# Patient Record
Sex: FEMALE | Race: WHITE | Employment: OTHER | URBAN - METROPOLITAN AREA
[De-identification: names, ages, dates, MRNs, and addresses within clinical notes are randomized per-mention and may not be internally consistent; named-entity substitution may affect disease eponyms.]

---

## 2017-01-01 ENCOUNTER — CARE COORDINATION (OUTPATIENT)
Dept: CARDIOLOGY | Facility: CLINIC | Age: 82
End: 2017-01-01

## 2017-01-01 ENCOUNTER — ALLIED HEALTH/NURSE VISIT (OUTPATIENT)
Dept: CARDIOLOGY | Facility: CLINIC | Age: 82
End: 2017-01-01
Payer: COMMERCIAL

## 2017-01-01 DIAGNOSIS — I50.9 ACUTE ON CHRONIC CONGESTIVE HEART FAILURE, UNSPECIFIED CONGESTIVE HEART FAILURE TYPE: ICD-10-CM

## 2017-01-01 DIAGNOSIS — E03.9 HYPOTHYROIDISM, UNSPECIFIED TYPE: Primary | ICD-10-CM

## 2017-01-01 DIAGNOSIS — Z95.0 CARDIAC PACEMAKER IN SITU: Primary | ICD-10-CM

## 2017-01-01 PROCEDURE — 93296 REM INTERROG EVL PM/IDS: CPT | Performed by: INTERNAL MEDICINE

## 2017-01-01 PROCEDURE — 93294 REM INTERROG EVL PM/LDLS PM: CPT | Performed by: INTERNAL MEDICINE

## 2017-01-01 RX ORDER — TORSEMIDE 20 MG/1
TABLET ORAL
Qty: 30 TABLET | COMMUNITY
Start: 2017-01-01 | End: 2018-01-01

## 2017-01-01 RX ORDER — TORSEMIDE 20 MG/1
TABLET ORAL
Qty: 30 TABLET | COMMUNITY
Start: 2017-01-01 | End: 2017-01-01

## 2017-01-01 RX ORDER — LEVOTHYROXINE SODIUM 112 UG/1
TABLET ORAL
Qty: 90 TABLET | Refills: 0 | Status: SHIPPED | OUTPATIENT
Start: 2017-01-01 | End: 2018-01-01

## 2017-01-01 RX ORDER — HYDRALAZINE HYDROCHLORIDE 25 MG/1
25 TABLET, FILM COATED ORAL 3 TIMES DAILY
Qty: 270 TABLET | Refills: 3 | Status: ON HOLD | OUTPATIENT
Start: 2017-01-01 | End: 2018-01-01

## 2017-01-09 ENCOUNTER — TELEPHONE (OUTPATIENT)
Dept: INTERNAL MEDICINE | Facility: CLINIC | Age: 82
End: 2017-01-09

## 2017-01-09 DIAGNOSIS — R10.32 LEFT GROIN PAIN: Primary | ICD-10-CM

## 2017-01-09 RX ORDER — TRAMADOL HYDROCHLORIDE 50 MG/1
50 TABLET ORAL EVERY 6 HOURS PRN
Qty: 30 TABLET | Refills: 0 | Status: SHIPPED | OUTPATIENT
Start: 2017-01-09 | End: 2017-02-23

## 2017-01-09 NOTE — TELEPHONE ENCOUNTER
"I cant say what is causing her pain without seeing her.    She does have problems with the arteries into the left leg based on the ultrasound test performed last week, I am not sure if this is playing any role or not.  It could be hip joint arthritis, it could be muscles/soft tissues, not sure until I examine her.   Any kind of \"10/10 pain\" should be evaluated.  If truly \"10/10\" and so bad that you cannot bear any weight, then evaluation in the ER may be indicated.    I would avoid Motrin kind of medications due to kidney problems.   I can send in a prescription for pain medication to start with, but this is only to hopefully provide some temporary symptom relief until she can get in top see us in the next couple of days.   Try Tramadol pain medication 50 mg, 3-4 times per day as needed to help take edge off the pain.  This may cause drowsiness and constipation, stomach upset.  Stop it if you have side effects.  DO not drive with this medication if you have any side effects or drowsiness.   Hand signed RX faxed to the specific pharmacy by myself.   "

## 2017-01-09 NOTE — TELEPHONE ENCOUNTER
"Received call from Mary .[phone]:      Developed spontaneous lt  groin pain about 2-3 weeks ago, HC nurse  thought pulled muscle in groin.  Pain calmed down until last evening, pain severe, rating \"10\" on 1-10 scale. Unable to put weight on leg,  had to lift legs into bed, slept minimal. This AM pain slightly better, using walker.   Lt leg swollen, due to lymphedema    Pt reluctant to to take medication due to her kidney function, she has not tried tylenol.  Asking what she should take?    Do you think should be seen?   Pharm entered if rx for pain med appropriate      "

## 2017-01-10 ENCOUNTER — CARE COORDINATION (OUTPATIENT)
Dept: CASE MANAGEMENT | Facility: CLINIC | Age: 82
End: 2017-01-10

## 2017-01-10 NOTE — PROGRESS NOTES
Clinic Care Coordination Contact  Care Team Conversations    Per [facility] chart review patient is still receiving services from [facility].    Plan:  Secure e-mail sent to home care , Vero Fulton RN, asking for progress update.  Will wait update from Vero.  CARMELLA CC will check on home care status in 2-3 weeks.    ALLYN Brown, RN, PHN  [facility] Care Coordinator  [phone]  January 10, 2017

## 2017-01-11 NOTE — PROGRESS NOTES
Clinic Care Coordination Contact  Care Team Conversations    Update received from Vero Fulton, RN with [facility].  She said that OT lymphedema finished last week due to LIZABETH results indicating moderate to severe PAD and compression is not recommended.  Home care discharge is planned for 1/13/2017 per patient request.  She is ready to start driving.  Home PT update from yesterday noted patient has groin pain and the MD gave RX for Tramadol and she is to schedule follow up with PCP.    Discharge plan may change after home care visit on 1/13/2017.    Plan:  RN CC will check on home care status next week.    ALYLN Brown, RN, PHN  [facility] Care Coordinator  [phone]  January 11, 2017

## 2017-01-12 ENCOUNTER — OFFICE VISIT (OUTPATIENT)
Dept: CARDIOLOGY | Facility: CLINIC | Age: 82
End: 2017-01-12
Payer: COMMERCIAL

## 2017-01-12 VITALS
WEIGHT: 189 LBS | BODY MASS INDEX: 32.27 KG/M2 | DIASTOLIC BLOOD PRESSURE: 64 MMHG | HEART RATE: 68 BPM | HEIGHT: 64 IN | SYSTOLIC BLOOD PRESSURE: 136 MMHG

## 2017-01-12 DIAGNOSIS — I50.22 CHRONIC SYSTOLIC HEART FAILURE (H): ICD-10-CM

## 2017-01-12 DIAGNOSIS — R60.0 LOCALIZED EDEMA: ICD-10-CM

## 2017-01-12 DIAGNOSIS — I42.9 CARDIOMYOPATHY, IDIOPATHIC (H): ICD-10-CM

## 2017-01-12 DIAGNOSIS — I50.22 CHRONIC SYSTOLIC CONGESTIVE HEART FAILURE (H): Primary | ICD-10-CM

## 2017-01-12 LAB
ANION GAP SERPL CALCULATED.3IONS-SCNC: 13.5 MMOL/L (ref 6–17)
BUN SERPL-MCNC: 69 MG/DL (ref 7–30)
CALCIUM SERPL-MCNC: 9.4 MG/DL (ref 8.5–10.5)
CHLORIDE SERPL-SCNC: 105 MMOL/L (ref 98–107)
CO2 SERPL-SCNC: 25 MMOL/L (ref 23–29)
CREAT SERPL-MCNC: 1.95 MG/DL (ref 0.7–1.3)
GFR SERPL CREATININE-BSD FRML MDRD: 24 ML/MIN/1.7M2
GLUCOSE SERPL-MCNC: 91 MG/DL (ref 70–105)
POTASSIUM SERPL-SCNC: 5.5 MMOL/L (ref 3.5–5.1)
SODIUM SERPL-SCNC: 138 MMOL/L (ref 136–145)

## 2017-01-12 PROCEDURE — 80048 BASIC METABOLIC PNL TOTAL CA: CPT | Performed by: PHYSICIAN ASSISTANT

## 2017-01-12 PROCEDURE — 36415 COLL VENOUS BLD VENIPUNCTURE: CPT | Performed by: PHYSICIAN ASSISTANT

## 2017-01-12 PROCEDURE — 99205 OFFICE O/P NEW HI 60 MIN: CPT | Performed by: INTERNAL MEDICINE

## 2017-01-12 RX ORDER — SPIRONOLACTONE 25 MG/1
12.5 TABLET ORAL DAILY
Qty: 90 TABLET | Refills: 3 | COMMUNITY
Start: 2017-01-12 | End: 2017-08-23

## 2017-01-12 NOTE — PROGRESS NOTES
HPI and Plan:   See dictation    Orders Placed This Encounter   Procedures     ICD/Pacemaker/Loop Recorder Procedure       Orders Placed This Encounter   Medications     spironolactone (ALDACTONE) 25 MG tablet     Sig: Take 0.5 tablets (12.5 mg) by mouth daily     Dispense:  90 tablet     Refill:  3       Medications Discontinued During This Encounter   Medication Reason     spironolactone (ALDACTONE) 25 MG tablet Reorder         Encounter Diagnoses   Name Primary?     Chronic systolic congestive heart failure (H) Yes     Cardiomyopathy, idiopathic (H)      Localized edema        CURRENT MEDICATIONS:  Current Outpatient Prescriptions   Medication Sig Dispense Refill     spironolactone (ALDACTONE) 25 MG tablet Take 0.5 tablets (12.5 mg) by mouth daily 90 tablet 3     traMADol (ULTRAM) 50 MG tablet Take 1 tablet (50 mg) by mouth every 6 hours as needed for moderate pain or severe pain maximum 4 tablet(s) per day 30 tablet 0     isosorbide mononitrate (IMDUR) 30 MG 24 hr tablet 1/2 tablet daily AT NIGHT 45 tablet 3     metolazone (ZAROXOLYN) 2.5 MG tablet As directed by cardiology 10 tablet 3     torsemide (DEMADEX) 20 MG tablet Take 2 tablets (40 mg) by mouth 2 times daily 90 tablet 3     hydrALAZINE (APRESOLINE) 25 MG tablet Take 1 tablet (25 mg) by mouth 3 times daily 270 tablet 3     Carvedilol (COREG PO) Take 25 mg by mouth 2 times daily (with meals)       MELATONIN PO Take 3 mg by mouth nightly as needed       fluticasone (FLONASE) 50 MCG/ACT nasal spray Spray 1-2 sprays into both nostrils daily as needed for rhinitis or allergies (TAKE ONCE DAILY DURIGN ALLERGY SEASON) 16 g 11     aspirin 81 MG tablet Take 81 mg by mouth daily       Iron TABS Take 324 mg by mouth daily Patient states she takes OTC iron furrous gluconate 324 mg tablets USP       levothyroxine (SYNTHROID, LEVOTHROID) 112 MCG tablet Take 1 tablet (112 mcg) by mouth daily 90 tablet 3     simvastatin (ZOCOR) 20 MG tablet Take 1 tablet (20 mg) by  mouth At Bedtime 90 tablet 3     calcium citrate (CALCITRATE) 950 MG tablet Take 2 tablets by mouth daily At Noon       VITAMIN D, CHOLECALCIFEROL, PO Take 2,000 Units by mouth daily        Lutein 20 MG CAPS Take 20 mg by mouth daily       prednisoLONE acetate (PRED FORTE) 1 % ophthalmic suspension Place 1 drop into the right eye every evening  1 Bottle      Multiple Vitamins-Minerals (MULTIVITAMIN OR) Take 1 tablet by mouth daily Takes with lunch       Omega-3 Fatty Acids (FISH OIL PO) Take 1,500 mg by mouth daily.       [DISCONTINUED] spironolactone (ALDACTONE) 25 MG tablet Take 1 tablet (25 mg) by mouth daily 90 tablet 3       ALLERGIES     Allergies   Allergen Reactions     Atenolol Anaphylaxis     Hydrochlorothiazide      hyponatremia     Pollen Extract        PAST MEDICAL HISTORY:  Past Medical History   Diagnosis Date     Hypertension      CHF (NYHA class II, ACC/AHA stage C) (H) 2/14/2013     Hypothyroidism 2/14/2013     Cardiomyopathy, idiopathic (H)      cardiomyopathy HTN vs viral; EF as low as 15% in 2009     Aortic stenosis      CAD (coronary artery disease)      CT calcium xwetp=883 May 2013     Glaucoma      Pulmonary hypertension (H)      H/O angiography 9-1-2016     No angiographic evidence of obstructive coronary artery disease.       PAST SURGICAL HISTORY:  Past Surgical History   Procedure Laterality Date     Gyn surgery       Hysterectomy 1995     Orthopedic surgery       knee replacement 1998     Orthopedic surgery       Knee replacement 2008     Orthopedic surgery       knee surgery [place]     Hysterectomy, pap no longer indicated       Cardiac catherization  9/1/16       FAMILY HISTORY:  Family History   Problem Relation Age of Onset     CANCER Mother      Uterius     Breast Cancer Daughter        SOCIAL HISTORY:  Social History     Social History     Marital Status:      Spouse Name: N/A     Number of Children: N/A     Years of Education: N/A     Social History Main Topics     Smoking  status: Never Smoker      Smokeless tobacco: Never Used     Alcohol Use: No     Drug Use: No     Sexual Activity: No     Other Topics Concern     Parent/Sibling W/ Cabg, Mi Or Angioplasty Before 65f 55m? No     Caffeine Concern No     Sleep Concern No     Stress Concern No     Weight Concern No     Special Diet Yes     low sodium     Exercise Yes     therapy     Seat Belt Yes     Social History Narrative       Review of Systems:  Skin:          Eyes:         ENT:         Respiratory:  Negative   uses O2 at night   Cardiovascular:    edema;Positive for;fatigue;exercise intolerance edema in LE improving and in abdomen, but now edema in thighs  Gastroenterology: Negative      Genitourinary:  Negative      Musculoskeletal:  Positive for arthritis    Neurologic:  Negative      Psychiatric:  Negative      Heme/Lymph/Imm:  Positive for allergies;anemia    Endocrine:  Positive for thyroid disorder      [id number]

## 2017-01-12 NOTE — MR AVS SNAPSHOT
After Visit Summary   1/12/2017    Mckayla Oconnell    MRN: [id number]           Patient Information     Date Of Birth          7/2/1927        Visit Information        Provider Department      1/12/2017 4:15 PM Levi Charles MD [facility]        Care Instructions    1.  Hold spironolactone on Friday and Saturday.  2.  On Arthur 01/15/2017 restart at half-dose 12.5 mg daily (currently you are taking 25 mg daily).         Follow-ups after your visit        Your next 10 appointments already scheduled     Jan 13, 2017  3:50 PM   Core Return with Divya Kraft PA-C   [facility] ([facility])    [address] [place] [id number] [phone]              Who to contact     If you have questions or need follow up information about today's clinic visit or your schedule please contact [facility] directly at [phone].  Normal or non-critical lab and imaging results will be communicated to you by [employer], letter or phone within 4 business days after the clinic has received the results. If you do not hear from us within 7 days, please contact the clinic through [email or web address] or phone. If you have a critical or abnormal lab result, we will notify you by phone as soon as possible.  Submit refill requests through [employer] or call your pharmacy and they will forward the refill request to us. Please allow 3 business days for your refill to be completed.          Additional Information About Your Visit        QuadW[employer]hart Information     [employer] gives you secure access to your electronic health record. If you see a primary care provider, you can also send messages to your care team and make appointments. If you have questions, please call your primary care clinic.  If you do not have a primary care provider, please call [phone] and they  "will assist you.        Care EveryWhere ID     This is your Care EveryWhere ID. This could be used by other organizations to access your [place] medical records  [id number]        Your Vitals Were     Pulse Height BMI (Body Mass Index)             68 1.626 m (5' 4\") 32.43 kg/m2          Blood Pressure from Last 3 Encounters:   01/12/17 136/64   12/19/16 132/64   12/02/16 128/50    Weight from Last 3 Encounters:   01/12/17 85.73 kg (189 lb)   12/19/16 85.73 kg (189 lb)   12/02/16 85.049 kg (187 lb 8 oz)              Today, you had the following     No orders found for display       Primary Care Provider Office Phone # Fax #    Ander Shrestha [phone]       [facility] [address] [facility] [address]        Thank you!     Thank you for choosing [facility] [place]  for your care. Our goal is always to provide you with excellent care. Hearing back from our patients is one way we can continue to improve our services. Please take a few minutes to complete the written survey that you may receive in the mail after your visit with us. Thank you!             Your Updated Medication List - Protect others around you: Learn how to safely use, store and throw away your medicines at www.disposemymeds.org.          This list is accurate as of: 1/12/17  4:50 PM.  Always use your most recent med list.                   Brand Name Dispense Instructions for use    aspirin 81 MG tablet      Take 81 mg by mouth daily       calcium citrate 950 MG tablet    CALCITRATE     Take 2 tablets by mouth daily At Noon       COREG PO      Take 25 mg by mouth 2 times daily (with meals)       FISH OIL PO      Take 1,500 mg by mouth daily.       fluticasone 50 MCG/ACT spray    FLONASE    16 g    Spray 1-2 sprays into both nostrils daily as needed for rhinitis or allergies (TAKE ONCE DAILY DURIGN ALLERGY SEASON)       hydrALAZINE 25 MG tablet    APRESOLINE    270 tablet    " Take 1 tablet (25 mg) by mouth 3 times daily       Iron Tabs      Take 324 mg by mouth daily Patient states she takes OTC iron furrous gluconate 324 mg tablets USP       isosorbide mononitrate 30 MG 24 hr tablet    IMDUR    45 tablet    1/2 tablet daily AT NIGHT       levothyroxine 112 MCG tablet    SYNTHROID/LEVOTHROID    90 tablet    Take 1 tablet (112 mcg) by mouth daily       Lutein 20 MG Caps      Take 20 mg by mouth daily       MELATONIN PO      Take 3 mg by mouth nightly as needed       metolazone 2.5 MG tablet    ZAROXOLYN    10 tablet    As directed by cardiology       MULTIVITAMIN PO      Take 1 tablet by mouth daily Takes with lunch       prednisoLONE acetate 1 % ophthalmic susp    PRED FORTE    1 Bottle    Place 1 drop into the right eye every evening       simvastatin 20 MG tablet    ZOCOR    90 tablet    Take 1 tablet (20 mg) by mouth At Bedtime       spironolactone 25 MG tablet    ALDACTONE    90 tablet    Take 1 tablet (25 mg) by mouth daily       torsemide 20 MG tablet    DEMADEX    90 tablet    Take 2 tablets (40 mg) by mouth 2 times daily       traMADol 50 MG tablet    ULTRAM    30 tablet    Take 1 tablet (50 mg) by mouth every 6 hours as needed for moderate pain or severe pain maximum 4 tablet(s) per day       VITAMIN D (CHOLECALCIFEROL) PO      Take 2,000 Units by mouth daily

## 2017-01-12 NOTE — PATIENT INSTRUCTIONS
1.  Hold spironolactone on Friday and Saturday.  2.  On Arthur 01/15/2017 restart at half-dose 12.5 mg daily (currently you are taking 25 mg daily).

## 2017-01-12 NOTE — Clinical Note
"1/12/2017    Ander Shrestha MD  [facility]   [address] [facility] [address]    RE: Mckayla Oconnell       Dear Colleague,    It was my pleasure seeing Ms. Mckayla Oconnell for evaluation of cardiac resynchronization therapy.  She is a delightful 89-year-old woman with severe non-ischemic cardiomyopathy and EF currently of 25%-30%.  She has been referred by Dr. Romeo Echols and MANAS King, for CRT evaluation.  Ms. Oconnell has had issues with congestive heart failure and told me she has been\" battling\" excess fluid for several months.  She was hospitalized in November with CHF and she was diuresed 20 pounds.  She has moderate to severe aortic stenosis and has been evaluated by the TAVR team.  Her aortic stenosis was not deemed to be severe enough to pursue TAVR at this time.  She also has moderate mitral regurgitation and moderate tricuspid regurgitation, chronic kidney disease and dyslipidemia.      She came in today with her daughter, Sandra, and her .  She still cooks her own meals and does some of her house chores.  She is very limited, however.  She told me she has been very tired \"battling the fluid.\"  Thankfully, her weight has been relatively stable over the past 1 to 2 weeks.  She is on torsemide 40 mg b.i.d. and takes an occasional metolazone.  She is on spironolactone 25 mg daily.      She has no chest pain, orthopnea or PND at this time.  She has chronic significant lower extremity edema.  Her lower extremities are typically wrapped.      PHYSICAL EXAMINATION:   VITAL SIGNS:  Blood pressure 136/64, pulse 68 and regular, weight 85.7 kg, height 163 cm.   GENERAL:  She is a pleasant, elderly woman who is alert, oriented and responds appropriately to questions.   HEENT:  Normocephalic, atraumatic.   NECK:  Supple without carotid bruits.   LUNGS:  With mildly decreased breath sounds, slightly diminished at the left base.   CARDIOVASCULAR:  JVP is approximately " 8-10 cm of water with V waves, regular rhythm with a 2/6 systolic ejection murmur at the base.  No apparent gallop.   ABDOMEN:  Soft, nontender, mildly obese.   EXTREMITIES:  With 2+ to 3+ edema bilaterally.      DIAGNOSTIC STUDIES:    A recent 12-lead ECG showed sinus rhythm with a left bundle branch block, QRS duration of 160 msec.      Her echocardiogram earlier in the year showed EF of 30% with severe global hypokinesis, aortic valve that was severely sclerotic with a mean gradient of 26 mmHg and felt to represent severe AS in the setting of low LV function.  2+ MR, 2+ TR, severe biatrial enlargement, normal size right ventricle with global hypokinesis.      No facility-administered encounter medications on file as of 1/12/2017.     Outpatient Encounter Prescriptions as of 1/12/2017   Medication Sig Dispense Refill     spironolactone (ALDACTONE) 25 MG tablet Take 0.5 tablets (12.5 mg) by mouth daily 90 tablet 3     traMADol (ULTRAM) 50 MG tablet Take 1 tablet (50 mg) by mouth every 6 hours as needed for moderate pain or severe pain maximum 4 tablet(s) per day 30 tablet 0     isosorbide mononitrate (IMDUR) 30 MG 24 hr tablet 1/2 tablet daily AT NIGHT 45 tablet 3     metolazone (ZAROXOLYN) 2.5 MG tablet As directed by cardiology 10 tablet 3     torsemide (DEMADEX) 20 MG tablet Take 2 tablets (40 mg) by mouth 2 times daily 90 tablet 3     hydrALAZINE (APRESOLINE) 25 MG tablet Take 1 tablet (25 mg) by mouth 3 times daily 270 tablet 3     Carvedilol (COREG PO) Take 25 mg by mouth 2 times daily (with meals)       MELATONIN PO Take 3 mg by mouth nightly as needed       fluticasone (FLONASE) 50 MCG/ACT nasal spray Spray 1-2 sprays into both nostrils daily as needed for rhinitis or allergies (TAKE ONCE DAILY DURIGN ALLERGY SEASON) 16 g 11     aspirin 81 MG tablet Take 81 mg by mouth daily       Iron TABS Take 324 mg by mouth daily Patient states she takes OTC iron furrous gluconate 324 mg tablets USP       levothyroxine  (SYNTHROID, LEVOTHROID) 112 MCG tablet Take 1 tablet (112 mcg) by mouth daily 90 tablet 3     simvastatin (ZOCOR) 20 MG tablet Take 1 tablet (20 mg) by mouth At Bedtime 90 tablet 3     calcium citrate (CALCITRATE) 950 MG tablet Take 2 tablets by mouth daily At Noon       VITAMIN D, CHOLECALCIFEROL, PO Take 2,000 Units by mouth daily        Lutein 20 MG CAPS Take 20 mg by mouth daily       prednisoLONE acetate (PRED FORTE) 1 % ophthalmic suspension Place 1 drop into the right eye every evening  1 Bottle      Multiple Vitamins-Minerals (MULTIVITAMIN OR) Take 1 tablet by mouth daily Takes with lunch       Omega-3 Fatty Acids (FISH OIL PO) Take 1,500 mg by mouth daily.       [DISCONTINUED] spironolactone (ALDACTONE) 25 MG tablet Take 1 tablet (25 mg) by mouth daily 90 tablet 3     IMPRESSION:   1.  Non-ischemic cardiomyopathy with congestive heart failure, left bundle branch block and moderate to severe aortic stenosis.  Ms. Oconnell is a frail elderly woman with advanced heart failure due to nonischemic cardiomyopathy.  NYHA Class III.  She has a wide left bundle branch block and is a candidate for cardiac resynchronization therapy.      Given her advanced age and comorbidities, the likelihood of CRT making a real difference is about 50%.  There is an approximately 3% risk of serious complication with this procedure, perhaps even higher given her overall condition.  I went over the technical aspects, risks and benefits of the procedure as well as restrictions following the procedure and anticipated recovery time.      We had a good conversation.  She told me that several physicians, including Dr. Echols and Dr. Harrison, have told her that CRT is something she should consider because we really do not have other good options to help her.  She is willing to go ahead with the procedure at this time.  She appears to understand the risks involved.      2.  Hyperkalemia, worsening renal function.  Her potassium today was 5.5  with a creatinine of 1.95, which is increased from 1.73 on 12/30/2016.  I asked her to hold spironolactone for 2 days and restart it at half dose on 01/15/2017.      RECOMMENDATIONS:   A. Hold spironolactone for 2 days and then resume at half dose, 12.5 mg daily, on 01/15/2017.   B.  Proceed with CRT-pacemaker implantation next week.  We will minimize the use of IV contrast, but I did explain to the patient's that a small amount of contrast is necessary for this procedure.      I do appreciate the opportunity to be involved in Ms. Oconnell's care.  Please feel free to contact me with questions or concerns.      I spent a substantial amount of time in the clinic with the patient and her family and most of the time was spent in discussion of CRT.                                   Again, thank you for allowing me to participate in the care of your patient.      Sincerely,    Levi Charles MD     [facility]    cc:   Romeo Echols MD     [facility] [place]    [address], [address]   [place]  [address]        Divya Kraft PA-C    [facility]    [address], [address]   [place]  [address]

## 2017-01-13 NOTE — PROGRESS NOTES
"HISTORY OF PRESENT ILLNESS:    It was my pleasure seeing Ms. Mckayla Oconnell for evaluation of cardiac resynchronization therapy.  She is a delightful 89-year-old woman with severe non-ischemic cardiomyopathy and EF currently of 25%-30%.  She has been referred by Dr. Romeo Echols and MANAS King, for CRT evaluation.  Ms. Oconnell has had issues with congestive heart failure and told me she has been\" battling\" excess fluid for several months.  She was hospitalized in November with CHF and she was diuresed 20 pounds.  She has moderate to severe aortic stenosis and has been evaluated by the TAVR team.  Her aortic stenosis was not deemed to be severe enough to pursue TAVR at this time.  She also has moderate mitral regurgitation and moderate tricuspid regurgitation, chronic kidney disease and dyslipidemia.      She came in today with her daughter, Sandra, and her .  She still cooks her own meals and does some of her house chores.  She is very limited, however.  She told me she has been very tired \"battling the fluid.\"  Thankfully, her weight has been relatively stable over the past 1 to 2 weeks.  She is on torsemide 40 mg b.i.d. and takes an occasional metolazone.  She is on spironolactone 25 mg daily.      She has no chest pain, orthopnea or PND at this time.  She has chronic significant lower extremity edema.  Her lower extremities are typically wrapped.      PHYSICAL EXAMINATION:   VITAL SIGNS:  Blood pressure 136/64, pulse 68 and regular, weight 85.7 kg, height 163 cm.   GENERAL:  She is a pleasant, elderly woman who is alert, oriented and responds appropriately to questions.   HEENT:  Normocephalic, atraumatic.   NECK:  Supple without carotid bruits.   LUNGS:  With mildly decreased breath sounds, slightly diminished at the left base.   CARDIOVASCULAR:  JVP is approximately 8-10 cm of water with V waves, regular rhythm with a 2/6 systolic ejection murmur at the base.  No apparent gallop.   ABDOMEN:  " Soft, nontender, mildly obese.   EXTREMITIES:  With 2+ to 3+ edema bilaterally.      DIAGNOSTIC STUDIES:    A recent 12-lead ECG showed sinus rhythm with a left bundle branch block, QRS duration of 160 msec.      Her echocardiogram earlier in the year showed EF of 30% with severe global hypokinesis, aortic valve that was severely sclerotic with a mean gradient of 26 mmHg and felt to represent severe AS in the setting of low LV function.  2+ MR, 2+ TR, severe biatrial enlargement, normal size right ventricle with global hypokinesis.      IMPRESSION:   1.  Non-ischemic cardiomyopathy with congestive heart failure, left bundle branch block and moderate to severe aortic stenosis.  Ms. Oconnell is a frail elderly woman with advanced heart failure due to nonischemic cardiomyopathy.  NYHA Class III.  She has a wide left bundle branch block and is a candidate for cardiac resynchronization therapy.      Given her advanced age and comorbidities, the likelihood of CRT making a real difference is about 50%.  There is an approximately 3% risk of serious complication with this procedure, perhaps even higher given her overall condition.  I went over the technical aspects, risks and benefits of the procedure as well as restrictions following the procedure and anticipated recovery time.      We had a good conversation.  She told me that several physicians, including Dr. Echols and Dr. Harrison, have told her that CRT is something she should consider because we really do not have other good options to help her.  She is willing to go ahead with the procedure at this time.  She appears to understand the risks involved.      2.  Hyperkalemia, worsening renal function.  Her potassium today was 5.5 with a creatinine of 1.95, which is increased from 1.73 on 12/30/2016.  I asked her to hold spironolactone for 2 days and restart it at half dose on 01/15/2017.      RECOMMENDATIONS:   A. Hold spironolactone for 2 days and then resume at half  dose, 12.5 mg daily, on 01/15/2017.   B.  Proceed with CRT-pacemaker implantation next week.  We will minimize the use of IV contrast, but I did explain to the patient's that a small amount of contrast is necessary for this procedure.      I do appreciate the opportunity to be involved in Ms. Oconnell's care.  Please feel free to contact me with questions or concerns.      I spent a substantial amount of time in the clinic with the patient and her family and most of the time was spent in discussion of CRT.        JUSTIN MUSA MD, [facility]        cc:   Ander Shrestha MD   [facility]   [address]  [address]       Romeo Echols MD     [facility]    [address], [address] [place] [address]  [address]        Divya Kraft PA-C    [facility]    [address], [address] [place] [address]  [address]            D: 2017 17:09   T: 2017 09:59   MT: MARLON      Name:     VICKEY OCONNELL   MRN:      [id number]        Account:      [id number]   :      1927           Service Date: 2017      Document: [id number]

## 2017-01-16 ENCOUNTER — TELEPHONE (OUTPATIENT)
Dept: CARDIOLOGY | Facility: CLINIC | Age: 82
End: 2017-01-16

## 2017-01-16 DIAGNOSIS — I50.22 CHRONIC SYSTOLIC HEART FAILURE (H): Primary | ICD-10-CM

## 2017-01-16 NOTE — TELEPHONE ENCOUNTER
"TELEMANAGEMENT: No Call Variance, pt has not called in weight since 1/12. Reviewed EPIC, pt saw Dr. Charles 1/12, at which time it was recommended she get BiV pacemaker, at that is scheduled for this week on 1/19. Called pt, she states she just needed a little break or vacation from weighing every day and calling it it. She is having the pacemaker surgery on Thursday, and will start weighing again on Friday and calling in. Explained importance of daily weights and calling in to patient, she stated understanding, but is going to continue on her \"weight vacation\" until after her procedure on Thursday. She denies SOB, swelling, or bloating. Will continue to monitor. CARMELLA Gee  "

## 2017-01-17 NOTE — PROGRESS NOTES
Clinic Care Coordination Contact  Care Team Conversations    Update received from CARMELLA Pahm with [facility] that she will be seeing Mckayla for a few more weeks.  She is having a pacemaker implanted on 1/19/2017.    Plan:  RN CC will check on home care status in 2-3 weeks.      ALLYN Brown, RN, PHN  [facility] Care Coordinator  [phone]  January 17, 2017

## 2017-01-18 DIAGNOSIS — I42.8 NON-ISCHEMIC CARDIOMYOPATHY (H): Primary | ICD-10-CM

## 2017-01-18 RX ORDER — SODIUM CHLORIDE 450 MG/100ML
INJECTION, SOLUTION INTRAVENOUS CONTINUOUS
Status: CANCELLED | OUTPATIENT
Start: 2017-01-18

## 2017-01-18 RX ORDER — LIDOCAINE 40 MG/G
CREAM TOPICAL
Status: CANCELLED | OUTPATIENT
Start: 2017-01-18

## 2017-01-18 RX ORDER — CEFAZOLIN SODIUM 2 G/100ML
2 INJECTION, SOLUTION INTRAVENOUS
Status: CANCELLED | OUTPATIENT
Start: 2017-01-18

## 2017-01-19 ENCOUNTER — APPOINTMENT (OUTPATIENT)
Dept: CARDIOLOGY | Facility: CLINIC | Age: 82
End: 2017-01-19
Attending: INTERNAL MEDICINE
Payer: COMMERCIAL

## 2017-01-19 ENCOUNTER — APPOINTMENT (OUTPATIENT)
Dept: GENERAL RADIOLOGY | Facility: CLINIC | Age: 82
End: 2017-01-19
Attending: INTERNAL MEDICINE
Payer: COMMERCIAL

## 2017-01-19 ENCOUNTER — HOSPITAL ENCOUNTER (OUTPATIENT)
Facility: CLINIC | Age: 82
Discharge: HOME | End: 2017-01-19
Attending: INTERNAL MEDICINE | Admitting: INTERNAL MEDICINE
Payer: COMMERCIAL

## 2017-01-19 VITALS
BODY MASS INDEX: 30.42 KG/M2 | HEIGHT: 65 IN | SYSTOLIC BLOOD PRESSURE: 115 MMHG | OXYGEN SATURATION: 96 % | DIASTOLIC BLOOD PRESSURE: 60 MMHG | HEART RATE: 56 BPM | RESPIRATION RATE: 18 BRPM | WEIGHT: 182.6 LBS

## 2017-01-19 DIAGNOSIS — I50.22 CHRONIC SYSTOLIC CONGESTIVE HEART FAILURE (H): ICD-10-CM

## 2017-01-19 LAB
ANION GAP SERPL CALCULATED.3IONS-SCNC: 9 MMOL/L (ref 3–14)
BUN SERPL-MCNC: 84 MG/DL (ref 7–30)
CALCIUM SERPL-MCNC: 8.6 MG/DL (ref 8.5–10.1)
CHLORIDE SERPL-SCNC: 106 MMOL/L (ref 94–109)
CO2 SERPL-SCNC: 24 MMOL/L (ref 20–32)
CREAT SERPL-MCNC: 1.91 MG/DL (ref 0.52–1.04)
ERYTHROCYTE [DISTWIDTH] IN BLOOD BY AUTOMATED COUNT: 15.7 % (ref 10–15)
GFR SERPL CREATININE-BSD FRML MDRD: 25 ML/MIN/1.7M2
GLUCOSE SERPL-MCNC: 82 MG/DL (ref 70–99)
HCT VFR BLD AUTO: 27.5 % (ref 35–47)
HGB BLD-MCNC: 9 G/DL (ref 11.7–15.7)
MCH RBC QN AUTO: 30.5 PG (ref 26.5–33)
MCHC RBC AUTO-ENTMCNC: 32.7 G/DL (ref 31.5–36.5)
MCV RBC AUTO: 93 FL (ref 78–100)
PLATELET # BLD AUTO: 161 10E9/L (ref 150–450)
POTASSIUM SERPL-SCNC: 4.4 MMOL/L (ref 3.4–5.3)
RBC # BLD AUTO: 2.95 10E12/L (ref 3.8–5.2)
SODIUM SERPL-SCNC: 139 MMOL/L (ref 133–144)
WBC # BLD AUTO: 5.3 10E9/L (ref 4–11)

## 2017-01-19 PROCEDURE — 02H63JZ INSERTION OF PACEMAKER LEAD INTO RIGHT ATRIUM, PERCUTANEOUS APPROACH: ICD-10-PCS | Performed by: INTERNAL MEDICINE

## 2017-01-19 PROCEDURE — 40000940 XR CHEST 2 VW

## 2017-01-19 PROCEDURE — 02HK3JZ INSERTION OF PACEMAKER LEAD INTO RIGHT VENTRICLE, PERCUTANEOUS APPROACH: ICD-10-PCS | Performed by: INTERNAL MEDICINE

## 2017-01-19 PROCEDURE — 99153 MOD SED SAME PHYS/QHP EA: CPT

## 2017-01-19 PROCEDURE — 93005 ELECTROCARDIOGRAM TRACING: CPT

## 2017-01-19 PROCEDURE — C1892 INTRO/SHEATH,FIXED,PEEL-AWAY: HCPCS

## 2017-01-19 PROCEDURE — 33208 INSRT HEART PM ATRIAL & VENT: CPT | Performed by: INTERNAL MEDICINE

## 2017-01-19 PROCEDURE — 36415 COLL VENOUS BLD VENIPUNCTURE: CPT | Performed by: INTERNAL MEDICINE

## 2017-01-19 PROCEDURE — 33225 L VENTRIC PACING LEAD ADD-ON: CPT | Performed by: INTERNAL MEDICINE

## 2017-01-19 PROCEDURE — C1898 LEAD, PMKR, OTHER THAN TRANS: HCPCS

## 2017-01-19 PROCEDURE — 25000132 ZZH RX MED GY IP 250 OP 250 PS 637: Performed by: INTERNAL MEDICINE

## 2017-01-19 PROCEDURE — 33208 INSRT HEART PM ATRIAL & VENT: CPT

## 2017-01-19 PROCEDURE — 27210886 ZZH ACCESSORY CR5

## 2017-01-19 PROCEDURE — 99152 MOD SED SAME PHYS/QHP 5/>YRS: CPT

## 2017-01-19 PROCEDURE — 25000125 ZZHC RX 250: Performed by: INTERNAL MEDICINE

## 2017-01-19 PROCEDURE — 80048 BASIC METABOLIC PNL TOTAL CA: CPT | Performed by: INTERNAL MEDICINE

## 2017-01-19 PROCEDURE — 40000852 ZZH STATISTIC HEART CATH LAB OR EP LAB

## 2017-01-19 PROCEDURE — 27210795 ZZH PAD DEFIB QUICK CR4

## 2017-01-19 PROCEDURE — 0JH607Z INSERTION OF CARDIAC RESYNCHRONIZATION PACEMAKER PULSE GENERATOR INTO CHEST SUBCUTANEOUS TISSUE AND FASCIA, OPEN APPROACH: ICD-10-PCS | Performed by: INTERNAL MEDICINE

## 2017-01-19 PROCEDURE — C1900 LEAD, CORONARY VENOUS: HCPCS

## 2017-01-19 PROCEDURE — 99153 MOD SED SAME PHYS/QHP EA: CPT | Performed by: INTERNAL MEDICINE

## 2017-01-19 PROCEDURE — 85027 COMPLETE CBC AUTOMATED: CPT | Performed by: INTERNAL MEDICINE

## 2017-01-19 PROCEDURE — 40000235 ZZH STATISTIC TELEMETRY

## 2017-01-19 PROCEDURE — C1730 CATH, EP, 19 OR FEW ELECT: HCPCS

## 2017-01-19 PROCEDURE — 99152 MOD SED SAME PHYS/QHP 5/>YRS: CPT | Performed by: INTERNAL MEDICINE

## 2017-01-19 PROCEDURE — 27210784 ZZH KIT PACEMAKER CR8

## 2017-01-19 PROCEDURE — 33225 L VENTRIC PACING LEAD ADD-ON: CPT

## 2017-01-19 PROCEDURE — 93010 ELECTROCARDIOGRAM REPORT: CPT | Mod: 59 | Performed by: INTERNAL MEDICINE

## 2017-01-19 PROCEDURE — 02HL3JZ INSERTION OF PACEMAKER LEAD INTO LEFT VENTRICLE, PERCUTANEOUS APPROACH: ICD-10-PCS | Performed by: INTERNAL MEDICINE

## 2017-01-19 PROCEDURE — 40000065 ZZH STATISTIC EKG NON-CHARGEABLE

## 2017-01-19 PROCEDURE — C2621 PMKR, OTHER THAN SING/DUAL: HCPCS

## 2017-01-19 PROCEDURE — 25500064 ZZH RX 255 OP 636: Performed by: INTERNAL MEDICINE

## 2017-01-19 RX ORDER — IBUTILIDE FUMARATE 1 MG/10ML
1 INJECTION, SOLUTION INTRAVENOUS
Status: DISCONTINUED | OUTPATIENT
Start: 2017-01-19 | End: 2017-01-19 | Stop reason: HOSPADM

## 2017-01-19 RX ORDER — PROMETHAZINE HYDROCHLORIDE 25 MG/ML
6.25-25 INJECTION, SOLUTION INTRAMUSCULAR; INTRAVENOUS EVERY 4 HOURS PRN
Status: DISCONTINUED | OUTPATIENT
Start: 2017-01-19 | End: 2017-01-19 | Stop reason: HOSPADM

## 2017-01-19 RX ORDER — SODIUM CHLORIDE 450 MG/100ML
INJECTION, SOLUTION INTRAVENOUS CONTINUOUS
Status: DISCONTINUED | OUTPATIENT
Start: 2017-01-19 | End: 2017-01-19 | Stop reason: HOSPADM

## 2017-01-19 RX ORDER — DOBUTAMINE HYDROCHLORIDE 200 MG/100ML
5-40 INJECTION INTRAVENOUS CONTINUOUS PRN
Status: DISCONTINUED | OUTPATIENT
Start: 2017-01-19 | End: 2017-01-19 | Stop reason: HOSPADM

## 2017-01-19 RX ORDER — LIDOCAINE HYDROCHLORIDE AND EPINEPHRINE 10; 10 MG/ML; UG/ML
10-30 INJECTION, SOLUTION INFILTRATION; PERINEURAL
Status: DISCONTINUED | OUTPATIENT
Start: 2017-01-19 | End: 2017-01-19 | Stop reason: HOSPADM

## 2017-01-19 RX ORDER — LORAZEPAM 2 MG/ML
.5-2 INJECTION INTRAMUSCULAR EVERY 10 MIN PRN
Status: DISCONTINUED | OUTPATIENT
Start: 2017-01-19 | End: 2017-01-19 | Stop reason: HOSPADM

## 2017-01-19 RX ORDER — FENTANYL CITRATE 50 UG/ML
25-50 INJECTION, SOLUTION INTRAMUSCULAR; INTRAVENOUS
Status: DISCONTINUED | OUTPATIENT
Start: 2017-01-19 | End: 2017-01-19 | Stop reason: HOSPADM

## 2017-01-19 RX ORDER — CEFAZOLIN SODIUM 2 G/100ML
2 INJECTION, SOLUTION INTRAVENOUS
Status: COMPLETED | OUTPATIENT
Start: 2017-01-19 | End: 2017-01-19

## 2017-01-19 RX ORDER — ONDANSETRON 2 MG/ML
4 INJECTION INTRAMUSCULAR; INTRAVENOUS EVERY 4 HOURS PRN
Status: DISCONTINUED | OUTPATIENT
Start: 2017-01-19 | End: 2017-01-19 | Stop reason: HOSPADM

## 2017-01-19 RX ORDER — DIPHENHYDRAMINE HYDROCHLORIDE 50 MG/ML
25-50 INJECTION INTRAMUSCULAR; INTRAVENOUS
Status: DISCONTINUED | OUTPATIENT
Start: 2017-01-19 | End: 2017-01-19 | Stop reason: HOSPADM

## 2017-01-19 RX ORDER — BUPIVACAINE HYDROCHLORIDE 2.5 MG/ML
10-30 INJECTION, SOLUTION EPIDURAL; INFILTRATION; INTRACAUDAL
Status: DISCONTINUED | OUTPATIENT
Start: 2017-01-19 | End: 2017-01-19 | Stop reason: HOSPADM

## 2017-01-19 RX ORDER — NALOXONE HYDROCHLORIDE 0.4 MG/ML
0.4 INJECTION, SOLUTION INTRAMUSCULAR; INTRAVENOUS; SUBCUTANEOUS EVERY 5 MIN PRN
Status: DISCONTINUED | OUTPATIENT
Start: 2017-01-19 | End: 2017-01-19 | Stop reason: HOSPADM

## 2017-01-19 RX ORDER — LIDOCAINE 40 MG/G
CREAM TOPICAL
Status: DISCONTINUED | OUTPATIENT
Start: 2017-01-19 | End: 2017-01-19 | Stop reason: HOSPADM

## 2017-01-19 RX ORDER — IBUTILIDE FUMARATE 1 MG/10ML
0.01 INJECTION, SOLUTION INTRAVENOUS
Status: DISCONTINUED | OUTPATIENT
Start: 2017-01-19 | End: 2017-01-19 | Stop reason: HOSPADM

## 2017-01-19 RX ORDER — ADENOSINE 3 MG/ML
6-12 INJECTION, SOLUTION INTRAVENOUS EVERY 5 MIN PRN
Status: DISCONTINUED | OUTPATIENT
Start: 2017-01-19 | End: 2017-01-19 | Stop reason: HOSPADM

## 2017-01-19 RX ORDER — FUROSEMIDE 10 MG/ML
20-100 INJECTION INTRAMUSCULAR; INTRAVENOUS
Status: DISCONTINUED | OUTPATIENT
Start: 2017-01-19 | End: 2017-01-19 | Stop reason: HOSPADM

## 2017-01-19 RX ORDER — ACETAMINOPHEN 325 MG/1
650 TABLET ORAL EVERY 4 HOURS PRN
Status: DISCONTINUED | OUTPATIENT
Start: 2017-01-19 | End: 2017-01-19 | Stop reason: HOSPADM

## 2017-01-19 RX ORDER — LIDOCAINE HYDROCHLORIDE 10 MG/ML
10-30 INJECTION, SOLUTION EPIDURAL; INFILTRATION; INTRACAUDAL; PERINEURAL
Status: COMPLETED | OUTPATIENT
Start: 2017-01-19 | End: 2017-01-19

## 2017-01-19 RX ORDER — PROTAMINE SULFATE 10 MG/ML
1-5 INJECTION, SOLUTION INTRAVENOUS
Status: DISCONTINUED | OUTPATIENT
Start: 2017-01-19 | End: 2017-01-19 | Stop reason: HOSPADM

## 2017-01-19 RX ORDER — NALOXONE HYDROCHLORIDE 0.4 MG/ML
.1-.4 INJECTION, SOLUTION INTRAMUSCULAR; INTRAVENOUS; SUBCUTANEOUS
Status: DISCONTINUED | OUTPATIENT
Start: 2017-01-19 | End: 2017-01-19 | Stop reason: HOSPADM

## 2017-01-19 RX ORDER — FLUMAZENIL 0.1 MG/ML
0.2 INJECTION, SOLUTION INTRAVENOUS
Status: DISCONTINUED | OUTPATIENT
Start: 2017-01-19 | End: 2017-01-19 | Stop reason: HOSPADM

## 2017-01-19 RX ORDER — KETOROLAC TROMETHAMINE 30 MG/ML
15 INJECTION, SOLUTION INTRAMUSCULAR; INTRAVENOUS
Status: DISCONTINUED | OUTPATIENT
Start: 2017-01-19 | End: 2017-01-19 | Stop reason: HOSPADM

## 2017-01-19 RX ORDER — BUPIVACAINE HYDROCHLORIDE 2.5 MG/ML
10-30 INJECTION, SOLUTION EPIDURAL; INFILTRATION; INTRACAUDAL
Status: COMPLETED | OUTPATIENT
Start: 2017-01-19 | End: 2017-01-19

## 2017-01-19 RX ORDER — LIDOCAINE HYDROCHLORIDE 10 MG/ML
10-30 INJECTION, SOLUTION EPIDURAL; INFILTRATION; INTRACAUDAL; PERINEURAL
Status: DISCONTINUED | OUTPATIENT
Start: 2017-01-19 | End: 2017-01-19 | Stop reason: HOSPADM

## 2017-01-19 RX ORDER — HEPARIN SODIUM 1000 [USP'U]/ML
1000-10000 INJECTION, SOLUTION INTRAVENOUS; SUBCUTANEOUS EVERY 5 MIN PRN
Status: DISCONTINUED | OUTPATIENT
Start: 2017-01-19 | End: 2017-01-19 | Stop reason: HOSPADM

## 2017-01-19 RX ORDER — PROTAMINE SULFATE 10 MG/ML
5-40 INJECTION, SOLUTION INTRAVENOUS EVERY 10 MIN PRN
Status: DISCONTINUED | OUTPATIENT
Start: 2017-01-19 | End: 2017-01-19 | Stop reason: HOSPADM

## 2017-01-19 RX ORDER — MORPHINE SULFATE 2 MG/ML
1-2 INJECTION, SOLUTION INTRAMUSCULAR; INTRAVENOUS EVERY 5 MIN PRN
Status: DISCONTINUED | OUTPATIENT
Start: 2017-01-19 | End: 2017-01-19 | Stop reason: HOSPADM

## 2017-01-19 RX ADMIN — MIDAZOLAM HYDROCHLORIDE 1 MG: 1 INJECTION, SOLUTION INTRAMUSCULAR; INTRAVENOUS at 12:44

## 2017-01-19 RX ADMIN — CEFAZOLIN SODIUM 2 G: 2 INJECTION, SOLUTION INTRAVENOUS at 12:30

## 2017-01-19 RX ADMIN — FENTANYL CITRATE 50 MCG: 50 INJECTION, SOLUTION INTRAMUSCULAR; INTRAVENOUS at 12:44

## 2017-01-19 RX ADMIN — ACETAMINOPHEN AND CODEINE PHOSPHATE 2 TABLET: 300; 30 TABLET ORAL at 16:37

## 2017-01-19 RX ADMIN — BUPIVACAINE HYDROCHLORIDE 25 MG: 2.5 INJECTION, SOLUTION EPIDURAL; INFILTRATION; INTRACAUDAL at 12:50

## 2017-01-19 RX ADMIN — ONDANSETRON 4 MG: 2 INJECTION INTRAMUSCULAR; INTRAVENOUS at 12:35

## 2017-01-19 RX ADMIN — Medication 5 ML: at 12:47

## 2017-01-19 RX ADMIN — SODIUM CHLORIDE 25000 UNITS: 900 IRRIGANT IRRIGATION at 13:49

## 2017-01-19 RX ADMIN — Medication 9 ML: at 13:10

## 2017-01-19 RX ADMIN — LIDOCAINE HYDROCHLORIDE 100 MG: 10 INJECTION, SOLUTION EPIDURAL; INFILTRATION; INTRACAUDAL; PERINEURAL at 12:50

## 2017-01-19 NOTE — IP AVS SNAPSHOT
[place] [address]    [place] [address]    Phone:  [phone]                                       After Visit Summary   1/19/2017    Mckayla Oconnell    MRN: [id number]           After Visit Summary Signature Page     I have received my discharge instructions, and my questions have been answered. I have discussed any challenges I see with this plan with the nurse or doctor.    ..........................................................................................................................................  Patient/Patient Representative Signature      ..........................................................................................................................................  Patient Representative Print Name and Relationship to Patient    ..................................................               ................................................  Date                                            Time    ..........................................................................................................................................  Reviewed by Signature/Title    ...................................................              ..............................................  Date                                                            Time

## 2017-01-19 NOTE — DISCHARGE INSTRUCTIONS
Pacemaker Implant Discharge Instructions    [facility] [place]:  [phone] [facility] (7 days a week)    After you go home:      Have an adult stay with you until tomorrow.    You may resume your normal diet.       For 24 hours - due to the sedation you received:    Relax and take it easy.    Do NOT make any important or legal decisions.    Do NOT drive or operate machines at home or at work.    Do NOT drink alcohol.    Care of Chest Incision:      Keep the bandage on at least 3 days. You may remove the dressing on Sunday evening. Change it only if it gets loose or soaked. If you need to change it, use 4x4-inch gauze and a large clear bandage.     If there is a pressure dressing (gauze & tape) - 24 hours after your procedure you may remove ONLY the top dressing. Leave the bottom dressing on.    Leave the strips of tape on. They will fall off on their own, or we will remove them at your first check-up.    Check your wound daily for signs of infection, such as increased redness, severe swelling or draining. Fever may also be a sign of infection. Call us if you see any of these signs.    If there are no signs of infection, you may shower after the bandage comes off in 3 days. If you take a tub bath, keep the wound dry.    No soaking the incision (swimming pool, bathtub, hot tub) for 2 weeks.    You may have mild to medium pain for 3 to 5 days. Take Acetaminophen (Tylenol) or Ibuprofen (Advil) for the pain. If the pain persists or is severe, call us.    Chest:    For at least 2 weeks: Do not raise your elbow above your shoulder. You can begin to use your arm as it feels comfortable to you.    Do not use arm on implant side to lift more than 10 pounds for 2 weeks.    In 6 to 8 weeks: You may begin to golf, play tennis, swim and do similar activities.    No driving for one day & limit to necessary driving for one week. Please talk to your doctor for specific recommendations.    Chest:    If  you start bleeding from the incision site, sit down and press firmly on the site for 10 minutes.     Once bleeding stops, call [facility] as soon as you can.       Call 911 right away if you have heavy bleeding or bleeding that does not stop.      Medicines:      Take your medications, including blood thinners, unless your provider tells you not to.    If you have stopped any other medicines, check with your provider about when to restart them.    If you have pain or shortness of breath, you may take Advil (ibuprofen) or Tylenol (acetaminophen).    Follow Up Appointments:      Follow up with Device Clinic at [facility] of patient preference in 7-10 days.    Follow up with [facility] Nurse Practitioner at [facility] of patient preference in 7-10 days.    Call the clinic if:      You have increased pain or a large or growing hard lump around the site.    The site is red, swollen, hot or tender.    Blood or fluid is draining from the site.    You have chills or a fever greater than 101 F (38 C).    Your leg turns feels numb, cool or changes color.    Increased pain in the chest and/or groin.    New pain in the back or belly that you cannot control with Tylenol.    Shortness of breath or chest pain that is not relieved by Advil or Tylenol.    Recurrent irregular or fast heart rate lasting over 2 hours.    Any questions or concerns.    Heart rhythms:    You may have some premature heartbeats. These feel very strong. They may make you feel that the fast heart rhythm (tachycardia) is going to start again.  Give it time. The premature beats should occur less often.    Telling others about your device:      Before you leave the hospital, you will receive a temporary ID card. A permanent card will be mailed to you about 6 to 8 weeks later. Always carry the ID card with you. It has important details about your device.    You may also get a Medical Alert bracelet or tag that says you have a pacemaker or a  defibrillator (ICD). Go to [email or web address].     Always tell doctors, dentists and other care providers that you have a device implanted in you.    Let us know before you plan any surgeries. Your care team must take special steps to keep you safe during certain procedures. These steps will depend on the type of device you have. Your provider will need to see your ID card. They may need to call us for instructions.    Device Safety:      Please refer to device  s booklet for further information.      [facility]:    [phone] [facility] (7 days a week)

## 2017-01-19 NOTE — PROGRESS NOTES
Report given for shift change to Ksenia DELONG Pt currently resting in room. VSS, left subclavian site dry and intact.

## 2017-01-19 NOTE — PROGRESS NOTES
1645- Dr Charles in and updated. He was shown the pacer site.  1700 Remains in 100% AV paced rhythm.  1730- Up with assist and use of walker. Walked to bathroom. SLow but did well.  Into wheelchair and to Xray.  1800 Pacer interrogated and all is well. Eating dinner.  1830-Melvin called here and OK to DC  1900 Assisted to dress.  Care Suites Discharge Summary  Discharge Criteria:   Discharge Criteria met per MD orders: Yes.   Vital signs stable.     Pt demonstrates ability to ambulate safely: Yes.  (See discharge questionnaire for additional information)  Discharge instructions & education:   Discharge instructions reviewed with patient and  and daughter. Patient verbalizes  understanding.   Additional patient education provided:  Pacer booklet and temporary ID card.  Medications:   Patient will be discharging on new medications- No. Patient verbalizes reason for use, start date, and side effects NA.    Items returned to patient:   Home and hospital acquired medications returned to patient NA   Listed belongings gathered and returned to patient: Yes    Patient discharged to home in car with daughter driving.     Ksenia Jimenes

## 2017-01-19 NOTE — IP AVS SNAPSHOT
MRN:[id number]                      After Visit Summary   1/19/2017    Mckayla Oconnell    MRN: [id number]           Visit Information        Department      1/19/2017  7:46 AM [facility]          Review of your medicines      CONTINUE these medicines which have NOT CHANGED        Dose / Directions    aspirin 81 MG tablet        Dose:  81 mg   Take 81 mg by mouth daily   Refills:  0       calcium citrate 950 MG tablet   Commonly known as:  CALCITRATE        Dose:  2 tablet   Take 2 tablets by mouth daily At Noon   Refills:  0       COREG PO        Dose:  25 mg   Take 25 mg by mouth 2 times daily (with meals)   Refills:  0       FISH OIL PO        Dose:  1500 mg   Take 1,500 mg by mouth daily.   Refills:  0       fluticasone 50 MCG/ACT spray   Commonly known as:  FLONASE   Used for:  Seasonal allergic rhinitis        Dose:  1-2 spray   Spray 1-2 sprays into both nostrils daily as needed for rhinitis or allergies (TAKE ONCE DAILY DURIGN ALLERGY SEASON)   Quantity:  16 g   Refills:  11       hydrALAZINE 25 MG tablet   Commonly known as:  APRESOLINE   Used for:  Acute on chronic congestive heart failure, unspecified congestive heart failure type (H)        Dose:  25 mg   Take 1 tablet (25 mg) by mouth 3 times daily   Quantity:  270 tablet   Refills:  3       Iron Tabs        Dose:  324 mg   Take 324 mg by mouth daily Patient states she takes OTC iron furrous gluconate 324 mg tablets USP   Refills:  0       isosorbide mononitrate 30 MG 24 hr tablet   Commonly known as:  IMDUR   Used for:  Cardiomyopathy, idiopathic (H)        1/2 tablet daily AT NIGHT   Quantity:  45 tablet   Refills:  3       levothyroxine 112 MCG tablet   Commonly known as:  SYNTHROID/LEVOTHROID   Used for:  Hypothyroidism, unspecified hypothyroidism type        Dose:  112 mcg   Take 1 tablet (112 mcg) by mouth daily   Quantity:  90 tablet   Refills:  3       Lutein 20 MG Caps        Dose:  20 mg   Take 20  mg by mouth daily   Refills:  0       MELATONIN PO        Dose:  3 mg   Take 3 mg by mouth nightly as needed   Refills:  0       metolazone 2.5 MG tablet   Commonly known as:  ZAROXOLYN   Used for:  Cardiomyopathy, idiopathic (H)        As directed by cardiology   Quantity:  10 tablet   Refills:  3       MULTIVITAMIN PO        Dose:  1 tablet   Take 1 tablet by mouth daily Takes with lunch   Refills:  0       prednisoLONE acetate 1 % ophthalmic susp   Commonly known as:  PRED FORTE        Dose:  1 drop   Place 1 drop into the right eye every evening   Quantity:  1 Bottle   Refills:  0       simvastatin 20 MG tablet   Commonly known as:  ZOCOR   Used for:  Coronary artery disease involving native coronary artery of native heart without angina pectoris        Dose:  20 mg   Take 1 tablet (20 mg) by mouth At Bedtime   Quantity:  90 tablet   Refills:  3       spironolactone 25 MG tablet   Commonly known as:  ALDACTONE   Used for:  Cardiomyopathy, idiopathic (H), Localized edema        Dose:  12.5 mg   Take 0.5 tablets (12.5 mg) by mouth daily   Quantity:  90 tablet   Refills:  3       torsemide 20 MG tablet   Commonly known as:  DEMADEX   Used for:  Acute on chronic congestive heart failure, unspecified congestive heart failure type (H)        Dose:  40 mg   Take 2 tablets (40 mg) by mouth 2 times daily   Quantity:  90 tablet   Refills:  3       traMADol 50 MG tablet   Commonly known as:  ULTRAM   Used for:  Left groin pain        Dose:  50 mg   Take 1 tablet (50 mg) by mouth every 6 hours as needed for moderate pain or severe pain maximum 4 tablet(s) per day   Quantity:  30 tablet   Refills:  0       VITAMIN D (CHOLECALCIFEROL) PO        Dose:  2000 Units   Take 2,000 Units by mouth daily   Refills:  0                Protect others around you: Learn how to safely use, store and throw away your medicines at www.disposemymeds.org.         Follow-ups after your visit         Care Instructions        After Care  Instructions     Discharge Instructions - Do not use arm on implant side to lift       Do not use arm on implant side to lift in excess of 7-8 pounds for 2 weeks (if newly implanted).            Discharge Instructions - Follow up with Device Check RN        Follow up with Device Check RN in 7-10 days.            Discharge Instructions - Follow up with [facility] Nurse Practitioner          Follow up with MANAS King at [facility] of patient preference in 3 months.            Discharge Instructions - Keep incision dry for 72 hours       Keep incision dry for 72 hours (unless Derma Keys was applied)            Discharge Instructions - No driving for 1 day       No driving for 1 day and limit to necessary driving for 1 week.                  Further instructions from your care team       Pacemaker Implant Discharge Instructions    [facility] at [place]:  [phone] [facility] (7 days a week)    After you go home:      Have an adult stay with you until tomorrow.    You may resume your normal diet.       For 24 hours - due to the sedation you received:    Relax and take it easy.    Do NOT make any important or legal decisions.    Do NOT drive or operate machines at home or at work.    Do NOT drink alcohol.    Care of Chest Incision:      Keep the bandage on at least 3 days. You may remove the dressing on Sunday evening. Change it only if it gets loose or soaked. If you need to change it, use 4x4-inch gauze and a large clear bandage.     If there is a pressure dressing (gauze & tape) - 24 hours after your procedure you may remove ONLY the top dressing. Leave the bottom dressing on.    Leave the strips of tape on. They will fall off on their own, or we will remove them at your first check-up.    Check your wound daily for signs of infection, such as increased redness, severe swelling or draining. Fever may also be a sign of infection. Call us if you see any of these signs.    If there are  no signs of infection, you may shower after the bandage comes off in 3 days. If you take a tub bath, keep the wound dry.    No soaking the incision (swimming pool, bathtub, hot tub) for 2 weeks.    You may have mild to medium pain for 3 to 5 days. Take Acetaminophen (Tylenol) or Ibuprofen (Advil) for the pain. If the pain persists or is severe, call us.    Chest:    For at least 2 weeks: Do not raise your elbow above your shoulder. You can begin to use your arm as it feels comfortable to you.    Do not use arm on implant side to lift more than 10 pounds for 2 weeks.    In 6 to 8 weeks: You may begin to golf, play tennis, swim and do similar activities.    No driving for one day & limit to necessary driving for one week. Please talk to your doctor for specific recommendations.    Chest:    If you start bleeding from the incision site, sit down and press firmly on the site for 10 minutes.     Once bleeding stops, call [facility] as soon as you can.       Call 911 right away if you have heavy bleeding or bleeding that does not stop.      Medicines:      Take your medications, including blood thinners, unless your provider tells you not to.    If you have stopped any other medicines, check with your provider about when to restart them.    If you have pain or shortness of breath, you may take Advil (ibuprofen) or Tylenol (acetaminophen).    Follow Up Appointments:      Follow up with Device Clinic at [facility] of patient preference in 7-10 days.    Follow up with [facility] Nurse Practitioner at [facility] of patient preference in 7-10 days.    Call the clinic if:      You have increased pain or a large or growing hard lump around the site.    The site is red, swollen, hot or tender.    Blood or fluid is draining from the site.    You have chills or a fever greater than 101 F (38 C).    Your leg turns feels numb, cool or changes color.    Increased pain in the chest and/or groin.    New pain in the back  or belly that you cannot control with Tylenol.    Shortness of breath or chest pain that is not relieved by Advil or Tylenol.    Recurrent irregular or fast heart rate lasting over 2 hours.    Any questions or concerns.    Heart rhythms:    You may have some premature heartbeats. These feel very strong. They may make you feel that the fast heart rhythm (tachycardia) is going to start again.  Give it time. The premature beats should occur less often.    Telling others about your device:      Before you leave the hospital, you will receive a temporary ID card. A permanent card will be mailed to you about 6 to 8 weeks later. Always carry the ID card with you. It has important details about your device.    You may also get a Medical Alert bracelet or tag that says you have a pacemaker or a defibrillator (ICD). Go to [email or web address].     Always tell doctors, dentists and other care providers that you have a device implanted in you.    Let us know before you plan any surgeries. Your care team must take special steps to keep you safe during certain procedures. These steps will depend on the type of device you have. Your provider will need to see your ID card. They may need to call us for instructions.    Device Safety:      Please refer to device  s booklet for further information.      [facility] at [place]:    [phone] [facility] (7 days a week)         Additional Information About Your Visit        MyChart Information     [email or web address] gives you secure access to your electronic health record. If you see a primary care provider, you can also send messages to your care team and make appointments. If you have questions, please call your primary care clinic.  If you do not have a primary care provider, please call [phone] and they will assist you.        Care EveryWhere ID     This is your Care EveryWhere ID. This could be used by other organizations to access your [facility]  "records  [id number]        Your Vitals Were     Blood Pressure Pulse Respirations Height Weight BMI (Body Mass Index)    108/52 mmHg 56 18 1.651 m (5' 5\") 82.827 kg (182 lb 9.6 oz) 30.39 kg/m2    Pulse Oximetry                   92%            Primary Care Provider Office Phone # Fax #    Ander Shrestha [phone]      Thank you!     Thank you for choosing [facility] for your care. Our goal is always to provide you with excellent care. Hearing back from our patients is one way we can continue to improve our services. Please take a few minutes to complete the written survey that you may receive in the mail after you visit with us. Thank you!             Medication List: This is a list of all your medications and when to take them. Check marks below indicate your daily home schedule. Keep this list as a reference.      Medications           Morning Afternoon Evening Bedtime As Needed    aspirin 81 MG tablet   Take 81 mg by mouth daily                                calcium citrate 950 MG tablet   Commonly known as:  CALCITRATE   Take 2 tablets by mouth daily At Noon                                COREG PO   Take 25 mg by mouth 2 times daily (with meals)                                FISH OIL PO   Take 1,500 mg by mouth daily.                                fluticasone 50 MCG/ACT spray   Commonly known as:  FLONASE   Spray 1-2 sprays into both nostrils daily as needed for rhinitis or allergies (TAKE ONCE DAILY DURIGN ALLERGY SEASON)                                hydrALAZINE 25 MG tablet   Commonly known as:  APRESOLINE   Take 1 tablet (25 mg) by mouth 3 times daily                                Iron Tabs   Take 324 mg by mouth daily Patient states she takes OTC iron furrous gluconate 324 mg tablets USP                                isosorbide mononitrate 30 MG 24 hr tablet   Commonly known as:  IMDUR   1/2 tablet daily AT NIGHT                                levothyroxine 112 MCG tablet "   Commonly known as:  SYNTHROID/LEVOTHROID   Take 1 tablet (112 mcg) by mouth daily                                Lutein 20 MG Caps   Take 20 mg by mouth daily                                MELATONIN PO   Take 3 mg by mouth nightly as needed                                metolazone 2.5 MG tablet   Commonly known as:  ZAROXOLYN   As directed by cardiology                                MULTIVITAMIN PO   Take 1 tablet by mouth daily Takes with lunch                                prednisoLONE acetate 1 % ophthalmic susp   Commonly known as:  PRED FORTE   Place 1 drop into the right eye every evening                                simvastatin 20 MG tablet   Commonly known as:  ZOCOR   Take 1 tablet (20 mg) by mouth At Bedtime                                spironolactone 25 MG tablet   Commonly known as:  ALDACTONE   Take 0.5 tablets (12.5 mg) by mouth daily                                torsemide 20 MG tablet   Commonly known as:  DEMADEX   Take 2 tablets (40 mg) by mouth 2 times daily                                traMADol 50 MG tablet   Commonly known as:  ULTRAM   Take 1 tablet (50 mg) by mouth every 6 hours as needed for moderate pain or severe pain maximum 4 tablet(s) per day                                VITAMIN D (CHOLECALCIFEROL) PO   Take 2,000 Units by mouth daily

## 2017-01-19 NOTE — PROCEDURES
Dictated.  Successful CRT-P implantation.  Moderately high LV threshold.    DDDR 70/120 ppm.    No apparent complication.  Pressure dressing.    EBL = 30-40 cc.  Contrast 15 cc.      Plan:   - PM interrogation and CXR around 5:30 pm  - will decide later if pt will stay or go home tonight

## 2017-01-21 LAB — INTERPRETATION ECG - MUSE: NORMAL

## 2017-01-23 NOTE — TELEPHONE ENCOUNTER
Pt called back wondering if OK to cancel OV and labs with [facility] this week, since she has OV with Nephrologist Dr. Cueto on 2/14/17. Advised pt keep labs and OV with [facility] this week, as we still need to see her in addition to her Nephrology appt. Pt stated understanding. CARMELLA Gee

## 2017-01-23 NOTE — TELEPHONE ENCOUNTER
"Received call from pt requesting to get her kidney function checked when she is here on 1/26/17 for her PPM check.  Reviewed Epic, pt has been getting BMPs frequently, as Creat has been running high, most recent Creat was 1.91 on 1/19/17.  Per JOI Miller's note on 12/19/16, \"her baseline creatinine is somewhere around 1.4 but has varied anywhere from 1.3 to 1.9 when checked several weeks ago.\"  Also, per Anabel's note on 12/19/16, \"I am going to try to get this patient in with Dr. Echols in the next 1 month.  If she cannot get in with him, we will see her sooner.  I am going to do a BMP in 1 week and will follow her closely with tele-assurance and phone calls before then.\"  Discussed need for f/u with pt and that Dr. Echols does not have any appt openings this week; however, Anabel has appt opening on 1/26/17, when she will be here for her PPM check.  Pt agreed with this plan for appt with Anabel at 1:10pm and BMP prior at 12:50pm, as this is the soonest that pt's daughter will be able to bring her to clinic on 1/26/17.      Of note, pt did call wt into TELEMANAGEMENT: Wt stable and no symptoms reported.     "

## 2017-01-23 NOTE — TELEPHONE ENCOUNTER
TELEMANAGEMENT: No call variance since 1/11.  Reviewed EPIC, pt had pacemaker placed 1//19/17 and was discharged home same day.  Per note below, plan was for pt to start calling in wts again on 1/20/17.  Called pt, no answer, LVM for pt reminding her to call daily wt into TELEMANAGEMENT and left [facility] number for questions/concerns.

## 2017-01-26 ENCOUNTER — ALLIED HEALTH/NURSE VISIT (OUTPATIENT)
Dept: CARDIOLOGY | Facility: CLINIC | Age: 82
End: 2017-01-26
Payer: COMMERCIAL

## 2017-01-26 ENCOUNTER — OFFICE VISIT (OUTPATIENT)
Dept: CARDIOLOGY | Facility: CLINIC | Age: 82
End: 2017-01-26
Payer: COMMERCIAL

## 2017-01-26 VITALS
DIASTOLIC BLOOD PRESSURE: 48 MMHG | SYSTOLIC BLOOD PRESSURE: 122 MMHG | BODY MASS INDEX: 31.07 KG/M2 | WEIGHT: 182 LBS | HEIGHT: 64 IN | HEART RATE: 60 BPM

## 2017-01-26 DIAGNOSIS — I50.22 CHRONIC SYSTOLIC HEART FAILURE (H): ICD-10-CM

## 2017-01-26 DIAGNOSIS — I42.9 CARDIOMYOPATHY, IDIOPATHIC (H): ICD-10-CM

## 2017-01-26 DIAGNOSIS — I10 ESSENTIAL HYPERTENSION, BENIGN: Primary | ICD-10-CM

## 2017-01-26 DIAGNOSIS — Z95.0 CARDIAC PACEMAKER IN SITU: Primary | ICD-10-CM

## 2017-01-26 LAB
ANION GAP SERPL CALCULATED.3IONS-SCNC: 12.6 MMOL/L (ref 6–17)
BUN SERPL-MCNC: 58 MG/DL (ref 7–30)
CALCIUM SERPL-MCNC: 9.3 MG/DL (ref 8.5–10.5)
CHLORIDE SERPL-SCNC: 102 MMOL/L (ref 98–107)
CO2 SERPL-SCNC: 28 MMOL/L (ref 23–29)
CREAT SERPL-MCNC: 1.83 MG/DL (ref 0.7–1.3)
GFR SERPL CREATININE-BSD FRML MDRD: 26 ML/MIN/1.7M2
GLUCOSE SERPL-MCNC: 132 MG/DL (ref 70–105)
POTASSIUM SERPL-SCNC: 4.6 MMOL/L (ref 3.5–5.1)
SODIUM SERPL-SCNC: 138 MMOL/L (ref 136–145)

## 2017-01-26 PROCEDURE — 80048 BASIC METABOLIC PNL TOTAL CA: CPT | Performed by: PHYSICIAN ASSISTANT

## 2017-01-26 PROCEDURE — 36415 COLL VENOUS BLD VENIPUNCTURE: CPT | Performed by: PHYSICIAN ASSISTANT

## 2017-01-26 PROCEDURE — 93281 PM DEVICE PROGR EVAL MULTI: CPT | Performed by: INTERNAL MEDICINE

## 2017-01-26 PROCEDURE — 99214 OFFICE O/P EST MOD 30 MIN: CPT | Mod: 24 | Performed by: PHYSICIAN ASSISTANT

## 2017-01-26 NOTE — PROGRESS NOTES
[employer] CRT-P  7-10 day Post Pacemaker Device Check/Chayo Kraft NP  AP: 98 % BVP: 99 %  Mode: DDDR        Underlying Rhythm: SR/ BBB  Heart Rate: Histogram without much variability in rate. Will consider adjusting RR at 6 week visit if appropriate at that time  Sensing: Stable    Pacing Threshold: Stable (LV capture threshold improved from implant)    Impedance:  WNL  Battery Status: 9.5 years estimated longevity  Incision: CDI; no evidence of infection.  Moderate hematoma with significant bruising under and medial to incision as well as to the axilla. This is resolving and is yellowish in color.  Dr Camacho in to assess. Pt instructed to HOLD ASA until she is seen here in one month.She is to call with any changes noted. Daughter is present and will also be assessing on a regular basis.   Atrial Arrhythmia: NONE  Ventricular Arrhythmia: NONE  Setting Change: NONE    Care Plan: RTC in one month. She is to see Chayo Kraft as well. Orders placed; pt to schedulling.

## 2017-01-26 NOTE — PATIENT INSTRUCTIONS
Thanks for coming into [facility] clinic today.    We discussed: overall, things look great!    Keep weighing yourself.      Medication changes:  Continue current medications.      Results:   Component      Latest Ref Rng 1/26/2017   Sodium      136 - 145 mmol/L 138   Potassium      3.5 - 5.1 mmol/L 4.6   Chloride      98 - 107 mmol/L 102   Carbon Dioxide      23 - 29 mmol/L 28   Anion Gap      6 - 17 mmol/L 12.6   Glucose      70 - 105 mg/dL 132 (H)   Urea Nitrogen      7 - 30 mg/dL 58 (H)   Creatinine      0.70 - 1.30 mg/dL 1.83 (H)   GFR Estimate      >60 mL/min/1.7m2 26 (L)   GFR Estimate If Black      >60 mL/min/1.7m2 31 (L)   Calcium      8.5 - 10.5 mg/dL 9.3     Follow up: with me in 1 month and Dr. Echols in 2 months with an echocardiogram.        Please call my nurse at [phone] with any questions or concerns.    Scheduling phone number: [phone]  Reminder: Please bring in all current medications, over the counter supplements and vitamin bottles to your next appointment.

## 2017-01-26 NOTE — PROGRESS NOTES
[id number]  HPI and Plan:   See dictation    Orders this Visit:  Orders Placed This Encounter   Procedures     Basic metabolic panel     CBC with platelets     Basic metabolic panel     Follow-Up with Cardiologist     Follow-Up with [facility] - TANYA visit     Echocardiogram     No orders of the defined types were placed in this encounter.     There are no discontinued medications.      Encounter Diagnoses   Name Primary?     Chronic systolic heart failure (H)      Essential hypertension, benign Yes     Cardiomyopathy, idiopathic (H)        CURRENT MEDICATIONS:  Current Outpatient Prescriptions   Medication Sig Dispense Refill     spironolactone (ALDACTONE) 25 MG tablet Take 0.5 tablets (12.5 mg) by mouth daily 90 tablet 3     traMADol (ULTRAM) 50 MG tablet Take 1 tablet (50 mg) by mouth every 6 hours as needed for moderate pain or severe pain maximum 4 tablet(s) per day 30 tablet 0     isosorbide mononitrate (IMDUR) 30 MG 24 hr tablet 1/2 tablet daily AT NIGHT 45 tablet 3     torsemide (DEMADEX) 20 MG tablet Take 2 tablets (40 mg) by mouth 2 times daily 90 tablet 3     hydrALAZINE (APRESOLINE) 25 MG tablet Take 1 tablet (25 mg) by mouth 3 times daily 270 tablet 3     Carvedilol (COREG PO) Take 25 mg by mouth 2 times daily (with meals)       MELATONIN PO Take 3 mg by mouth nightly as needed       fluticasone (FLONASE) 50 MCG/ACT nasal spray Spray 1-2 sprays into both nostrils daily as needed for rhinitis or allergies (TAKE ONCE DAILY DURIGN ALLERGY SEASON) 16 g 11     aspirin 81 MG tablet Take 81 mg by mouth daily       Iron TABS Take 324 mg by mouth daily Patient states she takes OTC iron furrous gluconate 324 mg tablets USP       levothyroxine (SYNTHROID, LEVOTHROID) 112 MCG tablet Take 1 tablet (112 mcg) by mouth daily 90 tablet 3     simvastatin (ZOCOR) 20 MG tablet Take 1 tablet (20 mg) by mouth At Bedtime 90 tablet 3     calcium citrate (CALCITRATE) 950 MG tablet Take 2 tablets by mouth daily At Noon       VITAMIN  D, CHOLECALCIFEROL, PO Take 2,000 Units by mouth daily        Lutein 20 MG CAPS Take 20 mg by mouth daily       prednisoLONE acetate (PRED FORTE) 1 % ophthalmic suspension Place 1 drop into the right eye every evening  1 Bottle      Multiple Vitamins-Minerals (MULTIVITAMIN OR) Take 1 tablet by mouth daily Takes with lunch       Omega-3 Fatty Acids (FISH OIL PO) Take 1,500 mg by mouth daily.       metolazone (ZAROXOLYN) 2.5 MG tablet As directed by cardiology 10 tablet 3       ALLERGIES     Allergies   Allergen Reactions     Atenolol Anaphylaxis     Hydrochlorothiazide      hyponatremia     Pollen Extract        PAST MEDICAL HISTORY:  Past Medical History   Diagnosis Date     Hypertension      CHF (NYHA class II, ACC/AHA stage C) (H) 2/14/2013     Hypothyroidism 2/14/2013     Cardiomyopathy, idiopathic (H)      cardiomyopathy HTN vs viral; EF as low as 15% in 2009; CRT-P implanted 1/19/17     Aortic stenosis      CAD (coronary artery disease)      CT calcium dfata=779 May 2013     Glaucoma      Pulmonary hypertension (H)      H/O angiography 9-1-2016     No angiographic evidence of obstructive coronary artery disease.       PAST SURGICAL HISTORY:  Past Surgical History   Procedure Laterality Date     Gyn surgery       Hysterectomy 1995     Orthopedic surgery       knee replacement 1998     Orthopedic surgery       Knee replacement 2008     Orthopedic surgery       knee surgery [place]     Hysterectomy, pap no longer indicated       Cardiac catherization  9/1/16     Implant pacemaker  1/19/17     CRT-P       FAMILY HISTORY:  Family History   Problem Relation Age of Onset     CANCER Mother      Uterius     Breast Cancer Daughter        SOCIAL HISTORY:  Social History     Social History     Marital Status:      Spouse Name: N/A     Number of Children: N/A     Years of Education: N/A     Social History Main Topics     Smoking status: Never Smoker      Smokeless tobacco: Never Used     Alcohol Use: No     Drug Use: No  "    Sexual Activity: No     Other Topics Concern     Parent/Sibling W/ Cabg, Mi Or Angioplasty Before 65f 55m? No     Caffeine Concern No     Sleep Concern No     Stress Concern No     Weight Concern No     Special Diet Yes     low sodium     Exercise Yes     therapy     Seat Belt Yes     Social History Narrative       Review of Systems:  Skin:  Negative     Eyes:  Positive for glasses  ENT:  Negative    Respiratory:  Negative    Cardiovascular:  Negative    Gastroenterology: Negative    Genitourinary:  Negative    Musculoskeletal:  Negative    Neurologic:  Negative    Psychiatric:  Negative    Heme/Lymph/Imm:  Positive for allergies  Endocrine:  Negative      Physical Exam:  Vitals: /48 mmHg  Pulse 60  Ht 1.626 m (5' 4\")  Wt 82.555 kg (182 lb)  BMI 31.22 kg/m2   Please refer to dictation for physical exam    Recent Lab Results:  LIPID RESULTS:  Lab Results   Component Value Date    CHOL 113 03/22/2016    HDL 39* 03/22/2016    LDL 54 03/22/2016    TRIG 98 03/22/2016    CHOLHDLRATIO 3.5 07/06/2015       LIVER ENZYME RESULTS:  Lab Results   Component Value Date    AST 50* 11/14/2016    ALT 89* 11/14/2016       CBC RESULTS:  Lab Results   Component Value Date    WBC 5.3 01/19/2017    RBC 2.95* 01/19/2017    HGB 9.0* 01/19/2017    HCT 27.5* 01/19/2017    MCV 93 01/19/2017    MCH 30.5 01/19/2017    MCHC 32.7 01/19/2017    RDW 15.7* 01/19/2017     01/19/2017       BMP RESULTS:  Lab Results   Component Value Date     01/26/2017    POTASSIUM 4.6 01/26/2017    CHLORIDE 102 01/26/2017    CO2 28 01/26/2017    ANIONGAP 12.6 01/26/2017    * 01/26/2017    BUN 58* 01/26/2017    BUN 31.3 09/06/2013    CR 1.83* 01/26/2017    GFRESTIMATED 26* 01/26/2017    GFRESTBLACK 31* 01/26/2017    SHOBHA 9.3 01/26/2017        A1C RESULTS:  No results found for: A1C    INR RESULTS:  Lab Results   Component Value Date    INR 1.12 09/01/2016           CC  Ander Shrestha MD  [facility]  [address] " [place] [address]

## 2017-01-26 NOTE — LETTER
2017    Ander Shrestha MD  [facility]  [address] [place] [address]    RE: Mckayla Oconnell       Dear Colleague,    I had the pleasure of seeing Mckayla Oconnell in the [facility].    PRIMARY CARDIOLOGIST:  Dr. Echols.      REASON FOR VISIT:  Heart failure followup.      Ms. Oconnell is a delightful 89-year-old woman with past cardiac history significant for the followin.  Nonischemic cardiomyopathy with EF between 10% and 45% historically, most recently about 25%-30%.   2.  Moderate to severe aortic stenosis, moderate tricuspid regurgitation, moderate mitral regurgitation.   3.  CKD with baseline creatinine around 1.5.   4.  Hyperlipidemia.   5.  Recent implant of a biventricular pacemaker on .      I met her in November when she was hospitalized with profound fatigue, weight gain and shortness of breath.  She was diuresed about 20 pounds with Bumex and dobutamine drip, with a discharge weight of 179.  Unfortunately, she rapidly gained about 7 pounds right after admission, and we have maintained her on 40 mg of torsemide b.i.d. as well as metolazone 2.5 mg intermittently.  She then was able to stabilize her weights, although they had not trended down until recently.  She did have her BiV placed last week.  Since that time, her weight has trended down another about 5-7 pounds.  [facility] also has [facility].  There they were doing wraps and compression stockings, but after finding she likely has significant peripheral arterial disease, they asked her to keep both of those off and just elevate her legs and they are doing some massage.      Today she comes in stating she feels okay.  She is happy, her weight is down to 176 at home, where her goal dry weight was somewhere around 172.  She can get her shoes on.  She still has some swelling mostly in her feet and ankles but not so much in her thighs.  She denies  orthopnea or PND.  She denies any symptoms of claudication.  She can get in and out of bed easier and in and out of her car easier.  She also has been doing exercises given to her by her daughter, Sandra, who is a physical therapist.      SOCIAL HISTORY:  She lives in her own home with her .  One of her goals as discussed with Palliative was to stay in her own home and around her family and avoid hospital admissions if possible.  She is a lifelong nonsmoker, no alcohol use, watches her salt closely.  Came in today with her daughter, Sandra, who is a physical therapist.      PHYSICAL EXAMINATION:   GENERAL:  Well-developed, well-nourished elderly woman in no acute distress, pale.   HEENT:  Normocephalic, atraumatic.   HEART:  Regular in rate and rhythm.  There is a harsh 3/6 systolic murmur heard best at the right sternal border.   LUNGS:  Clear bilaterally.   EXTREMITIES:  3+ pitting edema to upper shin.  Thighs are soft today.   SKIN:  Warm and dry.     Outpatient Encounter Prescriptions as of 1/26/2017   Medication Sig Dispense Refill     spironolactone (ALDACTONE) 25 MG tablet Take 0.5 tablets (12.5 mg) by mouth daily 90 tablet 3     traMADol (ULTRAM) 50 MG tablet Take 1 tablet (50 mg) by mouth every 6 hours as needed for moderate pain or severe pain maximum 4 tablet(s) per day 30 tablet 0     isosorbide mononitrate (IMDUR) 30 MG 24 hr tablet 1/2 tablet daily AT NIGHT 45 tablet 3     torsemide (DEMADEX) 20 MG tablet Take 2 tablets (40 mg) by mouth 2 times daily 90 tablet 3     hydrALAZINE (APRESOLINE) 25 MG tablet Take 1 tablet (25 mg) by mouth 3 times daily 270 tablet 3     Carvedilol (COREG PO) Take 25 mg by mouth 2 times daily (with meals)       MELATONIN PO Take 3 mg by mouth nightly as needed       fluticasone (FLONASE) 50 MCG/ACT nasal spray Spray 1-2 sprays into both nostrils daily as needed for rhinitis or allergies (TAKE ONCE DAILY DURIGN ALLERGY SEASON) 16 g 11     aspirin 81 MG tablet Take 81  mg by mouth daily       Iron TABS Take 324 mg by mouth daily Patient states she takes OTC iron furrous gluconate 324 mg tablets USP       levothyroxine (SYNTHROID, LEVOTHROID) 112 MCG tablet Take 1 tablet (112 mcg) by mouth daily 90 tablet 3     simvastatin (ZOCOR) 20 MG tablet Take 1 tablet (20 mg) by mouth At Bedtime 90 tablet 3     calcium citrate (CALCITRATE) 950 MG tablet Take 2 tablets by mouth daily At Noon       VITAMIN D, CHOLECALCIFEROL, PO Take 2,000 Units by mouth daily        Lutein 20 MG CAPS Take 20 mg by mouth daily       prednisoLONE acetate (PRED FORTE) 1 % ophthalmic suspension Place 1 drop into the right eye every evening  1 Bottle      Multiple Vitamins-Minerals (MULTIVITAMIN OR) Take 1 tablet by mouth daily Takes with lunch       Omega-3 Fatty Acids (FISH OIL PO) Take 1,500 mg by mouth daily.       metolazone (ZAROXOLYN) 2.5 MG tablet As directed by cardiology 10 tablet 3     No facility-administered encounter medications on file as of 1/26/2017.       ASSESSMENT AND PLAN:   1.  Chronic systolic heart failure with primarily right-sided heart failure symptoms complicated by MR, TR and moderate to severe aortic stenosis.  Her weight is now finally starting to trend down, with a hopeful home weight of around 172 pounds.  We do know that she had a right heart cath done in 09/2016 where she was just mildly hypervolemic, and her home weight at that point was 177 pounds.  At this point, her kidney function remains stable, her weight is trending down, and her edema seems to be improving.  We are going to continue her on torsemide 40 mg b.i.d.  We will continue to know that we can use metolazone as needed.  Her creatinine is stable at 1.83 with a BUN of 58, and perhaps this will improve as she decreases her volume status and becomes more euvolemic.  I am also hopeful that the BiV is going to make a significant improvement as well as hopefully help with her cardiorenal syndrome.  May also assist in  improving her EF.   2.  Moderate to severe AS, moderate MR and TR.  She has been evaluated for TAVR, and it was felt that her aortic stenosis was not severe.  We will reassess in about 2 months now that she has had the BiV and reconsider at that time.   3.  CKD, baseline creatinine around 1.4-1.5, today 1.83, BUN 58.  We will continue to follow closely.   4.  Peripheral artery disease with markedly abnormal ABIs when tested for lymphedema.  She does not endorse any symptoms of claudication.  She is already appropriately on aspirin and statin.  We will follow.        She will have an echocardiogram in 2 months with followup with Dr. Echols and a BMP at that time.  I will also see her in about 1 month with a BMP at that time.  She will be in contact with the [facility] with daily weights and low-sodium between now and then.      Thank you for allowing me to participate in her care.     Sincerely,    Divya Kraft PA-C     [facility]

## 2017-01-26 NOTE — PROGRESS NOTES
Quick Note:    Reviewed during clinic visit. Please see progress note for plan. Divya Kraft PA-C 1/26/2017 2:22 PM        ______

## 2017-01-26 NOTE — MR AVS SNAPSHOT
After Visit Summary   1/26/2017    Mckayla Oconnell    MRN: [id number]           Patient Information     Date Of Birth          7/2/1927        Visit Information        Provider Department      1/26/2017 1:10 PM More, Divya Mejia PA-C [facility] [place]        Today's Diagnoses     Essential hypertension, benign    -  1     Chronic systolic heart failure (H)         Cardiomyopathy, idiopathic (H)           Care Instructions    Thanks for coming into [facility] today.    We discussed: overall, things look great!    Keep weighing yourself.      Medication changes:  Continue current medications.      Results:   Component      Latest Ref Rng 1/26/2017   Sodium      136 - 145 mmol/L 138   Potassium      3.5 - 5.1 mmol/L 4.6   Chloride      98 - 107 mmol/L 102   Carbon Dioxide      23 - 29 mmol/L 28   Anion Gap      6 - 17 mmol/L 12.6   Glucose      70 - 105 mg/dL 132 (H)   Urea Nitrogen      7 - 30 mg/dL 58 (H)   Creatinine      0.70 - 1.30 mg/dL 1.83 (H)   GFR Estimate      >60 mL/min/1.7m2 26 (L)   GFR Estimate If Black      >60 mL/min/1.7m2 31 (L)   Calcium      8.5 - 10.5 mg/dL 9.3     Follow up: with me in 1 month and Dr. Echols in 2 months with an echocardiogram.        Please call my nurse at [phone] with any questions or concerns.    Scheduling phone number: [phone]  Reminder: Please bring in all current medications, over the counter supplements and vitamin bottles to your next appointment.                    Follow-ups after your visit        Additional Services     Follow-Up with [facility] - T[facility]A visit           Follow-Up with Cardiologist                 Your next 10 appointments already scheduled     Jan 26, 2017  2:00 PM   Pacemaker Check with [facility] DCR2   [facility] [place] ([facility])    [address] [place] [id number] [phone]             "  Future tests that were ordered for you today     Open Future Orders        Priority Expected Expires Ordered    Basic metabolic panel Routine 2/24/2017 1/26/2018 1/26/2017    Follow-Up with [facility] - [facility] visit Routine 2/24/2017 1/26/2018 1/26/2017    Echocardiogram Routine 3/17/2017 1/26/2018 1/26/2017    Follow-Up with Cardiologist Routine 3/17/2017 1/26/2018 1/26/2017    Basic metabolic panel Routine 3/17/2017 1/26/2018 1/26/2017    CBC with platelets Routine 3/17/2017 1/26/2018 1/26/2017            Who to contact     If you have questions or need follow up information about today's clinic visit or your schedule please contact [facility] [place] directly at [phone].  Normal or non-critical lab and imaging results will be communicated to you by [employer], letter or phone within 4 business days after the clinic has received the results. If you do not hear from us within 7 days, please contact the clinic through [employer] or phone. If you have a critical or abnormal lab result, we will notify you by phone as soon as possible.  Submit refill requests through [employer] or call your pharmacy and they will forward the refill request to us. Please allow 3 business days for your refill to be completed.          Additional Information About Your Visit        [employer] Information     [employer] gives you secure access to your electronic health record. If you see a primary care provider, you can also send messages to your care team and make appointments. If you have questions, please call your primary care clinic.  If you do not have a primary care provider, please call [phone] and they will assist you.        Care EveryWhere ID     This is your Care EveryWhere ID. This could be used by other organizations to access your [place] medical records  [id number]        Your Vitals Were     Pulse Height BMI (Body Mass Index)             60 1.626 m (5' 4\") 31.22 kg/m2          Blood Pressure " from Last 3 Encounters:   01/26/17 122/48   01/19/17 115/60   01/12/17 136/64    Weight from Last 3 Encounters:   01/26/17 82.555 kg (182 lb)   01/19/17 82.827 kg (182 lb 9.6 oz)   01/12/17 85.73 kg (189 lb)              We Performed the Following     Follow-Up with [facility]        Primary Care Provider Office Phone # Fax #    Ander Shrestha [phone]       [facility] [address] [facility] [address]        Thank you!     Thank you for choosing [facility] [place]  for your care. Our goal is always to provide you with excellent care. Hearing back from our patients is one way we can continue to improve our services. Please take a few minutes to complete the written survey that you may receive in the mail after your visit with us. Thank you!             Your Updated Medication List - Protect others around you: Learn how to safely use, store and throw away your medicines at www.disposemymeds.org.          This list is accurate as of: 1/26/17  1:55 PM.  Always use your most recent med list.                   Brand Name Dispense Instructions for use    aspirin 81 MG tablet      Take 81 mg by mouth daily       calcium citrate 950 MG tablet    CALCITRATE     Take 2 tablets by mouth daily At Noon       COREG PO      Take 25 mg by mouth 2 times daily (with meals)       FISH OIL PO      Take 1,500 mg by mouth daily.       fluticasone 50 MCG/ACT spray    FLONASE    16 g    Spray 1-2 sprays into both nostrils daily as needed for rhinitis or allergies (TAKE ONCE DAILY DURIGN ALLERGY SEASON)       hydrALAZINE 25 MG tablet    APRESOLINE    270 tablet    Take 1 tablet (25 mg) by mouth 3 times daily       Iron Tabs      Take 324 mg by mouth daily Patient states she takes OTC iron furrous gluconate 324 mg tablets USP       isosorbide mononitrate 30 MG 24 hr tablet    IMDUR    45 tablet    1/2 tablet daily AT NIGHT       levothyroxine 112 MCG tablet     SYNTHROID/LEVOTHROID    90 tablet    Take 1 tablet (112 mcg) by mouth daily       Lutein 20 MG Caps      Take 20 mg by mouth daily       MELATONIN PO      Take 3 mg by mouth nightly as needed       metolazone 2.5 MG tablet    ZAROXOLYN    10 tablet    As directed by cardiology       MULTIVITAMIN PO      Take 1 tablet by mouth daily Takes with lunch       prednisoLONE acetate 1 % ophthalmic susp    PRED FORTE    1 Bottle    Place 1 drop into the right eye every evening       simvastatin 20 MG tablet    ZOCOR    90 tablet    Take 1 tablet (20 mg) by mouth At Bedtime       spironolactone 25 MG tablet    ALDACTONE    90 tablet    Take 0.5 tablets (12.5 mg) by mouth daily       torsemide 20 MG tablet    DEMADEX    90 tablet    Take 2 tablets (40 mg) by mouth 2 times daily       traMADol 50 MG tablet    ULTRAM    30 tablet    Take 1 tablet (50 mg) by mouth every 6 hours as needed for moderate pain or severe pain maximum 4 tablet(s) per day       VITAMIN D (CHOLECALCIFEROL) PO      Take 2,000 Units by mouth daily

## 2017-01-27 NOTE — PROGRESS NOTES
PRIMARY CARDIOLOGIST:  Dr. Echols.      REASON FOR VISIT:  Heart failure followup.      HISTORY OF PRESENT ILLNESS:  Ms. Oconnell is a delightful 89-year-old woman with past cardiac history significant for the followin.  Nonischemic cardiomyopathy with EF between 10% and 45% historically, most recently about 25%-30%.   2.  Moderate to severe aortic stenosis, moderate tricuspid regurgitation, moderate mitral regurgitation.   3.  CKD with baseline creatinine around 1.5.   4.  Hyperlipidemia.   5.  Recent implant of a biventricular pacemaker on .      I met her in November when she was hospitalized with profound fatigue, weight gain and shortness of breath.  She was diuresed about 20 pounds with Bumex and dobutamine drip, with a discharge weight of 179.  Unfortunately, she rapidly gained about 7 pounds right after admission, and we have maintained her on 40 mg of torsemide b.i.d. as well as metolazone 2.5 mg intermittently.  She then was able to stabilize her weights, although they had not trended down until recently.  She did have her BiV placed last week.  Since that time, her weight has trended down another about 5-7 pounds.  She also has been attending [facility].  There they were doing wraps and compression stockings, but after finding she likely has significant peripheral arterial disease, they asked her to keep both of those off and just elevate her legs and they are doing some massage.      Today she comes in stating she feels okay.  She is happy, her weight is down to 176 at home, where her goal dry weight was somewhere around 172.  She can get her shoes on.  She still has some swelling mostly in her feet and ankles but not so much in her thighs.  She denies orthopnea or PND.  She denies any symptoms of claudication.  She can get in and out of bed easier and in and out of her car easier.  She also has been doing exercises given to her by her daughter, Sandra, who is a physical therapist.       SOCIAL HISTORY:  She lives in her own home with her .  One of her goals as discussed with Palliative was to stay in her own home and around her family and avoid hospital admissions if possible.  She is a lifelong nonsmoker, no alcohol use, watches her salt closely.  Came in today with her daughter, Sandra, who is a physical therapist.      PHYSICAL EXAMINATION:   GENERAL:  Well-developed, well-nourished elderly woman in no acute distress, pale.   HEENT:  Normocephalic, atraumatic.   HEART:  Regular in rate and rhythm.  There is a harsh 3/6 systolic murmur heard best at the right sternal border.   LUNGS:  Clear bilaterally.   EXTREMITIES:  3+ pitting edema to upper shin.  Thighs are soft today.   SKIN:  Warm and dry.      ASSESSMENT AND PLAN:   1.  Chronic systolic heart failure with primarily right-sided heart failure symptoms complicated by MR, TR and moderate to severe aortic stenosis.  Her weight is now finally starting to trend down, with a hopeful home weight of around 172 pounds.  We do know that she had a right heart cath done in 09/2016 where she was just mildly hypervolemic, and her home weight at that point was 177 pounds.  At this point, her kidney function remains stable, her weight is trending down, and her edema seems to be improving.  We are going to continue her on torsemide 40 mg b.i.d.  We will continue to know that we can use metolazone as needed.  Her creatinine is stable at 1.83 with a BUN of 58, and perhaps this will improve as she decreases her volume status and becomes more euvolemic.  I am also hopeful that the BiV is going to make a significant improvement as well as hopefully help with her cardiorenal syndrome.  May also assist in improving her EF.   2.  Moderate to severe AS, moderate MR and TR.  She has been evaluated for TAVR, and it was felt that her aortic stenosis was not severe.  We will reassess in about 2 months now that she has had the BiV and reconsider at that  time.   3.  CKD, baseline creatinine around 1.4-1.5, today 1.83, BUN 58.  We will continue to follow closely.   4.  Peripheral artery disease with markedly abnormal ABIs when tested for lymphedema.  She does not endorse any symptoms of claudication.  She is already appropriately on aspirin and statin.  We will follow.        She will have an echocardiogram in 2 months with followup with Dr. Echols and a BMP at that time.  I will also see her in about 1 month with a BMP at that time.  She will be in contact with the [facility] with daily weights and low-sodium between now and then.      Thank you for allowing me to participate in her care.      JOI King PA-C             D: 2017 14:35   T: 2017 02:15   MT: LENI      Name:     VICKEY CHURCH   MRN:      [id number]        Account:      [id number]   :      1927           Service Date: 2017      Document: [id number]

## 2017-02-02 ENCOUNTER — DOCUMENTATION ONLY (OUTPATIENT)
Dept: CASE MANAGEMENT | Facility: CLINIC | Age: 82
End: 2017-02-02

## 2017-02-02 ENCOUNTER — TRANSFERRED RECORDS (OUTPATIENT)
Dept: HEALTH INFORMATION MANAGEMENT | Facility: CLINIC | Age: 82
End: 2017-02-02

## 2017-02-02 NOTE — PROGRESS NOTES
Clinic Care Coordination Contact  Care Team Conversations    Per Epic chart review patient was discharged from [facility] on 1/30/2017. Discharge summary is in Epic.    Per chart review she has appointment with [facility] on 2/23/2017 and with cardiologist on 4/17/2017.      Plan: RN CC will follow up with Mckayla next week.    ALLYN Brown, RN, PHN  A Care Coordinator  [phone]  February 2, 2017

## 2017-02-03 ENCOUNTER — TELEPHONE (OUTPATIENT)
Dept: CARDIOLOGY | Facility: CLINIC | Age: 82
End: 2017-02-03

## 2017-02-03 DIAGNOSIS — I50.22 CHRONIC SYSTOLIC HEART FAILURE (H): Primary | ICD-10-CM

## 2017-02-03 NOTE — TELEPHONE ENCOUNTER
Called pt and reviewed SMore's recommendation to continue current torsemide dose and to repeat BMP next week.  Pt verbalized understanding and agrees with this plan and requests to get BMP done at [facility], as this is more convenient for her.  Discussed with pt that getting lab there is OK and requests that she let us know when this is done in order to watch for results and she agrees with this plan.

## 2017-02-03 NOTE — TELEPHONE ENCOUNTER
Continue current torsemide, repeat bmp next week- I expect her Cr will be stable to improved with the diuresis and biv.  Divya Kraft PA-C 2/3/2017 4:01 PM

## 2017-02-03 NOTE — TELEPHONE ENCOUNTER
"Received VM from pt who states she only has 1# to loose until she is down to the wt she was at last spring when she felt great.  She is wondering if she should continue on current diuretic dosage or decrease it? She also states she is concerned about her kidneys and is wondering when she can have another BMP done.     Per chart review pt saw Anabel JOI on 1/26/17 and per Anabel, \"Her weight is now finally starting to trend down, with a hopeful home weight of around 172 pounds.  We do know that she had a right heart cath done in 09/2016 where she was just mildly hypervolemic, and her home weight at that point was 177 pounds.  At this point, her kidney function remains stable, her weight is trending down, and her edema seems to be improving.  We are going to continue her on torsemide 40 mg b.i.d.  We will continue to know that we can use metolazone as needed.  Her creatinine is stable at 1.83 with a BUN of 58, and perhaps this will improve as she decreases her volume status and becomes more euvolemic.  I am also hopeful that the BiV is going to make a significant improvement as well as hopefully help with her cardiorenal syndrome.  May also assist in improving her EF.\"     Pt is scheduled for BMP and OV with Anabel on 2/23/17. Will route to [facility] for reivew.     TELEMANAGEMENT:      "

## 2017-02-06 NOTE — TELEPHONE ENCOUNTER
TELEMANAGEMENT: Wt 171#, same as yesterday, down another 2# since Friday 2/3 and now below min wt parameters with no symptoms. Pt takes torsemide 40mg BID and spironolactone 12.5mg daily. Called pt, she is feeling great. She is going in to have BMP drawn Thursday this week 2/9. Will continue to monitor until labs drawn Thursday. CARMELLA Gee   pa

## 2017-02-09 ENCOUNTER — TELEPHONE (OUTPATIENT)
Dept: CARDIOLOGY | Facility: CLINIC | Age: 82
End: 2017-02-09

## 2017-02-09 DIAGNOSIS — I50.22 CHRONIC SYSTOLIC HEART FAILURE (H): ICD-10-CM

## 2017-02-09 LAB
ANION GAP SERPL CALCULATED.3IONS-SCNC: 10 MMOL/L (ref 3–14)
BUN SERPL-MCNC: 60 MG/DL (ref 7–30)
CALCIUM SERPL-MCNC: 8.9 MG/DL (ref 8.5–10.1)
CHLORIDE SERPL-SCNC: 100 MMOL/L (ref 94–109)
CO2 SERPL-SCNC: 28 MMOL/L (ref 20–32)
CREAT SERPL-MCNC: 1.6 MG/DL (ref 0.52–1.04)
GFR SERPL CREATININE-BSD FRML MDRD: 30 ML/MIN/1.7M2
GLUCOSE SERPL-MCNC: 102 MG/DL (ref 70–99)
POTASSIUM SERPL-SCNC: 4.2 MMOL/L (ref 3.4–5.3)
SODIUM SERPL-SCNC: 138 MMOL/L (ref 133–144)

## 2017-02-09 PROCEDURE — 80048 BASIC METABOLIC PNL TOTAL CA: CPT | Performed by: INTERNAL MEDICINE

## 2017-02-09 PROCEDURE — 36415 COLL VENOUS BLD VENIPUNCTURE: CPT | Performed by: INTERNAL MEDICINE

## 2017-02-09 NOTE — TELEPHONE ENCOUNTER
TELEMANAGEMENT: Wt 171#, same for the last week, 1# below min wt parameters (current parameters 172-180#), no symptoms reported. Pt takes torsemide 40mg BID and spironolactone 12.5mg daily. Pt scheduled for BMP this afternoon. Will watch for results and review with Irina Gee RN

## 2017-02-09 NOTE — TELEPHONE ENCOUNTER
2/9 BMP results below, routed to [facility] for review with TELEMANAGEMENT note from today.

## 2017-02-10 ENCOUNTER — CARE COORDINATION (OUTPATIENT)
Dept: CASE MANAGEMENT | Facility: CLINIC | Age: 82
End: 2017-02-10

## 2017-02-10 NOTE — PROGRESS NOTES
Clinic Care Coordination Contact  OUTREACH    Referral Information:  Referral Source: Home Care Discharge  Reason for Contact: Home care discharge  Care Conference: No     Universal Utilization:   ED Visits in last year: 0  Hospital visits in last year: 3  Last PCP appointment: 12/02/16  Missed Appointments: 0  Concerns: None  Multiple Providers or Specialists: Yes  Upcoming appointment: 2/23/2017 with CORE and pacemaker check, 4/17/2017 with cardiologist and for ECHO    Clinical Concerns:  Current Medical Concerns: s/p pacemaker placement    Current Behavioral Concerns: Denies concerns for anxiety and depression at this time     Clinical Pathway Name: None (CORE clinic patient)  Clinical Pathway: None    Medication Management:  Method of Taking:  Out of bottles at given times  Patient has understanding of regimen and is adherent:  Yes  Medications Reviewed: Yes  Side Effects Reported: None       Functional Status:  Mobility Status: Independent with device  Equipment Currently Used at Home: cane, straight, grab bar, shower chair, walker, rolling  Transportation: She drives ro her  drives  Mckayla state she is independent with dressing and bathing.  She said she does the cooking and her  does the cleaning.               Psychosocial:  Current living arrangement:: I live in a private home with spouse (2 level town house)  Financial/Insurance: [facility]  , son-in-law, daughter and grandsons are very supportive.     Resources and Interventions:  Current Resources:     [facility]    Tele-Monitoring: through cardiology        Advanced Care Plans/Directives on file:: Yes  Referrals Placed: None at this time     Goals: None at this time       Strengths: Family is supportive    Patient/Caregiver understanding: Mckayla states she is doing well since her discharge from home care.  She denies any questions or concerns at this time.  She said her pacemaker is work well.  She said she recently had it put in.  She  said she is seeing the [facility] clinic and part of their tele-management program.      Frequency of Care Coordination: As needed       Plan:     1) Mckayla will follow up as scheduled with CORE on 2/23/2017 and cardiologist on 4/17/2017    2) RN CC will mail 24 Hour care plan.  No ongoing care coordination needs identified at this time.  Patient has good support from her  and family.  I encouraged Mckayla to call me if she has questions or needs further assistance.      India Sánchez, BS, RN, PHN  [facility] Care Coordinator  [phone]  February 10, 2017

## 2017-02-10 NOTE — LETTER
[facility] - CARE MANAGEMENT DEPT  [address] [address]  Phone: [phone]    02/13/17    Mckayla Oconnell  [address] [facility] [address]        Dear Mckayla,  I am the Clinic Care Coordinator that works with your primary care provider's clinic. I wanted to thank you for spending the time to talk with me on 2/10/2017.  Below is a description of what Clinic Care Coordination is and how I can further assist you.     The Clinic Care Coordinator role is a Registered Nurse and/or  who understands the health care system. The goal of Clinic Care Coordination is to help you manage your health and improve access to the [facility] system in the most efficient manner.  The Registered Nurse can assist you in meeting your health care goals by providing education, coordinating services, and strengthening the communication among your providers. The  can assist you with financial, behavioral, psychosocial, and chemical dependency and counseling/psychiatric resources.    Please feel free to keep this letter and contact information to contact me at [phone] with any further questions or concerns that may arise. We at [facility] are focused on providing you with the highest-quality healthcare experience possible and that all starts with you.       Sincerely,     ALLYN Brown, RN, PHN  FPA Care Coordinator      Enclosed: I have enclosed a copy of a 24 Hour Access Plan. This has helpful phone numbers for you to call when needed. Please keep this in an easy to access place to use as needed.                 Using Your Patient Care Plan: [facility]  When do I use my care plan?    Emergency room visits: The care plan gives the emergency room staff an overview of your health. And it gives instructions from your doctor about your care.    Hospital: If you bring your care plan, it will take less time to give your health  history when you are admitted.    Seeing a specialist:  Specialists can track changes to your medicines or enter a new diagnosis. You can have them fax the changes to your doctor to update your plan.    Regular or chronic care visits: Review your care plan for any errors before regular visits. Add information you feel would be helpful. (For example, all blood draws should be finger pokes if possible or note that your child cannot sit in a room for very long.)    Caregivers: Give the care plan to all caregivers. This might include your in-home health care team and family members.  How do I make changes to my care plan?  You may write on the care plan. Make changes by crossing out or adding information. Bring the revised care plan when you see your doctor, and share it with your Health Care Home team.  To send plan updates to your Health Care Home team, call, fax, mail or drop off the changes. We will update your care plan and send you a new copy. Please tell us if you need more than one copy. It s a good idea to keep a copy in your home, car, wheel-chair bag or wherever you might need one.

## 2017-02-10 NOTE — LETTER
Health Care Home - Access Care Plan    About Me  Patient Name:  Mckayla Oconnell    YOB: 1927  Age:                            89 year old   Manuela MRN:         [id number] Telephone Information:     Home Phone [phone]   Mobile NONE       Address:    [address] [facility] [address] Email address:  [email or web address]      Emergency Contact(s)  Name Relationship Lgl Grd Work Phone Home Phone Mobile Phone   1. DAYSI GREENFIELD* Daughter No [phone]   2. JACOB GREENFIELD Relative No [phone]   3. BREE GREENFIELD Grandchild No  [phone]             Health Maintenance: Routine Health maintenance Reviewed: Up to date    My Access Plan  Medical Emergency 911   Questions or concerns during clinic hours Primary Clinic Line, I will call the clinic directly: Primary Clinic: [facility] [phone]   24 Hour Appointment Line [phone] or  [phone] [place] [phone])  (toll free)   24 Hour Nurse Line [phone] (toll free)   Questions or concerns outside clinic hours 24 Hour Appointment Line, I will call the after-hours on-call line:   [facility] [phone] or [phone] ([phone]) (toll-free)   Preferred Urgent Care Preferred Urgent Care: [facility], [phone]   Preferred Hospital Preferred Hospital: [facility]  [phone]   Preferred Pharmacy [id number] - [place], [place] - [address] [facility], [phone] or 911     My Care Team Members  Patient Care Team       Relationship Specialty Notifications Start End    Ander Shrestha MD PCP - General Internal Medicine  10/23/13     Phone: [phone] Pager: [phone] Fax: [phone]        [facility] [address] [facility] [address]    Sebastián Cueto MD MD Nephrology   7/29/16     Phone: [phone] Fax: [phone]         [facility] [address] [address]    India Sánchez, RN Case Manager   9/15/16     Phone: [phone] Fax: [phone]            My Medical and Care Information  Problem List   Patient Active Problem List   Diagnosis     Hyperlipidemia LDL goal <130     Hypothyroidism, unspecified     Advance care planning     Health Care Home     Cardiomyopathy, idiopathic (H)     Nonrheumatic aortic valve stenosis     Glaucoma     Essential hypertension, benign     Coronary artery disease involving native coronary artery without angina pectoris     Iron deficiency anemia, unspecified     Iron deficiency anemia secondary to inadequate dietary iron intake     Drug-induced malabsorption     Iron and its compounds causing adverse effect in therapeutic use(E934.0)     Anemia in chronic kidney disease(285.21)     Chronic kidney disease, stage III (moderate)     Hyponatremia     Aortic stenosis     Fluid overload     Hypoxia     Peripheral edema     Anemia, unspecified     Malabsorption     Adverse effect of iron     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)     Chronic systolic heart failure (H)      Current Medications and Allergies:  See printed Medication Report

## 2017-02-10 NOTE — TELEPHONE ENCOUNTER
Wt parameters adjusted. Called pt, no answer. LVM with results and to follow up as planned on 2/23. Left call back number for questions. CARMELLA Gee

## 2017-02-10 NOTE — TELEPHONE ENCOUNTER
Kidney function improved from baseline, bun, k stable.  Wts are looking much better.  Please adjust teleassurance parameters to 170-176.  Continue current meds.    Divya Kraft PA-C 2/10/2017 11:07 AM

## 2017-02-13 NOTE — PROGRESS NOTES
Clinic Care Coordination Contact  Care Team Conversations      Plan: Access Care plan mailed to patient.  No ongoing care coordination needs identified at this time.  Patient has good support from her  and family.    ALLYN Brown, RN, PHN  [facility] Care Coordinator  [phone]  February 13, 2017

## 2017-02-23 ENCOUNTER — ALLIED HEALTH/NURSE VISIT (OUTPATIENT)
Dept: CARDIOLOGY | Facility: CLINIC | Age: 82
End: 2017-02-23
Attending: INTERNAL MEDICINE
Payer: COMMERCIAL

## 2017-02-23 ENCOUNTER — OFFICE VISIT (OUTPATIENT)
Dept: CARDIOLOGY | Facility: CLINIC | Age: 82
End: 2017-02-23
Attending: PHYSICIAN ASSISTANT
Payer: COMMERCIAL

## 2017-02-23 VITALS
HEART RATE: 70 BPM | SYSTOLIC BLOOD PRESSURE: 120 MMHG | DIASTOLIC BLOOD PRESSURE: 60 MMHG | OXYGEN SATURATION: 98 % | WEIGHT: 176.7 LBS | BODY MASS INDEX: 30.17 KG/M2 | HEIGHT: 64 IN

## 2017-02-23 DIAGNOSIS — I50.22 CHRONIC SYSTOLIC HEART FAILURE (H): ICD-10-CM

## 2017-02-23 DIAGNOSIS — Z95.0 CARDIAC PACEMAKER IN SITU: ICD-10-CM

## 2017-02-23 LAB
ANION GAP SERPL CALCULATED.3IONS-SCNC: 11.5 MMOL/L (ref 6–17)
BUN SERPL-MCNC: 45 MG/DL (ref 7–30)
CALCIUM SERPL-MCNC: 9.4 MG/DL (ref 8.5–10.5)
CHLORIDE SERPL-SCNC: 98 MMOL/L (ref 98–107)
CO2 SERPL-SCNC: 31 MMOL/L (ref 23–29)
CREAT SERPL-MCNC: 1.93 MG/DL (ref 0.7–1.3)
GFR SERPL CREATININE-BSD FRML MDRD: 24 ML/MIN/1.7M2
GLUCOSE SERPL-MCNC: 137 MG/DL (ref 70–105)
POTASSIUM SERPL-SCNC: 4.5 MMOL/L (ref 3.5–5.1)
SODIUM SERPL-SCNC: 136 MMOL/L (ref 136–145)

## 2017-02-23 PROCEDURE — 93281 PM DEVICE PROGR EVAL MULTI: CPT | Performed by: INTERNAL MEDICINE

## 2017-02-23 PROCEDURE — 80048 BASIC METABOLIC PNL TOTAL CA: CPT | Performed by: PHYSICIAN ASSISTANT

## 2017-02-23 PROCEDURE — 36415 COLL VENOUS BLD VENIPUNCTURE: CPT | Performed by: PHYSICIAN ASSISTANT

## 2017-02-23 PROCEDURE — 99214 OFFICE O/P EST MOD 30 MIN: CPT | Mod: 25 | Performed by: PHYSICIAN ASSISTANT

## 2017-02-23 NOTE — MR AVS SNAPSHOT
After Visit Summary   2/23/2017    Mckayla Oconnell    MRN: [id number]           Patient Information     Date Of Birth          7/2/1927        Visit Information        Provider Department      2/23/2017 2:30 PM BANG RON [facility] HEART [facility]        Today's Diagnoses     Cardiac pacemaker in situ           Follow-ups after your visit        Your next 10 appointments already scheduled     Apr 17, 2017 10:30 AM CDT   Ech Complete with [id number]   [facility] CV Echocardiography (Cardiovascular Imaging at [facility])    [address] [place] [address]  [address]  [facility] [address] [phone]           1.  Please bring or wear a comfortable two-piece outfit. 2.  You may eat, drink and take your normal medicines. 3.  For any questions that cannot be answered, please contact the ordering physician            Apr 17, 2017  1:40 PM CDT   LAB with BANG LAB   [facility] HEART [facility] ([facility])    [address] [place] [address] [address]  [facility] [address] [phone]           Patient must bring picture ID.  Patient should be prepared to give a urine specimen  Please do not eat 10-12 hours before your appointment if you are coming in fasting for labs on lipids, cholesterol, or glucose (sugar).  Pregnant women should follow their Care Team instructions. Water with medications is okay. Do not drink coffee or other fluids.   If you have concerns about taking  your medications, please ask at office or if scheduling via [employer], send a message by clicking on Secure Messaging, Message Your Care Team.            Apr 17, 2017  2:45 PM CDT   Return Visit with Romeo Echols MD   [facility] HEART [facility] ([facility])    [address] [place] [address] [address]  [facility] [address] [phone]            May 18, 2017  3:15 PM CDT   Remote PPM Check with CUENCA TECH1   [facility]YSICIANS  [facility]ART [facility] [place] ([facility])    [address] [facility] [address]  [place] [address] [phone]           This appointment is for a remote check of your pacemaker.  This is not an appointment at the office.              Who to contact     If you have questions or need follow up information about today's clinic visit or your schedule please contact [facility] [place] directly at [phone].  Normal or non-critical lab and imaging results will be communicated to you by [employer], letter or phone within 4 business days after the clinic has received the results. If you do not hear from us within 7 days, please contact the clinic through [employer] or phone. If you have a critical or abnormal lab result, we will notify you by phone as soon as possible.  Submit refill requests through [employer] or call your pharmacy and they will forward the refill request to us. Please allow 3 business days for your refill to be completed.          Additional Information About Your Visit        [employer] Information     [employer] gives you secure access to your electronic health record. If you see a primary care provider, you can also send messages to your care team and make appointments. If you have questions, please call your primary care clinic.  If you do not have a primary care provider, please call [phone] and they will assist you.        Care EveryWhere ID     This is your Care EveryWhere ID. This could be used by other organizations to access your [facility] medical records  [id number]         Blood Pressure from Last 3 Encounters:   01/26/17 122/48   01/19/17 115/60   01/12/17 136/64    Weight from Last 3 Encounters:   02/23/17 80.2 kg (176 lb 11.2 oz)   01/26/17 82.6 kg (182 lb)   01/19/17 82.8 kg (182 lb 9.6 oz)              We Performed the Following     Follow-Up with Device Clinic     PM DEVICE PROGRAMMING EVAL, MULTI LEAD PACER ([id number])        Primary Care Provider Office Phone #  Fax #    Ander Shrestha [phone]       [facility] [address] [facility] [address]        Thank you!     Thank you for choosing [facility] [place]  for your care. Our goal is always to provide you with excellent care. Hearing back from our patients is one way we can continue to improve our services. Please take a few minutes to complete the written survey that you may receive in the mail after your visit with us. Thank you!             Your Updated Medication List - Protect others around you: Learn how to safely use, store and throw away your medicines at www.disposemymeds.org.          This list is accurate as of: 2/23/17  3:12 PM.  Always use your most recent med list.                   Brand Name Dispense Instructions for use    aspirin 81 MG tablet      Take 81 mg by mouth daily       calcium citrate 950 MG tablet    CALCITRATE     Take 2 tablets by mouth daily At Noon       COREG PO      Take 25 mg by mouth 2 times daily (with meals)       FISH OIL PO      Take 1,500 mg by mouth daily.       fluticasone 50 MCG/ACT spray    FLONASE    16 g    Spray 1-2 sprays into both nostrils daily as needed for rhinitis or allergies (TAKE ONCE DAILY DURIGN ALLERGY SEASON)       hydrALAZINE 25 MG tablet    APRESOLINE    270 tablet    Take 1 tablet (25 mg) by mouth 3 times daily       Iron Tabs      Take 324 mg by mouth daily Patient states she takes OTC iron furrous gluconate 324 mg tablets USP       isosorbide mononitrate 30 MG 24 hr tablet    IMDUR    45 tablet    1/2 tablet daily AT NIGHT       levothyroxine 112 MCG tablet    SYNTHROID/LEVOTHROID    90 tablet    Take 1 tablet (112 mcg) by mouth daily       Lutein 20 MG Caps      Take 20 mg by mouth daily       MELATONIN PO      Take 3 mg by mouth nightly as needed       metolazone 2.5 MG tablet    ZAROXOLYN    10 tablet    As directed by cardiology       MULTIVITAMIN PO      Take 1 tablet by mouth  daily Takes with lunch       prednisoLONE acetate 1 % ophthalmic susp    PRED FORTE    1 Bottle    Place 1 drop into the right eye every evening       simvastatin 20 MG tablet    ZOCOR    90 tablet    Take 1 tablet (20 mg) by mouth At Bedtime       spironolactone 25 MG tablet    ALDACTONE    90 tablet    Take 0.5 tablets (12.5 mg) by mouth daily       torsemide 20 MG tablet    DEMADEX    90 tablet    Take 2 tablets (40 mg) by mouth 2 times daily       traMADol 50 MG tablet    ULTRAM    30 tablet    Take 1 tablet (50 mg) by mouth every 6 hours as needed for moderate pain or severe pain maximum 4 tablet(s) per day       VITAMIN D (CHOLECALCIFEROL) PO      Take 2,000 Units by mouth daily

## 2017-02-23 NOTE — PATIENT INSTRUCTIONS
Call CORE nurse for any questions or concerns:    Amparo Dias or Thea: [phone]   If you have concerns after hours, please call [phone], option 2    Consider a driving evaluation at [facility].      1. Medication changes:  Decrease your torsemide/ demadex to 2 tablets (40 mg) in the morning and 1 tablet in the afternoon (20 mg).      2. Weigh yourself daily and call it in.       3. Call CORE nurse if your weight is up more than 2 pounds in one day, or 5 pounds in one week.    4. Call CORE nurse if you feel more short of breath, have more abdominal bloating or leg swelling.    5. Continue low sodium diet (less than 2000mg daily). If you eat less salt, you will retain less fluid.     6. Lab results: These should improve with less waterpill.    Component      Latest Ref Rng & Units 1/26/2017 2/9/2017 2/23/2017   Sodium      136 - 145 mmol/L 138 138 136   Potassium      3.5 - 5.1 mmol/L 4.6 4.2 4.5   Chloride      98 - 107 mmol/L 102 100 98   Carbon Dioxide      23 - 29 mmol/L 28 28 31 (H)   Anion Gap      6 - 17 mmol/L 12.6 10 11.5   Glucose      70 - 105 mg/dL 132 (H) 102 (H) 137 (H)   Urea Nitrogen      7 - 30 mg/dL 58 (H) 60 (H) 45 (H)   Creatinine      0.70 - 1.30 mg/dL 1.83 (H) 1.60 (H) 1.93 (H)   GFR Estimate      >60 mL/min/1.7m2 26 (L) 30 (L) 24 (L)   GFR Estimate If Black      >60 mL/min/1.7m2 31 (L) 37 (L) 30 (L)   Calcium      8.5 - 10.5 mg/dL 9.3 8.9 9.4       **Do NOT take Aleve or Ibuprofen without checking with your doctor first    [facility]: Cardiomyopathy, Optimization, Rehabilitation, Education   The [facility] Clinic is a heart failure specialty clinic within the [facility] where you will work with specialized nurse practioners, physician assistants, doctors and registered nurses. They are dedicated to helping patients with heart failure to carefully adjust medications, receive education, and learn who and when to call if symptoms develop. They  specialize in helping you better understand your condition, slow the progression of your disease, improve the length and quality of your life, help you detect future heart problems before they become life threatening, and avoid hospitalizations.

## 2017-02-23 NOTE — MR AVS SNAPSHOT
After Visit Summary   2/23/2017    Mckayla Oconnell    MRN: [id number]           Patient Information     Date Of Birth          7/2/1927        Visit Information        Provider Department      2/23/2017 3:10 PM More, Divya Mejia PA-C [facility] PHYSIC[facility] AT [place]        Today's Diagnoses     Chronic systolic heart failure (H)          Care Instructions    Call CORE nurse for any questions or concerns:    Amparo Dias or Anne: [phone]   If you have concerns after hours, please call [phone], option [phone]    Consider a driving evaluation at [facility].      1. Medication changes:  Decrease your torsemide/ demadex to 2 tablets (40 mg) in the morning and 1 tablet in the afternoon (20 mg).      2. Weigh yourself daily and call it in.       3. Call CORE nurse if your weight is up more than 2 pounds in one day, or 5 pounds in one week.    4. Call CORE nurse if you feel more short of breath, have more abdominal bloating or leg swelling.    5. Continue low sodium diet (less than 2000mg daily). If you eat less salt, you will retain less fluid.     6. Lab results: These should improve with less waterpill.    Component      Latest Ref Rng & Units 1/26/2017 2/9/2017 2/23/2017   Sodium      136 - 145 mmol/L 138 138 136   Potassium      3.5 - 5.1 mmol/L 4.6 4.2 4.5   Chloride      98 - 107 mmol/L 102 100 98   Carbon Dioxide      23 - 29 mmol/L 28 28 31 (H)   Anion Gap      6 - 17 mmol/L 12.6 10 11.5   Glucose      70 - 105 mg/dL 132 (H) 102 (H) 137 (H)   Urea Nitrogen      7 - 30 mg/dL 58 (H) 60 (H) 45 (H)   Creatinine      0.70 - 1.30 mg/dL 1.83 (H) 1.60 (H) 1.93 (H)   GFR Estimate      >60 mL/min/1.7m2 26 (L) 30 (L) 24 (L)   GFR Estimate If Black      >60 mL/min/1.7m2 31 (L) 37 (L) 30 (L)   Calcium      8.5 - 10.5 mg/dL 9.3 8.9 9.4       **Do NOT take Aleve or Ibuprofen without checking with your doctor first    [facility] Clinic: Cardiomyopathy, Optimization,  Rehabilitation, Education   The [facility]ORE Clinic is a heart failure specialty clinic within the [facility] where you will work with specialized nurse practioners, physician assistants, doctors and registered nurses. They are dedicated to helping patients with heart failure to carefully adjust medications, receive education, and learn who and when to call if symptoms develop. They specialize in helping you better understand your condition, slow the progression of your disease, improve the length and quality of your life, help you detect future heart problems before they become life threatening, and avoid hospitalizations.        Follow-ups after your visit        Additional Services     Follow-Up with CORE Clinic - TANYA visit                 Your next 10 appointments already scheduled     Apr 17, 2017 10:30 AM CDT   Ech Complete with [id number]   [facility] CV Echocardiography (Cardiovascular Imaging at [facility])    [address]  [address]  [facility] [id number] [phone]           1.  Please bring or wear a comfortable two-piece outfit. 2.  You may eat, drink and take your normal medicines. 3.  For any questions that cannot be answered, please contact the ordering physician            Apr 17, 2017  1:40 PM CDT   LAB with [facility]   [facility] HEART AT [place] ([facility])    [address]  [facility] [id number] [phone]           Patient must bring picture ID.  Patient should be prepared to give a urine specimen  Please do not eat 10-12 hours before your appointment if you are coming in fasting for labs on lipids, cholesterol, or glucose (sugar).  Pregnant women should follow their Care Team instructions. Water with medications is okay. Do not drink coffee or other fluids.   If you have concerns about taking  your medications, please ask at office or if scheduling via Cas[employer], send a message by  clicking on Secure Messaging, Message Your Care Team.            Apr 17, 2017  2:45 PM CDT   Return Visit with Romeo Echols MD   [facility] HEART AT [place] ([facility])    [address]  [facility] [id number] [phone]            May 18, 2017  3:15 PM CDT   Remote PPM Check with [facility] TECH1   [facility] ([facility])    [address] [place] [address] [address]  [facility] [id number] [phone]           This appointment is for a remote check of your pacemaker.  This is not an appointment at the office.              Future tests that were ordered for you today     Open Future Orders        Priority Expected Expires Ordered    Basic metabolic panel Routine 4/23/2017 2/23/2018 2/23/2017    Follow-Up with [facility] Clinic - TANYA visit Routine 4/23/2017 2/23/2018 2/23/2017    Hemoglobin Routine 4/23/2017 2/23/2018 2/23/2017            Who to contact     If you have questions or need follow up information about today's clinic visit or your schedule please contact [facility] directly at [phone].  Normal or non-critical lab and imaging results will be communicated to you by [employer], letter or phone within 4 business days after the clinic has received the results. If you do not hear from us within 7 days, please contact the clinic through [employer] or phone. If you have a critical or abnormal lab result, we will notify you by phone as soon as possible.  Submit refill requests through [employer] or call your pharmacy and they will forward the refill request to us. Please allow 3 business days for your refill to be completed.          Additional Information About Your Visit        [employer] Information     [employer] gives you secure access to your electronic health record. If you see a primary care provider, you can also send messages to your care team and make appointments. If you have  "questions, please call your primary care clinic.  If you do not have a primary care provider, please call [phone] and they will assist you.        Care EveryWhere ID     This is your Care EveryWhere ID. This could be used by other organizations to access your [place] medical records  [id number]        Your Vitals Were     Pulse Height Pulse Oximetry BMI (Body Mass Index)          70 1.626 m (5' 4\") 98% 30.33 kg/m2         Blood Pressure from Last 3 Encounters:   02/23/17 120/60   01/26/17 122/48   01/19/17 115/60    Weight from Last 3 Encounters:   02/23/17 80.2 kg (176 lb 11.2 oz)   01/26/17 82.6 kg (182 lb)   01/19/17 82.8 kg (182 lb 9.6 oz)              We Performed the Following     Follow-Up with [facility] Clinic - TANYA visit        Primary Care Provider Office Phone # Fax #    Ander Shrestha [phone]       [facility] [address] [place] [address]        Thank you!     Thank you for choosing [facility] PHYSICIANS HEART AT [place]  for your care. Our goal is always to provide you with excellent care. Hearing back from our patients is one way we can continue to improve our services. Please take a few minutes to complete the written survey that you may receive in the mail after your visit with us. Thank you!             Your Updated Medication List - Protect others around you: Learn how to safely use, store and throw away your medicines at www.disposemymeds.org.          This list is accurate as of: 2/23/17  3:48 PM.  Always use your most recent med list.                   Brand Name Dispense Instructions for use    aspirin 81 MG tablet      Take 81 mg by mouth daily       calcium citrate 950 MG tablet    CALCITRATE     Take 2 tablets by mouth daily At Noon       COREG PO      Take 25 mg by mouth 2 times daily (with meals)       FISH OIL PO      Take 1,500 mg by mouth daily.       fluticasone 50 MCG/ACT spray    FLONASE    16 g    Spray 1-2 sprays into " both nostrils daily as needed for rhinitis or allergies (TAKE ONCE DAILY DURIGN ALLERGY SEASON)       hydrALAZINE 25 MG tablet    APRESOLINE    270 tablet    Take 1 tablet (25 mg) by mouth 3 times daily       Iron Tabs      Take 324 mg by mouth daily Patient states she takes OTC iron furrous gluconate 324 mg tablets USP       isosorbide mononitrate 30 MG 24 hr tablet    IMDUR    45 tablet    1/2 tablet daily AT NIGHT       levothyroxine 112 MCG tablet    SYNTHROID/LEVOTHROID    90 tablet    Take 1 tablet (112 mcg) by mouth daily       Lutein 20 MG Caps      Take 20 mg by mouth daily       MELATONIN PO      Take 3 mg by mouth nightly as needed       metolazone 2.5 MG tablet    ZAROXOLYN    10 tablet    As directed by cardiology       MULTIVITAMIN PO      Take 1 tablet by mouth daily Takes with lunch       prednisoLONE acetate 1 % ophthalmic susp    PRED FORTE    1 Bottle    Place 1 drop into the right eye every evening       simvastatin 20 MG tablet    ZOCOR    90 tablet    Take 1 tablet (20 mg) by mouth At Bedtime       spironolactone 25 MG tablet    ALDACTONE    90 tablet    Take 0.5 tablets (12.5 mg) by mouth daily       torsemide 20 MG tablet    DEMADEX    90 tablet    Take 2 tablets (40 mg) by mouth 2 times daily       VITAMIN D (CHOLECALCIFEROL) PO      Take 2,000 Units by mouth daily

## 2017-02-23 NOTE — PROGRESS NOTES
Reviewed during clinic visit.  Please see progress note for plan.  Divya Kraft PA-C 2/23/2017 3:06 PM

## 2017-02-23 NOTE — PROGRESS NOTES
[employer] Valitude CRT-P 6 Week PPM  Check  AP: 98 % BIV Paced: 98 %  Mode: DDDR 70        Underlying Rhythm: Sinus Ed with BBB, rates 40's  Heart Rate: Blunted at 70 bpm. Ambulates using a wheeled walker and denies any activity intolerance  Sensing: Stable    Pacing Threshold: Stable   Impedance: Stable  Battery Status: 9 yrs  Atrial Arrhythmia: None  Ventricular Arrhythmia: None  Setting Change: None    Care Plan: Pt has no complaints. Incision to left anterior chest well healed without signs of infection. Pt states edema has been steadily improving since CRT-P implant. Reviewed Latitude transmitter with pt and she verbalized understanding. First Latitude scheduled for May. FAUSTINO Hatfield RN.

## 2017-02-23 NOTE — LETTER
2017    Ander Shrestha MD  [facility]   [address]  [facility] [address]    RE: Mckayla Cardenasmaximo       Dear Colleague,    I had the pleasure of seeing Mckayla Oconnell in the [facility].    PRIMARY CARDIOLOGIST:  Dr. Echols.      REASON FOR VISIT:  Heart failure followup.      Ms. Oconnell is a delightful 89-year-old woman with past cardiac history significant for the followin.  Nonischemic cardiomyopathy with EF between 10% and 45% historically, most recently about 25%-30%.   2.  Moderate to severe aortic stenosis, moderate tricuspid regurgitation and moderate mitral regurgitation.   3.  CKD with baseline creatinine around 1.5.   4.  Hyperlipidemia.   5.  Recent implant of a biventricular pacemaker .   6.  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      I had initially met her in November when she was hospitalized with profound fatigue, weight gain and shortness of breath.  She was diuresed about 20 pounds on a dobutamine and Bumex drip, with a discharge weight of 179 pounds.  Unfortunately, she rapidly regained weight and we struggled to stabilize her weights until she underwent her BiV pacemaker placement.  Since that time, her weight has dropped down quite nicely.  Her home weight today is 170 pounds.      She tells me she feels fantastic.  She has no issues at all.  She has no disability.  She can move her legs easily.  She can walk well.  She has no chest pain, shortness of breath, orthopnea or PND.  When she wakes up in the morning, she just has pedal edema and ankle swelling, where previously she had anasarca up into her abdomen.  By the end of the day, she does have edema to her knees, but this is nowhere near as severe as it was before.  She has been doing walking around home.  Her , Jaime, broke his arm so she is now the caregiver there, which is a huge role reversal and she is able to do this without difficulty.  Their  biggest concern today is that Mckayla would like to continue driving, and she is wondering if there are any cardiac restrictions.  She has not driven since the fall due to her not feeling well.  She now feels well and like she would be able to drive just fine.      SOCIAL HISTORY:  She lives in her own home with her , Jaime.  Her daughter, Sandra, comes in with her today, who is a physical therapist.  Her goals have been stated that she wants to feel well and stay in her home and around her family and avoid hospital admissions and TCU or nursing home.  She is a lifelong nonsmoker, no alcohol use, watches her salt closely, has been very involved in her [employer].      PHYSICAL EXAMINATION:   GENERAL:  Well-developed, well-nourished, elderly woman in no acute distress.   HEENT:  Normocephalic, atraumatic.   HEART:  Regular with a 3 to 4/6 systolic murmur heard best at the right sternal border but throughout the precordium.   LUNGS:  Clear, slightly diminished in the left base but otherwise clear to auscultation bilaterally.  No wheezes, rales or rhonchi.   EXTREMITIES:  With 2+ edema to mid shin.  Thighs are soft.  No abdominal edema.   NECK:  Veins are flat at 30 degrees.   SKIN:  Warm and dry.     Outpatient Encounter Prescriptions as of 2/23/2017   Medication Sig Dispense Refill     spironolactone (ALDACTONE) 25 MG tablet Take 0.5 tablets (12.5 mg) by mouth daily 90 tablet 3     isosorbide mononitrate (IMDUR) 30 MG 24 hr tablet 1/2 tablet daily AT NIGHT 45 tablet 3     metolazone (ZAROXOLYN) 2.5 MG tablet As directed by cardiology 10 tablet 3     [DISCONTINUED] torsemide (DEMADEX) 20 MG tablet Take 2 tablets (40 mg) by mouth 2 times daily 90 tablet 3     hydrALAZINE (APRESOLINE) 25 MG tablet Take 1 tablet (25 mg) by mouth 3 times daily 270 tablet 3     Carvedilol (COREG PO) Take 25 mg by mouth 2 times daily (with meals)       MELATONIN PO Take 3 mg by mouth nightly as needed       fluticasone (FLONASE) 50 MCG/ACT  nasal spray Spray 1-2 sprays into both nostrils daily as needed for rhinitis or allergies (TAKE ONCE DAILY DURIGN ALLERGY SEASON) 16 g 11     aspirin 81 MG tablet Take 81 mg by mouth daily       Iron TABS Take 324 mg by mouth daily Patient states she takes OTC iron furrous gluconate 324 mg tablets USP       [DISCONTINUED] levothyroxine (SYNTHROID, LEVOTHROID) 112 MCG tablet Take 1 tablet (112 mcg) by mouth daily 90 tablet 3     [DISCONTINUED] simvastatin (ZOCOR) 20 MG tablet Take 1 tablet (20 mg) by mouth At Bedtime 90 tablet 3     calcium citrate (CALCITRATE) 950 MG tablet Take 2 tablets by mouth daily At Noon       VITAMIN D, CHOLECALCIFEROL, PO Take 2,000 Units by mouth daily        Lutein 20 MG CAPS Take 20 mg by mouth daily       prednisoLONE acetate (PRED FORTE) 1 % ophthalmic suspension Place 1 drop into the right eye every evening  1 Bottle      Multiple Vitamins-Minerals (MULTIVITAMIN OR) Take 1 tablet by mouth daily Takes with lunch       Omega-3 Fatty Acids (FISH OIL PO) Take 1,500 mg by mouth daily.       [DISCONTINUED] traMADol (ULTRAM) 50 MG tablet Take 1 tablet (50 mg) by mouth every 6 hours as needed for moderate pain or severe pain maximum 4 tablet(s) per day 30 tablet 0     No facility-administered encounter medications on file as of 2/23/2017.       ASSESSMENT AND PLAN:   1.  Chronic systolic heart failure.  This primarily has been right-sided, complicated by moderate to severe aortic stenosis, mitral regurgitation and tricuspid regurgitation.  Her home weight is 170 pounds today, which I think is probably euvolemia for her.  Unfortunately, her creatinine is mildly elevated at 1.93 with a BUN of 45.  She has been able to keep fluid off much more efficiently with her BiV pacemaker in place, and I think it is reasonable to consider a lower dose of diuretic at this point.  I am going to decrease her torsemide to 40 mg in the morning and 20 mg in the afternoon, where she previously was on 40 mg b.i.d.   We will continue to watch her weights and symptoms closely on tele-assurance.  If she hits 175 pounds, I think we will need to take some action and increase it again.   2.  Peripheral edema which is likely multifactorial due to venous insufficiency and lymphedema and heart failure.  She previously wore compression stockings or wraps, but she has significant peripheral arterial disease on ultrasound that was requested by the [facility], and they no longer recommend compression stockings due to the severity of her peripheral disease.  We will have her continue to elevate her feet during the day.   3.  CKD with baseline creatinine around 1.5.  Elevated as above.   4.  Valvular disease with moderate to severe AS, moderate MR and TR.  She has been evaluated for TAVR, but she was not felt that her aortic stenosis was severe enough.  She will get a repeat echocardiogram prior to seeing Dr. Echols in April.   5.  BiV and paced with 99% BiV pacing.   6.  Driving concerns, from a cardiac standpoint she does not have restrictions.  Although, we did discuss that I do not evaluate for other issues like reaction time, vision etc.  I asked her to review this with her primary care doctor and consider evaluation at [facility] in their driving center.        This patient is doing exceptionally well, considering everything she has been through.  Medication changes as discussed.  I will see her back in 1 month with a plan to follow her closely in clinic to avoid hospitalizations and long-term deterioration.      Thank you for allowing me to participate in her care.      Sincerely,    Divya Kraft PA-C     [facility]      cc:   Romeo Echols MD, [facility] Heart at [address], [address]    [place]  [address]

## 2017-02-23 NOTE — PROGRESS NOTES
[id number]  HPI and Plan:   See dictation    Orders this Visit:  Orders Placed This Encounter   Procedures     Basic metabolic panel     Hemoglobin     Follow-Up with [facility] - TANYA visit     No orders of the defined types were placed in this encounter.    Medications Discontinued During This Encounter   Medication Reason     traMADol (ULTRAM) 50 MG tablet Stopped by Patient         Encounter Diagnosis   Name Primary?     Chronic systolic heart failure (H)        CURRENT MEDICATIONS:  Current Outpatient Prescriptions   Medication Sig Dispense Refill     spironolactone (ALDACTONE) 25 MG tablet Take 0.5 tablets (12.5 mg) by mouth daily 90 tablet 3     isosorbide mononitrate (IMDUR) 30 MG 24 hr tablet 1/2 tablet daily AT NIGHT 45 tablet 3     metolazone (ZAROXOLYN) 2.5 MG tablet As directed by cardiology 10 tablet 3     torsemide (DEMADEX) 20 MG tablet Take 2 tablets (40 mg) by mouth 2 times daily 90 tablet 3     hydrALAZINE (APRESOLINE) 25 MG tablet Take 1 tablet (25 mg) by mouth 3 times daily 270 tablet 3     Carvedilol (COREG PO) Take 25 mg by mouth 2 times daily (with meals)       MELATONIN PO Take 3 mg by mouth nightly as needed       fluticasone (FLONASE) 50 MCG/ACT nasal spray Spray 1-2 sprays into both nostrils daily as needed for rhinitis or allergies (TAKE ONCE DAILY DURIGN ALLERGY SEASON) 16 g 11     aspirin 81 MG tablet Take 81 mg by mouth daily       Iron TABS Take 324 mg by mouth daily Patient states she takes OTC iron furrous gluconate 324 mg tablets USP       levothyroxine (SYNTHROID, LEVOTHROID) 112 MCG tablet Take 1 tablet (112 mcg) by mouth daily 90 tablet 3     simvastatin (ZOCOR) 20 MG tablet Take 1 tablet (20 mg) by mouth At Bedtime 90 tablet 3     calcium citrate (CALCITRATE) 950 MG tablet Take 2 tablets by mouth daily At Noon       VITAMIN D, CHOLECALCIFEROL, PO Take 2,000 Units by mouth daily        Lutein 20 MG CAPS Take 20 mg by mouth daily       prednisoLONE acetate (PRED FORTE) 1 %  ophthalmic suspension Place 1 drop into the right eye every evening  1 Bottle      Multiple Vitamins-Minerals (MULTIVITAMIN OR) Take 1 tablet by mouth daily Takes with lunch       Omega-3 Fatty Acids (FISH OIL PO) Take 1,500 mg by mouth daily.         ALLERGIES     Allergies   Allergen Reactions     Atenolol Anaphylaxis     Hydrochlorothiazide      hyponatremia     Pollen Extract        PAST MEDICAL HISTORY:  Past Medical History   Diagnosis Date     Aortic stenosis      CAD (coronary artery disease)      CT calcium qcgvk=277 May 2013     Cardiomyopathy, idiopathic (H)      cardiomyopathy HTN vs viral; EF as low as 15% in 2009; CRT-P implanted 1/19/17     CHF (NYHA class II, ACC/AHA stage C) (H) 2/14/2013     Glaucoma      H/O angiography 9-1-2016     No angiographic evidence of obstructive coronary artery disease.     Hypertension      Hypothyroidism 2/14/2013     Pulmonary hypertension (H)        PAST SURGICAL HISTORY:  Past Surgical History   Procedure Laterality Date     Gyn surgery       Hysterectomy 1995     Orthopedic surgery       knee replacement 1998     Orthopedic surgery       Knee replacement 2008     Orthopedic surgery       knee surgery [place]     Hysterectomy, pap no longer indicated       Cardiac catherization  9/1/16     Implant pacemaker  1/19/17     CRT-P       FAMILY HISTORY:  Family History   Problem Relation Age of Onset     CANCER Mother      Uterius     Breast Cancer Daughter        SOCIAL HISTORY:  Social History     Social History     Marital status:      Spouse name: N/A     Number of children: N/A     Years of education: N/A     Social History Main Topics     Smoking status: Never Smoker     Smokeless tobacco: Never Used     Alcohol use No     Drug use: No     Sexual activity: No     Other Topics Concern     Parent/Sibling W/ Cabg, Mi Or Angioplasty Before 65f 55m? No     Caffeine Concern No     Sleep Concern No     Stress Concern No     Weight Concern No     Special Diet Yes      "low sodium     Exercise Yes     therapy     Seat Belt Yes     Social History Narrative       Review of Systems:  Skin:  Negative     Eyes:  Positive for glasses  ENT:  Negative    Respiratory:  Negative for shortness of breath;cough;wheezing  Cardiovascular:  Negative for;palpitations;chest pain;lightheadedness;dizziness Positive for;edema;fatigue  Gastroenterology: Negative    Genitourinary:  Negative    Musculoskeletal:  Negative    Neurologic:  Negative    Psychiatric:  Negative    Heme/Lymph/Imm:  Negative    Endocrine:  Positive for thyroid disorder    Physical Exam:  Vitals: /60 (BP Location: Left arm, Patient Position: Chair, Cuff Size: Adult Regular)  Pulse 70  Ht 1.626 m (5' 4\")  Wt 80.2 kg (176 lb 11.2 oz)  SpO2 98%  BMI 30.33 kg/m2   Please refer to dictation for physical exam    Recent Lab Results:  LIPID RESULTS:  Lab Results   Component Value Date    CHOL 113 03/22/2016    HDL 39 (L) 03/22/2016    LDL 54 03/22/2016    TRIG 98 03/22/2016    CHOLHDLRATIO 3.5 07/06/2015       LIVER ENZYME RESULTS:  Lab Results   Component Value Date    AST 50 (H) 11/14/2016    ALT 89 (H) 11/14/2016       CBC RESULTS:  Lab Results   Component Value Date    WBC 5.3 01/19/2017    RBC 2.95 (L) 01/19/2017    HGB 9.0 (L) 01/19/2017    HCT 27.5 (L) 01/19/2017    MCV 93 01/19/2017    MCH 30.5 01/19/2017    MCHC 32.7 01/19/2017    RDW 15.7 (H) 01/19/2017     01/19/2017       BMP RESULTS:  Lab Results   Component Value Date     02/23/2017    POTASSIUM 4.5 02/23/2017    CHLORIDE 98 02/23/2017    CO2 31 (H) 02/23/2017    ANIONGAP 11.5 02/23/2017     (H) 02/23/2017    BUN 45 (H) 02/23/2017    BUN 31.3 09/06/2013    CR 1.93 (H) 02/23/2017    GFRESTIMATED 24 (L) 02/23/2017    GFRESTBLACK 30 (L) 02/23/2017    SHOBHA 9.4 02/23/2017        A1C RESULTS:  No results found for: A1C    INR RESULTS:  Lab Results   Component Value Date    INR 1.12 09/01/2016           CC  Divya Kraft PA-C  [facility]  [address]  [place] [id number]

## 2017-02-24 DIAGNOSIS — I25.10 CORONARY ARTERY DISEASE INVOLVING NATIVE CORONARY ARTERY OF NATIVE HEART WITHOUT ANGINA PECTORIS: ICD-10-CM

## 2017-02-24 RX ORDER — SIMVASTATIN 20 MG
20 TABLET ORAL AT BEDTIME
Qty: 90 TABLET | Refills: 0 | Status: SHIPPED | OUTPATIENT
Start: 2017-02-24 | End: 2017-07-16

## 2017-02-24 NOTE — PROGRESS NOTES
PRIMARY CARDIOLOGIST:  Dr. Echols.      REASON FOR VISIT:  Heart failure followup.      HISTORY OF PRESENT ILLNESS:  Ms. Oconnell is a delightful 89-year-old woman with past cardiac history significant for the followin.  Nonischemic cardiomyopathy with EF between 10% and 45% historically, most recently about 25%-30%.   2.  Moderate to severe aortic stenosis, moderate tricuspid regurgitation and moderate mitral regurgitation.   3.  CKD with baseline creatinine around 1.5.   4.  Hyperlipidemia.   5.  Recent implant of a biventricular pacemaker .   6.  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      I had initially met her in November when she was hospitalized with profound fatigue, weight gain and shortness of breath.  She was diuresed about 20 pounds on a dobutamine and Bumex drip, with a discharge weight of 179 pounds.  Unfortunately, she rapidly regained weight and we struggled to stabilize her weights until she underwent her BiV pacemaker placement.  Since that time, her weight has dropped down quite nicely.  Her home weight today is 170 pounds.      She tells me she feels fantastic.  She has no issues at all.  She has no disability.  She can move her legs easily.  She can walk well.  She has no chest pain, shortness of breath, orthopnea or PND.  When she wakes up in the morning, she just has pedal edema and ankle swelling, where previously she had anasarca up into her abdomen.  By the end of the day, she does have edema to her knees, but this is nowhere near as severe as it was before.  She has been doing walking around home.  Her , Jaime, broke his arm so she is now the caregiver there, which is a huge role reversal and she is able to do this without difficulty.  Their biggest concern today is that Mckayla would like to continue driving, and she is wondering if there are any cardiac restrictions.  She has not driven since the fall due to her not feeling well.  She now feels well and like she would be  able to drive just fine.      SOCIAL HISTORY:  She lives in her own home with her , Jaime.  Her daughter, Sandra, comes in with her today, who is a physical therapist.  Her goals have been stated that she wants to feel well and stay in her home and around her family and avoid hospital admissions and TCU or nursing home.  She is a lifelong nonsmoker, no alcohol use, watches her salt closely, has been very involved in her [employer].      PHYSICAL EXAMINATION:   GENERAL:  Well-developed, well-nourished, elderly woman in no acute distress.   HEENT:  Normocephalic, atraumatic.   HEART:  Regular with a 3 to 4/6 systolic murmur heard best at the right sternal border but throughout the precordium.   LUNGS:  Clear, slightly diminished in the left base but otherwise clear to auscultation bilaterally.  No wheezes, rales or rhonchi.   EXTREMITIES:  With 2+ edema to mid shin.  Thighs are soft.  No abdominal edema.   NECK:  Veins are flat at 30 degrees.   SKIN:  Warm and dry.      ASSESSMENT AND PLAN:   1.  Chronic systolic heart failure.  This primarily has been right-sided, complicated by moderate to severe aortic stenosis, mitral regurgitation and tricuspid regurgitation.  Her home weight is 170 pounds today, which I think is probably euvolemia for her.  Unfortunately, her creatinine is mildly elevated at 1.93 with a BUN of 45.  She has been able to keep fluid off much more efficiently with her BiV pacemaker in place, and I think it is reasonable to consider a lower dose of diuretic at this point.  I am going to decrease her torsemide to 40 mg in the morning and 20 mg in the afternoon, where she previously was on 40 mg b.i.d.  We will continue to watch her weights and symptoms closely on tele-assurance.  If she hits 175 pounds, I think we will need to take some action and increase it again.   2.  Peripheral edema which is likely multifactorial due to venous insufficiency and lymphedema and heart failure.  She previously  wore compression stockings or wraps, but she has significant peripheral arterial disease on ultrasound that was requested by the [facility], and they no longer recommend compression stockings due to the severity of her peripheral disease.  We will have her continue to elevate her feet during the day.   3.  CKD with baseline creatinine around 1.5.  Elevated as above.   4.  Valvular disease with moderate to severe AS, moderate MR and TR.  She has been evaluated for TAVR, but she was not felt that her aortic stenosis was severe enough.  She will get a repeat echocardiogram prior to seeing Dr. Echols in April.   5.  BiV and paced with 99% BiV pacing.   6.  Driving concerns, from a cardiac standpoint she does not have restrictions.  Although, we did discuss that I do not evaluate for other issues like reaction time, vision etc.  I asked her to review this with her primary care doctor and consider evaluation at [facility] in their driving center.        This patient is doing exceptionally well, considering everything she has been through.  Medication changes as discussed.  I will see her back in 1 month with a plan to follow her closely in clinic to avoid hospitalizations and long-term deterioration.      Thank you for allowing me to participate in her care.      Divya Burkett PA-C       cc:   Ander Shrestha MD    [facility]    [address]  [address]       Romeo Echols MD, [facility] at [address], [address]    [place]  [address]         DIVYA BURKETT PA-C             D: 2017 16:03   T: 2017 04:40   MT: LENI      Name:     VICKEY CHURCH   MRN:      [id number]        Account:      [id number]   :      1927           Service Date: 2017      Document: [id number]

## 2017-02-24 NOTE — TELEPHONE ENCOUNTER
simvastatin     Last Written Prescription Date: 03/22/16  Last Fill Quantity: 90, # refills: 3  Last Office Visit with [facility], [facility] or [facility] prescribing provider: 02/23/17  Next 5 appointments (look out 90 days)     Apr 17, 2017  2:45 PM CDT   Return Visit with Romeo Echols MD   [facility] ([facility])    [address] [place] [id number] [phone]                   Lab Results   Component Value Date    CHOL 113 03/22/2016     Lab Results   Component Value Date    HDL 39 03/22/2016     Lab Results   Component Value Date    LDL 54 03/22/2016     Lab Results   Component Value Date    TRIG 98 03/22/2016     Lab Results   Component Value Date    CHOLHDLRATIO 3.5 07/06/2015

## 2017-02-27 ENCOUNTER — TELEPHONE (OUTPATIENT)
Dept: CARDIOLOGY | Facility: CLINIC | Age: 82
End: 2017-02-27

## 2017-02-27 NOTE — TELEPHONE ENCOUNTER
TELEMANAGEMENT: Wt 169#, same as yesterday, 1# below min wt parameters with no symptoms reported. Pt takes torsemide 40mg in am and 20mg in pm, which was just decreased from 40mg BID on 2/23 per [facility]. Pt was seen in clinic on 2/23, pt euvolemic at home wt 170#. Creatinine and BUN were elevated, so torsemide decreased. Next OV 3/17 with [facility] and BMP. Called pt to review, she is feeling well. She verified she did decrease torsemide as directed. Messaged [facility] to review. CARMELLA Gee

## 2017-02-27 NOTE — TELEPHONE ENCOUNTER
Continue current meds.  Adjust parameter to 167-174 please.  Labs and f/u 3/17 ok.  Divya Kraft PA-C 2/27/2017 12:48 PM

## 2017-03-17 ENCOUNTER — OFFICE VISIT (OUTPATIENT)
Dept: CARDIOLOGY | Facility: CLINIC | Age: 82
End: 2017-03-17
Attending: PHYSICIAN ASSISTANT
Payer: COMMERCIAL

## 2017-03-17 VITALS
OXYGEN SATURATION: 95 % | BODY MASS INDEX: 29.94 KG/M2 | HEART RATE: 70 BPM | HEIGHT: 64 IN | WEIGHT: 175.4 LBS | DIASTOLIC BLOOD PRESSURE: 65 MMHG | SYSTOLIC BLOOD PRESSURE: 123 MMHG

## 2017-03-17 DIAGNOSIS — I50.22 CHRONIC SYSTOLIC HEART FAILURE (H): ICD-10-CM

## 2017-03-17 DIAGNOSIS — I42.9 CARDIOMYOPATHY, IDIOPATHIC (H): Primary | ICD-10-CM

## 2017-03-17 LAB
ANION GAP SERPL CALCULATED.3IONS-SCNC: 13.3 MMOL/L (ref 6–17)
BUN SERPL-MCNC: 58 MG/DL (ref 7–30)
CALCIUM SERPL-MCNC: 9.1 MG/DL (ref 8.5–10.5)
CHLORIDE SERPL-SCNC: 100 MMOL/L (ref 98–107)
CO2 SERPL-SCNC: 29 MMOL/L (ref 23–29)
CREAT SERPL-MCNC: 1.76 MG/DL (ref 0.7–1.3)
ERYTHROCYTE [DISTWIDTH] IN BLOOD BY AUTOMATED COUNT: 14.8 % (ref 10–15)
GFR SERPL CREATININE-BSD FRML MDRD: 27 ML/MIN/1.7M2
GLUCOSE SERPL-MCNC: 74 MG/DL (ref 70–105)
HCT VFR BLD AUTO: 27.4 % (ref 35–47)
HGB BLD-MCNC: 8.7 G/DL (ref 11.7–15.7)
MCH RBC QN AUTO: 30.3 PG (ref 26.5–33)
MCHC RBC AUTO-ENTMCNC: 31.8 G/DL (ref 31.5–36.5)
MCV RBC AUTO: 96 FL (ref 78–100)
PLATELET # BLD AUTO: 171 10E9/L (ref 150–450)
POTASSIUM SERPL-SCNC: 5.3 MMOL/L (ref 3.5–5.1)
RBC # BLD AUTO: 2.87 10E12/L (ref 3.8–5.2)
SODIUM SERPL-SCNC: 137 MMOL/L (ref 136–145)
WBC # BLD AUTO: 4.7 10E9/L (ref 4–11)

## 2017-03-17 PROCEDURE — 85027 COMPLETE CBC AUTOMATED: CPT | Performed by: PHYSICIAN ASSISTANT

## 2017-03-17 PROCEDURE — 99214 OFFICE O/P EST MOD 30 MIN: CPT | Performed by: PHYSICIAN ASSISTANT

## 2017-03-17 PROCEDURE — 36415 COLL VENOUS BLD VENIPUNCTURE: CPT | Performed by: PHYSICIAN ASSISTANT

## 2017-03-17 PROCEDURE — 80048 BASIC METABOLIC PNL TOTAL CA: CPT | Performed by: PHYSICIAN ASSISTANT

## 2017-03-17 RX ORDER — TORSEMIDE 20 MG/1
TABLET ORAL
COMMUNITY
End: 2017-03-17

## 2017-03-17 RX ORDER — TORSEMIDE 20 MG/1
20 TABLET ORAL 2 TIMES DAILY
Qty: 180 TABLET | Refills: 3 | Status: SHIPPED | OUTPATIENT
Start: 2017-03-17 | End: 2017-07-17

## 2017-03-17 NOTE — LETTER
3/17/2017    Ander Shrestha MD  [facility]   [address]  [facility] [address]    RE: Mckayla Cardenasmaximo       Dear Colleague,    I had the pleasure of seeing Mckayla Oconnell in the [facility].    PRIMARY CARDIOLOGIST:  Dr. Echols.      REASON FOR VISIT:  Heart failure followup.      Ms. Oconnell is a delightful 89-year-old woman with past cardiac history significant for the followin.  Nonischemic cardiomyopathy with EF historically between 10% and 45%, and most recently about 25%-30%.   2.  Moderate to severe aortic stenosis, moderate tricuspid regurg and moderate mitral regurg.   3.  CKD with baseline creatinine around 1.5.   4.  Hyperlipidemia.   5.  Recent implant of biventricular pacemaker 2017.   6.  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   7.  Peripheral arterial disease that is asymptomatic.   8.  Lymphedema.      I had initially met her in November when she was hospitalized with profound fatigue, weight gain and shortness of breath.  She diuresed 20 pounds on dobutamine and Bumex drip was discharged at a weight of 179 pounds.  Unfortunately, she rapidly regained weight and we struggled to stabilize her until she underwent her biventricular pacemaker, and since then she has really dropped her weight quite nicely.  Most recently her weight has been down to 168 pounds.  This is even after we decreased her torsemide from 40 mg b.i.d. to 40 mg in the morning and 20 mg in the afternoon.      Today, she comes in again today feeling fantastic.  She denies chest pain, shortness of breath, orthopnea or PND.  She is back to doing everything normal at home, which makes her just thrilled.  Her legs are without edema essentially in the mornings when she wakes up and then just worsen throughout the day.  She has had very minimal weeping that happens later in the day.  She has not been wearing compression stockings, as the [facility]ema Clinic asked  for an assessment of her peripheral arterial disease and this was severe enough that they did not feel compression was appropriate.  Since then, we have just had her elevate her legs, and with managing her heart failure she has done well.      SOCIAL HISTORY:  She lives in her own home with her , Jaime.  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  Her goals have been to stay home and around her family and avoid admissions to TCU or nursing home.  She is a lifelong nonsmoker, no alcohol use, watches their salt closely, is very involved in her [employer].      PHYSICAL EXAMINATION:   GENERAL:  Well-developed, well-nourished elderly woman in no acute distress.   HEENT:  Normocephalic, atraumatic.   HEART:  Regular with a harsh 4/6 systolic murmur heard throughout the precordium, worse at the right sternal border.   LUNGS:  Clear.  Good airflow deep bilateral lobes.   EXTREMITIES:  2+ pitting edema to lower shin, which actually for her is greatly improved, and neck veins are flat at 90 degrees.  She is unable to climb onto exam table.   SKIN:  Warm and dry.     Outpatient Encounter Prescriptions as of 3/17/2017   Medication Sig Dispense Refill     torsemide (DEMADEX) 20 MG tablet Take 1 tablet (20 mg) by mouth 2 times daily 180 tablet 3     simvastatin (ZOCOR) 20 MG tablet Take 1 tablet (20 mg) by mouth At Bedtime 90 tablet 0     spironolactone (ALDACTONE) 25 MG tablet Take 0.5 tablets (12.5 mg) by mouth daily 90 tablet 3     isosorbide mononitrate (IMDUR) 30 MG 24 hr tablet 1/2 tablet daily AT NIGHT 45 tablet 3     metolazone (ZAROXOLYN) 2.5 MG tablet As directed by cardiology 10 tablet 3     hydrALAZINE (APRESOLINE) 25 MG tablet Take 1 tablet (25 mg) by mouth 3 times daily 270 tablet 3     Carvedilol (COREG PO) Take 25 mg by mouth 2 times daily (with meals)       MELATONIN PO Take 3 mg by mouth nightly as needed       fluticasone (FLONASE) 50  MCG/ACT nasal spray Spray 1-2 sprays into both nostrils daily as needed for rhinitis or allergies (TAKE ONCE DAILY DURIGN ALLERGY SEASON) 16 g 11     aspirin 81 MG tablet Take 81 mg by mouth daily       Iron TABS Take 324 mg by mouth daily Patient states she takes OTC iron furrous gluconate 324 mg tablets USP       levothyroxine (SYNTHROID, LEVOTHROID) 112 MCG tablet Take 1 tablet (112 mcg) by mouth daily 90 tablet 3     calcium citrate (CALCITRATE) 950 MG tablet Take 2 tablets by mouth daily At Noon       VITAMIN D, CHOLECALCIFEROL, PO Take 2,000 Units by mouth daily        Lutein 20 MG CAPS Take 20 mg by mouth daily       prednisoLONE acetate (PRED FORTE) 1 % ophthalmic suspension Place 1 drop into the right eye every evening  1 Bottle      Multiple Vitamins-Minerals (MULTIVITAMIN OR) Take 1 tablet by mouth daily Takes with lunch       Omega-3 Fatty Acids (FISH OIL PO) Take 1,500 mg by mouth daily.       [DISCONTINUED] torsemide (DEMADEX) 20 MG tablet Take 2 tablets or 40mg in the morning and 1 tablet or 20mg in the afternoon       [DISCONTINUED] torsemide (DEMADEX) 20 MG tablet Take 2 tablets (40 mg) by mouth 2 times daily 90 tablet 3     No facility-administered encounter medications on file as of 3/17/2017.       ASSESSMENT AND PLAN:   1.  Chronic systolic heart failure, actually class II, stage C symptoms at this point and euvolemic.  Her current weight at home is 168 pounds, and this has trended down by 2 pounds since we decreased her torsemide.  I have to give credit to her biventricular pacemaker, as that is what has really turned her around.  She does have CKD, and at this point we do want to be as cautious and minimize her diuretic use as much as possible.  We are going to do a trial of keeping her on 20 mg of torsemide just b.i.d.  I think the maximum weight I would tolerate is 172 pounds before increasing it back up.  I am also going to change her weight parameters on tele-assurance to be 167-172  pounds.  We will continue her spironolactone at 12.5 mg daily, her Imdur 15 mg at night, her hydralazine 25 mg t.i.d. and her carvedilol 25 mg b.i.d.  She is not on an ACE or ARB due to renal failure.   2.  Moderate to severe aortic stenosis, moderate TR, moderate MR.  She has been followed by the [facility], and her AS has not been felt severe enough to warrant TAVR.  She does have an echocardiogram and is seeing Dr. Echols in 1 month.  Can be reassessed at that time.   3.  BiV pacemaker in place with BiV pacing.   4.  Lymphedema, as discussed.  She also has documented severe peripheral arterial disease on ABIs, and Lymphedema Clinic as recommend not using compression.  At this point she is doing well and will continue as we are.   5.  CKD, baseline creatinine 1.5.  Diuretic adjustments as below.   6.  Hypertension, well controlled.   7.  Anemia.  With her hemoglobin trending down from 10.6, to 9.0, to 8.7, which is not dangerous, although certainly going in the wrong direction.  I will get in touch with Dr. Brennan, our hematologist and what she recommends, as the patient in the past has gotten IV iron as well as what sounds like a EPO shots, although I am not precisely sure.      She and I had a nice discussion about her goals of care, which is keeping her out of the hospital.  We both agree that this needs means frequent clinic followup, and I am planning on seeing her monthly in [facility] to try to keep her out of trouble.  This would include monthly hemoglobin and BMPs as well.      Thank you for allowing me to participate in this delightful patient's care.       Sincerely,    Divya Kraft PA-C     [facility]    cc:   Romeo Echols MD, [facility] at [address], [address]    [place]  [address]

## 2017-03-17 NOTE — MR AVS SNAPSHOT
After Visit Summary   3/17/2017    Mckayla Oconnell    MRN: [id number]           Patient Information     Date Of Birth          7/2/1927        Visit Information        Provider Department      3/17/2017 3:10 PM More, Divya Mejia PA-C [facility] AT [place]        Today's Diagnoses     Cardiomyopathy, idiopathic (H)    -  1    Chronic systolic heart failure (H)          Care Instructions      Call CORE nurse for any questions or concerns:    Amparo Dias, ana Sidhu: [phone]   If you have concerns after hours, please call [phone], option [phone]    I'll talk to Dr. Brennan about your hemoglobin to see what we need to do to keep it where it should be.     1. Medication changes: decrease torsemide to 20 mg twice a day.    Continue other medications.     2. Weigh yourself daily and write it down.     3. Call CORE nurse if your weight is up more than 2 pounds in one day, or 5 pounds in one week.    4. Call CORE nurse if you feel more short of breath, have more abdominal bloating or leg swelling.    5. Continue low sodium diet (less than 2000mg daily). If you eat less salt, you will retain less fluid.     6. Lab results:  Kidney numbers look better today.  Watch your potassium intake.    Component      Latest Ref Rng & Units 2/9/2017 2/23/2017 3/17/2017   Sodium      136 - 145 mmol/L 138 136 137   Potassium      3.5 - 5.1 mmol/L 4.2 4.5 5.3 (H)   Chloride      98 - 107 mmol/L 100 98 100   Carbon Dioxide      23 - 29 mmol/L 28 31 (H) 29   Anion Gap      6 - 17 mmol/L 10 11.5 13.3   Glucose      70 - 105 mg/dL 102 (H) 137 (H) 74   Urea Nitrogen      7 - 30 mg/dL 60 (H) 45 (H) 58 (H)   Creatinine      0.70 - 1.30 mg/dL 1.60 (H) 1.93 (H) 1.76 (H)   GFR Estimate      >60 mL/min/1.7m2 30 (L) 24 (L) 27 (L)   GFR Estimate If Black      >60 mL/min/1.7m2 37 (L) 30 (L) 33 (L)   Calcium      8.5 - 10.5 mg/dL 8.9 9.4 9.1       **Do NOT take Aleve or Ibuprofen without  checking with your doctor first    [facility]: Cardiomyopathy, Optimization, Rehabilitation, Education   The [facility] is a heart failure specialty clinic within the [facility] where you will work with specialized nurse practioners, physician assistants, doctors and registered nurses. They are dedicated to helping patients with heart failure to carefully adjust medications, receive education, and learn who and when to call if symptoms develop. They specialize in helping you better understand your condition, slow the progression of your disease, improve the length and quality of your life, help you detect future heart problems before they become life threatening, and avoid hospitalizations.              Follow-ups after your visit        Additional Services     Follow-Up with [facility] - TANYA visit                 Your next 10 appointments already scheduled     Apr 17, 2017 10:30 AM CDT   Ech Complete with [id number]   [facility] CV Echocardiography (Cardiovascular Imaging at [facility])    [address]  [address]  [facility] [id number] [phone]           1.  Please bring or wear a comfortable two-piece outfit. 2.  You may eat, drink and take your normal medicines. 3.  For any questions that cannot be answered, please contact the ordering physician            Apr 17, 2017  1:40 PM CDT   LAB with [facility]   [facility] HEART AT [place] ([facility])    [address]  [facility] [id number] [phone]           Patient must bring picture ID.  Patient should be prepared to give a urine specimen  Please do not eat 10-12 hours before your appointment if you are coming in fasting for labs on lipids, cholesterol, or glucose (sugar).  Pregnant women should follow their Care Team instructions. Water with medications is okay. Do not drink coffee or other fluids.   If you have concerns about taking  your  medications, please ask at office or if scheduling via Tunezy, send a message by clicking on Secure Messaging, Message Your Care Team.            Apr 17, 2017  2:45 PM CDT   Return Visit with Romeo Echols MD   [facility] HEART AT [place] ([facility])    [address] [place] [address] [address]  [place] [address]   [phone]            May 18, 2017  3:15 PM CDT   Remote PPM Check with [facility] TECH1   [facility] ([facility])    [address] [place] [address] [address]  [facility] [address]   [phone]           This appointment is for a remote check of your pacemaker.  This is not an appointment at the office.              Future tests that were ordered for you today     Open Future Orders        Priority Expected Expires Ordered    Basic metabolic panel Routine 5/17/2017 3/17/2018 3/17/2017    Follow-Up with [facility] - TANYA visit Routine 5/17/2017 3/17/2018 3/17/2017            Who to contact     If you have questions or need follow up information about today's clinic visit or your schedule please contact [facility] directly at [phone].  Normal or non-critical lab and imaging results will be communicated to you by ForceManagerhart, letter or phone within 4 business days after the clinic has received the results. If you do not hear from us within 7 days, please contact the clinic through ForceManagerhart or phone. If you have a critical or abnormal lab result, we will notify you by phone as soon as possible.  Submit refill requests through Tunezy or call your pharmacy and they will forward the refill request to us. Please allow 3 business days for your refill to be completed.          Additional Information About Your Visit        Tunezy Information     Tunezy gives you secure access to your electronic health record. If you see a primary care provider, you can also send messages to your care team and make  "appointments. If you have questions, please call your primary care clinic.  If you do not have a primary care provider, please call [phone] and they will assist you.        Care EveryWhere ID     This is your Care EveryWhere ID. This could be used by other organizations to access your [place] medical records  [id number]        Your Vitals Were     Pulse Height Pulse Oximetry BMI (Body Mass Index)          70 1.626 m (5' 4\") 95% 30.11 kg/m2         Blood Pressure from Last 3 Encounters:   03/17/17 123/65   02/23/17 120/60   01/26/17 122/48    Weight from Last 3 Encounters:   03/17/17 79.6 kg (175 lb 6.4 oz)   02/23/17 80.2 kg (176 lb 11.2 oz)   01/26/17 82.6 kg (182 lb)              We Performed the Following     Follow-Up with [facility] - TANYA visit          Today's Medication Changes          These changes are accurate as of: 3/17/17  3:41 PM.  If you have any questions, ask your nurse or doctor.               These medicines have changed or have updated prescriptions.        Dose/Directions    torsemide 20 MG tablet   Commonly known as:  DEMADEX   This may have changed:    - how much to take  - how to take this  - when to take this  - additional instructions  - Another medication with the same name was removed. Continue taking this medication, and follow the directions you see here.   Used for:  Chronic systolic heart failure (H)   Changed by:  Divya Kraft PA-C        Dose:  20 mg   Take 1 tablet (20 mg) by mouth 2 times daily   Quantity:  180 tablet   Refills:  3            Where to get your medicines      These medications were sent to [facility] - [address]  [address] [facility] [address]     Phone:  [phone]     torsemide 20 MG tablet                Primary Care Provider Office Phone # Fax #    Ander Shrestha [phone]       [facility] [address] [facility] [address]        Thank you!  "    Thank you for choosing [facility] PHYSICIANS HEART AT [place]  for your care. Our goal is always to provide you with excellent care. Hearing back from our patients is one way we can continue to improve our services. Please take a few minutes to complete the written survey that you may receive in the mail after your visit with us. Thank you!             Your Updated Medication List - Protect others around you: Learn how to safely use, store and throw away your medicines at www.disposemymeds.org.          This list is accurate as of: 3/17/17  3:41 PM.  Always use your most recent med list.                   Brand Name Dispense Instructions for use    aspirin 81 MG tablet      Take 81 mg by mouth daily       calcium citrate 950 MG tablet    CALCITRATE     Take 2 tablets by mouth daily At Noon       COREG PO      Take 25 mg by mouth 2 times daily (with meals)       FISH OIL PO      Take 1,500 mg by mouth daily.       fluticasone 50 MCG/ACT spray    FLONASE    16 g    Spray 1-2 sprays into both nostrils daily as needed for rhinitis or allergies (TAKE ONCE DAILY DURIGN ALLERGY SEASON)       hydrALAZINE 25 MG tablet    APRESOLINE    270 tablet    Take 1 tablet (25 mg) by mouth 3 times daily       Iron Tabs      Take 324 mg by mouth daily Patient states she takes OTC iron furrous gluconate 324 mg tablets USP       isosorbide mononitrate 30 MG 24 hr tablet    IMDUR    45 tablet    1/2 tablet daily AT NIGHT       levothyroxine 112 MCG tablet    SYNTHROID/LEVOTHROID    90 tablet    Take 1 tablet (112 mcg) by mouth daily       Lutein 20 MG Caps      Take 20 mg by mouth daily       MELATONIN PO      Take 3 mg by mouth nightly as needed       metolazone 2.5 MG tablet    ZAROXOLYN    10 tablet    As directed by cardiology       MULTIVITAMIN PO      Take 1 tablet by mouth daily Takes with lunch       prednisoLONE acetate 1 % ophthalmic susp    PRED FORTE    1 Bottle    Place 1 drop into the right eye every evening        simvastatin 20 MG tablet    ZOCOR    90 tablet    Take 1 tablet (20 mg) by mouth At Bedtime       spironolactone 25 MG tablet    ALDACTONE    90 tablet    Take 0.5 tablets (12.5 mg) by mouth daily       torsemide 20 MG tablet    DEMADEX    180 tablet    Take 1 tablet (20 mg) by mouth 2 times daily       VITAMIN D (CHOLECALCIFEROL) PO      Take 2,000 Units by mouth daily

## 2017-03-17 NOTE — PROGRESS NOTES
[id number]  HPI and Plan:   See dictation    Orders this Visit:  Orders Placed This Encounter   Procedures     Basic metabolic panel     Follow-Up with [facility] - TANYA visit     Orders Placed This Encounter   Medications     DISCONTD: torsemide (DEMADEX) 20 MG tablet     Sig: Take 2 tablets or 40mg in the morning and 1 tablet or 20mg in the afternoon     torsemide (DEMADEX) 20 MG tablet     Sig: Take 1 tablet (20 mg) by mouth 2 times daily     Dispense:  180 tablet     Refill:  3     Medications Discontinued During This Encounter   Medication Reason     torsemide (DEMADEX) 20 MG tablet Dose adjustment     torsemide (DEMADEX) 20 MG tablet Reorder         Encounter Diagnoses   Name Primary?     Chronic systolic heart failure (H)      Cardiomyopathy, idiopathic (H) Yes       CURRENT MEDICATIONS:  Current Outpatient Prescriptions   Medication Sig Dispense Refill     torsemide (DEMADEX) 20 MG tablet Take 1 tablet (20 mg) by mouth 2 times daily 180 tablet 3     simvastatin (ZOCOR) 20 MG tablet Take 1 tablet (20 mg) by mouth At Bedtime 90 tablet 0     spironolactone (ALDACTONE) 25 MG tablet Take 0.5 tablets (12.5 mg) by mouth daily 90 tablet 3     isosorbide mononitrate (IMDUR) 30 MG 24 hr tablet 1/2 tablet daily AT NIGHT 45 tablet 3     metolazone (ZAROXOLYN) 2.5 MG tablet As directed by cardiology 10 tablet 3     hydrALAZINE (APRESOLINE) 25 MG tablet Take 1 tablet (25 mg) by mouth 3 times daily 270 tablet 3     Carvedilol (COREG PO) Take 25 mg by mouth 2 times daily (with meals)       MELATONIN PO Take 3 mg by mouth nightly as needed       fluticasone (FLONASE) 50 MCG/ACT nasal spray Spray 1-2 sprays into both nostrils daily as needed for rhinitis or allergies (TAKE ONCE DAILY DURIGN ALLERGY SEASON) 16 g 11     aspirin 81 MG tablet Take 81 mg by mouth daily       Iron TABS Take 324 mg by mouth daily Patient states she takes OTC iron furrous gluconate 324 mg tablets USP       levothyroxine (SYNTHROID, LEVOTHROID) 112 MCG  tablet Take 1 tablet (112 mcg) by mouth daily 90 tablet 3     calcium citrate (CALCITRATE) 950 MG tablet Take 2 tablets by mouth daily At Noon       VITAMIN D, CHOLECALCIFEROL, PO Take 2,000 Units by mouth daily        Lutein 20 MG CAPS Take 20 mg by mouth daily       prednisoLONE acetate (PRED FORTE) 1 % ophthalmic suspension Place 1 drop into the right eye every evening  1 Bottle      Multiple Vitamins-Minerals (MULTIVITAMIN OR) Take 1 tablet by mouth daily Takes with lunch       Omega-3 Fatty Acids (FISH OIL PO) Take 1,500 mg by mouth daily.       [DISCONTINUED] torsemide (DEMADEX) 20 MG tablet Take 2 tablets or 40mg in the morning and 1 tablet or 20mg in the afternoon       [DISCONTINUED] torsemide (DEMADEX) 20 MG tablet Take 2 tablets (40 mg) by mouth 2 times daily 90 tablet 3       ALLERGIES     Allergies   Allergen Reactions     Atenolol Anaphylaxis     Hydrochlorothiazide      hyponatremia     Pollen Extract        PAST MEDICAL HISTORY:  Past Medical History   Diagnosis Date     Aortic stenosis      CAD (coronary artery disease)      CT calcium quduq=860 May 2013     Cardiomyopathy, idiopathic (H)      cardiomyopathy HTN vs viral; EF as low as 15% in 2009; CRT-P implanted 1/19/17     CHF (NYHA class II, ACC/AHA stage C) (H) 2/14/2013     Glaucoma      H/O angiography 9-1-2016     No angiographic evidence of obstructive coronary artery disease.     Hypertension      Hypothyroidism 2/14/2013     Pulmonary hypertension (H)        PAST SURGICAL HISTORY:  Past Surgical History   Procedure Laterality Date     Gyn surgery       Hysterectomy 1995     Orthopedic surgery       knee replacement 1998     Orthopedic surgery       Knee replacement 2008     Orthopedic surgery       knee surgery [place]     Hysterectomy, pap no longer indicated       Cardiac catherization  9/1/16     Implant pacemaker  1/19/17     CRT-P       FAMILY HISTORY:  Family History   Problem Relation Age of Onset     CANCER Mother      Uterius      "Breast Cancer Daughter        SOCIAL HISTORY:  Social History     Social History     Marital status:      Spouse name: N/A     Number of children: N/A     Years of education: N/A     Social History Main Topics     Smoking status: Never Smoker     Smokeless tobacco: Never Used     Alcohol use No     Drug use: No     Sexual activity: No     Other Topics Concern     Parent/Sibling W/ Cabg, Mi Or Angioplasty Before 65f 55m? No     Caffeine Concern No     Sleep Concern No     Stress Concern No     Weight Concern No     Special Diet Yes     low sodium     Exercise Yes     therapy     Seat Belt Yes     Social History Narrative       Review of Systems:  Skin:  Negative     Eyes:  Positive for glasses  ENT:  Negative    Respiratory:  Negative    Cardiovascular:    Positive for;edema  Gastroenterology: Negative    Genitourinary:  Negative    Musculoskeletal:  Positive for arthritis  Neurologic:  Negative    Psychiatric:  Negative    Heme/Lymph/Imm:  Negative    Endocrine:  Positive for thyroid disorder    Physical Exam:  Vitals: /65  Pulse 70  Ht 1.626 m (5' 4\")  Wt 79.6 kg (175 lb 6.4 oz)  SpO2 95%  BMI 30.11 kg/m2   Please refer to dictation for physical exam    Recent Lab Results:  LIPID RESULTS:  Lab Results   Component Value Date    CHOL 113 03/22/2016    HDL 39 (L) 03/22/2016    LDL 54 03/22/2016    TRIG 98 03/22/2016    CHOLHDLRATIO 3.5 07/06/2015       LIVER ENZYME RESULTS:  Lab Results   Component Value Date    AST 50 (H) 11/14/2016    ALT 89 (H) 11/14/2016       CBC RESULTS:  Lab Results   Component Value Date    WBC 4.7 03/17/2017    RBC 2.87 (L) 03/17/2017    HGB 8.7 (L) 03/17/2017    HCT 27.4 (L) 03/17/2017    MCV 96 03/17/2017    MCH 30.3 03/17/2017    MCHC 31.8 03/17/2017    RDW 14.8 03/17/2017     03/17/2017       BMP RESULTS:  Lab Results   Component Value Date     03/17/2017    POTASSIUM 5.3 (H) 03/17/2017    CHLORIDE 100 03/17/2017    CO2 29 03/17/2017    ANIONGAP 13.3 03/17/2017 "    GLC 74 03/17/2017    BUN 58 (H) 03/17/2017    BUN 31.3 09/06/2013    CR 1.76 (H) 03/17/2017    GFRESTIMATED 27 (L) 03/17/2017    GFRESTBLACK 33 (L) 03/17/2017    SHOBHA 9.1 03/17/2017        A1C RESULTS:  No results found for: A1C    INR RESULTS:  Lab Results   Component Value Date    INR 1.12 09/01/2016           CC  Divya Kraft PALUIS  [facility] AT [place] [address]  [place] [address]

## 2017-03-17 NOTE — PATIENT INSTRUCTIONS
Call CORE nurse for any questions or concerns:    Amparo Dias, or Thea: [phone]   If you have concerns after hours, please call [phone], option 2    I'll talk to Dr. Brennan about your hemoglobin to see what we need to do to keep it where it should be.     1. Medication changes: decrease torsemide to 20 mg twice a day.    Continue other medications.     2. Weigh yourself daily and write it down.     3. Call CORE nurse if your weight is up more than 2 pounds in one day, or 5 pounds in one week.    4. Call CORE nurse if you feel more short of breath, have more abdominal bloating or leg swelling.    5. Continue low sodium diet (less than 2000mg daily). If you eat less salt, you will retain less fluid.     6. Lab results:  Kidney numbers look better today.  Watch your potassium intake.    Component      Latest Ref Rng & Units 2/9/2017 2/23/2017 3/17/2017   Sodium      136 - 145 mmol/L 138 136 137   Potassium      3.5 - 5.1 mmol/L 4.2 4.5 5.3 (H)   Chloride      98 - 107 mmol/L 100 98 100   Carbon Dioxide      23 - 29 mmol/L 28 31 (H) 29   Anion Gap      6 - 17 mmol/L 10 11.5 13.3   Glucose      70 - 105 mg/dL 102 (H) 137 (H) 74   Urea Nitrogen      7 - 30 mg/dL 60 (H) 45 (H) 58 (H)   Creatinine      0.70 - 1.30 mg/dL 1.60 (H) 1.93 (H) 1.76 (H)   GFR Estimate      >60 mL/min/1.7m2 30 (L) 24 (L) 27 (L)   GFR Estimate If Black      >60 mL/min/1.7m2 37 (L) 30 (L) 33 (L)   Calcium      8.5 - 10.5 mg/dL 8.9 9.4 9.1       **Do NOT take Aleve or Ibuprofen without checking with your doctor first    [facility]: Cardiomyopathy, Optimization, Rehabilitation, Education   The [facility] is a heart failure specialty clinic within the [facility] where you will work with specialized nurse practioners, physician assistants, doctors and registered nurses. They are dedicated to helping patients with heart failure to carefully adjust medications, receive education, and learn who and when  to call if symptoms develop. They specialize in helping you better understand your condition, slow the progression of your disease, improve the length and quality of your life, help you detect future heart problems before they become life threatening, and avoid hospitalizations.

## 2017-03-17 NOTE — PROGRESS NOTES
Reviewed during clinic visit.  Please see progress note for plan.  Divya Kraft PA-C 3/17/2017 3:44 PM

## 2017-03-18 NOTE — PROGRESS NOTES
PRIMARY CARDIOLOGIST:  Dr. Echols.      REASON FOR VISIT:  Heart failure followup.      HISTORY OF PRESENT ILLNESS:  Ms. Oconnell is a delightful 89-year-old woman with past cardiac history significant for the followin.  Nonischemic cardiomyopathy with EF historically between 10% and 45%, and most recently about 25%-30%.   2.  Moderate to severe aortic stenosis, moderate tricuspid regurg and moderate mitral regurg.   3.  CKD with baseline creatinine around 1.5.   4.  Hyperlipidemia.   5.  Recent implant of biventricular pacemaker 2017.   6.  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   7.  Peripheral arterial disease that is asymptomatic.   8.  Lymphedema.      I had initially met her in November when she was hospitalized with profound fatigue, weight gain and shortness of breath.  She diuresed 20 pounds on dobutamine and Bumex drip was discharged at a weight of 179 pounds.  Unfortunately, she rapidly regained weight and we struggled to stabilize her until she underwent her biventricular pacemaker, and since then she has really dropped her weight quite nicely.  Most recently her weight has been down to 168 pounds.  This is even after we decreased her torsemide from 40 mg b.i.d. to 40 mg in the morning and 20 mg in the afternoon.      Today, she comes in again today feeling fantastic.  She denies chest pain, shortness of breath, orthopnea or PND.  She is back to doing everything normal at home, which makes her just thrilled.  Her legs are without edema essentially in the mornings when she wakes up and then just worsen throughout the day.  She has had very minimal weeping that happens later in the day.  She has not been wearing compression stockings, as the Lymphedema Clinic asked for an assessment of her peripheral arterial disease and this was severe enough that they did not feel compression was appropriate.  Since then, we have just had her elevate her legs, and with managing her heart failure she has done  well.      SOCIAL HISTORY:  She lives in her own home with her , Jaime.  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  Her goals have been to stay home and around her family and avoid admissions to TCU or nursing home.  She is a lifelong nonsmoker, no alcohol use, watches their salt closely, is very involved in her [employer].      PHYSICAL EXAMINATION:   GENERAL:  Well-developed, well-nourished elderly woman in no acute distress.   HEENT:  Normocephalic, atraumatic.   HEART:  Regular with a harsh 4/6 systolic murmur heard throughout the precordium, worse at the right sternal border.   LUNGS:  Clear.  Good airflow deep bilateral lobes.   EXTREMITIES:  2+ pitting edema to lower shin, which actually for her is greatly improved, and neck veins are flat at 90 degrees.  She is unable to climb onto exam table.   SKIN:  Warm and dry.      ASSESSMENT AND PLAN:   1.  Chronic systolic heart failure, actually class II, stage C symptoms at this point and euvolemic.  Her current weight at home is 168 pounds, and this has trended down by 2 pounds since we decreased her torsemide.  I have to give credit to her biventricular pacemaker, as that is what has really turned her around.  She does have CKD, and at this point we do want to be as cautious and minimize her diuretic use as much as possible.  We are going to do a trial of keeping her on 20 mg of torsemide just b.i.d.  I think the maximum weight I would tolerate is 172 pounds before increasing it back up.  I am also going to change her weight parameters on tele-assurance to be 167-172 pounds.  We will continue her spironolactone at 12.5 mg daily, her Imdur 15 mg at night, her hydralazine 25 mg t.i.d. and her carvedilol 25 mg b.i.d.  She is not on an ACE or ARB due to renal failure.   2.  Moderate to severe aortic stenosis, moderate TR, moderate MR.  She has been followed by the [facility], and her AS has not  been felt severe enough to warrant TAVR.  She does have an echocardiogram and is seeing Dr. Echols in 1 month.  Can be reassessed at that time.   3.  BiV pacemaker in place with BiV pacing.   4.  Lymphedema, as discussed.  She also has documented severe peripheral arterial disease on ABIs, and Lymphedema Clinic as recommend not using compression.  At this point she is doing well and will continue as we are.   5.  CKD, baseline creatinine 1.5.  Diuretic adjustments as below.   6.  Hypertension, well controlled.   7.  Anemia.  With her hemoglobin trending down from 10.6, to 9.0, to 8.7, which is not dangerous, although certainly going in the wrong direction.  I will get in touch with Dr. Brennan, our hematologist and what she recommends, as the patient in the past has gotten IV iron as well as what sounds like a EPO shots, although I am not precisely sure.      She and I had a nice discussion about her goals of care, which is keeping her out of the hospital.  We both agree that this needs means frequent clinic followup, and I am planning on seeing her monthly in [facility] to try to keep her out of trouble.  This would include monthly hemoglobin and BMPs as well.      Thank you for allowing me to participate in this delightful patient's care.       cc:   Ander Shrestha MD    [facility]    [address]  [address]       Romeo Echols MD, [facility] Heart at [address], [address]    [place]  [address]         MELISSA BURKETT PA-C             D: 2017 15:52   T: 2017 03:21   MT: LENI      Name:     VICKEY CHURCH   MRN:      [id number]        Account:      [id number]   :      1927           Service Date: 2017      Document: [id number]

## 2017-03-24 ENCOUNTER — TRANSFERRED RECORDS (OUTPATIENT)
Dept: HEALTH INFORMATION MANAGEMENT | Facility: CLINIC | Age: 82
End: 2017-03-24

## 2017-03-31 ENCOUNTER — PRE VISIT (OUTPATIENT)
Dept: CARDIOLOGY | Facility: CLINIC | Age: 82
End: 2017-03-31

## 2017-04-01 DIAGNOSIS — E03.9 HYPOTHYROIDISM, UNSPECIFIED: Primary | ICD-10-CM

## 2017-04-03 RX ORDER — LEVOTHYROXINE SODIUM 112 UG/1
TABLET ORAL
Qty: 90 TABLET | Refills: 1 | Status: SHIPPED | OUTPATIENT
Start: 2017-04-03 | End: 2017-09-14

## 2017-04-03 NOTE — TELEPHONE ENCOUNTER
TSH was abnormal when she was acutely ill, but FT4 was normal 1.16, most liekly due to sick euthyroid state.   Continue same dose  Prescription(s) sent electronically to specified pharmacy.

## 2017-04-03 NOTE — TELEPHONE ENCOUNTER
levothyroxine     Last Written Prescription Date: 03/22/16  Last Quantity: 90, # refills: 3  Last Office Visit with [facility], [facility] or [facility] prescribing provider: 12/02/16   Next 5 appointments (look out 90 days)     Apr 17, 2017  2:45 PM CDT   Return Visit with Romeo Echols MD   [facility] ([facility])    [address] [place] [id number] [phone]                   TSH   Date Value Ref Range Status   11/28/2016 14.36 (H) 0.40 - 4.00 mU/L Final

## 2017-04-06 ENCOUNTER — CARE COORDINATION (OUTPATIENT)
Dept: CARDIOLOGY | Facility: CLINIC | Age: 82
End: 2017-04-06

## 2017-04-06 ENCOUNTER — TRANSFERRED RECORDS (OUTPATIENT)
Dept: HEALTH INFORMATION MANAGEMENT | Facility: CLINIC | Age: 82
End: 2017-04-06

## 2017-04-06 DIAGNOSIS — I50.9 CHF (CONGESTIVE HEART FAILURE) (H): Primary | ICD-10-CM

## 2017-04-06 NOTE — PROGRESS NOTES
TELEMANAGEMENT: Wt 166#, down 2# from yesterday, now 1# below min wt parameters with no symptoms reported. Pt takes torsemide 20mg BID and spironolactone 12.5mg daily. Last OV with [facility] wt was 168# at home, torsemide was decreased to 20mg BID from 40mg in am and 20mg in pm. Wt very stable since that time. Next f/u with Dr. Echols on 4/17 with Echo and BMP, CBC prior. Called pt, no answer and did not go to , just rang and rang. Will try back later. CARMELLA Gee

## 2017-04-07 NOTE — PROGRESS NOTES
Spoke with pt, she is feeling well. Wt today is 166#, same as yesterday with no symptoms. She verified she did decrease torsemide to 20mg BID as directed at last OV. Pt states she has started exercising and with that has lost a few lbs. Will message Anabel for review of parameters. CARMELLA Gee

## 2017-04-17 ENCOUNTER — OFFICE VISIT (OUTPATIENT)
Dept: CARDIOLOGY | Facility: CLINIC | Age: 82
End: 2017-04-17
Attending: PHYSICIAN ASSISTANT
Payer: COMMERCIAL

## 2017-04-17 ENCOUNTER — HOSPITAL ENCOUNTER (OUTPATIENT)
Dept: CARDIOLOGY | Facility: CLINIC | Age: 82
Discharge: HOME | End: 2017-04-17
Attending: PHYSICIAN ASSISTANT | Admitting: PHYSICIAN ASSISTANT
Payer: COMMERCIAL

## 2017-04-17 VITALS
HEART RATE: 72 BPM | BODY MASS INDEX: 30.39 KG/M2 | DIASTOLIC BLOOD PRESSURE: 60 MMHG | WEIGHT: 178 LBS | SYSTOLIC BLOOD PRESSURE: 124 MMHG | HEIGHT: 64 IN

## 2017-04-17 DIAGNOSIS — I50.22 CHRONIC SYSTOLIC HEART FAILURE (H): ICD-10-CM

## 2017-04-17 DIAGNOSIS — I35.0 NONRHEUMATIC AORTIC VALVE STENOSIS: ICD-10-CM

## 2017-04-17 DIAGNOSIS — I10 ESSENTIAL HYPERTENSION, BENIGN: ICD-10-CM

## 2017-04-17 DIAGNOSIS — I50.9 CHF (CONGESTIVE HEART FAILURE) (H): ICD-10-CM

## 2017-04-17 DIAGNOSIS — I42.9 CARDIOMYOPATHY, IDIOPATHIC (H): Primary | ICD-10-CM

## 2017-04-17 DIAGNOSIS — I25.10 CORONARY ARTERY DISEASE INVOLVING NATIVE CORONARY ARTERY OF NATIVE HEART WITHOUT ANGINA PECTORIS: ICD-10-CM

## 2017-04-17 DIAGNOSIS — E78.00 PURE HYPERCHOLESTEROLEMIA: ICD-10-CM

## 2017-04-17 DIAGNOSIS — R60.0 PERIPHERAL EDEMA: Chronic | ICD-10-CM

## 2017-04-17 LAB
ANION GAP SERPL CALCULATED.3IONS-SCNC: 16.1 MMOL/L (ref 6–17)
BUN SERPL-MCNC: 69 MG/DL (ref 7–30)
CALCIUM SERPL-MCNC: 9.1 MG/DL (ref 8.5–10.5)
CHLORIDE SERPL-SCNC: 103 MMOL/L (ref 98–107)
CO2 SERPL-SCNC: 25 MMOL/L (ref 23–29)
CREAT SERPL-MCNC: 1.8 MG/DL (ref 0.7–1.3)
ERYTHROCYTE [DISTWIDTH] IN BLOOD BY AUTOMATED COUNT: 15.3 % (ref 10–15)
GFR SERPL CREATININE-BSD FRML MDRD: 26 ML/MIN/1.7M2
GLUCOSE SERPL-MCNC: 133 MG/DL (ref 70–105)
HCT VFR BLD AUTO: 28.7 % (ref 35–47)
HGB BLD-MCNC: 9.2 G/DL (ref 11.7–15.7)
MCH RBC QN AUTO: 30.9 PG (ref 26.5–33)
MCHC RBC AUTO-ENTMCNC: 32.1 G/DL (ref 31.5–36.5)
MCV RBC AUTO: 96 FL (ref 78–100)
PLATELET # BLD AUTO: 147 10E9/L (ref 150–450)
POTASSIUM SERPL-SCNC: 5.1 MMOL/L (ref 3.5–5.1)
RBC # BLD AUTO: 2.98 10E12/L (ref 3.8–5.2)
SODIUM SERPL-SCNC: 139 MMOL/L (ref 136–145)
WBC # BLD AUTO: 4.8 10E9/L (ref 4–11)

## 2017-04-17 PROCEDURE — 93306 TTE W/DOPPLER COMPLETE: CPT

## 2017-04-17 PROCEDURE — 80048 BASIC METABOLIC PNL TOTAL CA: CPT | Performed by: PHYSICIAN ASSISTANT

## 2017-04-17 PROCEDURE — 36415 COLL VENOUS BLD VENIPUNCTURE: CPT | Performed by: PHYSICIAN ASSISTANT

## 2017-04-17 PROCEDURE — 85027 COMPLETE CBC AUTOMATED: CPT | Performed by: PHYSICIAN ASSISTANT

## 2017-04-17 PROCEDURE — 93306 TTE W/DOPPLER COMPLETE: CPT | Mod: 26 | Performed by: INTERNAL MEDICINE

## 2017-04-17 PROCEDURE — 99214 OFFICE O/P EST MOD 30 MIN: CPT | Performed by: INTERNAL MEDICINE

## 2017-04-17 NOTE — MR AVS SNAPSHOT
After Visit Summary   4/17/2017    Mckayla Oconnell    MRN: [id number]           Patient Information     Date Of Birth          7/2/1927        Visit Information        Provider Department      4/17/2017 2:45 PM Romeo Echols MD [facility]        Today's Diagnoses     Cardiomyopathy, idiopathic (H)    -  1    Chronic systolic heart failure (H)        Essential hypertension, benign        Pure hypercholesterolemia        Nonrheumatic aortic valve stenosis        Coronary artery disease involving native coronary artery of native heart without angina pectoris        Peripheral edema           Follow-ups after your visit        Additional Services     Follow-Up with Cardiac Advanced Practice Provider           Follow-Up with Cardiologist                 Your next 10 appointments already scheduled     May 15, 2017 12:30 PM CDT   LAB with [facility]   [facility] ([facility])    [address] [place] [id number] [phone]           Patient must bring picture ID.  Patient should be prepared to give a urine specimen  Please do not eat 10-12 hours before your appointment if you are coming in fasting for labs on lipids, cholesterol, or glucose (sugar).  Pregnant women should follow their Care Team instructions. Water with medications is okay. Do not drink coffee or other fluids.   If you have concerns about taking  your medications, please ask at office or if scheduling via [facility], send a message by clicking on Secure Messaging, Message Your Care Team.            May 15, 2017  1:10 PM CDT   Core Return with Divya Kraft PA-C   [facility] ([facility])    [address] [place], [place] [id number]  [facility] [id number] [phone]            May 18, 2017  3:15 PM CDT   Remote PPM Check with [facility] TECH1   [facility]YS[facility]ANS  HEART AT [place] ([facility])    [address]  [place] [id number] [phone]           This appointment is for a remote check of your pacemaker.  This is not an appointment at the office.              Future tests that were ordered for you today     Open Future Orders        Priority Expected Expires Ordered    Basic metabolic panel Routine 10/14/2017 4/17/2018 4/17/2017    Echocardiogram Routine 10/14/2017 4/17/2018 4/17/2017    Follow-Up with Cardiologist Routine 10/14/2017 4/17/2018 4/17/2017    Basic metabolic panel Routine 6/20/2017 4/17/2018 4/17/2017    Follow-Up with Cardiac Advanced Practice Provider Routine 6/20/2017 4/17/2018 4/17/2017            Who to contact     If you have questions or need follow up information about today's clinic visit or your schedule please contact [facility] PHYSICIANS HEART AT [place] directly at [phone].  Normal or non-critical lab and imaging results will be communicated to you by [facility], letter or phone within 4 business days after the clinic has received the results. If you do not hear from us within 7 days, please contact the clinic through [facility] or phone. If you have a critical or abnormal lab result, we will notify you by phone as soon as possible.  Submit refill requests through [facility] or call your pharmacy and they will forward the refill request to us. Please allow 3 business days for your refill to be completed.          Additional Information About Your Visit        [facility] Information     [facility] gives you secure access to your electronic health record. If you see a primary care provider, you can also send messages to your care team and make appointments. If you have questions, please call your primary care clinic.  If you do not have a primary care provider, please call [phone] and they will assist you.        Care EveryWhere ID     This is your Care EveryWhere ID. This could be used by other  "organizations to access your [place] medical records  [id number]        Your Vitals Were     Pulse Height BMI (Body Mass Index)             72 1.626 m (5' 4\") 30.55 kg/m2          Blood Pressure from Last 3 Encounters:   04/17/17 124/60   03/17/17 123/65   02/23/17 120/60    Weight from Last 3 Encounters:   04/17/17 80.7 kg (178 lb)   03/17/17 79.6 kg (175 lb 6.4 oz)   02/23/17 80.2 kg (176 lb 11.2 oz)              We Performed the Following     Follow-Up with Cardiologist        Primary Care Provider Office Phone # Fax #    Ander Shrestha [phone]       [facility] [address] [facility] [address]        Thank you!     Thank you for choosing [facility]YS[facility] HEART AT [place]  for your care. Our goal is always to provide you with excellent care. Hearing back from our patients is one way we can continue to improve our services. Please take a few minutes to complete the written survey that you may receive in the mail after your visit with us. Thank you!             Your Updated Medication List - Protect others around you: Learn how to safely use, store and throw away your medicines at www.disposemymeds.org.          This list is accurate as of: 4/17/17  3:45 PM.  Always use your most recent med list.                   Brand Name Dispense Instructions for use    aspirin 81 MG tablet      Take 81 mg by mouth daily       calcium citrate 950 MG tablet    CALCITRATE     Take 2 tablets by mouth daily At Noon       COREG PO      Take 25 mg by mouth 2 times daily (with meals)       FISH OIL PO      Take 1,500 mg by mouth daily.       fluticasone 50 MCG/ACT spray    FLONASE    16 g    Spray 1-2 sprays into both nostrils daily as needed for rhinitis or allergies (TAKE ONCE DAILY DURIGN ALLERGY SEASON)       hydrALAZINE 25 MG tablet    APRESOLINE    270 tablet    Take 1 tablet (25 mg) by mouth 3 times daily       Iron Tabs      Take 324 mg by mouth daily Patient " states she takes OTC iron furrous gluconate 324 mg tablets USP       isosorbide mononitrate 30 MG 24 hr tablet    IMDUR    45 tablet    1/2 tablet daily AT NIGHT       levothyroxine 112 MCG tablet    SYNTHROID/LEVOTHROID    90 tablet    TAKE 1 TABLET DAILY       Lutein 20 MG Caps      Take 20 mg by mouth daily       MELATONIN PO      Take 3 mg by mouth nightly as needed       MULTIVITAMIN PO      Take 1 tablet by mouth daily Takes with lunch       prednisoLONE acetate 1 % ophthalmic susp    PRED FORTE    1 Bottle    Place 1 drop into the right eye every evening       simvastatin 20 MG tablet    ZOCOR    90 tablet    Take 1 tablet (20 mg) by mouth At Bedtime       spironolactone 25 MG tablet    ALDACTONE    90 tablet    Take 0.5 tablets (12.5 mg) by mouth daily       torsemide 20 MG tablet    DEMADEX    180 tablet    Take 1 tablet (20 mg) by mouth 2 times daily       VITAMIN D (CHOLECALCIFEROL) PO      Take 2,000 Units by mouth daily

## 2017-04-17 NOTE — LETTER
4/17/2017    Ander Shrestha MD  [facility]   [address] [facility] [address]    RE: Mckayla Knowlesteri       Dear Colleague,    I had the pleasure of seeing Mckayla Oconnell in the [facility].    Ms. 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  A CT scan in 2013 confirmed the anterior course and a benign position, idiopathic cardiomyopathy with ejection fraction as low as 15% now running in the 30%-35% range.  She has hypertriglyceridemia, HDL deficiency, hypothyroidism and anemia for which he follows with Dr. Brennan for which she received iron infusions.  She has had problems with hyponatremia thought to be secondary to hydrochlorothiazide and another episode secondary to trazodone.      She has been evaluated at the TAVR Clinic and was in the process of headed towards TAVR procedure when a dobutamine stress echo appeared to demonstrate that her aortic valve was still only in the moderate range.  She has significant peripheral edema.  She has been seen by Nephrology for her renal insufficiency.  Ultimately, a biventricular pacemaker was placed which dramatically improved her symptoms and control of her biventricular heart failure.  She follows closely in our [facility] and has managed to thrive with this plan.      Mckayla returns to clinic stating she continues to do quite well.  She states she can do all things she wants to do in life.  She does have peripheral edema but she states when she gets up in the morning her ankles look normal, but it does accumulate as the day goes on.  She watches her sodium.  She also asked what she needs to watch for her potassium.  She has no chest, arm, neck, jaw or shoulder discomfort, no  dyspnea on exertion, orthopnea or PND.  She had no lightheadedness, dizziness, syncope or near-syncope.  She notes no problems or side effects with her current medical regimen.  She asks many good questions about her echocardiogram results and lab work.     Outpatient Encounter Prescriptions as of 4/17/2017   Medication Sig Dispense Refill     levothyroxine (SYNTHROID/LEVOTHROID) 112 MCG tablet TAKE 1 TABLET DAILY 90 tablet 1     torsemide (DEMADEX) 20 MG tablet Take 1 tablet (20 mg) by mouth 2 times daily 180 tablet 3     simvastatin (ZOCOR) 20 MG tablet Take 1 tablet (20 mg) by mouth At Bedtime 90 tablet 0     spironolactone (ALDACTONE) 25 MG tablet Take 0.5 tablets (12.5 mg) by mouth daily 90 tablet 3     isosorbide mononitrate (IMDUR) 30 MG 24 hr tablet 1/2 tablet daily AT NIGHT 45 tablet 3     hydrALAZINE (APRESOLINE) 25 MG tablet Take 1 tablet (25 mg) by mouth 3 times daily 270 tablet 3     Carvedilol (COREG PO) Take 25 mg by mouth 2 times daily (with meals)       MELATONIN PO Take 3 mg by mouth nightly as needed       fluticasone (FLONASE) 50 MCG/ACT nasal spray Spray 1-2 sprays into both nostrils daily as needed for rhinitis or allergies (TAKE ONCE DAILY DURIGN ALLERGY SEASON) 16 g 11     aspirin 81 MG tablet Take 81 mg by mouth daily       Iron TABS Take 324 mg by mouth daily Patient states she takes OTC iron furrous gluconate 324 mg tablets USP       calcium citrate (CALCITRATE) 950 MG tablet Take 2 tablets by mouth daily At Noon       VITAMIN D, CHOLECALCIFEROL, PO Take 2,000 Units by mouth daily        Lutein 20 MG CAPS Take 20 mg by mouth daily       prednisoLONE acetate (PRED FORTE) 1 % ophthalmic suspension Place 1 drop into the right eye every evening  1 Bottle      Multiple Vitamins-Minerals (MULTIVITAMIN OR) Take 1 tablet by mouth daily Takes with lunch       Omega-3 Fatty Acids (FISH OIL PO) Take 1,500 mg by mouth daily.       [DISCONTINUED] metolazone (ZAROXOLYN) 2.5 MG tablet As directed by  cardiology 10 tablet 3     No facility-administered encounter medications on file as of 4/17/2017.       ASSESSMENT AND PLAN:  Mckayla appears to be doing well from a cardiac standpoint without clinical evidence of ischemia.      Heart failure appears to be very well compensated.  She does have peripheral edema.  We did review the importance of a low-salt diet and elevating her legs.      Potassium is okay at 5.1.  We talked about watching out for watermelon, tomatoes and other high potassium fruits.  Creatinine is 1.8.  At this time, she is on spironolactone 12.5 and we will continue this, but will have to watch this closely.  We may have to stop that at some point in time.      A followup echocardiogram again relates that her aortic valve is severely stenotic with a mean gradient of 25.  At this time I suspect if we repeated the dobutamine we again would get a more moderate valve area.      Ejection fraction is 30%-35%, which is where she is hovering.      Pulmonary pressures are higher now, estimated to be 52 mmHg plus right atrial pressure.  This is higher than she has been.  We will have to keep an eye on this.      We talked about the sodium restriction and how much fluid she should drink.  I think she can drink to thirst.  She is not having problems with hyponatremia.      Fasting lipid profile is outstanding.  Total cholesterol is 113, HDL 39, LDL 54, triglycerides are 98.      I will have her follow up in the [facility] in 2 months.  I will see her back in 6 months, at which time I will repeat her echocardiogram.  If she should have problems, I would be glad to see her sooner.  If her aortic valve progresses or pulmonary hypertension worsens, we may have to consider a TAVR procedure but at this time she appears to be doing quite well.      Thank you for allowing me to participate in her care.     Sincerely,    Romeo Echols MD     [facility]

## 2017-04-17 NOTE — PROGRESS NOTES
HPI and Plan:   See dictation    Orders Placed This Encounter   Procedures     Basic metabolic panel     Basic metabolic panel     Follow-Up with Cardiac Advanced Practice Provider     Follow-Up with Cardiologist     Echocardiogram       No orders of the defined types were placed in this encounter.      Medications Discontinued During This Encounter   Medication Reason     metolazone (ZAROXOLYN) 2.5 MG tablet Discontinued by another Health Care Provider         Encounter Diagnoses   Name Primary?     Chronic systolic heart failure (H)      Cardiomyopathy, idiopathic (H) Yes     Essential hypertension, benign      Pure hypercholesterolemia      Nonrheumatic aortic valve stenosis      Coronary artery disease involving native coronary artery of native heart without angina pectoris      Peripheral edema        CURRENT MEDICATIONS:  Current Outpatient Prescriptions   Medication Sig Dispense Refill     levothyroxine (SYNTHROID/LEVOTHROID) 112 MCG tablet TAKE 1 TABLET DAILY 90 tablet 1     torsemide (DEMADEX) 20 MG tablet Take 1 tablet (20 mg) by mouth 2 times daily 180 tablet 3     simvastatin (ZOCOR) 20 MG tablet Take 1 tablet (20 mg) by mouth At Bedtime 90 tablet 0     spironolactone (ALDACTONE) 25 MG tablet Take 0.5 tablets (12.5 mg) by mouth daily 90 tablet 3     isosorbide mononitrate (IMDUR) 30 MG 24 hr tablet 1/2 tablet daily AT NIGHT 45 tablet 3     hydrALAZINE (APRESOLINE) 25 MG tablet Take 1 tablet (25 mg) by mouth 3 times daily 270 tablet 3     Carvedilol (COREG PO) Take 25 mg by mouth 2 times daily (with meals)       MELATONIN PO Take 3 mg by mouth nightly as needed       fluticasone (FLONASE) 50 MCG/ACT nasal spray Spray 1-2 sprays into both nostrils daily as needed for rhinitis or allergies (TAKE ONCE DAILY DURIGN ALLERGY SEASON) 16 g 11     aspirin 81 MG tablet Take 81 mg by mouth daily       Iron TABS Take 324 mg by mouth daily Patient states she takes OTC iron furrous gluconate 324 mg tablets USP        calcium citrate (CALCITRATE) 950 MG tablet Take 2 tablets by mouth daily At Noon       VITAMIN D, CHOLECALCIFEROL, PO Take 2,000 Units by mouth daily        Lutein 20 MG CAPS Take 20 mg by mouth daily       prednisoLONE acetate (PRED FORTE) 1 % ophthalmic suspension Place 1 drop into the right eye every evening  1 Bottle      Multiple Vitamins-Minerals (MULTIVITAMIN OR) Take 1 tablet by mouth daily Takes with lunch       Omega-3 Fatty Acids (FISH OIL PO) Take 1,500 mg by mouth daily.         ALLERGIES     Allergies   Allergen Reactions     Atenolol Anaphylaxis     Hydrochlorothiazide      hyponatremia     Pollen Extract        PAST MEDICAL HISTORY:  Past Medical History:   Diagnosis Date     Aortic stenosis      CAD (coronary artery disease)     CT calcium cxukv=574 May 2013     Cardiomyopathy, idiopathic (H)     cardiomyopathy HTN vs viral; EF as low as 15% in 2009; CRT-P implanted 1/19/17     CHF (NYHA class II, ACC/AHA stage C) (H) 2/14/2013     Glaucoma      H/O angiography 9-1-2016    No angiographic evidence of obstructive coronary artery disease.     Hypertension      Hypothyroidism 2/14/2013     Pulmonary hypertension (H)        PAST SURGICAL HISTORY:  Past Surgical History:   Procedure Laterality Date     CARDIAC CATHERIZATION  9/1/16     GYN SURGERY      Hysterectomy 1995     HYSTERECTOMY, PAP NO LONGER INDICATED       IMPLANT PACEMAKER  1/19/17    CRT-P     ORTHOPEDIC SURGERY      knee replacement 1998     ORTHOPEDIC SURGERY      Knee replacement 2008     ORTHOPEDIC SURGERY      knee surgery [place]       FAMILY HISTORY:  Family History   Problem Relation Age of Onset     CANCER Mother      Uterius     Breast Cancer Daughter        SOCIAL HISTORY:  Social History     Social History     Marital status:      Spouse name: N/A     Number of children: N/A     Years of education: N/A     Social History Main Topics     Smoking status: Never Smoker     Smokeless tobacco: Never Used     Alcohol use No     Drug  "use: No     Sexual activity: No     Other Topics Concern     Parent/Sibling W/ Cabg, Mi Or Angioplasty Before 65f 55m? No     Caffeine Concern No     Sleep Concern No     Stress Concern No     Weight Concern No     Special Diet Yes     low sodium     Exercise Yes     therapy     Seat Belt Yes     Social History Narrative       Review of Systems:  Skin:  Negative       Eyes:  Positive for glasses    ENT:  Negative      Respiratory:  Positive for sleep apnea wears oxygen   Cardiovascular:  Negative      Gastroenterology: Negative      Genitourinary:  not assessed      Musculoskeletal:  Positive for arthritis    Neurologic:  Positive for numbness or tingling of hands    Psychiatric:  Negative      Heme/Lymph/Imm:  Positive for allergies pollen  Endocrine:  Positive for thyroid disorder      Physical Exam:  Vitals: /60  Pulse 72  Ht 1.626 m (5' 4\")  Wt 80.7 kg (178 lb)  BMI 30.55 kg/m2    Constitutional:  cooperative, alert and oriented, well developed, well nourished, in no acute distress frail      Skin:  warm and dry to the touch, no apparent skin lesions or masses noted        Head:  normocephalic, no masses or lesions        Eyes:  pupils equal and round;conjunctivae and lids unremarkable;sclera white;no xanthalasma;no nystagmus   Ptosis of right eye    ENT:  no pallor or cyanosis, dentition good        Neck:  carotid pulses are full and equal bilaterally;no carotid bruit        Chest:  normal breath sounds, clear to auscultation, normal A-P diameter, normal symmetry, normal respiratory excursion, no use of accessory muscles          Cardiac: regular rhythm       systolic murmur;grade 3;radiation to the carotid     late peaking    Abdomen:           Vascular:                                          Extremities and Back:  no spinal abnormalities noted;normal muscle strength and tone   bilateral LE edema;2+          Neurological:  affect appropriate, oriented to time, person and place;no gross motor " deficits              CC  Divya Kraft PA-C  [facility] [place]  [address]  [place] [address]

## 2017-04-18 NOTE — PROGRESS NOTES
HISTORY OF PRESENT ILLNESS:  Ms. 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  A CT scan in 2013 confirmed the anterior course and a benign position, idiopathic cardiomyopathy with ejection fraction as low as 15% now running in the 30%-35% range.  She has hypertriglyceridemia, HDL deficiency, hypothyroidism and anemia for which he follows with Dr. Brennan for which she received iron infusions.  She has had problems with hyponatremia thought to be secondary to hydrochlorothiazide and another episode secondary to trazodone.      She has been evaluated at the [facility] and was in the process of headed towards TAVR procedure when a dobutamine stress echo appeared to demonstrate that her aortic valve was still only in the moderate range.  She has significant peripheral edema.  She has been seen by Nephrology for her renal insufficiency.  Ultimately, a biventricular pacemaker was placed which dramatically improved her symptoms and control of her biventricular heart failure.  She follows closely in our [facility] and has managed to thrive with this plan.      Mckayla returns to clinic stating she continues to do quite well.  She states she can do all things she wants to do in life.  She does have peripheral edema but she states when she gets up in the morning her ankles look normal, but it does accumulate as the day goes on.  She watches her sodium.  She also asked what she needs to watch for her potassium.  She has no chest, arm, neck, jaw or shoulder discomfort, no dyspnea on exertion, orthopnea or PND.  She had no lightheadedness, dizziness, syncope or near-syncope.  She notes no problems or side effects with her current medical regimen.  She asks many good questions about her echocardiogram results  and lab work.      ASSESSMENT AND PLAN:  Mckayla appears to be doing well from a cardiac standpoint without clinical evidence of ischemia.      Heart failure appears to be very well compensated.  She does have peripheral edema.  We did review the importance of a low-salt diet and elevating her legs.      Potassium is okay at 5.1.  We talked about watching out for watermelon, tomatoes and other high potassium fruits.  Creatinine is 1.8.  At this time, she is on spironolactone 12.5 and we will continue this, but will have to watch this closely.  We may have to stop that at some point in time.      A followup echocardiogram again relates that her aortic valve is severely stenotic with a mean gradient of 25.  At this time I suspect if we repeated the dobutamine we again would get a more moderate valve area.      Ejection fraction is 30%-35%, which is where she is hovering.      Pulmonary pressures are higher now, estimated to be 52 mmHg plus right atrial pressure.  This is higher than she has been.  We will have to keep an eye on this.      We talked about the sodium restriction and how much fluid she should drink.  I think she can drink to thirst.  She is not having problems with hyponatremia.      Fasting lipid profile is outstanding.  Total cholesterol is 113, HDL 39, LDL 54, triglycerides are 98.      I will have her follow up in the [facility] in 2 months.  I will see her back in 6 months, at which time I will repeat her echocardiogram.  If she should have problems, I would be glad to see her sooner.  If her aortic valve progresses or pulmonary hypertension worsens, we may have to consider a TAVR procedure but at this time she appears to be doing quite well.      Thank you for allowing me to participate in her care.      MD GUCCI Guan MD, [facility]             D: 04/17/2017 15:45   T: 04/18/2017 08:18   MT: JETT      Name:     MCKAYLA CHURCH   MRN:      [id number]         Account:      [id number]   :      1927           Service Date: 2017      Document: [id number]

## 2017-04-21 ENCOUNTER — OFFICE VISIT (OUTPATIENT)
Dept: INTERNAL MEDICINE | Facility: CLINIC | Age: 82
End: 2017-04-21
Payer: COMMERCIAL

## 2017-04-21 ENCOUNTER — CARE COORDINATION (OUTPATIENT)
Dept: CARDIOLOGY | Facility: CLINIC | Age: 82
End: 2017-04-21

## 2017-04-21 VITALS
OXYGEN SATURATION: 96 % | HEART RATE: 71 BPM | HEIGHT: 64 IN | TEMPERATURE: 98.3 F | DIASTOLIC BLOOD PRESSURE: 80 MMHG | SYSTOLIC BLOOD PRESSURE: 122 MMHG

## 2017-04-21 DIAGNOSIS — I50.9 CHF (CONGESTIVE HEART FAILURE) (H): Primary | ICD-10-CM

## 2017-04-21 DIAGNOSIS — H61.23 BILATERAL IMPACTED CERUMEN: Primary | ICD-10-CM

## 2017-04-21 PROCEDURE — 99213 OFFICE O/P EST LOW 20 MIN: CPT | Performed by: PHYSICIAN ASSISTANT

## 2017-04-21 NOTE — PROGRESS NOTES
Called pt, no answer. LVM letting her know we will start with check mag level at next OV. Explained if level normal, no reason for supplement. Left call back phone number for questions. Order placed for mag level to be done with labs on 5/15. CARMELLA Gee

## 2017-04-21 NOTE — MR AVS SNAPSHOT
After Visit Summary   4/21/2017    Mckayla Oconnell    MRN: [id number]           Patient Information     Date Of Birth          7/2/1927        Visit Information        Provider Department      4/21/2017 11:00 AM Lilian Chen PA-C [facility]        Today's Diagnoses     Bilateral impacted cerumen    -  1       Follow-ups after your visit        Your next 10 appointments already scheduled     May 15, 2017 12:30 PM CDT   LAB with [facility]   [facility]    [address] [place] [address] [address]  [facility] [address]   [phone]           Patient must bring picture ID.  Patient should be prepared to give a urine specimen  Please do not eat 10-12 hours before your appointment if you are coming in fasting for labs on lipids, cholesterol, or glucose (sugar).  Pregnant women should follow their Care Team instructions. Water with medications is okay. Do not drink coffee or other fluids.   If you have concerns about taking  your medications, please ask at office or if scheduling via exoro system, send a message by clicking on Secure Messaging, Message Your Care Team.            May 15, 2017  1:10 PM CDT   Core Return with Divya Kraft PA-C   [facility]    [address] [place], [place] [id number]  [facility] [id number] [phone]            May 18, 2017  3:15 PM CDT   Remote PPM Check with [facility] TECH1   [facility]    [address] [place], [place] [id number]  [facility] [address] [phone]           This appointment is for a remote check of your pacemaker.  This is not an appointment at the office.              Future tests that were ordered for you today     Open Future Orders        Priority Expected Expires Ordered    Magnesium Routine 5/5/2017 4/21/2019 4/21/2017            Who to contact     If  "you have questions or need follow up information about today's clinic visit or your schedule please contact [facility] directly at [phone].  Normal or non-critical lab and imaging results will be communicated to you by MyChart, letter or phone within 4 business days after the clinic has received the results. If you do not hear from us within 7 days, please contact the clinic through [employer] or phone. If you have a critical or abnormal lab result, we will notify you by phone as soon as possible.  Submit refill requests through [employer] or call your pharmacy and they will forward the refill request to us. Please allow 3 business days for your refill to be completed.          Additional Information About Your Visit        [employer] Information     [employer] gives you secure access to your electronic health record. If you see a primary care provider, you can also send messages to your care team and make appointments. If you have questions, please call your primary care clinic.  If you do not have a primary care provider, please call [phone] and they will assist you.        Care EveryWhere ID     This is your Care EveryWhere ID. This could be used by other organizations to access your [facility] [place] medical records  [id number]        Your Vitals Were     Pulse Temperature Height Pulse Oximetry          71 98.3  F (36.8  C) (Oral) 5' 4\" (1.626 m) 96%         Blood Pressure from Last 3 Encounters:   04/21/17 122/80   04/17/17 124/60   03/17/17 123/65    Weight from Last 3 Encounters:   04/17/17 178 lb (80.7 kg)   03/17/17 175 lb 6.4 oz (79.6 kg)   02/23/17 176 lb 11.2 oz (80.2 kg)              Today, you had the following     No orders found for display       Primary Care Provider Office Phone # Fax #    Ander Shrestha [phone]       [facility] [address] [facility] [address]        Thank you!     Thank you for choosing [facility] " [facility]  for your care. Our goal is always to provide you with excellent care. Hearing back from our patients is one way we can continue to improve our services. Please take a few minutes to complete the written survey that you may receive in the mail after your visit with us. Thank you!             Your Updated Medication List - Protect others around you: Learn how to safely use, store and throw away your medicines at www.disposemymeds.org.          This list is accurate as of: 4/21/17 11:35 AM.  Always use your most recent med list.                   Brand Name Dispense Instructions for use    aspirin 81 MG tablet      Take 81 mg by mouth daily       calcium citrate 950 MG tablet    CALCITRATE     Take 2 tablets by mouth daily At Noon       COREG PO      Take 25 mg by mouth 2 times daily (with meals)       FISH OIL PO      Take 1,500 mg by mouth daily.       fluticasone 50 MCG/ACT spray    FLONASE    16 g    Spray 1-2 sprays into both nostrils daily as needed for rhinitis or allergies (TAKE ONCE DAILY DURIGN ALLERGY SEASON)       hydrALAZINE 25 MG tablet    APRESOLINE    270 tablet    Take 1 tablet (25 mg) by mouth 3 times daily       Iron Tabs      Take 324 mg by mouth daily Patient states she takes OTC iron furrous gluconate 324 mg tablets USP       isosorbide mononitrate 30 MG 24 hr tablet    IMDUR    45 tablet    1/2 tablet daily AT NIGHT       levothyroxine 112 MCG tablet    SYNTHROID/LEVOTHROID    90 tablet    TAKE 1 TABLET DAILY       Lutein 20 MG Caps      Take 20 mg by mouth daily       MELATONIN PO      Take 3 mg by mouth nightly as needed       MULTIVITAMIN PO      Take 1 tablet by mouth daily Takes with lunch       prednisoLONE acetate 1 % ophthalmic susp    PRED FORTE    1 Bottle    Place 1 drop into the right eye every evening       simvastatin 20 MG tablet    ZOCOR    90 tablet    Take 1 tablet (20 mg) by mouth At Bedtime       spironolactone 25 MG tablet    ALDACTONE    90 tablet    Take 0.5 tablets  (12.5 mg) by mouth daily       torsemide 20 MG tablet    DEMADEX    180 tablet    Take 1 tablet (20 mg) by mouth 2 times daily       VITAMIN D (CHOLECALCIFEROL) PO      Take 2,000 Units by mouth daily

## 2017-04-21 NOTE — NURSING NOTE
"Chief Complaint   Patient presents with     Plugged Ears     R ear        Initial /80 (BP Location: Left arm, Patient Position: Chair, Cuff Size: Adult Regular)  Pulse 71  Temp 98.3  F (36.8  C) (Oral)  Ht 5' 4\" (1.626 m)  SpO2 96% Estimated body mass index is 30.55 kg/(m^2) as calculated from the following:    Height as of 4/17/17: 5' 4\" (1.626 m).    Weight as of 4/17/17: 178 lb (80.7 kg).  Medication Reconciliation: complete    "

## 2017-04-21 NOTE — PROGRESS NOTES
Telemanagment     Alert for: Wt gain, wt outside of parameters.  Current diuretic: Torsemide 20mg BID, Spironolactone 12.5mg daily  Heart Failure sx: No symptoms reported on survey.  Plan: No PRN plan.     Pt just saw Dr. Echols on Monday 4/17, at that time pt doing well and HF well compensated. Wt that day 169# at home.   Pt called and LVM. She is feeling well, denies SOB, swelling, or bloating. Pt cannot figure out why she gained wt. She did not eat anything different yesterday, has been watching sodium intake. She was more active yesterday. Next OV 5/15 with Anabel and GRAY. Messaged Anabel for review. CARMELLA Gee

## 2017-04-21 NOTE — PROGRESS NOTES
"  SUBJECTIVE:                                                    Mckayla Oconnell is a 89 year old female who presents to clinic today for the following health issues:      Pt states her R ear as been plugged and needs to be cleaned.   Patient with hx of cerumen impaction in the past.   Notes that hearing is less that it should be.  No pain or drainage from the ear.  No recent URI symptoms, congestion or runny nose.     -------------------------------------    Problem list and histories reviewed & adjusted, as indicated.  Additional history: as documented    Labs reviewed in EPIC    Reviewed and updated as needed this visit by clinical staff  Tobacco  Allergies       Reviewed and updated as needed this visit by Provider         ROS:  Constitutional, HEENT, cardiovascular, pulmonary, systems are negative, except as otherwise noted.    OBJECTIVE:                                                    /80 (BP Location: Left arm, Patient Position: Chair, Cuff Size: Adult Regular)  Pulse 71  Temp 98.3  F (36.8  C) (Oral)  Ht 5' 4\" (1.626 m)  SpO2 96%  There is no height or weight on file to calculate BMI.  GENERAL: healthy, alert and no distress  HENT: both ears: occluded with wax and nose and mouth without ulcers or lesions  RESP: lungs clear to auscultation - no rales, rhonchi or wheezes  CV: regular rates and rhythm and normal S1 S2, no S3 or S4    Diagnostic Test Results:  none      ASSESSMENT/PLAN:                                                            1. Bilateral impacted cerumen  Ear lavage in clinic with good results.  No pain or irritation after.  Recheck prn with PCP on routine chronic issues.           Lilian Chen PA-C  [facility]    "

## 2017-04-21 NOTE — PROGRESS NOTES
Called pt, reviewed Anabel's recommendations for no changes today given feeling well. Reviewed we will check in with her Monday, and if wt still up, be will give extra torsemide. Pt stated understanding. Reminder sent to CORE board to check in with pt on Monday. Pt also states she was reading an article today about Magnesium. Pt wonders if she should start taking a Magnesium supplement and wants Anabel's input. She also states if we do think she should take Magnesium, what dose. Will review with Anabel. CARMELLA Gee

## 2017-04-21 NOTE — PROGRESS NOTES
We can check a mag level with next labs, no mag supplement for now.  If it's in the normal range no reason to take supplements.

## 2017-04-21 NOTE — PROGRESS NOTES
Given she feels well and is one day, we'll follow for now.  If wt is still up on Monday above parameters will do 30 mg of torsemide bid until back in range.  Divya Kraft PA-C 4/21/2017 10:08 AM

## 2017-05-15 ENCOUNTER — OFFICE VISIT (OUTPATIENT)
Dept: CARDIOLOGY | Facility: CLINIC | Age: 82
End: 2017-05-15
Payer: COMMERCIAL

## 2017-05-15 VITALS
BODY MASS INDEX: 28.67 KG/M2 | SYSTOLIC BLOOD PRESSURE: 108 MMHG | HEIGHT: 65 IN | HEART RATE: 62 BPM | OXYGEN SATURATION: 94 % | WEIGHT: 172.1 LBS | RESPIRATION RATE: 16 BRPM | DIASTOLIC BLOOD PRESSURE: 60 MMHG

## 2017-05-15 DIAGNOSIS — I42.9 CARDIOMYOPATHY, IDIOPATHIC (H): Primary | ICD-10-CM

## 2017-05-15 DIAGNOSIS — I50.9 CHF (CONGESTIVE HEART FAILURE) (H): ICD-10-CM

## 2017-05-15 DIAGNOSIS — I50.22 CHRONIC SYSTOLIC HEART FAILURE (H): ICD-10-CM

## 2017-05-15 LAB
ANION GAP SERPL CALCULATED.3IONS-SCNC: 13.6 MMOL/L (ref 6–17)
BUN SERPL-MCNC: 66 MG/DL (ref 7–30)
CALCIUM SERPL-MCNC: 9.9 MG/DL (ref 8.5–10.5)
CHLORIDE SERPL-SCNC: 104 MMOL/L (ref 98–107)
CO2 SERPL-SCNC: 28 MMOL/L (ref 23–29)
CREAT SERPL-MCNC: 1.98 MG/DL (ref 0.7–1.3)
GFR SERPL CREATININE-BSD FRML MDRD: 24 ML/MIN/1.7M2
GLUCOSE SERPL-MCNC: 105 MG/DL (ref 70–105)
MAGNESIUM SERPL-MCNC: 2.6 MG/DL (ref 1.6–2.3)
POTASSIUM SERPL-SCNC: 4.6 MMOL/L (ref 3.5–5.1)
SODIUM SERPL-SCNC: 141 MMOL/L (ref 136–145)

## 2017-05-15 PROCEDURE — 36415 COLL VENOUS BLD VENIPUNCTURE: CPT | Performed by: PHYSICIAN ASSISTANT

## 2017-05-15 PROCEDURE — 83735 ASSAY OF MAGNESIUM: CPT | Performed by: PHYSICIAN ASSISTANT

## 2017-05-15 PROCEDURE — 80048 BASIC METABOLIC PNL TOTAL CA: CPT | Performed by: PHYSICIAN ASSISTANT

## 2017-05-15 PROCEDURE — 99214 OFFICE O/P EST MOD 30 MIN: CPT | Performed by: PHYSICIAN ASSISTANT

## 2017-05-15 NOTE — MR AVS SNAPSHOT
After Visit Summary   5/15/2017    Mckayla Oconnell    MRN: [id number]           Patient Information     Date Of Birth          7/2/1927        Visit Information        Provider Department      5/15/2017 1:50 PM More, Divya Mejia PA-C [facility] AT [place]        Today's Diagnoses     Cardiomyopathy, idiopathic (H)    -  1    Chronic systolic heart failure (H)          Care Instructions    Call CORE Nurse for any questions or concerns:   Amparo Dias or Anne: [phone]   If you have concerns after hours, please call [phone], option 2    1. Medication changes: Continue current medications.      2. Weigh yourself daily and write it down.     3. Call CORE nurse if your weight is up more than 2 pounds in one day, or 5 pounds in one week.    4. Call CORE nurse if you feel more short of breath, have more abdominal bloating or leg swelling.    5. Continue low sodium diet (less than 2000mg daily). If you eat less salt, you will retain less fluid.     6. Set up a time to do labs.      **Do NOT take Aleve or Ibuprofen without checking with your doctor first        [facility]: Cardiomyopathy, Optimization, Rehabilitation, Education   The [facility] is a heart failure specialty clinic within the [facility] where you will work with specialized nurse practioners, physician assistants, doctors and registered nurses. They are dedicated to helping patients with heart failure to carefully adjust medications, receive education, and learn who and when to call if symptoms develop. They specialize in helping you better understand your condition, slow the progression of your disease, improve the length and quality of your life, help you detect future heart problems before they become life threatening, and avoid hospitalizations.              Follow-ups after your visit        Additional Services     Follow-Up with CORE Clinic -  TANYA visit                 Your next 10 appointments already scheduled     May 18, 2017  3:15 PM CDT   Remote PPM Check with CUENCA TECH1   [facility]YS[facility] AT [place] ([facility])    [address] [place] [address]  [place] [address] [phone]           This appointment is for a remote check of your pacemaker.  This is not an appointment at the office.              Future tests that were ordered for you today     Open Future Orders        Priority Expected Expires Ordered    Basic metabolic panel Routine 7/15/2017 5/15/2018 5/15/2017    Follow-Up with CORE Clinic - TANYA visit Routine 7/15/2017 5/15/2018 5/15/2017    Hemoglobin Routine 7/15/2017 5/15/2018 5/15/2017            Who to contact     If you have questions or need follow up information about today's clinic visit or your schedule please contact [facility] AT [place] directly at [phone].  Normal or non-critical lab and imaging results will be communicated to you by [employer], letter or phone within 4 business days after the clinic has received the results. If you do not hear from us within 7 days, please contact the clinic through [employer] or phone. If you have a critical or abnormal lab result, we will notify you by phone as soon as possible.  Submit refill requests through [employer] or call your pharmacy and they will forward the refill request to us. Please allow 3 business days for your refill to be completed.          Additional Information About Your Visit        [employer] Information     [employer] gives you secure access to your electronic health record. If you see a primary care provider, you can also send messages to your care team and make appointments. If you have questions, please call your primary care clinic.  If you do not have a primary care provider, please call [phone] and they will assist you.        Care EveryWhere ID     This is your Care EveryWhere ID. This could be used  "by other organizations to access your [place] medical records  [id number]        Your Vitals Were     Pulse Respirations Height Pulse Oximetry BMI (Body Mass Index)       62 16 1.651 m (5' 5\") 94% 28.64 kg/m2        Blood Pressure from Last 3 Encounters:   05/15/17 108/60   04/21/17 122/80   04/17/17 124/60    Weight from Last 3 Encounters:   05/15/17 78.1 kg (172 lb 1.6 oz)   04/17/17 80.7 kg (178 lb)   03/17/17 79.6 kg (175 lb 6.4 oz)              We Performed the Following     Follow-Up with Cardiac Advanced Practice Provider     Follow-Up with CORE Clinic - TANYA visit        Primary Care Provider Office Phone # Fax #    Ander Shrestha [phone]       [facility] [address] [facility] [address]        Thank you!     Thank you for choosing [facility] AT [place]  for your care. Our goal is always to provide you with excellent care. Hearing back from our patients is one way we can continue to improve our services. Please take a few minutes to complete the written survey that you may receive in the mail after your visit with us. Thank you!             Your Updated Medication List - Protect others around you: Learn how to safely use, store and throw away your medicines at www.disposemymeds.org.          This list is accurate as of: 5/15/17  2:14 PM.  Always use your most recent med list.                   Brand Name Dispense Instructions for use    aspirin 81 MG tablet      Take 81 mg by mouth daily       calcium citrate 950 MG tablet    CALCITRATE     Take 2 tablets by mouth daily At Noon       COREG PO      Take 25 mg by mouth 2 times daily (with meals)       FISH OIL PO      Take 1,500 mg by mouth daily.       fluticasone 50 MCG/ACT spray    FLONASE    16 g    Spray 1-2 sprays into both nostrils daily as needed for rhinitis or allergies (TAKE ONCE DAILY DURIGN ALLERGY SEASON)       hydrALAZINE 25 MG tablet    APRESOLINE    270 tablet    " Take 1 tablet (25 mg) by mouth 3 times daily       Iron Tabs      Take 324 mg by mouth daily Patient states she takes OTC iron furrous gluconate 324 mg tablets USP       isosorbide mononitrate 30 MG 24 hr tablet    IMDUR    45 tablet    1/2 tablet daily AT NIGHT       levothyroxine 112 MCG tablet    SYNTHROID/LEVOTHROID    90 tablet    TAKE 1 TABLET DAILY       Lutein 20 MG Caps      Take 20 mg by mouth daily       MELATONIN PO      Take 3 mg by mouth nightly as needed       MULTIVITAMIN PO      Take 1 tablet by mouth daily Takes with lunch       prednisoLONE acetate 1 % ophthalmic susp    PRED FORTE    1 Bottle    Place 1 drop into the right eye every evening       simvastatin 20 MG tablet    ZOCOR    90 tablet    Take 1 tablet (20 mg) by mouth At Bedtime       spironolactone 25 MG tablet    ALDACTONE    90 tablet    Take 0.5 tablets (12.5 mg) by mouth daily       torsemide 20 MG tablet    DEMADEX    180 tablet    Take 1 tablet (20 mg) by mouth 2 times daily       VITAMIN D (CHOLECALCIFEROL) PO      Take 2,000 Units by mouth daily

## 2017-05-15 NOTE — PROGRESS NOTES
[id number]  HPI and Plan:   See dictation    Orders this Visit:  Orders Placed This Encounter   Procedures     Basic metabolic panel     Hemoglobin     Follow-Up with [facility] Clinic - TANYA visit     No orders of the defined types were placed in this encounter.    There are no discontinued medications.      Encounter Diagnoses   Name Primary?     Chronic systolic heart failure (H)      Cardiomyopathy, idiopathic (H) Yes       CURRENT MEDICATIONS:  Current Outpatient Prescriptions   Medication Sig Dispense Refill     levothyroxine (SYNTHROID/LEVOTHROID) 112 MCG tablet TAKE 1 TABLET DAILY 90 tablet 1     torsemide (DEMADEX) 20 MG tablet Take 1 tablet (20 mg) by mouth 2 times daily 180 tablet 3     simvastatin (ZOCOR) 20 MG tablet Take 1 tablet (20 mg) by mouth At Bedtime 90 tablet 0     spironolactone (ALDACTONE) 25 MG tablet Take 0.5 tablets (12.5 mg) by mouth daily 90 tablet 3     isosorbide mononitrate (IMDUR) 30 MG 24 hr tablet 1/2 tablet daily AT NIGHT 45 tablet 3     hydrALAZINE (APRESOLINE) 25 MG tablet Take 1 tablet (25 mg) by mouth 3 times daily 270 tablet 3     Carvedilol (COREG PO) Take 25 mg by mouth 2 times daily (with meals)       MELATONIN PO Take 3 mg by mouth nightly as needed       fluticasone (FLONASE) 50 MCG/ACT nasal spray Spray 1-2 sprays into both nostrils daily as needed for rhinitis or allergies (TAKE ONCE DAILY DURIGN ALLERGY SEASON) 16 g 11     aspirin 81 MG tablet Take 81 mg by mouth daily       Iron TABS Take 324 mg by mouth daily Patient states she takes OTC iron furrous gluconate 324 mg tablets USP       calcium citrate (CALCITRATE) 950 MG tablet Take 2 tablets by mouth daily At Noon       VITAMIN D, CHOLECALCIFEROL, PO Take 2,000 Units by mouth daily        Lutein 20 MG CAPS Take 20 mg by mouth daily       prednisoLONE acetate (PRED FORTE) 1 % ophthalmic suspension Place 1 drop into the right eye every evening  1 Bottle      Multiple Vitamins-Minerals (MULTIVITAMIN OR) Take 1 tablet by mouth  daily Takes with lunch       Omega-3 Fatty Acids (FISH OIL PO) Take 1,500 mg by mouth daily.         ALLERGIES     Allergies   Allergen Reactions     Atenolol Anaphylaxis     Hydrochlorothiazide      hyponatremia     Pollen Extract        PAST MEDICAL HISTORY:  Past Medical History:   Diagnosis Date     Aortic stenosis      CAD (coronary artery disease)     CT calcium ptivg=355 May 2013     Cardiomyopathy, idiopathic (H)     cardiomyopathy HTN vs viral; EF as low as 15% in 2009; CRT-P implanted 1/19/17     CHF (NYHA class II, ACC/AHA stage C) (H) 2/14/2013     Glaucoma      H/O angiography 9-1-2016    No angiographic evidence of obstructive coronary artery disease.     Hypertension      Hypothyroidism 2/14/2013     Pulmonary hypertension (H)        PAST SURGICAL HISTORY:  Past Surgical History:   Procedure Laterality Date     CARDIAC CATHERIZATION  9/1/16     GYN SURGERY      Hysterectomy 1995     HYSTERECTOMY, PAP NO LONGER INDICATED       IMPLANT PACEMAKER  1/19/17    CRT-P     ORTHOPEDIC SURGERY      knee replacement 1998     ORTHOPEDIC SURGERY      Knee replacement 2008     ORTHOPEDIC SURGERY      knee surgery [place]       FAMILY HISTORY:  Family History   Problem Relation Age of Onset     CANCER Mother      Uterius     Breast Cancer Daughter        SOCIAL HISTORY:  Social History     Social History     Marital status:      Spouse name: N/A     Number of children: N/A     Years of education: N/A     Social History Main Topics     Smoking status: Never Smoker     Smokeless tobacco: Never Used     Alcohol use No     Drug use: No     Sexual activity: No     Other Topics Concern     Parent/Sibling W/ Cabg, Mi Or Angioplasty Before 65f 55m? No     Caffeine Concern No     Sleep Concern No     Stress Concern No     Weight Concern No     Special Diet Yes     low sodium     Exercise Yes     therapy     Seat Belt Yes     Social History Narrative       Review of Systems:  Skin:  Positive for bruising   Eyes:  Positive  "for glasses  ENT:  Positive for hoarseness  Respiratory:  Negative for shortness of breath;dyspnea on exertion  Cardiovascular:  Negative for Positive for;edema;fatigue;lightheadedness;dizziness  Gastroenterology: Positive for nausea  Genitourinary:  Positive for nocturia  Musculoskeletal:  Positive for arthritis  Neurologic:  Positive for numbness or tingling of hands  Psychiatric:  Negative    Heme/Lymph/Imm:  Positive for allergies  Endocrine:  Positive for thyroid disorder    Physical Exam:  Vitals: /60  Pulse 62  Resp 16  Ht 1.651 m (5' 5\")  Wt 78.1 kg (172 lb 1.6 oz)  SpO2 94%  BMI 28.64 kg/m2   Please refer to dictation for physical exam    Recent Lab Results:  LIPID RESULTS:  Lab Results   Component Value Date    CHOL 113 03/22/2016    HDL 39 (L) 03/22/2016    LDL 54 03/22/2016    TRIG 98 03/22/2016    CHOLHDLRATIO 3.5 07/06/2015       LIVER ENZYME RESULTS:  Lab Results   Component Value Date    AST 50 (H) 11/14/2016    ALT 89 (H) 11/14/2016       CBC RESULTS:  Lab Results   Component Value Date    WBC 4.8 04/17/2017    RBC 2.98 (L) 04/17/2017    HGB 9.2 (L) 04/17/2017    HCT 28.7 (L) 04/17/2017    MCV 96 04/17/2017    MCH 30.9 04/17/2017    MCHC 32.1 04/17/2017    RDW 15.3 (H) 04/17/2017     (L) 04/17/2017       BMP RESULTS:  Lab Results   Component Value Date     04/17/2017    POTASSIUM 5.1 04/17/2017    CHLORIDE 103 04/17/2017    CO2 25 04/17/2017    ANIONGAP 16.1 04/17/2017     (H) 04/17/2017    BUN 69 (H) 04/17/2017    CR 1.80 (H) 04/17/2017    GFRESTIMATED 26 (L) 04/17/2017    GFRESTBLACK 32 (L) 04/17/2017    SHOBHA 9.1 04/17/2017        A1C RESULTS:  No results found for: A1C    INR RESULTS:  Lab Results   Component Value Date    INR 1.12 09/01/2016           MIKY Kraft PA-C   PHYS HEART AT [place]  [address]  [place] [address]      "

## 2017-05-15 NOTE — LETTER
"5/15/2017    Ander Shrestha MD  [address] [facility] [address]    RE: Mckayla Isabelcaridad       Dear Colleague,    I had the pleasure of seeing Mckayla Oconnell in the [facility].    PRIMARY CARDIOLOGIST:  Dr. Echols.      REASON FOR VISIT:  Heart failure followup.      Ms. Oconnell is a delightful 89-year-old woman with past cardiac history significant for the followin.  Nonischemic cardiomyopathy with EF historically between 10% and 45%, most recently documented around 25%-30%.   2.  Moderate to severe aortic stenosis, moderate tricuspid regurgitation and moderate mitral regurgitation.   3.  CKD with baseline creatinine around 1.5.   4.  Hyperlipidemia.   5.  Biventricular pacemaker implant in 2017.   6.  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   7.  Peripheral arterial disease without symptoms of claudication.   8.  Lymphedema.      I had initially met her in 2016 when she was hospitalized with profound fatigue, weight gain and shortness of breath.  She diuresed about 20 pounds on dobutamine and Bumex to a weight of 179.  This weight was unfortunately quickly regained, and we struggled to stabilize her until she underwent a biventricular pacemaker.  She has responded to this phenomenally well, and her weight has trended down to about 168 at home.  This is even with a decrease in her torsemide.      She comes in today stating she continues to feel well.  She denies chest pain, shortness of breath, orthopnea or PND.  Her weight is 168 pounds at home today.  Her legs are without edema in the morning when she wakes up and then they worsen throughout the day, but they are still nowhere near as severely swollen as they were before.  She overall is watching her diet, but yesterday on Mother's Day, she said she ate \"everything bad.\"  She did not, though, have breen, sausage or ham.  She did have lots of cinnamon rolls and quiche at [facility].      SOCIAL " HISTORY:  She lives in her own home, usually with her , Jaime.  Unfortunately, Jaime had a fall and broke his arm, and he is in [facility] right now.  She has children, 1 of her daughters, Sandra, is a physical therapist.  Their goals have been to stay at home and avoid admissions or TCU.  Her  is now going to see Dr. Estrada.  She is a lifelong nonsmoker, no alcohol use.  She is very involved in their [employer] locally and at their cabin.      PHYSICAL EXAMINATION:   GENERAL:  Well-developed, well-nourished elderly woman in no acute distress.   HEENT:  Normocephalic, atraumatic, anicteric.   HEART:  Regular with a harsh 4/6 systolic murmur heard throughout the precordium, best at the right sternal border with radiation to bilateral carotids.   LUNGS:  Clear.  Good airflow deep bilateral lobes.   EXTREMITIES:  1+ pitting edema to upper shin.  Legs also have notable varicose veins.   SKIN:  Warm and dry but pale.     Outpatient Encounter Prescriptions as of 5/15/2017   Medication Sig Dispense Refill     levothyroxine (SYNTHROID/LEVOTHROID) 112 MCG tablet TAKE 1 TABLET DAILY 90 tablet 1     [DISCONTINUED] torsemide (DEMADEX) 20 MG tablet Take 1 tablet (20 mg) by mouth 2 times daily 180 tablet 3     [DISCONTINUED] simvastatin (ZOCOR) 20 MG tablet Take 1 tablet (20 mg) by mouth At Bedtime 90 tablet 0     spironolactone (ALDACTONE) 25 MG tablet Take 0.5 tablets (12.5 mg) by mouth daily 90 tablet 3     [DISCONTINUED] isosorbide mononitrate (IMDUR) 30 MG 24 hr tablet 1/2 tablet daily AT NIGHT 45 tablet 3     hydrALAZINE (APRESOLINE) 25 MG tablet Take 1 tablet (25 mg) by mouth 3 times daily 270 tablet 3     MELATONIN PO Take 3 mg by mouth nightly as needed       [DISCONTINUED] Carvedilol (COREG PO) Take 25 mg by mouth 2 times daily (with meals)       fluticasone (FLONASE) 50 MCG/ACT nasal spray Spray 1-2 sprays into both nostrils daily as needed for rhinitis or allergies (TAKE ONCE DAILY DURIGN ALLERGY SEASON)  16 g 11     aspirin 81 MG tablet Take 81 mg by mouth daily       Iron TABS Take 324 mg by mouth daily Patient states she takes OTC iron furrous gluconate 324 mg tablets USP       calcium citrate (CALCITRATE) 950 MG tablet Take 2 tablets by mouth daily At Noon       VITAMIN D, CHOLECALCIFEROL, PO Take 2,000 Units by mouth daily        Lutein 20 MG CAPS Take 20 mg by mouth daily       prednisoLONE acetate (PRED FORTE) 1 % ophthalmic suspension Place 1 drop into the right eye every evening  1 Bottle      Omega-3 Fatty Acids (FISH OIL PO) Take 1,500 mg by mouth daily.       [DISCONTINUED] Multiple Vitamins-Minerals (MULTIVITAMIN OR) Take 1 tablet by mouth daily Takes with lunch       No facility-administered encounter medications on file as of 5/15/2017.       ASSESSMENT AND PLAN:   1.  Chronic systolic heart failure with class II symptoms and euvolemic with a home dry weight of about 168 pounds.  She is monitored on tele-assurance and done quite well.  She is appropriately on torsemide 20 mg b.i.d., spironolactone 12.5 mg daily, Imdur 15 mg at night, hydralazine 25 mg t.i.d. and carvedilol 25 mg b.i.d.  She has not been on high-dose ACE or ARB due to renal insufficiency.  She has done remarkably well on this regimen, considering how much she struggled before she had her BiV.  We will continue her medications as currently taking.  I did warn her to not cheat on her food and to watch her sodium going forward.   2.  Moderate aortic stenosis but in a low-flow state, her mean gradient was 25 mmHg on last echocardiogram.  She did have a dobutamine echo done in 09/2016 at similar gradients, and her mean gradient did not change significantly, nor did her peak velocity which peaked at 3.5, falling short of the 4 meter per second velocity for severe AS.  She in addition has MR and TR.  These will be followed with serial echocardiogram again in about 4 months.   3.  BiV pacemaker with last device check showing 98% BiV paced with  no changes.   4.  Lymphedema.  She has documented severe peripheral arterial disease, and Lymphedema Clinic does not recommend these to compression.  Will follow.   5.  Hypertension, excellent control.   6.  Hemoglobin.  Will repeat hemoglobin at labs.      She is due for labs, and this should be a hemoglobin and BMP.  We will see her back in 2 months, sooner with concerns.  Thank you for allowing me to participate in her care.     Sincerely,    Divya Kraft PA-C     [facility]

## 2017-05-15 NOTE — PATIENT INSTRUCTIONS
Call CORE Nurse for any questions or concerns:   Amparo Dias, or Thea: [phone]   If you have concerns after hours, please call [phone], option 2    1. Medication changes: Continue current medications.      2. Weigh yourself daily and write it down.     3. Call CORE nurse if your weight is up more than 2 pounds in one day, or 5 pounds in one week.    4. Call CORE nurse if you feel more short of breath, have more abdominal bloating or leg swelling.    5. Continue low sodium diet (less than 2000mg daily). If you eat less salt, you will retain less fluid.     6. Set up a time to do labs.      **Do NOT take Aleve or Ibuprofen without checking with your doctor first        [facility]: Cardiomyopathy, Optimization, Rehabilitation, Education   The [facility] is a heart failure specialty clinic within the [facility] where you will work with specialized nurse practioners, physician assistants, doctors and registered nurses. They are dedicated to helping patients with heart failure to carefully adjust medications, receive education, and learn who and when to call if symptoms develop. They specialize in helping you better understand your condition, slow the progression of your disease, improve the length and quality of your life, help you detect future heart problems before they become life threatening, and avoid hospitalizations.

## 2017-05-16 NOTE — PROGRESS NOTES
"PRIMARY CARDIOLOGIST:  Dr. Echols.      REASON FOR VISIT:  Heart failure followup.      HISTORY OF PRESENT ILLNESS:  Ms. Oconnell is a delightful 89-year-old woman with past cardiac history significant for the followin.  Nonischemic cardiomyopathy with EF historically between 10% and 45%, most recently documented around 25%-30%.   2.  Moderate to severe aortic stenosis, moderate tricuspid regurgitation and moderate mitral regurgitation.   3.  CKD with baseline creatinine around 1.5.   4.  Hyperlipidemia.   5.  Biventricular pacemaker implant in 2017.   6.  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   7.  Peripheral arterial disease without symptoms of claudication.   8.  Lymphedema.      I had initially met her in 2016 when she was hospitalized with profound fatigue, weight gain and shortness of breath.  She diuresed about 20 pounds on dobutamine and Bumex to a weight of 179.  This weight was unfortunately quickly regained, and we struggled to stabilize her until she underwent a biventricular pacemaker.  She has responded to this phenomenally well, and her weight has trended down to about 168 at home.  This is even with a decrease in her torsemide.      She comes in today stating she continues to feel well.  She denies chest pain, shortness of breath, orthopnea or PND.  Her weight is 168 pounds at home today.  Her legs are without edema in the morning when she wakes up and then they worsen throughout the day, but they are still nowhere near as severely swollen as they were before.  She overall is watching her diet, but yesterday on Mother's Day, she said she ate \"everything bad.\"  She did not, though, have breen, sausage or ham.  She did have lots of cinnamon rolls and quiche at [facility].      SOCIAL HISTORY:  She lives in her own home, usually with her , Jaime.  Unfortunately, Jaime had a fall and broke his arm, and [facility].  She has children, 1 of her daughters, Sandra, is a " physical therapist.  Their goals have been to stay at home and avoid admissions or TCU.  Her  is now going to see Dr. Estrada.  She is a lifelong nonsmoker, no alcohol use.  She is very involved in their [employer] locally and at their cabin.      PHYSICAL EXAMINATION:   GENERAL:  Well-developed, well-nourished elderly woman in no acute distress.   HEENT:  Normocephalic, atraumatic, anicteric.   HEART:  Regular with a harsh 4/6 systolic murmur heard throughout the precordium, best at the right sternal border with radiation to bilateral carotids.   LUNGS:  Clear.  Good airflow deep bilateral lobes.   EXTREMITIES:  1+ pitting edema to upper shin.  Legs also have notable varicose veins.   SKIN:  Warm and dry but pale.      ASSESSMENT AND PLAN:   1.  Chronic systolic heart failure with class II symptoms and euvolemic with a home dry weight of about 168 pounds.  She is monitored on tele-assurance and done quite well.  She is appropriately on torsemide 20 mg b.i.d., spironolactone 12.5 mg daily, Imdur 15 mg at night, hydralazine 25 mg t.i.d. and carvedilol 25 mg b.i.d.  She has not been on high-dose ACE or ARB due to renal insufficiency.  She has done remarkably well on this regimen, considering how much she struggled before she had her BiV.  We will continue her medications as currently taking.  I did warn her to not cheat on her food and to watch her sodium going forward.   2.  Moderate aortic stenosis but in a low-flow state, her mean gradient was 25 mmHg on last echocardiogram.  She did have a dobutamine echo done in 09/2016 at similar gradients, and her mean gradient did not change significantly, nor did her peak velocity which peaked at 3.5, falling short of the 4 meter per second velocity for severe AS.  She in addition has MR and TR.  These will be followed with serial echocardiogram again in about 4 months.   3.  BiV pacemaker with last device check showing 98% BiV paced with no changes.   4.  Lymphedema.  She  has documented severe peripheral arterial disease, and [facility] does not recommend these to compression.  Will follow.   5.  Hypertension, excellent control.   6.  Hemoglobin.  Will repeat hemoglobin at labs.      She is due for labs, and this should be a hemoglobin and BMP.  We will see her back in 2 months, sooner with concerns.  Thank you for allowing me to participate in her care.         MELISSA BURKETT PA-C             D: 05/15/2017 14:24   T: 2017 01:49   MT: LENI      Name:     VICKEY CHURCH   MRN:      [id number]        Account:      [id number]   :      1927           Service Date: 05/15/2017      Document: [id number]

## 2017-05-17 ENCOUNTER — CARE COORDINATION (OUTPATIENT)
Dept: CARDIOLOGY | Facility: CLINIC | Age: 82
End: 2017-05-17

## 2017-05-17 NOTE — PROGRESS NOTES
Called and spoke with pt.  Reviewed that labs done 5/15 show high magnesium level.  Pt confirms she does not take a magnesium supplement.  Reviewed with pt to hold multivitamin, per Anabel's recommendations.  Pt verbalized understanding and agrees with this plan.  CARMELLA Miller 4:47 PM 5/17/2017

## 2017-05-18 ENCOUNTER — ALLIED HEALTH/NURSE VISIT (OUTPATIENT)
Dept: CARDIOLOGY | Facility: CLINIC | Age: 82
End: 2017-05-18
Payer: COMMERCIAL

## 2017-05-18 DIAGNOSIS — Z95.0 CARDIAC PACEMAKER IN SITU: Primary | ICD-10-CM

## 2017-05-18 PROCEDURE — 93294 REM INTERROG EVL PM/LDLS PM: CPT | Performed by: INTERNAL MEDICINE

## 2017-05-18 PROCEDURE — 93296 REM INTERROG EVL PM/IDS: CPT | Performed by: INTERNAL MEDICINE

## 2017-05-18 NOTE — PROGRESS NOTES
[employer] Valitude CRT-P Remote PPM Device Check  AP: 99 % BIVP: 98 %  Mode: DDDR        Presenting Rhythm: AP/BIVP  Heart Rate: Adequate rates per histogram  Sensing: Stable    Pacing Threshold: Stable    Impedance: Stable  Battery Status: 10 years  Atrial Arrhythmia: 1 brief mode switch episode for PAT lasting 7 seconds, ventricular rates controlled.    Ventricular Arrhythmia: None     Care Plan: F/u PPM Latitude q 3 months. Sent letter with results. HUANG GordilloT

## 2017-05-18 NOTE — MR AVS SNAPSHOT
After Visit Summary   5/18/2017    Mckayla Oconnell    MRN: [id number]           Patient Information     Date Of Birth          7/2/1927        Visit Information        Provider Department      5/18/2017 3:15 PM [facility] TECH1 [facility]        Today's Diagnoses     Cardiac pacemaker in situ    -  1       Follow-ups after your visit        Your next 10 appointments already scheduled     May 18, 2017  3:15 PM CDT   Remote PPM Check with [facility] TECH1   [facility] ([facility])    [address] [place] [id number] [phone]           This appointment is for a remote check of your pacemaker.  This is not an appointment at the office.              Who to contact     If you have questions or need follow up information about today's clinic visit or your schedule please contact [facility] directly at [phone].  Normal or non-critical lab and imaging results will be communicated to you by BioSEThart, letter or phone within 4 business days after the clinic has received the results. If you do not hear from us within 7 days, please contact the clinic through BioSEThart or phone. If you have a critical or abnormal lab result, we will notify you by phone as soon as possible.  Submit refill requests through YesVideo or call your pharmacy and they will forward the refill request to us. Please allow 3 business days for your refill to be completed.          Additional Information About Your Visit        BioSEThart Information     YesVideo gives you secure access to your electronic health record. If you see a primary care provider, you can also send messages to your care team and make appointments. If you have questions, please call your primary care clinic.  If you do not have a primary care provider, please call [phone] and they will assist you.        Care EveryWhere  ID     This is your Care EveryWhere ID. This could be used by other organizations to access your [place] medical records  [id number]         Blood Pressure from Last 3 Encounters:   05/15/17 108/60   04/21/17 122/80   04/17/17 124/60    Weight from Last 3 Encounters:   05/15/17 78.1 kg (172 lb 1.6 oz)   04/17/17 80.7 kg (178 lb)   03/17/17 79.6 kg (175 lb 6.4 oz)              We Performed the Following     INTERROGATION DEVICE EVAL REMOTE, PACER/ICD (49834)     PM DEVICE INTERROGATE REMOTE (23147)        Primary Care Provider Office Phone # Fax #    Ander Shrestha [phone]       [facility] [address] [facility] [address]        Thank you!     Thank you for choosing [facility] [place]  for your care. Our goal is always to provide you with excellent care. Hearing back from our patients is one way we can continue to improve our services. Please take a few minutes to complete the written survey that you may receive in the mail after your visit with us. Thank you!             Your Updated Medication List - Protect others around you: Learn how to safely use, store and throw away your medicines at www.disposemymeds.org.          This list is accurate as of: 5/18/17 10:20 AM.  Always use your most recent med list.                   Brand Name Dispense Instructions for use    aspirin 81 MG tablet      Take 81 mg by mouth daily       calcium citrate 950 MG tablet    CALCITRATE     Take 2 tablets by mouth daily At Noon       COREG PO      Take 25 mg by mouth 2 times daily (with meals)       FISH OIL PO      Take 1,500 mg by mouth daily.       fluticasone 50 MCG/ACT spray    FLONASE    16 g    Spray 1-2 sprays into both nostrils daily as needed for rhinitis or allergies (TAKE ONCE DAILY DURIGN ALLERGY SEASON)       hydrALAZINE 25 MG tablet    APRESOLINE    270 tablet    Take 1 tablet (25 mg) by mouth 3 times daily       Iron Tabs      Take 324 mg  by mouth daily Patient states she takes OTC iron furrous gluconate 324 mg tablets USP       isosorbide mononitrate 30 MG 24 hr tablet    IMDUR    45 tablet    1/2 tablet daily AT NIGHT       levothyroxine 112 MCG tablet    SYNTHROID/LEVOTHROID    90 tablet    TAKE 1 TABLET DAILY       Lutein 20 MG Caps      Take 20 mg by mouth daily       MELATONIN PO      Take 3 mg by mouth nightly as needed       MULTIVITAMIN PO      Take 1 tablet by mouth daily Takes with lunch       prednisoLONE acetate 1 % ophthalmic susp    PRED FORTE    1 Bottle    Place 1 drop into the right eye every evening       simvastatin 20 MG tablet    ZOCOR    90 tablet    Take 1 tablet (20 mg) by mouth At Bedtime       spironolactone 25 MG tablet    ALDACTONE    90 tablet    Take 0.5 tablets (12.5 mg) by mouth daily       torsemide 20 MG tablet    DEMADEX    180 tablet    Take 1 tablet (20 mg) by mouth 2 times daily       VITAMIN D (CHOLECALCIFEROL) PO      Take 2,000 Units by mouth daily

## 2017-05-30 ENCOUNTER — CARE COORDINATION (OUTPATIENT)
Dept: CASE MANAGEMENT | Facility: CLINIC | Age: 82
End: 2017-05-30

## 2017-05-30 NOTE — PROGRESS NOTES
"Clinic Care Coordination Contact  OUTREACH    Received a note from triage about patient's experience with managing her spouse's care.  \"... called regarding a visit pt had with GREGORIO PERKINS has apparently given the patient \"inadequate information\" about pt's broken arm  Mckayla would not give more information than that  Please call patient at [phone]- patient is waiting on a phone call.\"    Spoke with patient about her concerns regarding the visit with GREGORIO. Pt reported she was going to \"hire a  to have her records cleared about the visit with GREGORIO.\" Offered reassurance to patient. Pt has had  many poor experiences with health care and many providers have expressed concerns for her ability to care for the pt. Validated feelings. 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 Pt sounded a bit more calm ending the conversation. Denied needing assistance with anything.     Hiral Kline, [facility] Care Coordinator  [phone]    "

## 2017-06-05 ENCOUNTER — CARE COORDINATION (OUTPATIENT)
Dept: CARDIOLOGY | Facility: CLINIC | Age: 82
End: 2017-06-05

## 2017-06-05 NOTE — PROGRESS NOTES
Telemanagement: No call variance for last 3 days. Called pt. LVM requesting she begin calling into telemonitoring number or call [facility] RN w/ concerns.

## 2017-06-06 NOTE — PROGRESS NOTES
Telemanagement: No call variance for last 4 days. Called pt. LVM requesting she begin calling into telemonitoring number or call CORE RN w/ concerns.

## 2017-06-08 NOTE — PROGRESS NOTES
Telemanagement: No call variance since 6/2. Called pt, LVM requesting call back or call into survey.

## 2017-06-08 NOTE — PROGRESS NOTES
Received VM from shruthi Flores. Pt is out of town and she was just at pt's home checking messages. Pt is doing well and in [place] through 6/11 visiting family. TELEMANAGEMENT placed on hold. Pt will start calling again when back in town. CARMELLA Gee

## 2017-06-15 ENCOUNTER — TRANSFERRED RECORDS (OUTPATIENT)
Dept: HEALTH INFORMATION MANAGEMENT | Facility: CLINIC | Age: 82
End: 2017-06-15

## 2017-06-16 ENCOUNTER — CARE COORDINATION (OUTPATIENT)
Dept: CARDIOLOGY | Facility: CLINIC | Age: 82
End: 2017-06-16

## 2017-06-16 NOTE — PROGRESS NOTES
FILIPE from pt. Last OV with Anabel, pt states she thought she was supposed to have labs every month. Pt states her  has an OV on Monday 6/19 to see Dr. Estrada, so she is wondering if she can have it drawn when she is here with him. Reviewed chart, pt last seen 5/15 by Anabel, Creatinine was up slightly from baseline, plan was to watch and recheck at next OV which is ordered for mid July. Reviewed with Anabel, OK for BMP Monday since Creatinine up on last check. Called pt back, reviewed OK for BMP on Monday 6/19 per Anabel. Pt stated understanding. CARMELLA Gee

## 2017-06-19 DIAGNOSIS — I25.10 CORONARY ARTERY DISEASE INVOLVING NATIVE CORONARY ARTERY OF NATIVE HEART WITHOUT ANGINA PECTORIS: Primary | Chronic | ICD-10-CM

## 2017-06-19 DIAGNOSIS — I50.22 CHRONIC SYSTOLIC HEART FAILURE (H): ICD-10-CM

## 2017-06-19 DIAGNOSIS — I10 ESSENTIAL HYPERTENSION, BENIGN: ICD-10-CM

## 2017-06-19 DIAGNOSIS — I42.9 CARDIOMYOPATHY, IDIOPATHIC (H): ICD-10-CM

## 2017-06-19 LAB
ANION GAP SERPL CALCULATED.3IONS-SCNC: 13.4 MMOL/L (ref 6–17)
BUN SERPL-MCNC: 57 MG/DL (ref 7–30)
CALCIUM SERPL-MCNC: 9.4 MG/DL (ref 8.5–10.5)
CHLORIDE SERPL-SCNC: 102 MMOL/L (ref 98–107)
CO2 SERPL-SCNC: 24 MMOL/L (ref 23–29)
CREAT SERPL-MCNC: 1.81 MG/DL (ref 0.7–1.3)
GFR SERPL CREATININE-BSD FRML MDRD: 26 ML/MIN/1.7M2
GLUCOSE SERPL-MCNC: 106 MG/DL (ref 70–105)
HGB BLD-MCNC: 9.3 G/DL (ref 11.7–15.7)
POTASSIUM SERPL-SCNC: 4.4 MMOL/L (ref 3.5–5.1)
SODIUM SERPL-SCNC: 135 MMOL/L (ref 136–145)

## 2017-06-19 PROCEDURE — 80048 BASIC METABOLIC PNL TOTAL CA: CPT | Performed by: INTERNAL MEDICINE

## 2017-06-19 PROCEDURE — 85018 HEMOGLOBIN: CPT | Performed by: PHYSICIAN ASSISTANT

## 2017-06-19 PROCEDURE — 36415 COLL VENOUS BLD VENIPUNCTURE: CPT | Performed by: INTERNAL MEDICINE

## 2017-06-19 RX ORDER — CARVEDILOL 25 MG/1
25 TABLET ORAL 2 TIMES DAILY WITH MEALS
Qty: 60 TABLET | Refills: 2 | Status: SHIPPED | OUTPATIENT
Start: 2017-06-19 | End: 2017-07-16

## 2017-06-19 NOTE — TELEPHONE ENCOUNTER
Reason for call:  Medication   If this is a refill request, has the caller requested the refill from the pharmacy already? Yes  Will the patient be using a [facility]? No  Name of the pharmacy and phone number for the current request: [facility]    Name of the medication requested: Carvedilol    Other request: Patient only has one pill left, and needs these asap, as she is really worried about running out, no need to call patient as she will not be home, she said just sent the meds to the pharmacy, and she will check with them    Phone number to reach patient:  Home number on file [phone] (home)    Best Time:  anytime    Can we leave a detailed message on this number?  YES

## 2017-06-19 NOTE — TELEPHONE ENCOUNTER
Prescription(s) sent electronically to specified pharmacy.     She bene taking Carvedilol without reported side effects

## 2017-07-03 ENCOUNTER — CARE COORDINATION (OUTPATIENT)
Dept: CARDIOLOGY | Facility: CLINIC | Age: 82
End: 2017-07-03

## 2017-07-03 DIAGNOSIS — I50.9 CHF (CONGESTIVE HEART FAILURE) (H): Primary | ICD-10-CM

## 2017-07-03 NOTE — PROGRESS NOTES
TELEMANAGEMENT: No call variance X 3 days.  Called and spoke with pt who reports she was out of town for granddaughter's wedding over the weekend so no opportunity to weigh herself.  Pt reports she is feeling well, denies questions concerns and states she will plan to start weighing daily and calling in again tomorrow.  Reviewed that clinic is closed July 4th, and provided on-call cardiology number if questions/concerns arise.  Pt verbalized understanding and agrees with this plan. CARMELLA Miller 12:05 PM 7/3/2017

## 2017-07-16 DIAGNOSIS — I42.9 CARDIOMYOPATHY, IDIOPATHIC (H): ICD-10-CM

## 2017-07-16 DIAGNOSIS — I25.10 CORONARY ARTERY DISEASE INVOLVING NATIVE CORONARY ARTERY OF NATIVE HEART WITHOUT ANGINA PECTORIS: ICD-10-CM

## 2017-07-16 DIAGNOSIS — I10 ESSENTIAL HYPERTENSION, BENIGN: ICD-10-CM

## 2017-07-16 DIAGNOSIS — I50.22 CHRONIC SYSTOLIC HEART FAILURE (H): ICD-10-CM

## 2017-07-16 NOTE — TELEPHONE ENCOUNTER
simvastatin (ZOCOR) 20 MG tablet     Last Written Prescription Date: 2/24/17  Last Fill Quantity: 90, # refills: 0  Last Office Visit with [facility], [facility] or [facility] prescribing provider: 4/21/17       Lab Results   Component Value Date    CHOL 113 03/22/2016     Lab Results   Component Value Date    HDL 39 03/22/2016     Lab Results   Component Value Date    LDL 54 03/22/2016     Lab Results   Component Value Date    TRIG 98 03/22/2016     Lab Results   Component Value Date    CHOLHDLRATIO 3.5 07/06/2015         carvedilol (COREG) 25 MG tablet      Last Written Prescription Date: 6/19/17  Last Fill Quantity: 60, # refills: 2    Last Office Visit with [facility], [facility] or [facility] prescribing provider:  4/21/17   Future Office Visit:        BP Readings from Last 3 Encounters:   05/15/17 108/60   04/21/17 122/80   04/17/17 124/60

## 2017-07-17 ENCOUNTER — OFFICE VISIT (OUTPATIENT)
Dept: CARDIOLOGY | Facility: CLINIC | Age: 82
End: 2017-07-17
Attending: PHYSICIAN ASSISTANT
Payer: COMMERCIAL

## 2017-07-17 VITALS
SYSTOLIC BLOOD PRESSURE: 108 MMHG | BODY MASS INDEX: 28.44 KG/M2 | WEIGHT: 170.7 LBS | DIASTOLIC BLOOD PRESSURE: 52 MMHG | HEIGHT: 65 IN | HEART RATE: 64 BPM

## 2017-07-17 DIAGNOSIS — I50.9 CHF (CONGESTIVE HEART FAILURE) (H): ICD-10-CM

## 2017-07-17 DIAGNOSIS — I50.22 CHRONIC SYSTOLIC HEART FAILURE (H): ICD-10-CM

## 2017-07-17 LAB
ANION GAP SERPL CALCULATED.3IONS-SCNC: 13.9 MMOL/L (ref 6–17)
BUN SERPL-MCNC: 66 MG/DL (ref 7–30)
CALCIUM SERPL-MCNC: 9.1 MG/DL (ref 8.5–10.5)
CHLORIDE SERPL-SCNC: 101 MMOL/L (ref 98–107)
CO2 SERPL-SCNC: 26 MMOL/L (ref 23–29)
CREAT SERPL-MCNC: 1.9 MG/DL (ref 0.7–1.3)
GFR SERPL CREATININE-BSD FRML MDRD: 25 ML/MIN/1.7M2
GLUCOSE SERPL-MCNC: 127 MG/DL (ref 70–105)
MAGNESIUM SERPL-MCNC: 2.3 MG/DL (ref 1.6–2.3)
POTASSIUM SERPL-SCNC: 3.9 MMOL/L (ref 3.5–5.1)
SODIUM SERPL-SCNC: 137 MMOL/L (ref 136–145)

## 2017-07-17 PROCEDURE — 36415 COLL VENOUS BLD VENIPUNCTURE: CPT | Performed by: PHYSICIAN ASSISTANT

## 2017-07-17 PROCEDURE — 83735 ASSAY OF MAGNESIUM: CPT | Performed by: PHYSICIAN ASSISTANT

## 2017-07-17 PROCEDURE — 99214 OFFICE O/P EST MOD 30 MIN: CPT | Performed by: PHYSICIAN ASSISTANT

## 2017-07-17 PROCEDURE — 80048 BASIC METABOLIC PNL TOTAL CA: CPT | Performed by: PHYSICIAN ASSISTANT

## 2017-07-17 RX ORDER — TORSEMIDE 20 MG/1
TABLET ORAL
Qty: 135 TABLET | Refills: 3
Start: 2017-07-17 | End: 2017-08-28

## 2017-07-17 NOTE — PATIENT INSTRUCTIONS
Call [facility] nurse for any questions or concerns:  [phone]   *If you have concerns after hours, please call [phone], option 2 to speak with on-call Cardiologist.    1. Medication changes from today:  Decrease torsemide.  We'll do 20 mg in the morning- 1 full pill.  Take 10 mg in the afternoon- 1/2 tablet.    Continue other medications.       2. Weigh yourself daily and write it down.     3. Call [facility]ORE nurse if your weight is up more than 2 pounds in one day or 5 pounds in one week.     4. Call CORE nurse if you feel more short of breath, have more abdominal bloating, or leg swelling.     5. Continue low sodium diet (less than 2000 mg daily). If you eat less salt, you will retain less fluid.     6. Alcohol can weaken your heart further. You should avoid alcohol or limit its use to special times, such as a holiday or birthday.      7. Do NOT take Aleve or ibuprofen without talking to your doctor first.      8. Lab Results:   Component      Latest Ref Rng & Units 5/15/2017 6/19/2017 7/17/2017   Sodium      136 - 145 mmol/L 141 135 (L) 137   Potassium      3.5 - 5.1 mmol/L 4.6 4.4 3.9   Chloride      98 - 107 mmol/L 104 102 101   Carbon Dioxide      23 - 29 mmol/L 28 24 26   Anion Gap      6 - 17 mmol/L 13.6 13.4 13.9   Glucose      70 - 105 mg/dL 105 106 (H) 127 (H)   Urea Nitrogen      7 - 30 mg/dL 66 (H) 57 (H) 66 (H)   Creatinine      0.70 - 1.30 mg/dL 1.98 (H) 1.81 (H) 1.90 (H)   GFR Estimate      >60 mL/min/1.7m2 24 (L) 26 (L) 25 (L)   GFR Estimate If Black      >60 mL/min/1.7m2 29 (L) 32 (L) 30 (L)   Calcium      8.5 - 10.5 mg/dL 9.9 9.4 9.1        [facility]: Cardiomyopathy, Optimization, Rehabilitation, Education  The [facility] is a heart failure specialty clinic within the [facility] where you will work with specialized nurse practitioners, physician assistants, doctors, and registered nurses. They are dedicated to helping patients with heart failure to carefully adjust medications,  receive education, and learn who and when to call if symptoms develop. They specialize in helping you better understand your condition, slow the progression of your disease, improve the length and quality of your life, help you detect future heart problems before they become life threatening, and avoid hospitalizations.

## 2017-07-17 NOTE — PROGRESS NOTES
Reviewed during clinic visit.  Please see progress note for plan.  Divya Kraft PA-C 7/17/2017 10:06 AM

## 2017-07-17 NOTE — PROGRESS NOTES
PRIMARY CARDIOLOGIST:  Dr. Echols.      REASON FOR VISIT:  Heart failure followup.      HISTORY OF PRESENT ILLNESS:  Ms. Oconnell is a delightful 89-year-old woman with past cardiac history significant for the followin.  Nonischemic cardiomyopathy with EF historically between 10% and 45%, most recently documented around 30-35%.   2.  Moderate to severe aortic stenosis, moderate tricuspid regurgitation and moderate mitral regurgitation.   3.  CKD with baseline creatinine around 1.5.   4.  Hyperlipidemia.   5.  Biventricular pacemaker implant in 2017.   6.  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   7.  Peripheral arterial disease without symptoms of claudication.   8.  Lymphedema.   9.  Severely elevated pulmonary pressures likely secondary to valvular disease     I had initially met her in 2016 when she was hospitalized with profound fatigue, weight gain and shortness of breath.  She diuresed about 20 pounds on dobutamine and Bumex to a weight of 179.  This weight was unfortunately quickly regained, and we struggled to stabilize her until she underwent a biventricular pacemaker.  She has responded to this phenomenally well, and her weight has trended down to about 166 at home.       She comes in today stating she continues to feel well.  She denies chest pain, shortness of breath, orthopnea or PND.  Her weight is 166 pounds at home today.  Her legs are without edema in the morning when she wakes up and then they worsen throughout the day but only to minimal amount. She states that this is the best she's felt in quite some time. She has good energy she's getting a lot of things done at home that she couldn't to before. She feels like she continues to improve.    She watches her diet closely she thinks she is eating about 600 mg of salt and probably drinking about 40 ounces of fluid a day. She is working on a renal diet. She is very motivated to protect her kidneys.         SOCIAL HISTORY:  She lives in her own  "home, usually with her , Jaime.  Unfortunately, Jaime had a fall and broke his arm,was in TCU now back home. She has children, 1 of her daughters, Sandra, is a physical therapist.  Their goals have been to stay at home and avoid admissions or TCU.  Her  is now going to see Dr. Estrada.  She is a lifelong nonsmoker, no alcohol use.  She is very involved in their Zoroastrianism locally and at their cabin.      PHYSICAL EXAMINATION:  /52  Pulse 64  Ht 1.651 m (5' 5\")  Wt 77.4 kg (170 lb 11.2 oz)  BMI 28.41 kg/m2    GENERAL:  Well-developed, well-nourished elderly woman in no acute distress.   HEENT:  Normocephalic, atraumatic, anicteric.   HEART:  Regular with a harsh 4/6 systolic murmur heard throughout the precordium, best at the right sternal border with radiation to bilateral carotids.   LUNGS:  Clear.  Good airflow deep bilateral lobes.   EXTREMITIES:  1+ pitting edema to lower shin, woody edema noted as well.  Legs also have notable varicose veins.   SKIN:  Warm and dry but pale.      ASSESSMENT AND PLAN:   1.  Chronic systolic heart failure with class II symptoms and euvolemic with a home dry weight of about 16 pounds.  She is monitored on tele-assurance and done quite well.  She is appropriately on torsemide 20 mg b.i.d., spironolactone 12.5 mg daily, Imdur 15 mg at night, hydralazine 25 mg t.i.d. and carvedilol 25 mg b.i.d.  She has not been on high-dose ACE or ARB due to renal insufficiency.  At this point her creatinine has been running in the high ones. As she's been euvolemic and doing well I think it's worth a trial of cutting back her diuretics bit further. We'll decrease her torsemide from 20 mg b.i.d. to 20 mg in the morning and 10 mg in the afternoon. She'll continue with total assurance if she goes above 169 pounds all have her increase to 20 mg b.i.d. for 3 days or until she is back in range. I'm hopeful she'll be able to manage at this and perhaps even down the road field cut back her " torsemide further.   2.  Moderate aortic stenosis but in a low-flow state, her mean gradient was 25 mmHg on last echocardiogram.  She did have a dobutamine echo done in 2016 at similar gradients, and her mean gradient did not change significantly, nor did her peak velocity which peaked at 3.5, falling short of the 4 meter per second velocity for severe AS.  She in addition has MR and TR.  These will be followed with serial echocardiogram again in October   3.  BiV pacemaker with last device check showing 98% BiV paced with no changes.   4.  Lymphedema.  She has documented severe peripheral arterial disease, and [facility] does not recommend these to compression.  Will follow.   5.  Hypertension, excellent control.   6. Chronic anemia, stable on last check- will recheck at next visti.        Thank you for allowing me to participate in this patient's care. I'll see her back in one month with a BMP and hemoglobin at that visit. She'll then see Dr. Echols in October with an echocardiogram prior to that visit.     Divya Kraft PA-C  10:10 AM 2017   [facility]  Pager: [phone]    This note was dictated using a voice recognition software, unintended errors may occur.      Orders this Visit:  Orders Placed This Encounter   Procedures     Basic metabolic panel     Hemoglobin     [facility] with [facility] - [facility] visit     Orders Placed This Encounter   Medications     torsemide (DEMADEX) 20 MG tablet     Si tablet in the am and 1/2 tablet in the afternoon     Dispense:  135 tablet     Refill:  3     DO not fill, dose adjustments.     Medications Discontinued During This Encounter   Medication Reason     Multiple Vitamins-Minerals (MULTIVITAMIN OR) Stopped by Patient     torsemide (DEMADEX) 20 MG tablet Reorder         Encounter Diagnosis   Name Primary?     Chronic systolic heart failure (H)        CURRENT MEDICATIONS:  Current Outpatient Prescriptions   Medication Sig Dispense Refill     torsemide  (DEMADEX) 20 MG tablet 1 tablet in the am and 1/2 tablet in the afternoon 135 tablet 3     carvedilol (COREG) 25 MG tablet Take 1 tablet (25 mg) by mouth 2 times daily (with meals) 60 tablet 2     levothyroxine (SYNTHROID/LEVOTHROID) 112 MCG tablet TAKE 1 TABLET DAILY 90 tablet 1     simvastatin (ZOCOR) 20 MG tablet Take 1 tablet (20 mg) by mouth At Bedtime 90 tablet 0     spironolactone (ALDACTONE) 25 MG tablet Take 0.5 tablets (12.5 mg) by mouth daily 90 tablet 3     isosorbide mononitrate (IMDUR) 30 MG 24 hr tablet 1/2 tablet daily AT NIGHT 45 tablet 3     hydrALAZINE (APRESOLINE) 25 MG tablet Take 1 tablet (25 mg) by mouth 3 times daily 270 tablet 3     MELATONIN PO Take 3 mg by mouth nightly as needed       fluticasone (FLONASE) 50 MCG/ACT nasal spray Spray 1-2 sprays into both nostrils daily as needed for rhinitis or allergies (TAKE ONCE DAILY DURIGN ALLERGY SEASON) 16 g 11     aspirin 81 MG tablet Take 81 mg by mouth daily       Iron TABS Take 324 mg by mouth daily Patient states she takes OTC iron furrous gluconate 324 mg tablets USP       calcium citrate (CALCITRATE) 950 MG tablet Take 2 tablets by mouth daily At Noon       VITAMIN D, CHOLECALCIFEROL, PO Take 2,000 Units by mouth daily        Lutein 20 MG CAPS Take 20 mg by mouth daily       prednisoLONE acetate (PRED FORTE) 1 % ophthalmic suspension Place 1 drop into the right eye every evening  1 Bottle      Omega-3 Fatty Acids (FISH OIL PO) Take 1,500 mg by mouth daily.       [DISCONTINUED] torsemide (DEMADEX) 20 MG tablet Take 1 tablet (20 mg) by mouth 2 times daily 180 tablet 3       ALLERGIES     Allergies   Allergen Reactions     Atenolol Anaphylaxis     Hydrochlorothiazide      hyponatremia     Pollen Extract        PAST MEDICAL HISTORY:  Past Medical History:   Diagnosis Date     Aortic stenosis      CAD (coronary artery disease)     CT calcium buulq=656 May 2013     Cardiomyopathy, idiopathic (H)     cardiomyopathy HTN vs viral; EF as low as 15%  "in 2009; CRT-P implanted 1/19/17     CHF (NYHA class II, ACC/AHA stage C) (H) 2/14/2013     Glaucoma      H/O angiography 9-1-2016    No angiographic evidence of obstructive coronary artery disease.     Hypertension      Hypothyroidism 2/14/2013     Pulmonary hypertension (H)        PAST SURGICAL HISTORY:  Past Surgical History:   Procedure Laterality Date     CARDIAC CATHERIZATION  9/1/16     GYN SURGERY      Hysterectomy 1995     HYSTERECTOMY, PAP NO LONGER INDICATED       IMPLANT PACEMAKER  1/19/17    CRT-P     ORTHOPEDIC SURGERY      knee replacement 1998     ORTHOPEDIC SURGERY      Knee replacement 2008     ORTHOPEDIC SURGERY      knee surgery [place]       FAMILY HISTORY:  Family History   Problem Relation Age of Onset     CANCER Mother      Uterius     Breast Cancer Daughter        SOCIAL HISTORY:  Social History     Social History     Marital status:      Spouse name: N/A     Number of children: N/A     Years of education: N/A     Social History Main Topics     Smoking status: Never Smoker     Smokeless tobacco: Never Used     Alcohol use No     Drug use: No     Sexual activity: No     Other Topics Concern     Parent/Sibling W/ Cabg, Mi Or Angioplasty Before 65f 55m? No     Caffeine Concern No     Sleep Concern No     Stress Concern No     Weight Concern No     Special Diet Yes     low sodium     Exercise Yes     therapy     Seat Belt Yes     Social History Narrative       Review of Systems:  Skin:  Positive for bruising   Eyes:  Positive for glasses  ENT:  Positive for hoarseness  Respiratory:  Negative    Cardiovascular:    Positive for;edema  Gastroenterology: Negative    Genitourinary:  Positive for    Musculoskeletal:  Positive for arthritis  Neurologic:    numbness or tingling of hands  Psychiatric:  Negative    Heme/Lymph/Imm:  Positive for allergies  Endocrine:  Positive for thyroid disorder    Physical Exam:  Vitals: /52  Pulse 64  Ht 1.651 m (5' 5\")  Wt 77.4 kg (170 lb 11.2 oz)  BMI " 28.41 kg/m2   Please refer to dictation above for physical exam    Recent Lab Results:  LIPID RESULTS:  Lab Results   Component Value Date    CHOL 113 03/22/2016    HDL 39 (L) 03/22/2016    LDL 54 03/22/2016    TRIG 98 03/22/2016    CHOLHDLRATIO 3.5 07/06/2015       LIVER ENZYME RESULTS:  Lab Results   Component Value Date    AST 50 (H) 11/14/2016    ALT 89 (H) 11/14/2016       CBC RESULTS:  Lab Results   Component Value Date    WBC 4.8 04/17/2017    RBC 2.98 (L) 04/17/2017    HGB 9.3 (L) 06/19/2017    HCT 28.7 (L) 04/17/2017    MCV 96 04/17/2017    MCH 30.9 04/17/2017    MCHC 32.1 04/17/2017    RDW 15.3 (H) 04/17/2017     (L) 04/17/2017       BMP RESULTS:  Lab Results   Component Value Date     07/17/2017    POTASSIUM 3.9 07/17/2017    CHLORIDE 101 07/17/2017    CO2 26 07/17/2017    ANIONGAP 13.9 07/17/2017     (H) 07/17/2017    BUN 66 (H) 07/17/2017    CR 1.90 (H) 07/17/2017    GFRESTIMATED 25 (L) 07/17/2017    GFRESTBLACK 30 (L) 07/17/2017    SHOBHA 9.1 07/17/2017        A1C RESULTS:  No results found for: A1C    INR RESULTS:  Lab Results   Component Value Date    INR 1.12 09/01/2016           CC  Divya Kraft PA-C  [facility] [place]  [address]  [place] [address]

## 2017-07-17 NOTE — LETTER
2017    Ander Shrestha MD  [facility]   [address]  [facility] [address]    RE: Mckayla Oconnell       Dear Colleague,    I had the pleasure of seeing Mckayla Oconnell in the [facility].    PRIMARY CARDIOLOGIST:  Dr. Echols.      REASON FOR VISIT:  Heart failure followup.      HISTORY OF PRESENT ILLNESS:  Ms. Oconnell is a delightful 89-year-old woman with past cardiac history significant for the followin.  Nonischemic cardiomyopathy with EF historically between 10% and 45%, most recently documented around 30-35%.   2.  Moderate to severe aortic stenosis, moderate tricuspid regurgitation and moderate mitral regurgitation.   3.  CKD with baseline creatinine around 1.5.   4.  Hyperlipidemia.   5.  Biventricular pacemaker implant in 2017.   6.  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   7.  Peripheral arterial disease without symptoms of claudication.   8.  Lymphedema.   9.  Severely elevated pulmonary pressures likely secondary to valvular disease     I had initially met her in 2016 when she was hospitalized with profound fatigue, weight gain and shortness of breath.  She diuresed about 20 pounds on dobutamine and Bumex to a weight of 179.  This weight was unfortunately quickly regained, and we struggled to stabilize her until she underwent a biventricular pacemaker.  She has responded to this phenomenally well, and her weight has trended down to about 166 at home.       She comes in today stating she continues to feel well.  She denies chest pain, shortness of breath, orthopnea or PND.  Her weight is 166 pounds at home today.  Her legs are without edema in the morning when she wakes up and then they worsen throughout the day but only to minimal amount. She states that this is the best she's felt in quite some time. She has good energy she's getting a lot of things done at home that she couldn't to before. She feels like she continues to  "improve.    She watches her diet closely she thinks she is eating about 600 mg of salt and probably drinking about 40 ounces of fluid a day. She is working on a renal diet. She is very motivated to protect her kidneys.         SOCIAL HISTORY:  She lives in her own home, usually with her , Jaime.  Unfortunately, Jaime had a fall and broke his arm,was in TCU now back home. She has children, 1 of her daughters, Sandra, is a physical therapist.  Their goals have been to stay at home and avoid admissions or TCU.  Her  is now going to see Dr. Estrada.  She is a lifelong nonsmoker, no alcohol use.  She is very involved in their Jainism locally and at their cabin.      PHYSICAL EXAMINATION:  /52  Pulse 64  Ht 1.651 m (5' 5\")  Wt 77.4 kg (170 lb 11.2 oz)  BMI 28.41 kg/m2    GENERAL:  Well-developed, well-nourished elderly woman in no acute distress.   HEENT:  Normocephalic, atraumatic, anicteric.   HEART:  Regular with a harsh 4/6 systolic murmur heard throughout the precordium, best at the right sternal border with radiation to bilateral carotids.   LUNGS:  Clear.  Good airflow deep bilateral lobes.   EXTREMITIES:  1+ pitting edema to lower shin, woody edema noted as well.  Legs also have notable varicose veins.   SKIN:  Warm and dry but pale.      ASSESSMENT AND PLAN:   1.  Chronic systolic heart failure with class II symptoms and euvolemic with a home dry weight of about 16 pounds.  She is monitored on tele-assurance and done quite well.  She is appropriately on torsemide 20 mg b.i.d., spironolactone 12.5 mg daily, Imdur 15 mg at night, hydralazine 25 mg t.i.d. and carvedilol 25 mg b.i.d.  She has not been on high-dose ACE or ARB due to renal insufficiency.  At this point her creatinine has been running in the high ones. As she's been euvolemic and doing well I think it's worth a trial of cutting back her diuretics bit further. We'll decrease her torsemide from 20 mg b.i.d. to 20 mg in the morning and " 10 mg in the afternoon. She'll continue with total assurance if she goes above 169 pounds all have her increase to 20 mg b.i.d. for 3 days or until she is back in range. I'm hopeful she'll be able to manage at this and perhaps even down the road field cut back her torsemide further.   2.  Moderate aortic stenosis but in a low-flow state, her mean gradient was 25 mmHg on last echocardiogram.  She did have a dobutamine echo done in 2016 at similar gradients, and her mean gradient did not change significantly, nor did her peak velocity which peaked at 3.5, falling short of the 4 meter per second velocity for severe AS.  She in addition has MR and TR.  These will be followed with serial echocardiogram again in October   3.  BiV pacemaker with last device check showing 98% BiV paced with no changes.   4.  Lymphedema.  She has documented severe peripheral arterial disease, and [facility] does not recommend these to compression.  Will follow.   5.  Hypertension, excellent control.   6. Chronic anemia, stable on last check- will recheck at next visti.        Thank you for allowing me to participate in this patient's care. I'll see her back in one month with a BMP and hemoglobin at that visit. She'll then see Dr. Echols in October with an echocardiogram prior to that visit.     Divya Kraft PA-C  10:10 AM 2017   [facility]  Pager: [phone]    This note was dictated using a voice recognition software, unintended errors may occur.      Orders this Visit:  Orders Placed This Encounter   Procedures     Basic metabolic panel     Hemoglobin     [facility] with [facility] - [facility] visit     Orders Placed This Encounter   Medications     torsemide (DEMADEX) 20 MG tablet     Si tablet in the am and 1/2 tablet in the afternoon     Dispense:  135 tablet     Refill:  3     DO not fill, dose adjustments.     Medications Discontinued During This Encounter   Medication Reason     Multiple Vitamins-Minerals (MULTIVITAMIN  OR) Stopped by Patient     torsemide (DEMADEX) 20 MG tablet Reorder         Encounter Diagnosis   Name Primary?     Chronic systolic heart failure (H)        CURRENT MEDICATIONS:  Current Outpatient Prescriptions   Medication Sig Dispense Refill     torsemide (DEMADEX) 20 MG tablet 1 tablet in the am and 1/2 tablet in the afternoon 135 tablet 3     carvedilol (COREG) 25 MG tablet Take 1 tablet (25 mg) by mouth 2 times daily (with meals) 60 tablet 2     levothyroxine (SYNTHROID/LEVOTHROID) 112 MCG tablet TAKE 1 TABLET DAILY 90 tablet 1     simvastatin (ZOCOR) 20 MG tablet Take 1 tablet (20 mg) by mouth At Bedtime 90 tablet 0     spironolactone (ALDACTONE) 25 MG tablet Take 0.5 tablets (12.5 mg) by mouth daily 90 tablet 3     isosorbide mononitrate (IMDUR) 30 MG 24 hr tablet 1/2 tablet daily AT NIGHT 45 tablet 3     hydrALAZINE (APRESOLINE) 25 MG tablet Take 1 tablet (25 mg) by mouth 3 times daily 270 tablet 3     MELATONIN PO Take 3 mg by mouth nightly as needed       fluticasone (FLONASE) 50 MCG/ACT nasal spray Spray 1-2 sprays into both nostrils daily as needed for rhinitis or allergies (TAKE ONCE DAILY DURIGN ALLERGY SEASON) 16 g 11     aspirin 81 MG tablet Take 81 mg by mouth daily       Iron TABS Take 324 mg by mouth daily Patient states she takes OTC iron furrous gluconate 324 mg tablets USP       calcium citrate (CALCITRATE) 950 MG tablet Take 2 tablets by mouth daily At Noon       VITAMIN D, CHOLECALCIFEROL, PO Take 2,000 Units by mouth daily        Lutein 20 MG CAPS Take 20 mg by mouth daily       prednisoLONE acetate (PRED FORTE) 1 % ophthalmic suspension Place 1 drop into the right eye every evening  1 Bottle      Omega-3 Fatty Acids (FISH OIL PO) Take 1,500 mg by mouth daily.       [DISCONTINUED] torsemide (DEMADEX) 20 MG tablet Take 1 tablet (20 mg) by mouth 2 times daily 180 tablet 3       ALLERGIES     Allergies   Allergen Reactions     Atenolol Anaphylaxis     Hydrochlorothiazide      hyponatremia      Pollen Extract        PAST MEDICAL HISTORY:  Past Medical History:   Diagnosis Date     Aortic stenosis      CAD (coronary artery disease)     CT calcium zpdls=149 May 2013     Cardiomyopathy, idiopathic (H)     cardiomyopathy HTN vs viral; EF as low as 15% in 2009; CRT-P implanted 1/19/17     CHF (NYHA class II, ACC/AHA stage C) (H) 2/14/2013     Glaucoma      H/O angiography 9-1-2016    No angiographic evidence of obstructive coronary artery disease.     Hypertension      Hypothyroidism 2/14/2013     Pulmonary hypertension (H)        PAST SURGICAL HISTORY:  Past Surgical History:   Procedure Laterality Date     CARDIAC CATHERIZATION  9/1/16     GYN SURGERY      Hysterectomy 1995     HYSTERECTOMY, PAP NO LONGER INDICATED       IMPLANT PACEMAKER  1/19/17    CRT-P     ORTHOPEDIC SURGERY      knee replacement 1998     ORTHOPEDIC SURGERY      Knee replacement 2008     ORTHOPEDIC SURGERY      knee surgery [place]       FAMILY HISTORY:  Family History   Problem Relation Age of Onset     CANCER Mother      Uterius     Breast Cancer Daughter        SOCIAL HISTORY:  Social History     Social History     Marital status:      Spouse name: N/A     Number of children: N/A     Years of education: N/A     Social History Main Topics     Smoking status: Never Smoker     Smokeless tobacco: Never Used     Alcohol use No     Drug use: No     Sexual activity: No     Other Topics Concern     Parent/Sibling W/ Cabg, Mi Or Angioplasty Before 65f 55m? No     Caffeine Concern No     Sleep Concern No     Stress Concern No     Weight Concern No     Special Diet Yes     low sodium     Exercise Yes     therapy     Seat Belt Yes     Social History Narrative       Review of Systems:  Skin:  Positive for bruising   Eyes:  Positive for glasses  ENT:  Positive for hoarseness  Respiratory:  Negative    Cardiovascular:    Positive for;edema  Gastroenterology: Negative    Genitourinary:  Positive for    Musculoskeletal:  Positive for  "arthritis  Neurologic:    numbness or tingling of hands  Psychiatric:  Negative    Heme/Lymph/Imm:  Positive for allergies  Endocrine:  Positive for thyroid disorder    Physical Exam:  Vitals: /52  Pulse 64  Ht 1.651 m (5' 5\")  Wt 77.4 kg (170 lb 11.2 oz)  BMI 28.41 kg/m2   Please refer to dictation above for physical exam    Recent Lab Results:  LIPID RESULTS:  Lab Results   Component Value Date    CHOL 113 03/22/2016    HDL 39 (L) 03/22/2016    LDL 54 03/22/2016    TRIG 98 03/22/2016    CHOLHDLRATIO 3.5 07/06/2015       LIVER ENZYME RESULTS:  Lab Results   Component Value Date    AST 50 (H) 11/14/2016    ALT 89 (H) 11/14/2016       CBC RESULTS:  Lab Results   Component Value Date    WBC 4.8 04/17/2017    RBC 2.98 (L) 04/17/2017    HGB 9.3 (L) 06/19/2017    HCT 28.7 (L) 04/17/2017    MCV 96 04/17/2017    MCH 30.9 04/17/2017    MCHC 32.1 04/17/2017    RDW 15.3 (H) 04/17/2017     (L) 04/17/2017       BMP RESULTS:  Lab Results   Component Value Date     07/17/2017    POTASSIUM 3.9 07/17/2017    CHLORIDE 101 07/17/2017    CO2 26 07/17/2017    ANIONGAP 13.9 07/17/2017     (H) 07/17/2017    BUN 66 (H) 07/17/2017    CR 1.90 (H) 07/17/2017    GFRESTIMATED 25 (L) 07/17/2017    GFRESTBLACK 30 (L) 07/17/2017    SHOBHA 9.1 07/17/2017        A1C RESULTS:  No results found for: A1C    INR RESULTS:  Lab Results   Component Value Date    INR 1.12 09/01/2016     Thank you for allowing me to participate in the care of your patient.    Sincerely,     Divya Kraft PAPascaleC     [facility] [place]    "

## 2017-07-17 NOTE — MR AVS SNAPSHOT
After Visit Summary   7/17/2017    Mckayla Oconnell    MRN: [id number]           Patient Information     Date Of Birth          7/2/1927        Visit Information        Provider Department      7/17/2017 9:30 AM Abena, Divya Mejia PA-C [facility] [place]        Today's Diagnoses     Chronic systolic heart failure (H)          Care Instructions      Call CORE nurse for any questions or concerns:  [phone]   *If you have concerns after hours, please call [phone], option 2 to speak with on-call Cardiologist.    1. Medication changes from today:  Decrease torsemide.  We'll do 20 mg in the morning- 1 full pill.  Take 10 mg in the afternoon- 1/2 tablet.    Continue other medications.       2. Weigh yourself daily and write it down.     3. Call CORE nurse if your weight is up more than 2 pounds in one day or 5 pounds in one week.     4. Call CORE nurse if you feel more short of breath, have more abdominal bloating, or leg swelling.     5. Continue low sodium diet (less than 2000 mg daily). If you eat less salt, you will retain less fluid.     6. Alcohol can weaken your heart further. You should avoid alcohol or limit its use to special times, such as a holiday or birthday.      7. Do NOT take Aleve or ibuprofen without talking to your doctor first.      8. Lab Results:   Component      Latest Ref Rng & Units 5/15/2017 6/19/2017 7/17/2017   Sodium      136 - 145 mmol/L 141 135 (L) 137   Potassium      3.5 - 5.1 mmol/L 4.6 4.4 3.9   Chloride      98 - 107 mmol/L 104 102 101   Carbon Dioxide      23 - 29 mmol/L 28 24 26   Anion Gap      6 - 17 mmol/L 13.6 13.4 13.9   Glucose      70 - 105 mg/dL 105 106 (H) 127 (H)   Urea Nitrogen      7 - 30 mg/dL 66 (H) 57 (H) 66 (H)   Creatinine      0.70 - 1.30 mg/dL 1.98 (H) 1.81 (H) 1.90 (H)   GFR Estimate      >60 mL/min/1.7m2 24 (L) 26 (L) 25 (L)   GFR Estimate If Black      >60 mL/min/1.7m2 29 (L) 32 (L) 30 (L)    Calcium      8.5 - 10.5 mg/dL 9.9 9.4 9.1        [facility]: Cardiomyopathy, Optimization, Rehabilitation, Education  The [facility] Clinic is a heart failure specialty clinic within the [facility] where you will work with specialized nurse practitioners, physician assistants, doctors, and registered nurses. They are dedicated to helping patients with heart failure to carefully adjust medications, receive education, and learn who and when to call if symptoms develop. They specialize in helping you better understand your condition, slow the progression of your disease, improve the length and quality of your life, help you detect future heart problems before they become life threatening, and avoid hospitalizations.          Follow-ups after your visit        Additional Services     Follow-Up with [facility] - [facility]ANYA visit                 Future tests that were ordered for you today     Open Future Orders        Priority Expected Expires Ordered    Basic metabolic panel Routine 8/21/2017 7/17/2018 7/17/2017    Follow-Up with [facility] Clinic - [facility] visit Routine 8/17/2017 7/17/2018 7/17/2017            Who to contact     If you have questions or need follow up information about today's clinic visit or your schedule please contact [facility] [place] directly at [phone].  Normal or non-critical lab and imaging results will be communicated to you by MyChart, letter or phone within 4 business days after the clinic has received the results. If you do not hear from us within 7 days, please contact the clinic through MyChart or phone. If you have a critical or abnormal lab result, we will notify you by phone as soon as possible.  Submit refill requests through [employer] or call your pharmacy and they will forward the refill request to us. Please allow 3 business days for your refill to be completed.          Additional Information About Your Visit        MyChart Information     CAPPTUREt  "gives you secure access to your electronic health record. If you see a primary care provider, you can also send messages to your care team and make appointments. If you have questions, please call your primary care clinic.  If you do not have a primary care provider, please call [phone] and they will assist you.        Care EveryWhere ID     This is your Care EveryWhere ID. This could be used by other organizations to access your [place] medical records  [id number]        Your Vitals Were     Pulse Height BMI (Body Mass Index)             64 1.651 m (5' 5\") 28.41 kg/m2          Blood Pressure from Last 3 Encounters:   07/17/17 108/52   05/15/17 108/60   04/21/17 122/80    Weight from Last 3 Encounters:   07/17/17 77.4 kg (170 lb 11.2 oz)   05/15/17 78.1 kg (172 lb 1.6 oz)   04/17/17 80.7 kg (178 lb)              We Performed the Following     Follow-Up with [facility] Clinic - [facility]ANYA visit          Today's Medication Changes          These changes are accurate as of: 7/17/17 10:01 AM.  If you have any questions, ask your nurse or doctor.               These medicines have changed or have updated prescriptions.        Dose/Directions    torsemide 20 MG tablet   Commonly known as:  DEMADEX   This may have changed:    - how much to take  - how to take this  - when to take this  - additional instructions   Used for:  Chronic systolic heart failure (H)   Changed by:  Divya Kraft PA-C        1 tablet in the am and 1/2 tablet in the afternoon   Quantity:  135 tablet   Refills:  3            Where to get your medicines      Some of these will need a paper prescription and others can be bought over the counter.  Ask your nurse if you have questions.     You don't need a prescription for these medications     torsemide 20 MG tablet                Primary Care Provider Office Phone # Fax #    Ander Shrestha [phone]       [facility] [address] [facility] [address]      "   Equal Access to Services     [facility] : Hadii aad ku hadstephypatrick Ayleenchicho, waambreenda luqadaha, qaybta kimberlijeremiasbrooklyn pressley. So [facility] [phone].    ATENCIÓN: Si habla santos, tiene a villalba disposición servicios gratuitos de asistencia lingüística. Felicityame al [phone].    We comply with applicable federal civil rights laws and [place] laws. We do not discriminate on the basis of race, color, national origin, age, disability sex, sexual orientation or gender identity.            Thank you!     Thank you for choosing [facility] AT [place]  for your care. Our goal is always to provide you with excellent care. Hearing back from our patients is one way we can continue to improve our services. Please take a few minutes to complete the written survey that you may receive in the mail after your visit with us. Thank you!             Your Updated Medication List - Protect others around you: Learn how to safely use, store and throw away your medicines at www.disposemymeds.org.          This list is accurate as of: 7/17/17 10:01 AM.  Always use your most recent med list.                   Brand Name Dispense Instructions for use Diagnosis    aspirin 81 MG tablet      Take 81 mg by mouth daily        calcium citrate 950 MG tablet    CALCITRATE     Take 2 tablets by mouth daily At Noon        carvedilol 25 MG tablet    COREG    60 tablet    Take 1 tablet (25 mg) by mouth 2 times daily (with meals)    Cardiomyopathy, idiopathic (H), Chronic systolic heart failure (H), Essential hypertension, benign, Coronary artery disease involving native coronary artery of native heart without angina pectoris       FISH OIL PO      Take 1,500 mg by mouth daily.        fluticasone 50 MCG/ACT spray    FLONASE    16 g    Spray 1-2 sprays into both nostrils daily as needed for rhinitis or allergies (TAKE ONCE DAILY DURIGN ALLERGY SEASON)    Seasonal allergic rhinitis        hydrALAZINE 25 MG tablet    APRESOLINE    270 tablet    Take 1 tablet (25 mg) by mouth 3 times daily    Acute on chronic congestive heart failure, unspecified congestive heart failure type (H)       Iron Tabs      Take 324 mg by mouth daily Patient states she takes OTC iron furrous gluconate 324 mg tablets USP        isosorbide mononitrate 30 MG 24 hr tablet    IMDUR    45 tablet    1/2 tablet daily AT NIGHT    Cardiomyopathy, idiopathic (H)       levothyroxine 112 MCG tablet    SYNTHROID/LEVOTHROID    90 tablet    TAKE 1 TABLET DAILY    Hypothyroidism, unspecified       Lutein 20 MG Caps      Take 20 mg by mouth daily        MELATONIN PO      Take 3 mg by mouth nightly as needed        prednisoLONE acetate 1 % ophthalmic susp    PRED FORTE    1 Bottle    Place 1 drop into the right eye every evening        simvastatin 20 MG tablet    ZOCOR    90 tablet    Take 1 tablet (20 mg) by mouth At Bedtime    Coronary artery disease involving native coronary artery of native heart without angina pectoris       spironolactone 25 MG tablet    ALDACTONE    90 tablet    Take 0.5 tablets (12.5 mg) by mouth daily    Cardiomyopathy, idiopathic (H), Localized edema       torsemide 20 MG tablet    DEMADEX    135 tablet    1 tablet in the am and 1/2 tablet in the afternoon    Chronic systolic heart failure (H)       VITAMIN D (CHOLECALCIFEROL) PO      Take 2,000 Units by mouth daily

## 2017-07-18 RX ORDER — CARVEDILOL 25 MG/1
TABLET ORAL
Qty: 180 TABLET | Refills: 2 | Status: SHIPPED | OUTPATIENT
Start: 2017-07-18 | End: 2018-01-01

## 2017-07-18 RX ORDER — SIMVASTATIN 20 MG
20 TABLET ORAL AT BEDTIME
Qty: 90 TABLET | Refills: 0 | Status: SHIPPED | OUTPATIENT
Start: 2017-07-18 | End: 2017-08-28

## 2017-07-18 NOTE — TELEPHONE ENCOUNTER
Routing refill request to provider for review/approval because:  Labs not current:  Last lipids done 3/2016

## 2017-07-20 DIAGNOSIS — I42.9 CARDIOMYOPATHY, IDIOPATHIC (H): ICD-10-CM

## 2017-07-20 RX ORDER — ISOSORBIDE MONONITRATE 30 MG/1
TABLET, EXTENDED RELEASE ORAL
Qty: 45 TABLET | Refills: 3 | Status: SHIPPED | OUTPATIENT
Start: 2017-07-20 | End: 2018-01-01

## 2017-07-31 ENCOUNTER — CARE COORDINATION (OUTPATIENT)
Dept: CARE COORDINATION | Facility: CLINIC | Age: 82
End: 2017-07-31

## 2017-07-31 NOTE — PROGRESS NOTES
Clinic Care Coordination Contact  OUTREACH    Referral Information:  Referral Source: Self-patient/Caregiver  Reason for Contact: Patient was told that she should call to get phone number for [facility].   Care Conference: No     Universal Utilization:   ED Visits in last year: 0  Hospital visits in last year: 3  Last PCP appointment: 04/21/17  Missed Appointments: 0  Concerns: None  Multiple Providers or Specialists: cardiology and nephrology    Clinical Concerns:  Current Medical Concerns: none identified.    Current Behavioral Concerns: none identified.    Education Provided to patient: How [facility] works.   Clinical Pathway Name: None      Medication Management: not addressed.    Functional Status:  Mobility Status: Independent w/Device  Equipment Currently Used at Home: cane, straight, grab bar, shower chair, walker, rolling  Transportation: not addressed.      Psychosocial:  Current living arrangement::A private home with spouse  Financial/Insurance: not addressed.     Resources and Interventions:  Current Resources: [facility], Tele-Monitoring;   PAS Number:  (na)  Senior Linkage Line Referral Placed:  (na)  Advanced Care Plans/Directives on file:: Yes  Referrals Placed: ([facility])     Barriers: none identified.  Strengths: Patient can call and set up services.  Patient/Caregiver understanding: She will call [facility] to set it up.  Frequency of Care Coordination:  (na)  Upcoming appointment:  (na)     Plan: No further contact by this CC.

## 2017-08-23 DIAGNOSIS — R60.0 LOCALIZED EDEMA: ICD-10-CM

## 2017-08-23 DIAGNOSIS — I42.9 CARDIOMYOPATHY, IDIOPATHIC (H): ICD-10-CM

## 2017-08-23 RX ORDER — SPIRONOLACTONE 25 MG/1
12.5 TABLET ORAL DAILY
Qty: 90 TABLET | Refills: 3 | Status: SHIPPED | OUTPATIENT
Start: 2017-08-23 | End: 2017-08-28

## 2017-08-24 ENCOUNTER — ALLIED HEALTH/NURSE VISIT (OUTPATIENT)
Dept: CARDIOLOGY | Facility: CLINIC | Age: 82
End: 2017-08-24
Payer: COMMERCIAL

## 2017-08-24 DIAGNOSIS — Z95.0 CARDIAC PACEMAKER IN SITU: Primary | ICD-10-CM

## 2017-08-24 PROCEDURE — 93296 REM INTERROG EVL PM/IDS: CPT | Performed by: INTERNAL MEDICINE

## 2017-08-24 PROCEDURE — 93294 REM INTERROG EVL PM/LDLS PM: CPT | Performed by: INTERNAL MEDICINE

## 2017-08-24 NOTE — PROGRESS NOTES
[employer] Valitude CRT-P Remote PPM Device Check  AP: 89 % BIVP: 99 %  Mode: DDDR        Presenting Rhythm: AS/BIVP  Heart Rate: Rates mostly in the 70's per histogram  Sensing: Stable    Pacing Threshold: Stable    Impedance: Stable  Battery Status: 10 years  Atrial Arrhythmia: None  Ventricular Arrhythmia: None     Care Plan: F/u PPM Latitude q 3 months. Gave patient results over the phone. DuyCVT

## 2017-08-24 NOTE — MR AVS SNAPSHOT
After Visit Summary   8/24/2017    Mckayla Oconnell    MRN: [id number]           Patient Information     Date Of Birth          7/2/1927        Visit Information        Provider Department      8/24/2017 2:45 PM CUEN[facility] TECH1 [facility]        Today's Diagnoses     Cardiac pacemaker in situ    -  1       Follow-ups after your visit        Your next 10 appointments already scheduled     Aug 24, 2017  2:45 PM CDT   Remote PPM Check with CUENCA TECH1   [facility] ([facility])    [address] [place], [place] [id number]  [place] [address] [phone]           This appointment is for a remote check of your pacemaker.  This is not an appointment at the office.            Aug 28, 2017  9:20 AM CDT   LAB with [facility] LAB   [facility] ([facility])    [address] [place], [place] [id number]  [facility] [id number] [phone]           Patient must bring picture ID. Patient should be prepared to give a urine specimen  Please do not eat 10-12 hours before your appointment if you are coming in fasting for labs on lipids, cholesterol, or glucose (sugar). Pregnant women should follow their Care Team instructions. Water with medications is okay. Do not drink coffee or other fluids. If you have concerns about taking  your medications, please ask at office or if scheduling via alooma, send a message by clicking on Secure Messaging, Message Your Care Team.            Aug 28, 2017 10:10 AM CDT   Core Return with Divya Kraft PA-C   [facility] ([facility])    [address] [place] [address] [address]  [place] [address] [phone]            Oct 23, 2017  9:30 AM CDT   Ech Complete with [id number]   [facility] CV Echocardiography (Cardiovascular Imaging at [facility])    [address] [place] [address]  [address]  [place]  [address]   [phone]           1. Please bring or wear a comfortable two-piece outfit. 2. You may eat, drink and take your normal medicines. 3. For any questions that cannot be answered, please contact the ordering physician            Oct 23, 2017 10:30 AM CDT   LAB with [facility]EN[facility] LAB   [facility] ([facility])    [address] [place] [address] [phone]           Patient must bring picture ID. Patient should be prepared to give a urine specimen  Please do not eat 10-12 hours before your appointment if you are coming in fasting for labs on lipids, cholesterol, or glucose (sugar). Pregnant women should follow their Care Team instructions. Water with medications is okay. Do not drink coffee or other fluids. If you have concerns about taking  your medications, please ask at office or if scheduling via dentaZOOM, send a message by clicking on Secure Messaging, Message Your Care Team.            Oct 23, 2017 12:45 PM CDT   Return Visit with Romeo Echols MD   [facility] ([facility])    [address] [place] [address] [phone]              Who to contact     If you have questions or need follow up information about today's clinic visit or your schedule please contact [facility] directly at [phone].  Normal or non-critical lab and imaging results will be communicated to you by MyChart, letter or phone within 4 business days after the clinic has received the results. If you do not hear from us within 7 days, please contact the clinic through [employer] or phone. If you have a critical or abnormal lab result, we will notify you by phone as soon as possible.  Submit refill requests through [employer] or call your pharmacy and they will forward the refill request to us. Please allow 3 business days for your refill to be completed.           Additional Information About Your Visit        MyChart Information     [employer] gives you secure access to your electronic health record. If you see a primary care provider, you can also send messages to your care team and make appointments. If you have questions, please call your primary care clinic.  If you do not have a primary care provider, please call [phone] and they will assist you.        Care EveryWhere ID     This is your Care EveryWhere ID. This could be used by other organizations to access your [place] medical records  [id number]         Blood Pressure from Last 3 Encounters:   07/17/17 108/52   05/15/17 108/60   04/21/17 122/80    Weight from Last 3 Encounters:   07/17/17 77.4 kg (170 lb 11.2 oz)   05/15/17 78.1 kg (172 lb 1.6 oz)   04/17/17 80.7 kg (178 lb)              We Performed the Following     INTERROGATION DEVICE EVAL REMOTE, PACER/ICD (91680)     PM DEVICE INTERROGATE REMOTE (69119)        Primary Care Provider Office Phone # Fax #    Ander Shrestha [phone]       [address] [place] [address]        Equal Access to Services     SHELBY SHEPARD : Hadii aad ku hadasho Soomaali, waaxda luqadaha, qaybta kaalmada adeegyada, waxay patrick haysebn peewee sorensen. So [facility] [phone].    ATENCIÓN: Si habla español, tiene a villalba disposición servicios gratuitos de asistencia lingüística. [facility] [phone].    We comply with applicable federal civil rights laws and [place] laws. We do not discriminate on the basis of race, color, national origin, age, disability sex, sexual orientation or gender identity.            Thank you!     Thank you for choosing [facility] AT [place]  for your care. Our goal is always to provide you with excellent care. Hearing back from our patients is one way we can continue to improve our services. Please take a few minutes to complete the written survey that you may receive in the mail  after your visit with us. Thank you!             Your Updated Medication List - Protect others around you: Learn how to safely use, store and throw away your medicines at www.disposemymeds.org.          This list is accurate as of: 8/24/17  9:50 AM.  Always use your most recent med list.                   Brand Name Dispense Instructions for use Diagnosis    aspirin 81 MG tablet      Take 81 mg by mouth daily        calcium citrate 950 MG tablet    CALCITRATE     Take 2 tablets by mouth daily At Noon        carvedilol 25 MG tablet    COREG    180 tablet    TAKE 1 TABLET TWICE A DAY WITH MEALS    Cardiomyopathy, idiopathic (H), Chronic systolic heart failure (H), Essential hypertension, benign, Coronary artery disease involving native coronary artery of native heart without angina pectoris       FISH OIL PO      Take 1,500 mg by mouth daily.        fluticasone 50 MCG/ACT spray    FLONASE    16 g    Spray 1-2 sprays into both nostrils daily as needed for rhinitis or allergies (TAKE ONCE DAILY DURIGN ALLERGY SEASON)    Seasonal allergic rhinitis       hydrALAZINE 25 MG tablet    APRESOLINE    270 tablet    Take 1 tablet (25 mg) by mouth 3 times daily    Acute on chronic congestive heart failure, unspecified congestive heart failure type (H)       Iron Tabs      Take 324 mg by mouth daily Patient states she takes OTC iron furrous gluconate 324 mg tablets USP        isosorbide mononitrate 30 MG 24 hr tablet    IMDUR    45 tablet    1/2 tablet daily AT NIGHT    Cardiomyopathy, idiopathic (H)       levothyroxine 112 MCG tablet    SYNTHROID/LEVOTHROID    90 tablet    TAKE 1 TABLET DAILY    Hypothyroidism, unspecified       Lutein 20 MG Caps      Take 20 mg by mouth daily        MELATONIN PO      Take 3 mg by mouth nightly as needed        prednisoLONE acetate 1 % ophthalmic susp    PRED FORTE    1 Bottle    Place 1 drop into the right eye every evening        simvastatin 20 MG tablet    ZOCOR    90 tablet    Take 1 tablet  (20 mg) by mouth At Bedtime    Coronary artery disease involving native coronary artery of native heart without angina pectoris       spironolactone 25 MG tablet    ALDACTONE    90 tablet    Take 0.5 tablets (12.5 mg) by mouth daily    Cardiomyopathy, idiopathic (H), Localized edema       torsemide 20 MG tablet    DEMADEX    135 tablet    1 tablet in the am and 1/2 tablet in the afternoon    Chronic systolic heart failure (H)       VITAMIN D (CHOLECALCIFEROL) PO      Take 2,000 Units by mouth daily

## 2017-08-28 ENCOUNTER — OFFICE VISIT (OUTPATIENT)
Dept: CARDIOLOGY | Facility: CLINIC | Age: 82
End: 2017-08-28
Attending: PHYSICIAN ASSISTANT
Payer: COMMERCIAL

## 2017-08-28 VITALS
DIASTOLIC BLOOD PRESSURE: 60 MMHG | SYSTOLIC BLOOD PRESSURE: 108 MMHG | BODY MASS INDEX: 28.76 KG/M2 | HEART RATE: 70 BPM | OXYGEN SATURATION: 98 % | WEIGHT: 172.6 LBS | HEIGHT: 65 IN

## 2017-08-28 DIAGNOSIS — I25.10 CORONARY ARTERY DISEASE INVOLVING NATIVE CORONARY ARTERY OF NATIVE HEART WITHOUT ANGINA PECTORIS: ICD-10-CM

## 2017-08-28 DIAGNOSIS — I42.9 CARDIOMYOPATHY, IDIOPATHIC (H): ICD-10-CM

## 2017-08-28 DIAGNOSIS — I50.22 CHRONIC SYSTOLIC HEART FAILURE (H): ICD-10-CM

## 2017-08-28 DIAGNOSIS — R60.0 LOCALIZED EDEMA: ICD-10-CM

## 2017-08-28 LAB
ANION GAP SERPL CALCULATED.3IONS-SCNC: 11.3 MMOL/L (ref 6–17)
BUN SERPL-MCNC: 54 MG/DL (ref 7–30)
CALCIUM SERPL-MCNC: 9.2 MG/DL (ref 8.5–10.5)
CHLORIDE SERPL-SCNC: 104 MMOL/L (ref 98–107)
CO2 SERPL-SCNC: 27 MMOL/L (ref 23–29)
CREAT SERPL-MCNC: 1.82 MG/DL (ref 0.7–1.3)
GFR SERPL CREATININE-BSD FRML MDRD: 26 ML/MIN/1.7M2
GLUCOSE SERPL-MCNC: 109 MG/DL (ref 70–105)
HGB BLD-MCNC: 9.3 G/DL (ref 11.7–15.7)
POTASSIUM SERPL-SCNC: 4.3 MMOL/L (ref 3.5–5.1)
SODIUM SERPL-SCNC: 138 MMOL/L (ref 136–145)

## 2017-08-28 PROCEDURE — 80048 BASIC METABOLIC PNL TOTAL CA: CPT | Performed by: PHYSICIAN ASSISTANT

## 2017-08-28 PROCEDURE — 99214 OFFICE O/P EST MOD 30 MIN: CPT | Performed by: PHYSICIAN ASSISTANT

## 2017-08-28 PROCEDURE — 36415 COLL VENOUS BLD VENIPUNCTURE: CPT | Performed by: PHYSICIAN ASSISTANT

## 2017-08-28 PROCEDURE — 85018 HEMOGLOBIN: CPT | Performed by: PHYSICIAN ASSISTANT

## 2017-08-28 RX ORDER — SPIRONOLACTONE 25 MG/1
12.5 TABLET ORAL DAILY
Qty: 45 TABLET | Refills: 3 | Status: SHIPPED | OUTPATIENT
Start: 2017-08-28 | End: 2018-01-01

## 2017-08-28 RX ORDER — SIMVASTATIN 20 MG
20 TABLET ORAL AT BEDTIME
Qty: 90 TABLET | Refills: 3 | Status: SHIPPED | OUTPATIENT
Start: 2017-08-28 | End: 2018-01-01

## 2017-08-28 RX ORDER — TORSEMIDE 20 MG/1
TABLET ORAL
COMMUNITY
End: 2017-01-01

## 2017-08-28 NOTE — PROGRESS NOTES
PRIMARY CARDIOLOGIST:  Dr. Echols.      HISTORY OF PRESENT ILLNESS:  Ms. Oconnell is a delightful 89-year-old woman with past cardiac history significant for the followin.  Nonischemic cardiomyopathy with EF between 10% and 45%, most recently documented at 30%-35%.   2.  Aortic stenosis concerning for severe low-flow, low-gradient AS, moderate tricuspid regurgitation and moderate mitral regurgitation.   3.  Chronic kidney disease, baseline creatinine typically about 1.5, although has been running at about 1.8 or 1.9 more recently.   4.  Hyperlipidemia.   5.  Biventricular pacemaker in 2017.   6.  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   7.  Peripheral arterial disease with symptoms of claudication.   8.  Lymphedema.   9.  Elevated pulmonary pressures, RVSP of 52+ right atrial pressure.      I had met her in 2016 when she was hospitalized with profound fatigue, weight gain and shortness of breath.  She diuresed about 20 pounds on a dobutamine and Bumex drip to a weight of 179.  She struggled fairly significantly with a quick regain of weight when she got home until she got her biventricular pacemaker placed.  She then respond to this phenomenally well with her weight trending down to 166 to 167 and has been stable since then.  At her last visit, we had her on torsemide 20 mg b.i.d. for diuretic.      She tells me today that she on her own decided to see how much she could decrease her torsemide.  She was doing 20 b.i.d. and now after much trial and error has settled on taking torsemide 10 mg for 2 days in a row, on the third day taking 20 mg, then doing 10 mg for 2 days, again the third day taking 20.  With this, her edema has been stable and her weight has stayed between 166 and 167 pounds at home.  She feels well overall.  She is most concerned about preserving her kidney function and she was hopeful with these changes her creatinine would improve significantly.  She has also been aggressively eating a  renal diet, limiting her protein to less than 45 mg a day, avoiding phosphorus and keeping close track of those things as well.  She thinks she drinks about 35-40 ounces of fluid a day and is eating about 600-700 mg of salt.      SOCIAL HISTORY:  She lives in her own home with her , Jaime.  They have children.  Their daughter, Sandra, is a physical therapist.  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      PHYSICAL EXAMINATION:   VITAL SIGNS:  Blood pressure today is 108/60 with a pulse of 70, weight is 172 on our clinic scale, 167 at home.  BMI is 28.   HEART:  Regular with a harsh 4/6 systolic murmur heard best at the right sternal border, but throughout the precordium.   LUNGS:  Clear with good airflow deep bilateral lobes.  She has 1-2+ edema mixed with lymphedema to her lower shin.  This is good for her.   SKIN:  Warm and dry.   NECK:  She does not have JVP at 90 degrees, unable to climb onto exam table.      ASSESSMENT AND PLAN:   1.  Chronic systolic heart failure with class II symptoms currently with most of her improvement attributable to her biventricular pacemaker.  She is maintained on carvedilol 25 mg b.i.d., Imdur 15 mg each day at bedtime and hydralazine 25 mg t.i.d.  We have not had her on higher dose of Imdur primarily due to blood pressures.  She has done well on this mix, including her current low dose of torsemide at 10 mg 2 days in a row followed by 20 mg on the third day.  She is also maintained on spironolactone 12.5 mg daily.  With her RV dysfunction, the spironolactone is helpful.  I would like to continue this in spite of her creatinine being elevated at 1.8.  We will follow her closely on Tel-Assurance.  Really from a heart failure standpoint, she is doing excellent.   2.  Aortic stenosis, moderate tricuspid regurgitation and mitral regurgitation as well as elevated pulmonary pressures.  I suspect her elevated pulmonary  pressures are due to her valvular disease, although we need a right heart cath to confirm.  She had dobutamine echo done in 2016 that did not meet criteria for severe AS, although on a plain transthoracic echo in April there is again concern for low-flow, low-gradient severe AS.  At this point, she is fairly asymptomatic.  We will repeat her echocardiogram in October and she will see Dr. Echols, at that time can reassess.   3.  Biventricular pacemaker in place.  Last check shows 99% BiV pacing.   4.  Lymphedema and known severely abnormal ABIs.  Given her abnormal ABIs, Lymphedema Clinic did not recommend compression stockings.  She is doing well without.   5.  Hypertension, well controlled.   6.  Chronic anemia, stable at 9.3.      She is overall really doing outstanding considering all her comorbidities.  We will get her echocardiogram and repeat labs in early October with Dr. Echols.  She certainly would be a possible TAVR candidate if we need to go down that road, but at this point fortunately she does not have a lot of symptoms.      Thank you for allowing me to participate in her care.      cc:   Romeo Echols MD, [facility] at [address]  [address]         MELISSA BURKETT PA-C             D: 2017 10:56   T: 2017 12:28   MT: KENYETTA      Name:     VICKEY CHURCH   MRN:      [id number]        Account:      [id number]   :      1927           Service Date: 2017      Document: [id number]

## 2017-08-28 NOTE — MR AVS SNAPSHOT
After Visit Summary   8/28/2017    Mckayla Oconnell    MRN: [id number]           Patient Information     Date Of Birth          7/2/1927        Visit Information        Provider Department      8/28/2017 10:10 AM More, Divya Mejia PA-C [facility] AT [place]        Today's Diagnoses     Chronic systolic heart failure (H)        Cardiomyopathy, idiopathic (H)        Localized edema        Coronary artery disease involving native coronary artery of native heart without angina pectoris          Care Instructions      Call [facility] nurse for any questions or concerns:  [phone]   *If you have concerns after hours, please call [phone], option 2 to speak with on call Cardiologist.    Please try drinking about 60 ounces of fluids a day.      1. Medication changes from today:  Ok to stop taking fish oil.  Continue taking your torsemide as you have been.       2. Weigh yourself daily and write it down.     3. Call CORE nurse if your weight is up more than 2 pounds in one day or 5 pounds in one week.     4. Call CORE nurse if you feel more short of breath, have more abdominal bloating, or leg swelling.     5. Continue low sodium diet (less than 2000 mg daily). If you eat less salt, you will retain less fluid.     6. Alcohol can weaken your heart further. You should avoid alcohol or limit its use to special times, such as a holiday or birthday.      7. Do NOT take Aleve or ibuprofen without talking to your doctor first.      8. Lab Results: Labs are slightly better than last time.    Component      Latest Ref Rng & Units 6/19/2017 7/17/2017 8/28/2017   Sodium      136 - 145 mmol/L 135 (L) 137 138   Potassium      3.5 - 5.1 mmol/L 4.4 3.9 4.3   Chloride      98 - 107 mmol/L 102 101 104   Carbon Dioxide      23 - 29 mmol/L 24 26 27   Anion Gap      6 - 17 mmol/L 13.4 13.9 11.3   Glucose      70 - 105 mg/dL 106 (H) 127 (H) 109 (H)   Urea Nitrogen      7 - 30 mg/dL 57  (H) 66 (H) 54 (H)   Creatinine      0.70 - 1.30 mg/dL 1.81 (H) 1.90 (H) 1.82 (H)   GFR Estimate      >60 mL/min/1.7m2 26 (L) 25 (L) 26 (L)   GFR Estimate If Black      >60 mL/min/1.7m2 32 (L) 30 (L) 32 (L)   Calcium      8.5 - 10.5 mg/dL 9.4 9.1 9.2   Hemoglobin      11.7 - 15.7 g/dL 9.3 (L)  9.3 (L)        [facility]: Cardiomyopathy, Optimization, Rehabilitation, Education  The [facility] is a heart failure specialty clinic within the [facility] where you will work with your cardiologist, nurse practitioners, physician assistants and registered nurses who specialize in heart failure care. They are dedicated to helping patients with heart failure to carefully adjust medications, receive education, and learn who and when to call if symptoms develop. They specialize in helping you better understand your condition, slow the progression of your disease, improve the length and quality of your life, help you detect future heart problems before they become life threatening, and avoid hospitalizations.                  Follow-ups after your visit        Your next 10 appointments already scheduled     Oct 23, 2017  9:30 AM CDT   Ech Complete with [id number]   [facility] CV Echocardiography (Cardiovascular Imaging at [facility])    [address] [place] [id number] [phone]           1. Please bring or wear a comfortable two-piece outfit. 2. You may eat, drink and take your normal medicines. 3. For any questions that cannot be answered, please contact the ordering physician            Oct 23, 2017 10:30 AM CDT   LAB with [facility]   [facility] AT [place] ([facility])    [address] [place] [address] [address]  [facility] [id number] [phone]           Patient must bring picture ID. Patient should be prepared to give a urine specimen  Please do not eat 10-12 hours before your appointment if you are coming in fasting for labs on  lipids, cholesterol, or glucose (sugar). Pregnant women should follow their Care Team instructions. Water with medications is okay. Do not drink coffee or other fluids. If you have concerns about taking  your medications, please ask at office or if scheduling via 3Scan, send a message by clicking on Secure Messaging, Message Your Care Team.            Oct 23, 2017 12:45 PM CDT   Return Visit with Romeo Echols MD   [facility] AT [place] ([facility])    [address] [place] [address] [address]  [facility] [id number] [phone]            Nov 30, 2017  2:30 PM CST   Remote PPM Check with CU[facility]CA TECH1   [facility] ([facility])    [address] [place] [address] [address]  [facility] [id number] [phone]           This appointment is for a remote check of your pacemaker.  This is not an appointment at the office.              Who to contact     If you have questions or need follow up information about today's clinic visit or your schedule please contact [facility] directly at [phone].  Normal or non-critical lab and imaging results will be communicated to you by Gamervisionhart, letter or phone within 4 business days after the clinic has received the results. If you do not hear from us within 7 days, please contact the clinic through Tadpolest or phone. If you have a critical or abnormal lab result, we will notify you by phone as soon as possible.  Submit refill requests through 3Scan or call your pharmacy and they will forward the refill request to us. Please allow 3 business days for your refill to be completed.          Additional Information About Your Visit        3Scan Information     3Scan gives you secure access to your electronic health record. If you see a primary care provider, you can also send messages to your care team and make appointments. If you have questions, please call your  "primary care clinic.  If you do not have a primary care provider, please call [phone] and they will assist you.        Care EveryWhere ID     This is your Care EveryWhere ID. This could be used by other organizations to access your [facility] medical records  [id number]        Your Vitals Were     Pulse Height Pulse Oximetry BMI (Body Mass Index)          70 1.651 m (5' 5\") 98% 28.72 kg/m2         Blood Pressure from Last 3 Encounters:   08/28/17 108/60   07/17/17 108/52   05/15/17 108/60    Weight from Last 3 Encounters:   08/28/17 78.3 kg (172 lb 9.6 oz)   07/17/17 77.4 kg (170 lb 11.2 oz)   05/15/17 78.1 kg (172 lb 1.6 oz)              We Performed the Following     Follow-Up with [facility] - TANYA visit          Today's Medication Changes          These changes are accurate as of: 8/28/17 10:37 AM.  If you have any questions, ask your nurse or doctor.               These medicines have changed or have updated prescriptions.        Dose/Directions    torsemide 20 MG tablet   Commonly known as:  DEMADEX   This may have changed:  Another medication with the same name was removed. Continue taking this medication, and follow the directions you see here.   Changed by:  Divya Kraft PA-C        10mg or 1/2 tablet daily at bedtime for 2 days, then on 3rd night, takes 1 tablet or 20mg x 1 day, and so on   Refills:  0         Stop taking these medicines if you haven't already. Please contact your care team if you have questions.     FISH OIL PO   Stopped by:  Divya Kraft PA-C                Where to get your medicines      These medications were [facility] to [id number] - [place], [place] [facility] [address] [address]  [address] [facility] [address]     Phone:  [phone]     simvastatin 20 MG tablet    spironolactone 25 MG tablet                Primary Care Provider Office Phone # Fax #    Ander Shrestha [phone]       [address] [facility] " [address]        Equal Access to Services     [facility] : Hadii aad ku hadstephypatrick Dexterali, waambreenda luqgeorgiha, qaybta kajeremiasbrooklyn pressley. So [facility] [phone].    ATENCIÓN: Si habla español, tiene a villalba disposición servicios gratuitos de asistencia lingüística. Felicityame al [phone].    We comply with applicable federal civil rights laws and [place] laws. We do not discriminate on the basis of race, color, national origin, age, disability sex, sexual orientation or gender identity.            Thank you!     Thank you for choosing [facility] AT [place]  for your care. Our goal is always to provide you with excellent care. Hearing back from our patients is one way we can continue to improve our services. Please take a few minutes to complete the written survey that you may receive in the mail after your visit with us. Thank you!             Your Updated Medication List - Protect others around you: Learn how to safely use, store and throw away your medicines at www.disposemymeds.org.          This list is accurate as of: 8/28/17 10:37 AM.  Always use your most recent med list.                   Brand Name Dispense Instructions for use Diagnosis    aspirin 81 MG tablet      Take 81 mg by mouth daily        calcium citrate 950 MG tablet    CALCITRATE     Take 2 tablets by mouth daily At Noon        carvedilol 25 MG tablet    COREG    180 tablet    TAKE 1 TABLET TWICE A DAY WITH MEALS    Cardiomyopathy, idiopathic (H), Chronic systolic heart failure (H), Essential hypertension, benign, Coronary artery disease involving native coronary artery of native heart without angina pectoris       fluticasone 50 MCG/ACT spray    FLONASE    16 g    Spray 1-2 sprays into both nostrils daily as needed for rhinitis or allergies (TAKE ONCE DAILY DURIGN ALLERGY SEASON)    Seasonal allergic rhinitis       hydrALAZINE 25 MG tablet    APRESOLINE    270 tablet    Take 1  tablet (25 mg) by mouth 3 times daily    Acute on chronic congestive heart failure, unspecified congestive heart failure type (H)       Iron Tabs      Take 324 mg by mouth daily Patient states she takes OTC iron furrous gluconate 324 mg tablets USP        isosorbide mononitrate 30 MG 24 hr tablet    IMDUR    45 tablet    1/2 tablet daily AT NIGHT    Cardiomyopathy, idiopathic (H)       levothyroxine 112 MCG tablet    SYNTHROID/LEVOTHROID    90 tablet    TAKE 1 TABLET DAILY    Hypothyroidism, unspecified       Lutein 20 MG Caps      Take 20 mg by mouth daily        MELATONIN PO      Take 3 mg by mouth nightly as needed        prednisoLONE acetate 1 % ophthalmic susp    PRED FORTE    1 Bottle    Place 1 drop into the right eye every evening        simvastatin 20 MG tablet    ZOCOR    90 tablet    Take 1 tablet (20 mg) by mouth At Bedtime    Coronary artery disease involving native coronary artery of native heart without angina pectoris       spironolactone 25 MG tablet    ALDACTONE    45 tablet    Take 0.5 tablets (12.5 mg) by mouth daily    Cardiomyopathy, idiopathic (H), Localized edema       torsemide 20 MG tablet    DEMADEX     10mg or 1/2 tablet daily at bedtime for 2 days, then on 3rd night, takes 1 tablet or 20mg x 1 day, and so on        VITAMIN D (CHOLECALCIFEROL) PO      Take 2,000 Units by mouth daily

## 2017-08-28 NOTE — LETTER
2017    Ander Shrestha MD  [address] [facility] [address]      RE: Mckayla Cardenasmaximo       Dear Colleague,    I had the pleasure of seeing Mckayla Oconnell in the [facility].    PRIMARY CARDIOLOGIST:  Dr. Echols.      Ms. Oconnell is a delightful 89-year-old woman with past cardiac history significant for the followin.  Nonischemic cardiomyopathy with EF between 10% and 45%, most recently documented at 30%-35%.   2.  Aortic stenosis concerning for severe low-flow, low-gradient AS, moderate tricuspid regurgitation and moderate mitral regurgitation.   3.  Chronic kidney disease, baseline creatinine typically about 1.5, although has been running at about 1.8 or 1.9 more recently.   4.  Hyperlipidemia.   5.  Biventricular pacemaker in 2017.   6.  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   7.  Peripheral arterial disease with symptoms of claudication.   8.  Lymphedema.   9.  Elevated pulmonary pressures, RVSP of 52+ right atrial pressure.      I had met her in 2016 when she was hospitalized with profound fatigue, weight gain and shortness of breath.  She diuresed about 20 pounds on a dobutamine and Bumex drip to a weight of 179.  She struggled fairly significantly with a quick regain of weight when she got home until she got her biventricular pacemaker placed.  She then respond to this phenomenally well with her weight trending down to 166 to 167 and has been stable since then.  At her last visit, we had her on torsemide 20 mg b.i.d. for diuretic.      She tells me today that she on her own decided to see how much she could decrease her torsemide.  She was doing 20 b.i.d. and now after much trial and error has settled on taking torsemide 10 mg for 2 days in a row, on the third day taking 20 mg, then doing 10 mg for 2 days, again the third day taking 20.  With this, her edema has been stable and her weight has stayed between 166 and 167 pounds at home.   She feels well overall.  She is most concerned about preserving her kidney function and she was hopeful with these changes her creatinine would improve significantly.  She has also been aggressively eating a renal diet, limiting her protein to less than 45 mg a day, avoiding phosphorus and keeping close track of those things as well.  She thinks she drinks about 35-40 ounces of fluid a day and is eating about 600-700 mg of salt.      SOCIAL HISTORY:  She lives in her own home with her , Jaime.  They have children.  Their daughter, Sandra, is a physical therapist.  She is a lifelong nonsmoker, no alcohol use, very involved in her [employer] locally and at her cabin.      PHYSICAL EXAMINATION:   VITAL SIGNS:  Blood pressure today is 108/60 with a pulse of 70, weight is 172 on our clinic scale, 167 at home.  BMI is 28.   HEART:  Regular with a harsh 4/6 systolic murmur heard best at the right sternal border, but throughout the precordium.   LUNGS:  Clear with good airflow deep bilateral lobes.  She has 1-2+ edema mixed with lymphedema to her lower shin.  This is good for her.   SKIN:  Warm and dry.   NECK:  She does not have JVP at 90 degrees, unable to climb onto exam table.     Outpatient Encounter Prescriptions as of 8/28/2017   Medication Sig Dispense Refill     torsemide (DEMADEX) 20 MG tablet 10mg or 1/2 tablet daily at bedtime for 2 days, then on 3rd night, takes 1 tablet or 20mg x 1 day, and so on       spironolactone (ALDACTONE) 25 MG tablet Take 0.5 tablets (12.5 mg) by mouth daily 45 tablet 3     simvastatin (ZOCOR) 20 MG tablet Take 1 tablet (20 mg) by mouth At Bedtime 90 tablet 3     isosorbide mononitrate (IMDUR) 30 MG 24 hr tablet 1/2 tablet daily AT NIGHT 45 tablet 3     carvedilol (COREG) 25 MG tablet TAKE 1 TABLET TWICE A DAY WITH MEALS 180 tablet 2     levothyroxine (SYNTHROID/LEVOTHROID) 112 MCG tablet TAKE 1 TABLET DAILY 90 tablet 1     hydrALAZINE (APRESOLINE) 25 MG tablet Take 1 tablet (25 mg)  by mouth 3 times daily 270 tablet 3     MELATONIN PO Take 3 mg by mouth nightly as needed       fluticasone (FLONASE) 50 MCG/ACT nasal spray Spray 1-2 sprays into both nostrils daily as needed for rhinitis or allergies (TAKE ONCE DAILY DURIGN ALLERGY SEASON) 16 g 11     aspirin 81 MG tablet Take 81 mg by mouth daily       Iron TABS Take 324 mg by mouth daily Patient states she takes OTC iron furrous gluconate 324 mg tablets USP       calcium citrate (CALCITRATE) 950 MG tablet Take 2 tablets by mouth daily At Noon       VITAMIN D, CHOLECALCIFEROL, PO Take 2,000 Units by mouth daily        Lutein 20 MG CAPS Take 20 mg by mouth daily       prednisoLONE acetate (PRED FORTE) 1 % ophthalmic suspension Place 1 drop into the right eye every evening  1 Bottle      [DISCONTINUED] spironolactone (ALDACTONE) 25 MG tablet Take 0.5 tablets (12.5 mg) by mouth daily 90 tablet 3     [DISCONTINUED] simvastatin (ZOCOR) 20 MG tablet Take 1 tablet (20 mg) by mouth At Bedtime 90 tablet 0     [DISCONTINUED] torsemide (DEMADEX) 20 MG tablet 1 tablet in the am and 1/2 tablet in the afternoon 135 tablet 3     [DISCONTINUED] Omega-3 Fatty Acids (FISH OIL PO) Take 1,500 mg by mouth daily.       No facility-administered encounter medications on file as of 8/28/2017.       ASSESSMENT AND PLAN:   1.  Chronic systolic heart failure with class II symptoms currently with most of her improvement attributable to her biventricular pacemaker.  She is maintained on carvedilol 25 mg b.i.d., Imdur 15 mg each day at bedtime and hydralazine 25 mg t.i.d.  We have not had her on higher dose of Imdur primarily due to blood pressures.  She has done well on this mix, including her current low dose of torsemide at 10 mg 2 days in a row followed by 20 mg on the third day.  She is also maintained on spironolactone 12.5 mg daily.  With her RV dysfunction, the spironolactone is helpful.  I would like to continue this in spite of her creatinine being elevated at 1.8.   We will follow her closely on [employer].  Really from a heart failure standpoint, she is doing excellent.   2.  Aortic stenosis, moderate tricuspid regurgitation and mitral regurgitation as well as elevated pulmonary pressures.  I suspect her elevated pulmonary pressures are due to her valvular disease, although we need a right heart cath to confirm.  She had dobutamine echo done in 09/2016 that did not meet criteria for severe AS, although on a plain transthoracic echo in April there is again concern for low-flow, low-gradient severe AS.  At this point, she is fairly asymptomatic.  We will repeat her echocardiogram in October and she will see Dr. Echols, at that time can reassess.   3.  Biventricular pacemaker in place.  Last check shows 99% BiV pacing.   4.  Lymphedema and known severely abnormal ABIs.  Given her abnormal ABIs, Lymphedema Clinic did not recommend compression stockings.  She is doing well without.   5.  Hypertension, well controlled.   6.  Chronic anemia, stable at 9.3.      She is overall really doing outstanding considering all her comorbidities.  We will get her echocardiogram and repeat labs in early October with Dr. Echols.  She certainly would be a possible TAVR candidate if we need to go down that road, but at this point fortunately she does not have a lot of symptoms.      Thank you for allowing me to participate in her care.      Sincerely,    Divya Kraft PA-C     [facility]    cc:   Romeo Echols MD, [facility] at [address]    [address] [place] [address]  [id number]

## 2017-08-28 NOTE — PATIENT INSTRUCTIONS
Call [facility] nurse for any questions or concerns:  [phone]   *If you have concerns after hours, please call [phone], option 2 to speak with on call Cardiologist.    Please try drinking about 60 ounces of fluids a day.      1. Medication changes from today:  Ok to stop taking fish oil.  Continue taking your torsemide as you have been.       2. Weigh yourself daily and write it down.     3. Call [facility] nurse if your weight is up more than 2 pounds in one day or 5 pounds in one week.     4. Call [facility] nurse if you feel more short of breath, have more abdominal bloating, or leg swelling.     5. Continue low sodium diet (less than 2000 mg daily). If you eat less salt, you will retain less fluid.     6. Alcohol can weaken your heart further. You should avoid alcohol or limit its use to special times, such as a holiday or birthday.      7. Do NOT take Aleve or ibuprofen without talking to your doctor first.      8. Lab Results: Labs are slightly better than last time.    Component      Latest Ref Rng & Units 6/19/2017 7/17/2017 8/28/2017   Sodium      136 - 145 mmol/L 135 (L) 137 138   Potassium      3.5 - 5.1 mmol/L 4.4 3.9 4.3   Chloride      98 - 107 mmol/L 102 101 104   Carbon Dioxide      23 - 29 mmol/L 24 26 27   Anion Gap      6 - 17 mmol/L 13.4 13.9 11.3   Glucose      70 - 105 mg/dL 106 (H) 127 (H) 109 (H)   Urea Nitrogen      7 - 30 mg/dL 57 (H) 66 (H) 54 (H)   Creatinine      0.70 - 1.30 mg/dL 1.81 (H) 1.90 (H) 1.82 (H)   GFR Estimate      >60 mL/min/1.7m2 26 (L) 25 (L) 26 (L)   GFR Estimate If Black      >60 mL/min/1.7m2 32 (L) 30 (L) 32 (L)   Calcium      8.5 - 10.5 mg/dL 9.4 9.1 9.2   Hemoglobin      11.7 - 15.7 g/dL 9.3 (L)  9.3 (L)        [facility]: Cardiomyopathy, Optimization, Rehabilitation, Education  The [facility] is a heart failure specialty clinic within the [facility] where you will work with your cardiologist, nurse practitioners, physician assistants and registered nurses who  specialize in heart failure care. They are dedicated to helping patients with heart failure to carefully adjust medications, receive education, and learn who and when to call if symptoms develop. They specialize in helping you better understand your condition, slow the progression of your disease, improve the length and quality of your life, help you detect future heart problems before they become life threatening, and avoid hospitalizations.

## 2017-08-28 NOTE — PROGRESS NOTES
Reviewed during clinic visit.  Please see progress note for plan.  Divya Kraft PA-C 8/28/2017 10:39 AM

## 2017-08-28 NOTE — PROGRESS NOTES
[id number]  HPI and Plan:   See dictation    Orders this Visit:  No orders of the defined types were placed in this encounter.    Orders Placed This Encounter   Medications     torsemide (DEMADEX) 20 MG tablet     Sig: 10mg or 1/2 tablet daily at bedtime for 2 days, then on 3rd night, takes 1 tablet or 20mg x 1 day, and so on     spironolactone (ALDACTONE) 25 MG tablet     Sig: Take 0.5 tablets (12.5 mg) by mouth daily     Dispense:  45 tablet     Refill:  3     simvastatin (ZOCOR) 20 MG tablet     Sig: Take 1 tablet (20 mg) by mouth At Bedtime     Dispense:  90 tablet     Refill:  3     Medications Discontinued During This Encounter   Medication Reason     torsemide (DEMADEX) 20 MG tablet      Omega-3 Fatty Acids (FISH OIL PO)      spironolactone (ALDACTONE) 25 MG tablet Reorder     simvastatin (ZOCOR) 20 MG tablet Reorder         Encounter Diagnoses   Name Primary?     Chronic systolic heart failure (H)      Cardiomyopathy, idiopathic (H)      Localized edema      Coronary artery disease involving native coronary artery of native heart without angina pectoris        CURRENT MEDICATIONS:  Current Outpatient Prescriptions   Medication Sig Dispense Refill     torsemide (DEMADEX) 20 MG tablet 10mg or 1/2 tablet daily at bedtime for 2 days, then on 3rd night, takes 1 tablet or 20mg x 1 day, and so on       spironolactone (ALDACTONE) 25 MG tablet Take 0.5 tablets (12.5 mg) by mouth daily 45 tablet 3     simvastatin (ZOCOR) 20 MG tablet Take 1 tablet (20 mg) by mouth At Bedtime 90 tablet 3     isosorbide mononitrate (IMDUR) 30 MG 24 hr tablet 1/2 tablet daily AT NIGHT 45 tablet 3     carvedilol (COREG) 25 MG tablet TAKE 1 TABLET TWICE A DAY WITH MEALS 180 tablet 2     levothyroxine (SYNTHROID/LEVOTHROID) 112 MCG tablet TAKE 1 TABLET DAILY 90 tablet 1     hydrALAZINE (APRESOLINE) 25 MG tablet Take 1 tablet (25 mg) by mouth 3 times daily 270 tablet 3     MELATONIN PO Take 3 mg by mouth nightly as needed       fluticasone  (FLONASE) 50 MCG/ACT nasal spray Spray 1-2 sprays into both nostrils daily as needed for rhinitis or allergies (TAKE ONCE DAILY DURIGN ALLERGY SEASON) 16 g 11     aspirin 81 MG tablet Take 81 mg by mouth daily       Iron TABS Take 324 mg by mouth daily Patient states she takes OTC iron furrous gluconate 324 mg tablets USP       calcium citrate (CALCITRATE) 950 MG tablet Take 2 tablets by mouth daily At Noon       VITAMIN D, CHOLECALCIFEROL, PO Take 2,000 Units by mouth daily        Lutein 20 MG CAPS Take 20 mg by mouth daily       prednisoLONE acetate (PRED FORTE) 1 % ophthalmic suspension Place 1 drop into the right eye every evening  1 Bottle      [DISCONTINUED] spironolactone (ALDACTONE) 25 MG tablet Take 0.5 tablets (12.5 mg) by mouth daily 90 tablet 3     [DISCONTINUED] simvastatin (ZOCOR) 20 MG tablet Take 1 tablet (20 mg) by mouth At Bedtime 90 tablet 0     [DISCONTINUED] torsemide (DEMADEX) 20 MG tablet 1 tablet in the am and 1/2 tablet in the afternoon 135 tablet 3       ALLERGIES     Allergies   Allergen Reactions     Atenolol Anaphylaxis     Hydrochlorothiazide      hyponatremia     Pollen Extract        PAST MEDICAL HISTORY:  Past Medical History:   Diagnosis Date     Aortic stenosis      CAD (coronary artery disease)     CT calcium jjovl=118 May 2013     Cardiomyopathy, idiopathic (H)     cardiomyopathy HTN vs viral; EF as low as 15% in 2009; CRT-P implanted 1/19/17     CHF (NYHA class II, ACC/AHA stage C) (H) 2/14/2013     Glaucoma      H/O angiography 9-1-2016    No angiographic evidence of obstructive coronary artery disease.     Hypertension      Hypothyroidism 2/14/2013     Pulmonary hypertension (H)        PAST SURGICAL HISTORY:  Past Surgical History:   Procedure Laterality Date     CARDIAC CATHERIZATION  9/1/16     GYN SURGERY      Hysterectomy 1995     HYSTERECTOMY, PAP NO LONGER INDICATED       IMPLANT PACEMAKER  1/19/17    CRT-P     ORTHOPEDIC SURGERY      knee replacement 1998     ORTHOPEDIC  "SURGERY      Knee replacement 2008     ORTHOPEDIC SURGERY      knee surgery [place]       FAMILY HISTORY:  Family History   Problem Relation Age of Onset     CANCER Mother      Uterius     Breast Cancer Daughter        SOCIAL HISTORY:  Social History     Social History     Marital status:      Spouse name: N/A     Number of children: N/A     Years of education: N/A     Social History Main Topics     Smoking status: Never Smoker     Smokeless tobacco: Never Used     Alcohol use No     Drug use: No     Sexual activity: No     Other Topics Concern     Parent/Sibling W/ Cabg, Mi Or Angioplasty Before 65f 55m? No     Caffeine Concern No     Sleep Concern No     Stress Concern No     Weight Concern No     Special Diet Yes     low sodium     Exercise Yes     therapy     Seat Belt Yes     Social History Narrative       Review of Systems:  Skin:  Positive for bruising   Eyes:  Positive for glasses  ENT:  Positive for hoarseness  Respiratory:  Negative    Cardiovascular:    edema;Positive for  Gastroenterology: Negative    Genitourinary:  Positive for nocturia  Musculoskeletal:  Positive for arthritis  Neurologic:  Positive for numbness or tingling of hands  Psychiatric:  Negative    Heme/Lymph/Imm:  Positive for allergies  Endocrine:  Positive for thyroid disorder    Physical Exam:  Vitals: /60  Pulse 70  Ht 1.651 m (5' 5\")  Wt 78.3 kg (172 lb 9.6 oz)  SpO2 98%  BMI 28.72 kg/m2   Please refer to dictation for physical exam    Recent Lab Results:  LIPID RESULTS:  Lab Results   Component Value Date    CHOL 113 03/22/2016    HDL 39 (L) 03/22/2016    LDL 54 03/22/2016    TRIG 98 03/22/2016    CHOLHDLRATIO 3.5 07/06/2015       LIVER ENZYME RESULTS:  Lab Results   Component Value Date    AST 50 (H) 11/14/2016    ALT 89 (H) 11/14/2016       CBC RESULTS:  Lab Results   Component Value Date    WBC 4.8 04/17/2017    RBC 2.98 (L) 04/17/2017    HGB 9.3 (L) 08/28/2017    HCT 28.7 (L) 04/17/2017    MCV 96 04/17/2017    MCH " 30.9 04/17/2017    MCHC 32.1 04/17/2017    RDW 15.3 (H) 04/17/2017     (L) 04/17/2017       BMP RESULTS:  Lab Results   Component Value Date     08/28/2017    POTASSIUM 4.3 08/28/2017    CHLORIDE 104 08/28/2017    CO2 27 08/28/2017    ANIONGAP 11.3 08/28/2017     (H) 08/28/2017    BUN 54 (H) 08/28/2017    CR 1.82 (H) 08/28/2017    GFRESTIMATED 26 (L) 08/28/2017    GFRESTBLACK 32 (L) 08/28/2017    SHOBHA 9.2 08/28/2017        A1C RESULTS:  No results found for: A1C    INR RESULTS:  Lab Results   Component Value Date    INR 1.12 09/01/2016           MIKY Kraft PA-C  [id number]  [place] [id number]

## 2017-09-14 DIAGNOSIS — E03.9 HYPOTHYROIDISM, UNSPECIFIED: ICD-10-CM

## 2017-09-14 RX ORDER — LEVOTHYROXINE SODIUM 112 UG/1
TABLET ORAL
Qty: 90 TABLET | Refills: 0 | Status: SHIPPED | OUTPATIENT
Start: 2017-09-14 | End: 2017-01-01

## 2017-09-14 NOTE — TELEPHONE ENCOUNTER
levothyoxine     Last Written Prescription Date: 4/3/17  Last Quantity: 90, # refills: 1  Last Office Visit with [facility], [facility] or [facility] prescribing provider:4/21/17   Next 5 appointments (look out 90 days)     Oct 23, 2017 12:45 PM CDT   Return Visit with Romeo Echols MD   [facility] ([facility])    [address] [place] [id number] [phone]                   TSH   Date Value Ref Range Status   11/28/2016 14.36 (H) 0.40 - 4.00 mU/L Final

## 2017-09-14 NOTE — TELEPHONE ENCOUNTER
Prescription(s) sent electronically to specified pharmacy.    Elevated TSH in Nov 2016 was probably sick euthyroid from acute illness.     Repeat labs at next opportunity

## 2017-09-15 ENCOUNTER — CARE COORDINATION (OUTPATIENT)
Dept: CARDIOLOGY | Facility: CLINIC | Age: 82
End: 2017-09-15

## 2017-09-15 NOTE — PROGRESS NOTES
Called pt to review Anabel's recommendations.  No answer and no option to leave VM.  Will try again later today.  CARMELLA Miller 12:23 PM 9/15/2017

## 2017-09-15 NOTE — PROGRESS NOTES
Called and spoke with pt and reviewed that Anabel states her Creat has been fairly stable, so OK for pt to wait until OV with Dr. Echols in October for BMP.  Pt verbalized understanding and agrees with this plan.  CARMELLA Miller 3:20 PM 9/15/2017

## 2017-09-15 NOTE — PROGRESS NOTES
Her cr has been fairly stable, I thinks waiting 2 months between will be fine.  IF she feels very strongly about it we can do a bmp in between now and visit with Dr. Echols.   Divya Kraft PA-C 9/15/2017 11:33 AM

## 2017-09-15 NOTE — PROGRESS NOTES
Received VM from pt who requests to know if she can have a BMP done this month.  She states she has been getting a BMP monthly but is not scheduled for OV with Dr. Echols and BMP until 10/23/17.  Routed to [facility] for review.  CARMELLA Miller 8:45 AM 9/15/2017

## 2017-09-25 ENCOUNTER — OFFICE VISIT (OUTPATIENT)
Dept: INTERNAL MEDICINE | Facility: CLINIC | Age: 82
End: 2017-09-25
Payer: COMMERCIAL

## 2017-09-25 VITALS
DIASTOLIC BLOOD PRESSURE: 62 MMHG | OXYGEN SATURATION: 95 % | HEIGHT: 65 IN | HEART RATE: 70 BPM | WEIGHT: 171.3 LBS | TEMPERATURE: 98 F | BODY MASS INDEX: 28.54 KG/M2 | SYSTOLIC BLOOD PRESSURE: 130 MMHG

## 2017-09-25 DIAGNOSIS — H61.23 BILATERAL IMPACTED CERUMEN: Primary | ICD-10-CM

## 2017-09-25 PROCEDURE — 99213 OFFICE O/P EST LOW 20 MIN: CPT | Performed by: PHYSICIAN ASSISTANT

## 2017-09-25 NOTE — PROGRESS NOTES
"  SUBJECTIVE:   Mckayla Oconnell is a 89 year old female who presents to clinic today for the following health issues:      Pt is here today to get her ears cleaned out   Issues with ear wax build up in the past. She does wear hearing aids too.  No ear pain or drainage noted. Notes hearing is less that it should be.   No recent URI or fevers.       -------------------------------------    Problem list and histories reviewed & adjusted, as indicated.  Additional history: as documented    Labs reviewed in EPIC    Reviewed and updated as needed this visit by clinical staff  Tobacco  Allergies       Reviewed and updated as needed this visit by Provider  Allergies  Meds         ROS:  Constitutional, HEENT, cardiovascular, pulmonary, gi and gu systems are negative, except as otherwise noted.      OBJECTIVE:   /62 (BP Location: Left arm, Patient Position: Chair, Cuff Size: Adult Regular)  Pulse 70  Temp 98  F (36.7  C) (Oral)  Ht 5' 5\" (1.651 m)  Wt 171 lb 4.8 oz (77.7 kg)  SpO2 95%  BMI 28.51 kg/m2  Body mass index is 28.51 kg/(m^2).  GENERAL: healthy, alert and no distress  HENT: normal cephalic/atraumatic and both ears: occluded with wax after lavage clear  RESP: lungs clear to auscultation - no rales, rhonchi or wheezes  CV: regular rates and rhythm  SKIN: no suspicious lesions or rashes    Diagnostic Test Results:  none     ASSESSMENT/PLAN:             1. Bilateral impacted cerumen  Ear lavage with good results, done by nursing staff      Reviewed techniques to help with ear wax buildup       Lilian Chen PA-C  [facility]    "

## 2017-09-25 NOTE — MR AVS SNAPSHOT
After Visit Summary   9/25/2017    Mckayla Oconnell    MRN: [id number]           Patient Information     Date Of Birth          7/2/1927        Visit Information        Provider Department      9/25/2017 9:40 AM Lilian Chen PA-C [facility]        Today's Diagnoses     Bilateral impacted cerumen    -  1       Follow-ups after your visit        Your next 10 appointments already scheduled     Oct 23, 2017  9:30 AM CDT   Ech Complete with [id number]   [facility] CV Echocardiography (Cardiovascular Imaging at [facility])    [address] [place] [address]  [address]  [facility] [id number] [phone]           1. Please bring or wear a comfortable two-piece outfit. 2. You may eat, drink and take your normal medicines. 3. For any questions that cannot be answered, please contact the ordering physician            Oct 23, 2017 10:30 AM CDT   LAB with [facility]   [facility] HEART [facility] ([facility])    [address] [place] [address] [address]  [facility] [id number] [phone]           Patient must bring picture ID. Patient should be prepared to give a urine specimen  Please do not eat 10-12 hours before your appointment if you are coming in fasting for labs on lipids, cholesterol, or glucose (sugar). Pregnant women should follow their Care Team instructions. Water with medications is okay. Do not drink coffee or other fluids. If you have concerns about taking  your medications, please ask at office or if scheduling via Saleze[employer]art, send a message by clicking on Secure Messaging, Message Your Care Team.            Oct 23, 2017 12:45 PM CDT   Return Visit with Romeo Echols MD   [facility] PHYSICIANS HEART AT [place] ([facility])    [address] [place] [address] [address]  [facility] [address]   [phone]            Nov 30, 2017  2:30 PM CST   Remote PPM Check with [facility] TECH1   [facility]  "PHYSICIANS HEART AT [place] ([facility])    [address] [facility] [address]  [place] [address] [phone]           This appointment is for a remote check of your pacemaker.  This is not an appointment at the office.              Who to contact     If you have questions or need follow up information about today's clinic visit or your schedule please contact [facility] directly at [phone].  Normal or non-critical lab and imaging results will be communicated to you by VCVhart, letter or phone within 4 business days after the clinic has received the results. If you do not hear from us within 7 days, please contact the clinic through [employer] or phone. If you have a critical or abnormal lab result, we will notify you by phone as soon as possible.  Submit refill requests through BrightFunnel or call your pharmacy and they will forward the refill request to us. Please allow 3 business days for your refill to be completed.          Additional Information About Your Visit        BrightFunnel Information     Bright[employer]unnel gives you secure access to your electronic health record. If you see a primary care provider, you can also send messages to your care team and make appointments. If you have questions, please call your primary care clinic.  If you do not have a primary care provider, please call [phone] and they will assist you.        Care EveryWhere ID     This is your Care EveryWhere ID. This could be used by other organizations to access your [facility] medical records  [id number]        Your Vitals Were     Pulse Temperature Height Pulse Oximetry BMI (Body Mass Index)       70 98  F (36.7  C) (Oral) 5' 5\" (1.651 m) 95% 28.51 kg/m2        Blood Pressure from Last 3 Encounters:   09/25/17 130/62   08/28/17 108/60   07/17/17 108/52    Weight from Last 3 Encounters:   09/25/17 171 lb 4.8 oz (77.7 kg)   08/28/17 172 lb 9.6 oz (78.3 kg)   07/17/17 170 lb 11.2 oz (77.4 kg)              Today, " you had the following     No orders found for display       Primary Care Provider Office Phone # Fax #    Ander Shrestha [phone]       [address] [facility] [address]        Equal Access to Services     SHELBY SHEPARD : Hadolivia juan pablo ku aryano Sowandaali, waaxda luqadaha, qaybta kaalmada adeegyada, brooklyn angelo laIsaelflor sorensen. So [facility] [phone].    ATENCIÓN: Si habla español, tiene a villalba disposición servicios gratuitos de asistencia lingüística. Llame al [phone].    We comply with applicable federal civil rights laws and [place] laws. We do not discriminate on the basis of race, color, national origin, age, disability sex, sexual orientation or gender identity.            Thank you!     Thank you for choosing [facility]  for your care. Our goal is always to provide you with excellent care. Hearing back from our patients is one way we can continue to improve our services. Please take a few minutes to complete the written survey that you may receive in the mail after your visit with us. Thank you!             Your Updated Medication List - Protect others around you: Learn how to safely use, store and throw away your medicines at www.disposemymeds.org.          This list is accurate as of: 9/25/17 10:23 AM.  Always use your most recent med list.                   Brand Name Dispense Instructions for use Diagnosis    aspirin 81 MG tablet      Take 81 mg by mouth daily        calcium citrate 950 MG tablet    CALCITRATE     Take 2 tablets by mouth daily At Noon        carvedilol 25 MG tablet    COREG    180 tablet    TAKE 1 TABLET TWICE A DAY WITH MEALS    Cardiomyopathy, idiopathic (H), Chronic systolic heart failure (H), Essential hypertension, benign, Coronary artery disease involving native coronary artery of native heart without angina pectoris       fluticasone 50 MCG/ACT spray    FLONASE    16 g    Spray 1-2 sprays into both nostrils daily  as needed for rhinitis or allergies (TAKE ONCE DAILY DURIGN ALLERGY SEASON)    Seasonal allergic rhinitis       hydrALAZINE 25 MG tablet    APRESOLINE    270 tablet    Take 1 tablet (25 mg) by mouth 3 times daily    Acute on chronic congestive heart failure, unspecified congestive heart failure type (H)       Iron Tabs      Take 324 mg by mouth daily Patient states she takes OTC iron furrous gluconate 324 mg tablets USP        isosorbide mononitrate 30 MG 24 hr tablet    IMDUR    45 tablet    1/2 tablet daily AT NIGHT    Cardiomyopathy, idiopathic (H)       levothyroxine 112 MCG tablet    SYNTHROID/LEVOTHROID    90 tablet    TAKE 1 TABLET DAILY    Hypothyroidism, unspecified       Lutein 20 MG Caps      Take 20 mg by mouth daily        MELATONIN PO      Take 3 mg by mouth nightly as needed        prednisoLONE acetate 1 % ophthalmic susp    PRED FORTE    1 Bottle    Place 1 drop into the right eye every evening        simvastatin 20 MG tablet    ZOCOR    90 tablet    Take 1 tablet (20 mg) by mouth At Bedtime    Coronary artery disease involving native coronary artery of native heart without angina pectoris       spironolactone 25 MG tablet    ALDACTONE    45 tablet    Take 0.5 tablets (12.5 mg) by mouth daily    Cardiomyopathy, idiopathic (H), Localized edema       torsemide 20 MG tablet    DEMADEX     10mg or 1/2 tablet daily at bedtime for 2 days, then on 3rd night, takes 1 tablet or 20mg x 1 day, and so on        VITAMIN D (CHOLECALCIFEROL) PO      Take 2,000 Units by mouth daily

## 2017-09-25 NOTE — NURSING NOTE
"Chief Complaint   Patient presents with     Plugged Ears     needs ear cleaning        Initial /62 (BP Location: Left arm, Patient Position: Chair, Cuff Size: Adult Regular)  Pulse 70  Temp 98  F (36.7  C) (Oral)  Ht 5' 5\" (1.651 m)  Wt 171 lb 4.8 oz (77.7 kg)  SpO2 95%  BMI 28.51 kg/m2 Estimated body mass index is 28.51 kg/(m^2) as calculated from the following:    Height as of this encounter: 5' 5\" (1.651 m).    Weight as of this encounter: 171 lb 4.8 oz (77.7 kg).  Medication Reconciliation: complete     "

## 2017-09-26 DIAGNOSIS — I25.10 CORONARY ARTERY DISEASE INVOLVING NATIVE CORONARY ARTERY OF NATIVE HEART WITHOUT ANGINA PECTORIS: ICD-10-CM

## 2017-09-26 RX ORDER — SIMVASTATIN 20 MG
TABLET ORAL
Qty: 90 TABLET | Refills: 0 | OUTPATIENT
Start: 2017-09-26

## 2017-10-03 ENCOUNTER — PRE VISIT (OUTPATIENT)
Dept: CARDIOLOGY | Facility: CLINIC | Age: 82
End: 2017-10-03

## 2017-10-23 ENCOUNTER — HOSPITAL ENCOUNTER (OUTPATIENT)
Dept: CARDIOLOGY | Facility: CLINIC | Age: 82
Discharge: HOME | End: 2017-10-23
Attending: INTERNAL MEDICINE | Admitting: INTERNAL MEDICINE
Payer: COMMERCIAL

## 2017-10-23 ENCOUNTER — OFFICE VISIT (OUTPATIENT)
Dept: CARDIOLOGY | Facility: CLINIC | Age: 82
End: 2017-10-23
Attending: INTERNAL MEDICINE
Payer: COMMERCIAL

## 2017-10-23 VITALS
SYSTOLIC BLOOD PRESSURE: 137 MMHG | BODY MASS INDEX: 29.26 KG/M2 | HEIGHT: 65 IN | WEIGHT: 175.6 LBS | HEART RATE: 71 BPM | DIASTOLIC BLOOD PRESSURE: 72 MMHG

## 2017-10-23 DIAGNOSIS — E78.00 PURE HYPERCHOLESTEROLEMIA: ICD-10-CM

## 2017-10-23 DIAGNOSIS — I50.22 CHRONIC SYSTOLIC HEART FAILURE (H): ICD-10-CM

## 2017-10-23 DIAGNOSIS — R60.0 PERIPHERAL EDEMA: Chronic | ICD-10-CM

## 2017-10-23 DIAGNOSIS — N18.30 CHRONIC KIDNEY DISEASE, STAGE III (MODERATE) (H): ICD-10-CM

## 2017-10-23 DIAGNOSIS — I10 ESSENTIAL HYPERTENSION, BENIGN: ICD-10-CM

## 2017-10-23 DIAGNOSIS — D50.8 IRON DEFICIENCY ANEMIA SECONDARY TO INADEQUATE DIETARY IRON INTAKE: ICD-10-CM

## 2017-10-23 DIAGNOSIS — I25.10 CORONARY ARTERY DISEASE INVOLVING NATIVE CORONARY ARTERY OF NATIVE HEART WITHOUT ANGINA PECTORIS: Primary | Chronic | ICD-10-CM

## 2017-10-23 DIAGNOSIS — I35.0 NONRHEUMATIC AORTIC VALVE STENOSIS: ICD-10-CM

## 2017-10-23 DIAGNOSIS — I42.9 CARDIOMYOPATHY, IDIOPATHIC (H): ICD-10-CM

## 2017-10-23 LAB
ANION GAP SERPL CALCULATED.3IONS-SCNC: 11.7 MMOL/L (ref 6–17)
BUN SERPL-MCNC: 43 MG/DL (ref 7–30)
CALCIUM SERPL-MCNC: 9.5 MG/DL (ref 8.5–10.5)
CHLORIDE SERPL-SCNC: 102 MMOL/L (ref 98–107)
CO2 SERPL-SCNC: 27 MMOL/L (ref 23–29)
CREAT SERPL-MCNC: 1.49 MG/DL (ref 0.7–1.3)
GFR SERPL CREATININE-BSD FRML MDRD: 33 ML/MIN/1.7M2
GLUCOSE SERPL-MCNC: 104 MG/DL (ref 70–105)
POTASSIUM SERPL-SCNC: 4.7 MMOL/L (ref 3.5–5.1)
SODIUM SERPL-SCNC: 136 MMOL/L (ref 136–145)

## 2017-10-23 PROCEDURE — 99214 OFFICE O/P EST MOD 30 MIN: CPT | Performed by: INTERNAL MEDICINE

## 2017-10-23 PROCEDURE — 36415 COLL VENOUS BLD VENIPUNCTURE: CPT | Performed by: INTERNAL MEDICINE

## 2017-10-23 PROCEDURE — 93306 TTE W/DOPPLER COMPLETE: CPT

## 2017-10-23 PROCEDURE — 93306 TTE W/DOPPLER COMPLETE: CPT | Mod: 26 | Performed by: INTERNAL MEDICINE

## 2017-10-23 PROCEDURE — 80048 BASIC METABOLIC PNL TOTAL CA: CPT | Performed by: INTERNAL MEDICINE

## 2017-10-23 NOTE — LETTER
10/23/2017    Ander Shrestha MD  [address] [facility] [address]    RE: Mckayla Delacruzlyn Anish       Dear Colleague,    I had the pleasure of seeing Mckayla Swanson Anish in the [facility].    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  A CT scan in 2013 confirmed an anterior benign course.  Her cardiomyopathy is ranged for a low of 15% to as high as 45%.  Recent baseline has been in the 30%-35% range.  She has hypertriglyceridemia, HDL deficiency, hypothyroidism and anemia for which she has seen Dr. Brennan and has received iron infusions in the past.  She also has had problems with hyponatremia thought to be secondary to hydrochlorothiazide and another episode thought to be secondary to trazodone.      Last year, she was in the process of going through a TAVR evaluation and headed towards a TAVR procedure; however, dobutamine stress echo demonstrated that her aortic valve was still only in the moderate range.  She was having problems with severe peripheral edema, renal insufficiency but ultimately with placement of a biventricular pacemaker, she had a dramatic turnaround and improvement in her biventricular heart failure.  She follows closely in our [facility].R[facility] Clinic and has managed to thrive with this plan.  Her torsemide has steadily been titrated downward such that she is taking a half of a 20 mg on a daily basis.      Mckayla returns to clinic stating she is doing great.  She has no chest, arm, neck, jaw or shoulder discomfort.  No lightheadedness, dizziness, syncope or near syncope.  She has no dyspnea on exertion, orthopnea or PND.  She gets around by use of a walker and states she has no limitations.  A confirmation of the fact is that she is here alone  without her daughter or other family members.      She states she has been watching her sodium quite diligently.  She does elevate her legs.  She notes no problems or side effects with her current medical regimen.     Outpatient Encounter Prescriptions as of 10/23/2017   Medication Sig Dispense Refill     levothyroxine (SYNTHROID/LEVOTHROID) 112 MCG tablet TAKE 1 TABLET DAILY 90 tablet 0     torsemide (DEMADEX) 20 MG tablet 10mg or 1/2 tablet daily at bedtime for 2 days, then on 3rd night, takes 1 tablet or 20mg x 1 day, and so on       spironolactone (ALDACTONE) 25 MG tablet Take 0.5 tablets (12.5 mg) by mouth daily 45 tablet 3     simvastatin (ZOCOR) 20 MG tablet Take 1 tablet (20 mg) by mouth At Bedtime 90 tablet 3     isosorbide mononitrate (IMDUR) 30 MG 24 hr tablet 1/2 tablet daily AT NIGHT 45 tablet 3     carvedilol (COREG) 25 MG tablet TAKE 1 TABLET TWICE A DAY WITH MEALS 180 tablet 2     hydrALAZINE (APRESOLINE) 25 MG tablet Take 1 tablet (25 mg) by mouth 3 times daily 270 tablet 3     MELATONIN PO Take 3 mg by mouth nightly as needed       fluticasone (FLONASE) 50 MCG/ACT nasal spray Spray 1-2 sprays into both nostrils daily as needed for rhinitis or allergies (TAKE ONCE DAILY DURIGN ALLERGY SEASON) 16 g 11     aspirin 81 MG tablet Take 81 mg by mouth daily       Iron TABS Take 324 mg by mouth daily Patient states she takes OTC iron furrous gluconate 324 mg tablets USP       calcium citrate (CALCITRATE) 950 MG tablet Take 2 tablets by mouth daily At Noon       VITAMIN D, CHOLECALCIFEROL, PO Take 2,000 Units by mouth daily        Lutein 20 MG CAPS Take 20 mg by mouth daily       prednisoLONE acetate (PRED FORTE) 1 % ophthalmic suspension Place 1 drop into the right eye every evening  1 Bottle      No facility-administered encounter medications on file as of 10/23/2017.       ASSESSMENT AND PLAN:  Mckayla appears to be doing incredibly well from a cardiac standpoint without evidence of ischemia.      Heart  failure is very well compensated.  She follows in our Tel-Assurance program.  She watches her sodium like a hawk.  She prepares almost all her own foods for herself, such that she knows how much salt is in everything.  As stated, she has titrated herself down to 10 mg of torsemide on a daily basis.      Blood pressure is very well controlled at 137/72 with a pulse of 71.      Weight is 175 pounds which is up a bit for her.  Body mass index is still 29.      Electrolytes are the best they have been in quite some time with creatinine improved all the way to 1.49 with a BUN of 43, sodium of 136 and potassium of 4.7 and I think this is all due to the fact that she is on a much lower dose of torsemide and I have told her she is doing a great job of elevating her legs, watching the salt in her diet and managing her peripheral edema with lifestyle as opposed to high dose medications.      This improvement is also reflected in her echocardiogram, whereas her pulmonary pressures previously were 52 mmHg plus right atrial pressure, now 33 mmHg plus right atrial pressure.  Gradient across the valve is still 25 mmHg, valve area calculation has worsened from 1.22 to 0.9.  We talked about the fact that we will move to TAVR when she starts having symptoms such as shortness of breath, chest discomfort or lightheadedness and dizziness.  At this time, we do not move just based on a valve calculation alone.  Will continue to check every 6 months but I have told her to call us and if she should have a problem sooner than that.  As a matter of fact, she will follow in our Tele-Assurance program and I will have her follow in our [facility] Clinic with Divya in 3 months.  We reviewed her medications and we will continue them as is.      Thank you for allowing me to participate in her care.     Sincerely,    Romeo Echols MD     [facility]

## 2017-10-23 NOTE — PROGRESS NOTES
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  A CT scan in 2013 confirmed an anterior benign course.  Her cardiomyopathy is ranged for a low of 15% to as high as 45%.  Recent baseline has been in the 30%-35% range.  She has hypertriglyceridemia, HDL deficiency, hypothyroidism and anemia for which she has seen Dr. Brennan and has received iron infusions in the past.  She also has had problems with hyponatremia thought to be secondary to hydrochlorothiazide and another episode thought to be secondary to trazodone.      Last year, she was in the process of going through a TAVR evaluation and headed towards a TAVR procedure; however, dobutamine stress echo demonstrated that her aortic valve was still only in the moderate range.  She was having problems with severe peripheral edema, renal insufficiency but ultimately with placement of a biventricular pacemaker, she had a dramatic turnaround and improvement in her biventricular heart failure.  She follows closely in our [facility] Clinic and has managed to thrive with this plan.  Her torsemide has steadily been titrated downward such that she is taking a half of a 20 mg on a daily basis.      Mckayla returns to clinic stating she is doing great.  She has no chest, arm, neck, jaw or shoulder discomfort.  No lightheadedness, dizziness, syncope or near syncope.  She has no dyspnea on exertion, orthopnea or PND.  She gets around by use of a walker and states she has no limitations.  A confirmation of the fact is that she is here alone without her daughter or other family members.      She states she has been watching her sodium quite diligently.  She does elevate her legs.  She notes no problems or side effects with her current medical regimen.       ASSESSMENT AND PLAN:  Mckayla appears to be doing incredibly well from a cardiac standpoint without evidence of ischemia.      Heart failure is very well compensated.  She follows in our Tel-Assurance program.  She watches her sodium like a hawk.  She prepares almost all her own foods for herself, such that she knows how much salt is in everything.  As stated, she has titrated herself down to 10 mg of torsemide on a daily basis.      Blood pressure is very well controlled at 137/72 with a pulse of 71.      Weight is 175 pounds which is up a bit for her.  Body mass index is still 29.      Electrolytes are the best they have been in quite some time with creatinine improved all the way to 1.49 with a BUN of 43, sodium of 136 and potassium of 4.7 and I think this is all due to the fact that she is on a much lower dose of torsemide and I have told her she is doing a great job of elevating her legs, watching the salt in her diet and managing her peripheral edema with lifestyle as opposed to high dose medications.      This improvement is also reflected in her echocardiogram, whereas her pulmonary pressures previously were 52 mmHg plus right atrial pressure, now 33 mmHg plus right atrial pressure.  Gradient across the valve is still 25 mmHg, valve area calculation has worsened from 1.22 to 0.9.  We talked about the fact that we will move to TAVR when she starts having symptoms such as shortness of breath, chest discomfort or lightheadedness and dizziness.  At this time, we do not move just based on a valve calculation alone.  Will continue to check every 6 months but I have told her to call us and if she should have a problem sooner than that.  As a matter of fact, she will follow in our Tele-Assurance program and I will have her follow in our [facility] Clinic with Divya in 3 months.  We reviewed her medications and we will continue them as is.      Thank you for allowing me to participate in her care.      Romeo  MD Kinza, FACC         GUCCI RUBIO MD, FACC             D: 10/23/2017 13:28   T: 10/23/2017 14:21   MT: WILL      Name:     VICKEY CHURCH   MRN:      [id number]        Account:      [id number]   :      1927           Service Date: 10/23/2017      Document: [id number]

## 2017-10-23 NOTE — PROGRESS NOTES
HPI and Plan:   See dictation    Orders Placed This Encounter   Procedures     Basic metabolic panel     Basic metabolic panel     Follow-Up with Cardiologist     Follow-Up with Cardiac Advanced Practice Provider     Echocardiogram       No orders of the defined types were placed in this encounter.      There are no discontinued medications.      Encounter Diagnoses   Name Primary?     Chronic systolic heart failure (H)      Coronary artery disease involving native coronary artery of native heart without angina pectoris Yes     Pure hypercholesterolemia      Cardiomyopathy, idiopathic (H)      Nonrheumatic aortic valve stenosis      Essential hypertension, benign      Peripheral edema      Iron deficiency anemia secondary to inadequate dietary iron intake      Chronic kidney disease, stage III (moderate)        CURRENT MEDICATIONS:  Current Outpatient Prescriptions   Medication Sig Dispense Refill     levothyroxine (SYNTHROID/LEVOTHROID) 112 MCG tablet TAKE 1 TABLET DAILY 90 tablet 0     torsemide (DEMADEX) 20 MG tablet 10mg or 1/2 tablet daily at bedtime for 2 days, then on 3rd night, takes 1 tablet or 20mg x 1 day, and so on       spironolactone (ALDACTONE) 25 MG tablet Take 0.5 tablets (12.5 mg) by mouth daily 45 tablet 3     simvastatin (ZOCOR) 20 MG tablet Take 1 tablet (20 mg) by mouth At Bedtime 90 tablet 3     isosorbide mononitrate (IMDUR) 30 MG 24 hr tablet 1/2 tablet daily AT NIGHT 45 tablet 3     carvedilol (COREG) 25 MG tablet TAKE 1 TABLET TWICE A DAY WITH MEALS 180 tablet 2     hydrALAZINE (APRESOLINE) 25 MG tablet Take 1 tablet (25 mg) by mouth 3 times daily 270 tablet 3     MELATONIN PO Take 3 mg by mouth nightly as needed       fluticasone (FLONASE) 50 MCG/ACT nasal spray Spray 1-2 sprays into both nostrils daily as needed for rhinitis or allergies (TAKE ONCE DAILY DURIGN ALLERGY SEASON) 16 g 11     aspirin 81 MG tablet Take 81 mg by mouth daily       Iron TABS Take 324 mg by mouth daily Patient  states she takes OTC iron furrous gluconate 324 mg tablets USP       calcium citrate (CALCITRATE) 950 MG tablet Take 2 tablets by mouth daily At Noon       VITAMIN D, CHOLECALCIFEROL, PO Take 2,000 Units by mouth daily        Lutein 20 MG CAPS Take 20 mg by mouth daily       prednisoLONE acetate (PRED FORTE) 1 % ophthalmic suspension Place 1 drop into the right eye every evening  1 Bottle        ALLERGIES     Allergies   Allergen Reactions     Atenolol Anaphylaxis     Hydrochlorothiazide      hyponatremia     Pollen Extract        PAST MEDICAL HISTORY:  Past Medical History:   Diagnosis Date     Aortic stenosis      CAD (coronary artery disease)     CT calcium unkkl=887 May 2013     Cardiomyopathy, idiopathic (H)     cardiomyopathy HTN vs viral; EF as low as 15% in 2009; CRT-P implanted 1/19/17     CHF (NYHA class II, ACC/AHA stage C) (H) 2/14/2013     Glaucoma      H/O angiography 9-1-2016    No angiographic evidence of obstructive coronary artery disease.     Hypertension      Hypothyroidism 2/14/2013     Pulmonary hypertension        PAST SURGICAL HISTORY:  Past Surgical History:   Procedure Laterality Date     CARDIAC CATHERIZATION  9/1/16     GYN SURGERY      Hysterectomy 1995     HYSTERECTOMY, PAP NO LONGER INDICATED       IMPLANT PACEMAKER  1/19/17    CRT-P     ORTHOPEDIC SURGERY      knee replacement 1998     ORTHOPEDIC SURGERY      Knee replacement 2008     ORTHOPEDIC SURGERY      knee surgery [place]       FAMILY HISTORY:  Family History   Problem Relation Age of Onset     CANCER Mother      Uterius     Breast Cancer Daughter        SOCIAL HISTORY:  Social History     Social History     Marital status:      Spouse name: N/A     Number of children: N/A     Years of education: N/A     Social History Main Topics     Smoking status: Never Smoker     Smokeless tobacco: Never Used     Alcohol use No     Drug use: No     Sexual activity: No     Other Topics Concern     Parent/Sibling W/ Cabg, Mi Or  "Angioplasty Before 65f 55m? No     Caffeine Concern No     Sleep Concern No     Stress Concern No     Weight Concern No     Special Diet Yes     low sodium     Exercise Yes     therapy     Seat Belt Yes     Social History Narrative       Review of Systems:  Skin:  Negative       Eyes:  Positive for cataracts Pt reports cataracts in right eye;  Left cataract removed two yrs ago per pt report.    ENT:  Negative      Respiratory:  Negative       Cardiovascular:    edema;Positive for    Gastroenterology: Negative      Genitourinary:  Positive for nocturia;urgency lot more fluid coming out  Musculoskeletal:  Positive for arthritis    Neurologic:  Negative   pt had carpal tunnel surgery in August 207.    Psychiatric:  Negative      Heme/Lymph/Imm:  Negative      Endocrine:  Positive for thyroid disorder      Physical Exam:  Vitals: /72  Pulse 71  Ht 1.651 m (5' 5\")  Wt 79.7 kg (175 lb 9.6 oz)  BMI 29.22 kg/m2    Constitutional:  cooperative, alert and oriented, well developed, well nourished, in no acute distress frail;overweight Use walker    Skin:  warm and dry to the touch, no apparent skin lesions or masses noted        Head:  normocephalic, no masses or lesions        Eyes:  pupils equal and round;conjunctivae and lids unremarkable;sclera white;no xanthalasma;no nystagmus   Ptosis of right eye    ENT:  no pallor or cyanosis, dentition good        Neck:  carotid pulses are full and equal bilaterally;no carotid bruit        Chest:  normal breath sounds, clear to auscultation, normal A-P diameter, normal symmetry, normal respiratory excursion, no use of accessory muscles          Cardiac: regular rhythm       systolic murmur;grade 3;radiation to the carotid     late peaking    Abdomen:           Vascular:                                          Extremities and Back:  no spinal abnormalities noted;normal muscle strength and tone   bilateral LE edema;1+          Neurological:  affect appropriate, oriented to " time, person and place;no gross motor deficits              CC  Romeo Echols MD  [address]  [place] [address]

## 2017-10-23 NOTE — MR AVS SNAPSHOT
After Visit Summary   10/23/2017    Mckayla Oconnell    MRN: [id number]           Patient Information     Date Of Birth          7/2/1927        Visit Information        Provider Department      10/23/2017 12:45 PM Romeo Echols MD [facility] [place]        Today's Diagnoses     Coronary artery disease involving native coronary artery of native heart without angina pectoris    -  1    Chronic systolic heart failure (H)        Pure hypercholesterolemia        Cardiomyopathy, idiopathic (H)        Nonrheumatic aortic valve stenosis        Essential hypertension, benign        Peripheral edema        Iron deficiency anemia secondary to inadequate dietary iron intake        Chronic kidney disease, stage III (moderate)           Follow-ups after your visit        Additional Services     Follow-Up with Cardiac Advanced Practice Provider           Follow-Up with Cardiologist                 Your next 10 appointments already scheduled     Nov 30, 2017  2:30 PM CST   Remote PPM Check with CUENCA TECH1   [facility] [place] ([facility])    [address] [place] [id number] [phone]           This appointment is for a remote check of your pacemaker.  This is not an appointment at the office.              Future tests that were ordered for you today     Open Future Orders        Priority Expected Expires Ordered    Basic metabolic panel Routine 4/21/2018 10/23/2018 10/23/2017    Follow-Up with Cardiologist Routine 4/21/2018 10/23/2018 10/23/2017    Echocardiogram Routine 4/21/2018 10/23/2018 10/23/2017    Basic metabolic panel Routine 1/21/2018 10/23/2018 10/23/2017    Follow-Up with Cardiac Advanced Practice Provider Routine 1/21/2018 10/23/2018 10/23/2017            Who to contact     If you have questions or need follow up information about today's clinic visit or your schedule please contact  "[facility] AT [place] directly at [phone].  Normal or non-critical lab and imaging results will be communicated to you by MyChart, letter or phone within 4 business days after the clinic has received the results. If you do not hear from us within 7 days, please contact the clinic through e(ye)BRAINhart or phone. If you have a critical or abnormal lab result, we will notify you by phone as soon as possible.  Submit refill requests through [employer] or call your pharmacy and they will forward the refill request to us. Please allow 3 business days for your refill to be completed.          Additional Information About Your Visit        e(ye)BRAINharInformaat Information     [employer] gives you secure access to your electronic health record. If you see a primary care provider, you can also send messages to your care team and make appointments. If you have questions, please call your primary care clinic.  If you do not have a primary care provider, please call [phone] and they will assist you.        Care EveryWhere ID     This is your Care EveryWhere ID. This could be used by other organizations to access your [place] medical records  [id number]        Your Vitals Were     Pulse Height BMI (Body Mass Index)             71 1.651 m (5' 5\") 29.22 kg/m2          Blood Pressure from Last 3 Encounters:   10/23/17 137/72   09/25/17 130/62   08/28/17 108/60    Weight from Last 3 Encounters:   10/23/17 79.7 kg (175 lb 9.6 oz)   09/25/17 77.7 kg (171 lb 4.8 oz)   08/28/17 78.3 kg (172 lb 9.6 oz)              We Performed the Following     Follow-Up with Cardiologist        Primary Care Provider Office Phone # Fax #    Ander Shrestha [phone]       [address] [place] [id number]        Equal Access to Services     SHELBY SHEPARD : Luba Lainez, waaxda luqbaljinder, qaybta kaalmabrooklyn pressley. So [facility] [phone].    ATENCIÓN: " Si padma graham, tiene a villalba disposición servicios gratuitos de asistencia lingüística. Itz penn [phone].    We comply with applicable federal civil rights laws and [place] laws. We do not discriminate on the basis of race, color, national origin, age, disability, sex, sexual orientation, or gender identity.            Thank you!     Thank you for choosing [facility] HEART AT [place]  for your care. Our goal is always to provide you with excellent care. Hearing back from our patients is one way we can continue to improve our services. Please take a few minutes to complete the written survey that you may receive in the mail after your visit with us. Thank you!             Your Updated Medication List - Protect others around you: Learn how to safely use, store and throw away your medicines at www.disposemymeds.org.          This list is accurate as of: 10/23/17  1:33 PM.  Always use your most recent med list.                   Brand Name Dispense Instructions for use Diagnosis    aspirin 81 MG tablet      Take 81 mg by mouth daily        calcium citrate 950 MG tablet    CALCITRATE     Take 2 tablets by mouth daily At Noon        carvedilol 25 MG tablet    COREG    180 tablet    TAKE 1 TABLET TWICE A DAY WITH MEALS    Cardiomyopathy, idiopathic (H), Chronic systolic heart failure (H), Essential hypertension, benign, Coronary artery disease involving native coronary artery of native heart without angina pectoris       fluticasone 50 MCG/ACT spray    FLONASE    16 g    Spray 1-2 sprays into both nostrils daily as needed for rhinitis or allergies (TAKE ONCE DAILY DURIGN ALLERGY SEASON)    Seasonal allergic rhinitis       hydrALAZINE 25 MG tablet    APRESOLINE    270 tablet    Take 1 tablet (25 mg) by mouth 3 times daily    Acute on chronic congestive heart failure, unspecified congestive heart failure type (H)       Iron Tabs      Take 324 mg by mouth daily Patient states she takes OTC iron  furrous gluconate 324 mg tablets USP        isosorbide mononitrate 30 MG 24 hr tablet    IMDUR    45 tablet    1/2 tablet daily AT NIGHT    Cardiomyopathy, idiopathic (H)       levothyroxine 112 MCG tablet    SYNTHROID/LEVOTHROID    90 tablet    TAKE 1 TABLET DAILY    Hypothyroidism, unspecified       Lutein 20 MG Caps      Take 20 mg by mouth daily        MELATONIN PO      Take 3 mg by mouth nightly as needed        prednisoLONE acetate 1 % ophthalmic susp    PRED FORTE    1 Bottle    Place 1 drop into the right eye every evening        simvastatin 20 MG tablet    ZOCOR    90 tablet    Take 1 tablet (20 mg) by mouth At Bedtime    Coronary artery disease involving native coronary artery of native heart without angina pectoris       spironolactone 25 MG tablet    ALDACTONE    45 tablet    Take 0.5 tablets (12.5 mg) by mouth daily    Cardiomyopathy, idiopathic (H), Localized edema       torsemide 20 MG tablet    DEMADEX     10mg or 1/2 tablet daily at bedtime for 2 days, then on 3rd night, takes 1 tablet or 20mg x 1 day, and so on        VITAMIN D (CHOLECALCIFEROL) PO      Take 2,000 Units by mouth daily

## 2017-10-27 ENCOUNTER — CARE COORDINATION (OUTPATIENT)
Dept: CARDIOLOGY | Facility: CLINIC | Age: 82
End: 2017-10-27

## 2017-10-27 NOTE — PROGRESS NOTES
Pt called, she saw Dr. Echols on Monday. She looked at print out and noticed her sodium was 136. Pt states in the past she has had too low of sodium and was in the hospital. Pt wants to know if she should be changing anything to get sodium up around 140. Reviewed labs with pt for the last 6 months. Reviewed sodium within normal limits and stable on current regimen over the last 6 months. Pt already limites her fluids to about 40-48 ounces per day. She states she eats well under 2000mg of sodium per day. Reviewed with pt that I do not recommend making any changes, as she just saw Dr. Echols, and he thought pt doing well. We discussed different reason sodium in blood could worsen. Pt questioned wether she needs to have repeat labs in a month to be safe. Told pt I did not think necessary as it has been pretty stable the last 6 months. Told pt as long as she is feeling well, wt stable, and no changes in medications, I do not think we need to change anything or get labs again. Pt in agreement with plan. CARMELLA Gee 8:37 AM 10/27/17

## 2017-11-03 ENCOUNTER — CARE COORDINATION (OUTPATIENT)
Dept: CARDIOLOGY | Facility: CLINIC | Age: 82
End: 2017-11-03

## 2017-11-03 NOTE — PROGRESS NOTES
Patient calling with questions regarding the TAVR procedure and symptoms the patient should be watching for.. All questions answered to the patients satisfaction. Patient agreed to contact myself or Diana if she starts experiencing symptoms. no other tasks to be done at this time. Abiola Richards

## 2017-11-22 NOTE — TELEPHONE ENCOUNTER
Prescription(s) sent electronically to specified pharmacy.  Due for labs, will get it done when she has her labs done in Jan at cardiology clinic

## 2017-11-30 NOTE — MR AVS SNAPSHOT
After Visit Summary   11/30/2017    Mckayla Oconnell    MRN: [id number]           Patient Information     Date Of Birth          7/2/1927        Visit Information        Provider Department      11/30/2017 2:30 PM [facility]        Today's Diagnoses     Cardiac pacemaker in situ    -  1       Follow-ups after your visit        Additional Services     Follow-Up with Device Clinic                 Your next 10 appointments already scheduled     Nov 30, 2017  2:30 PM CST   Remote PPM Check with BANG BHAGAT   [facility]   [place] ([facility])    [address] [place] [address] [address]  [facility] [id number] [phone]           This appointment is for a remote check of your pacemaker.  This is not an appointment at the office.              Future tests that were ordered for you today     Open Future Orders        Priority Expected Expires Ordered    Follow-Up with Device Clinic Routine 2/28/2018 11/30/2018 11/30/2017            Who to contact     If you have questions or need follow up information about today's clinic visit or your schedule please contact [facility] directly at [phone].  Normal or non-critical lab and imaging results will be communicated to you by [employer], letter or phone within 4 business days after the clinic has received the results. If you do not hear from us within 7 days, please contact the clinic through [employer] or phone. If you have a critical or abnormal lab result, we will notify you by phone as soon as possible.  Submit refill requests through [employer] or call your pharmacy and they will forward the refill request to us. Please allow 3 business days for your refill to be completed.          Additional Information About Your Visit        MyChart Information     [employer] gives you secure access to your electronic health record. If you see a primary care  provider, you can also send messages to your care team and make appointments. If you have questions, please call your primary care clinic.  If you do not have a primary care provider, please call [phone] and they will assist you.        Care EveryWhere ID     This is your Care EveryWhere ID. This could be used by other organizations to access your [place] medical records  [id number]         Blood Pressure from Last 3 Encounters:   10/23/17 137/72   09/25/17 130/62   08/28/17 108/60    Weight from Last 3 Encounters:   10/23/17 79.7 kg (175 lb 9.6 oz)   09/25/17 77.7 kg (171 lb 4.8 oz)   08/28/17 78.3 kg (172 lb 9.6 oz)              We Performed the Following     INTERROGATION DEVICE EVAL REMOTE, PACER/ICD (23994)     PM DEVICE INTERROGATE REMOTE (81312)        Primary Care Provider Office Phone # Fax #    Ander Shrestha [phone]       [address] [place] [address]        Equal Access to Services     [facility] Center[facility]AS : Hadii aad ku hadasho Soomaali, waaxda luqadaha, qaybta kaalmada adeegyada, [place] santana . So [facility] [phone].    ATENCIÓN: Si habla español, tiene a villalba disposición servicios gratuitos de asistencia lingüística. Llame al [phone].    We comply with applicable federal civil rights laws and [place] laws. We do not discriminate on the basis of race, color, national origin, age, disability, sex, sexual orientation, or gender identity.            Thank you!     Thank you for choosing [facility] HEART [facility]  for your care. Our goal is always to provide you with excellent care. Hearing back from our patients is one way we can continue to improve our services. Please take a few minutes to complete the written survey that you may receive in the mail after your visit with us. Thank you!             Your Updated Medication List - Protect others around you: Learn how to safely use, store and throw away your  medicines at www.disposemymeds.org.          This list is accurate as of: 11/30/17  8:14 AM.  Always use your most recent med list.                   Brand Name Dispense Instructions for use Diagnosis    aspirin 81 MG tablet      Take 81 mg by mouth daily        calcium citrate 950 MG tablet    CALCITRATE     Take 2 tablets by mouth daily At Noon        carvedilol 25 MG tablet    COREG    180 tablet    TAKE 1 TABLET TWICE A DAY WITH MEALS    Cardiomyopathy, idiopathic (H), Chronic systolic heart failure (H), Essential hypertension, benign, Coronary artery disease involving native coronary artery of native heart without angina pectoris       fluticasone 50 MCG/ACT spray    FLONASE    16 g    Spray 1-2 sprays into both nostrils daily as needed for rhinitis or allergies (TAKE ONCE DAILY DURIGN ALLERGY SEASON)    Seasonal allergic rhinitis       hydrALAZINE 25 MG tablet    APRESOLINE    270 tablet    Take 1 tablet (25 mg) by mouth 3 times daily    Acute on chronic congestive heart failure, unspecified congestive heart failure type (H)       Iron Tabs      Take 324 mg by mouth daily Patient states she takes OTC iron furrous gluconate 324 mg tablets USP        isosorbide mononitrate 30 MG 24 hr tablet    IMDUR    45 tablet    1/2 tablet daily AT NIGHT    Cardiomyopathy, idiopathic (H)       levothyroxine 112 MCG tablet    SYNTHROID/LEVOTHROID    90 tablet    TAKE 1 TABLET DAILY    Hypothyroidism, unspecified type       Lutein 20 MG Caps      Take 20 mg by mouth daily        MELATONIN PO      Take 3 mg by mouth nightly as needed        prednisoLONE acetate 1 % ophthalmic susp    PRED FORTE    1 Bottle    Place 1 drop into the right eye every evening        simvastatin 20 MG tablet    ZOCOR    90 tablet    Take 1 tablet (20 mg) by mouth At Bedtime    Coronary artery disease involving native coronary artery of native heart without angina pectoris       spironolactone 25 MG tablet    ALDACTONE    45 tablet    Take 0.5 tablets  (12.5 mg) by mouth daily    Cardiomyopathy, idiopathic (H), Localized edema       torsemide 20 MG tablet    DEMADEX     10mg or 1/2 tablet daily at bedtime for 2 days, then on 3rd night, takes 1 tablet or 20mg x 1 day, and so on        VITAMIN D (CHOLECALCIFEROL) PO      Take 2,000 Units by mouth daily

## 2017-11-30 NOTE — PROGRESS NOTES
[employer] CRT-P Remote PPM Device Check  AP: 87 % BIVP: 99 %  Mode: DDDR        Presenting Rhythm: AP/BIVP  Heart Rate: Rates mostly in the 70's per histogram  Sensing: Stable    Pacing Threshold: Stable    Impedance: Stable  Battery Status: 9.5 years  Atrial Arrhythmia: None  Ventricular Arrhythmia: None     Care Plan: F/u Annual threshold in 3 months. Gave patient results over the phone. Duy,CVT

## 2017-12-27 NOTE — PROGRESS NOTES
I'm concerned with her wts going up.  Let's have her take 5 mg one day alternating with 10 mg the other and see how that goes.  Divya Kraft PA-C 12/27/2017 1:08 PM

## 2017-12-27 NOTE — PROGRESS NOTES
Telemanagment     Alert for: Wt above max parameters   Current diuretic: Torsemide 10mg daily   Heart Failure sx: None reported   Plan: Last seen in clinic on 10/23/17 by Dr. Echols. Pt doing well, no changes made. Next OV 1/18/18 with Anabel and labs and device check prior.     Called pt, she states she is feeling well. She denies any SOB, swelling, or bloating. Pt states when she saw Dr. Echols, she was taking torsemide 10mg daily and doing well. Per pt, Dr. Echols told her she could wean off torsemide if she wanted. Pt states she has weaned herself down to Torsemide 5mg daily. She is cutting a 20mg tablet in 1/4's. Pt states the pill cuts easy so it is not a problem. Told pt I would update Anabel and if she wanted I could send her in a smaller tablet of Torsemide. Pt states she has no problem cutting the one she has, so does not need a new Rx at this time.     Messaged update to Anabel. CARMELLA Gee 12:46 PM 12/27/17

## 2017-12-27 NOTE — PROGRESS NOTES
Called pt, reviewed SMore's concerned with wt slowly trending up and decreasing Torsemide. Reviewed recommendation to increase Torsemide to 5mg every other day, alternating with 10mg every other day. Pt willing to do this and stated understanding. Med list updated. Will continue to monitor. CARMELLA Gee 1:35 PM 12/27/17

## 2017-12-28 NOTE — PROGRESS NOTES
Telemanagment     Alert for: Wt remains above parameters   Current diuretic: Torsemide 5mg every other day, alternating with 10mg every other day  Heart Failure sx: None  Plan: Torsemide increased from 5mg daily to 5mg every other day, alternating with 10mg every other day starting yesterday. Will continue to monitor for now and given increased torsemide some time, since pt not reporting symptoms. CARMELLA Gee 9:56 AM 12/28/17

## 2017-12-29 NOTE — PROGRESS NOTES
Telemanagment     Alert for: Wt above parameters   Current diuretic: Torsemide 5 mg every other day, alternating with 10mg every other day.   Heart Failure sx: None   Plan: No symptoms reported. Torsemide increased this week. Will continue to monitor. CARMELLA Gee 12:03 PM 12/29/17

## 2018-01-01 ENCOUNTER — ALLIED HEALTH/NURSE VISIT (OUTPATIENT)
Dept: CARDIOLOGY | Facility: CLINIC | Age: 83
End: 2018-01-01
Payer: COMMERCIAL

## 2018-01-01 ENCOUNTER — CARE COORDINATION (OUTPATIENT)
Dept: CARDIOLOGY | Facility: CLINIC | Age: 83
End: 2018-01-01

## 2018-01-01 ENCOUNTER — APPOINTMENT (OUTPATIENT)
Dept: CARDIOLOGY | Facility: CLINIC | Age: 83
DRG: 291 | End: 2018-01-01
Attending: HOSPITALIST
Payer: COMMERCIAL

## 2018-01-01 ENCOUNTER — THERAPY VISIT (OUTPATIENT)
Dept: PHYSICAL THERAPY | Facility: CLINIC | Age: 83
End: 2018-01-01
Payer: COMMERCIAL

## 2018-01-01 ENCOUNTER — APPOINTMENT (OUTPATIENT)
Dept: GENERAL RADIOLOGY | Facility: CLINIC | Age: 83
DRG: 291 | End: 2018-01-01
Attending: HOSPITALIST
Payer: COMMERCIAL

## 2018-01-01 ENCOUNTER — TRANSFERRED RECORDS (OUTPATIENT)
Dept: HEALTH INFORMATION MANAGEMENT | Facility: CLINIC | Age: 83
End: 2018-01-01

## 2018-01-01 ENCOUNTER — APPOINTMENT (OUTPATIENT)
Dept: GENERAL RADIOLOGY | Facility: CLINIC | Age: 83
DRG: 291 | End: 2018-01-01
Attending: EMERGENCY MEDICINE
Payer: COMMERCIAL

## 2018-01-01 ENCOUNTER — DOCUMENTATION ONLY (OUTPATIENT)
Dept: CARDIOLOGY | Facility: CLINIC | Age: 83
End: 2018-01-01

## 2018-01-01 ENCOUNTER — APPOINTMENT (OUTPATIENT)
Dept: CT IMAGING | Facility: CLINIC | Age: 83
DRG: 291 | End: 2018-01-01
Attending: HOSPITALIST
Payer: COMMERCIAL

## 2018-01-01 ENCOUNTER — OFFICE VISIT (OUTPATIENT)
Dept: INTERNAL MEDICINE | Facility: CLINIC | Age: 83
End: 2018-01-01
Payer: COMMERCIAL

## 2018-01-01 ENCOUNTER — OFFICE VISIT (OUTPATIENT)
Dept: CARDIOLOGY | Facility: CLINIC | Age: 83
End: 2018-01-01
Attending: PHYSICIAN ASSISTANT
Payer: MEDICARE

## 2018-01-01 ENCOUNTER — PRE VISIT (OUTPATIENT)
Dept: CARDIOLOGY | Facility: CLINIC | Age: 83
End: 2018-01-01

## 2018-01-01 ENCOUNTER — TELEPHONE (OUTPATIENT)
Dept: CARDIOLOGY | Facility: CLINIC | Age: 83
End: 2018-01-01

## 2018-01-01 ENCOUNTER — APPOINTMENT (OUTPATIENT)
Dept: OCCUPATIONAL THERAPY | Facility: CLINIC | Age: 83
DRG: 291 | End: 2018-01-01
Attending: HOSPITALIST
Payer: COMMERCIAL

## 2018-01-01 ENCOUNTER — OFFICE VISIT (OUTPATIENT)
Dept: CARDIOLOGY | Facility: CLINIC | Age: 83
End: 2018-01-01
Attending: INTERNAL MEDICINE
Payer: COMMERCIAL

## 2018-01-01 ENCOUNTER — TELEPHONE (OUTPATIENT)
Dept: INTERNAL MEDICINE | Facility: CLINIC | Age: 83
End: 2018-01-01

## 2018-01-01 ENCOUNTER — APPOINTMENT (OUTPATIENT)
Dept: SPEECH THERAPY | Facility: CLINIC | Age: 83
DRG: 291 | End: 2018-01-01
Payer: COMMERCIAL

## 2018-01-01 ENCOUNTER — HOSPITAL ENCOUNTER (EMERGENCY)
Facility: CLINIC | Age: 83
Discharge: HOME | End: 2018-11-06
Attending: EMERGENCY MEDICINE | Admitting: EMERGENCY MEDICINE
Payer: COMMERCIAL

## 2018-01-01 ENCOUNTER — MYC MEDICAL ADVICE (OUTPATIENT)
Dept: CARDIOLOGY | Facility: CLINIC | Age: 83
End: 2018-01-01

## 2018-01-01 ENCOUNTER — CARE COORDINATION (OUTPATIENT)
Dept: LAB | Facility: CLINIC | Age: 83
End: 2018-01-01

## 2018-01-01 ENCOUNTER — HOSPITAL ENCOUNTER (INPATIENT)
Facility: CLINIC | Age: 83
LOS: 5 days | Discharge: HOSPICE | DRG: 291 | End: 2018-11-16
Attending: EMERGENCY MEDICINE | Admitting: HOSPITALIST
Payer: COMMERCIAL

## 2018-01-01 ENCOUNTER — HOSPITAL ENCOUNTER (OUTPATIENT)
Dept: MAMMOGRAPHY | Facility: CLINIC | Age: 83
Discharge: HOME | End: 2018-04-17
Attending: INTERNAL MEDICINE | Admitting: INTERNAL MEDICINE
Payer: COMMERCIAL

## 2018-01-01 ENCOUNTER — APPOINTMENT (OUTPATIENT)
Dept: PHYSICAL THERAPY | Facility: CLINIC | Age: 83
DRG: 291 | End: 2018-01-01
Attending: HOSPITALIST
Payer: COMMERCIAL

## 2018-01-01 ENCOUNTER — APPOINTMENT (OUTPATIENT)
Dept: SPEECH THERAPY | Facility: CLINIC | Age: 83
DRG: 291 | End: 2018-01-01
Attending: HOSPITALIST
Payer: COMMERCIAL

## 2018-01-01 ENCOUNTER — HOSPITAL ENCOUNTER (OUTPATIENT)
Dept: CARDIOLOGY | Facility: CLINIC | Age: 83
Discharge: HOME | End: 2018-04-20
Attending: INTERNAL MEDICINE | Admitting: INTERNAL MEDICINE
Payer: COMMERCIAL

## 2018-01-01 VITALS
HEART RATE: 74 BPM | OXYGEN SATURATION: 91 % | SYSTOLIC BLOOD PRESSURE: 120 MMHG | RESPIRATION RATE: 16 BRPM | WEIGHT: 175 LBS | TEMPERATURE: 98.4 F | BODY MASS INDEX: 31 KG/M2 | DIASTOLIC BLOOD PRESSURE: 62 MMHG

## 2018-01-01 VITALS
BODY MASS INDEX: 29.94 KG/M2 | SYSTOLIC BLOOD PRESSURE: 120 MMHG | WEIGHT: 179.9 LBS | HEART RATE: 70 BPM | DIASTOLIC BLOOD PRESSURE: 76 MMHG | OXYGEN SATURATION: 97 % | TEMPERATURE: 97.6 F

## 2018-01-01 VITALS
RESPIRATION RATE: 16 BRPM | HEART RATE: 70 BPM | BODY MASS INDEX: 32.37 KG/M2 | TEMPERATURE: 97.5 F | SYSTOLIC BLOOD PRESSURE: 111 MMHG | WEIGHT: 182.76 LBS | DIASTOLIC BLOOD PRESSURE: 64 MMHG | OXYGEN SATURATION: 96 %

## 2018-01-01 VITALS
SYSTOLIC BLOOD PRESSURE: 122 MMHG | OXYGEN SATURATION: 93 % | DIASTOLIC BLOOD PRESSURE: 46 MMHG | WEIGHT: 174.3 LBS | BODY MASS INDEX: 30.88 KG/M2 | RESPIRATION RATE: 12 BRPM | HEART RATE: 71 BPM | TEMPERATURE: 97.7 F

## 2018-01-01 VITALS
BODY MASS INDEX: 32.02 KG/M2 | WEIGHT: 180.7 LBS | HEART RATE: 70 BPM | OXYGEN SATURATION: 96 % | DIASTOLIC BLOOD PRESSURE: 72 MMHG | HEIGHT: 63 IN | SYSTOLIC BLOOD PRESSURE: 116 MMHG

## 2018-01-01 VITALS
BODY MASS INDEX: 29.22 KG/M2 | SYSTOLIC BLOOD PRESSURE: 130 MMHG | DIASTOLIC BLOOD PRESSURE: 72 MMHG | HEART RATE: 70 BPM | OXYGEN SATURATION: 95 % | WEIGHT: 175.4 LBS | HEIGHT: 65 IN

## 2018-01-01 VITALS
DIASTOLIC BLOOD PRESSURE: 80 MMHG | SYSTOLIC BLOOD PRESSURE: 140 MMHG | OXYGEN SATURATION: 93 % | WEIGHT: 176.7 LBS | HEART RATE: 70 BPM | TEMPERATURE: 97.6 F | BODY MASS INDEX: 29.4 KG/M2

## 2018-01-01 VITALS
BODY MASS INDEX: 29.19 KG/M2 | HEART RATE: 80 BPM | SYSTOLIC BLOOD PRESSURE: 157 MMHG | WEIGHT: 175.4 LBS | DIASTOLIC BLOOD PRESSURE: 77 MMHG

## 2018-01-01 VITALS
HEART RATE: 76 BPM | BODY MASS INDEX: 31.45 KG/M2 | SYSTOLIC BLOOD PRESSURE: 138 MMHG | WEIGHT: 177.5 LBS | DIASTOLIC BLOOD PRESSURE: 68 MMHG | HEIGHT: 63 IN

## 2018-01-01 DIAGNOSIS — I50.22 CHRONIC SYSTOLIC HEART FAILURE (H): ICD-10-CM

## 2018-01-01 DIAGNOSIS — I35.0 NONRHEUMATIC AORTIC VALVE STENOSIS: ICD-10-CM

## 2018-01-01 DIAGNOSIS — I42.9 CARDIOMYOPATHY, IDIOPATHIC (H): ICD-10-CM

## 2018-01-01 DIAGNOSIS — E87.1 HYPONATREMIA: ICD-10-CM

## 2018-01-01 DIAGNOSIS — D63.1 ANEMIA DUE TO STAGE 4 CHRONIC KIDNEY DISEASE TREATED WITH ERYTHROPOIETIN (H): ICD-10-CM

## 2018-01-01 DIAGNOSIS — I25.10 CORONARY ARTERY DISEASE INVOLVING NATIVE CORONARY ARTERY OF NATIVE HEART WITHOUT ANGINA PECTORIS: ICD-10-CM

## 2018-01-01 DIAGNOSIS — I10 ESSENTIAL HYPERTENSION, BENIGN: ICD-10-CM

## 2018-01-01 DIAGNOSIS — M54.50 ACUTE RIGHT-SIDED LOW BACK PAIN WITHOUT SCIATICA: ICD-10-CM

## 2018-01-01 DIAGNOSIS — H61.23 BILATERAL IMPACTED CERUMEN: Primary | ICD-10-CM

## 2018-01-01 DIAGNOSIS — E03.9 HYPOTHYROIDISM, UNSPECIFIED TYPE: ICD-10-CM

## 2018-01-01 DIAGNOSIS — Z95.0 CARDIAC PACEMAKER IN SITU: Primary | ICD-10-CM

## 2018-01-01 DIAGNOSIS — N18.30 CHRONIC KIDNEY DISEASE, STAGE III (MODERATE) (H): ICD-10-CM

## 2018-01-01 DIAGNOSIS — R11.0 NAUSEA: ICD-10-CM

## 2018-01-01 DIAGNOSIS — Z12.31 VISIT FOR SCREENING MAMMOGRAM: ICD-10-CM

## 2018-01-01 DIAGNOSIS — F51.01 PRIMARY INSOMNIA: Primary | ICD-10-CM

## 2018-01-01 DIAGNOSIS — J81.0 ACUTE PULMONARY EDEMA (H): ICD-10-CM

## 2018-01-01 DIAGNOSIS — Z95.0 CARDIAC PACEMAKER IN SITU: ICD-10-CM

## 2018-01-01 DIAGNOSIS — I50.22 CHRONIC SYSTOLIC CONGESTIVE HEART FAILURE (H): Primary | ICD-10-CM

## 2018-01-01 DIAGNOSIS — R60.0 PERIPHERAL EDEMA: Chronic | ICD-10-CM

## 2018-01-01 DIAGNOSIS — I25.10 CORONARY ARTERY DISEASE INVOLVING NATIVE CORONARY ARTERY OF NATIVE HEART WITHOUT ANGINA PECTORIS: Chronic | ICD-10-CM

## 2018-01-01 DIAGNOSIS — N18.4 ANEMIA DUE TO STAGE 4 CHRONIC KIDNEY DISEASE TREATED WITH ERYTHROPOIETIN (H): ICD-10-CM

## 2018-01-01 DIAGNOSIS — I50.22 CHRONIC SYSTOLIC CONGESTIVE HEART FAILURE (H): ICD-10-CM

## 2018-01-01 DIAGNOSIS — Z51.5 END OF LIFE CARE: ICD-10-CM

## 2018-01-01 DIAGNOSIS — M54.50 ACUTE RIGHT-SIDED LOW BACK PAIN WITHOUT SCIATICA: Primary | ICD-10-CM

## 2018-01-01 DIAGNOSIS — R41.82 ALTERED MENTAL STATUS, UNSPECIFIED ALTERED MENTAL STATUS TYPE: ICD-10-CM

## 2018-01-01 DIAGNOSIS — I42.9 CARDIOMYOPATHY, IDIOPATHIC (H): Primary | ICD-10-CM

## 2018-01-01 DIAGNOSIS — I25.10 CORONARY ARTERY DISEASE INVOLVING NATIVE CORONARY ARTERY OF NATIVE HEART WITHOUT ANGINA PECTORIS: Primary | Chronic | ICD-10-CM

## 2018-01-01 DIAGNOSIS — E03.9 HYPOTHYROIDISM: ICD-10-CM

## 2018-01-01 DIAGNOSIS — I36.1 NONRHEUMATIC TRICUSPID VALVE REGURGITATION: ICD-10-CM

## 2018-01-01 DIAGNOSIS — J30.2 SEASONAL ALLERGIC RHINITIS, UNSPECIFIED CHRONICITY, UNSPECIFIED TRIGGER: Primary | ICD-10-CM

## 2018-01-01 DIAGNOSIS — F51.01 PRIMARY INSOMNIA: ICD-10-CM

## 2018-01-01 DIAGNOSIS — I50.22 CHRONIC SYSTOLIC HEART FAILURE (H): Primary | ICD-10-CM

## 2018-01-01 DIAGNOSIS — H69.93 DYSFUNCTION OF BOTH EUSTACHIAN TUBES: ICD-10-CM

## 2018-01-01 DIAGNOSIS — I34.0 NONRHEUMATIC MITRAL VALVE REGURGITATION: ICD-10-CM

## 2018-01-01 DIAGNOSIS — I50.9 ACUTE ON CHRONIC CONGESTIVE HEART FAILURE, UNSPECIFIED HEART FAILURE TYPE (H): Primary | ICD-10-CM

## 2018-01-01 DIAGNOSIS — E78.00 PURE HYPERCHOLESTEROLEMIA: ICD-10-CM

## 2018-01-01 DIAGNOSIS — M54.2 NECK PAIN: ICD-10-CM

## 2018-01-01 LAB
ALBUMIN SERPL-MCNC: 2.6 G/DL (ref 3.4–5)
ALBUMIN SERPL-MCNC: 2.7 G/DL (ref 3.4–5)
ALBUMIN SERPL-MCNC: 3 G/DL (ref 3.4–5)
ALBUMIN SERPL-MCNC: 3.3 G/DL (ref 3.4–5)
ALBUMIN UR-MCNC: 10 MG/DL
ALP SERPL-CCNC: 51 U/L (ref 40–150)
ALP SERPL-CCNC: 60 U/L (ref 40–150)
ALP SERPL-CCNC: 61 U/L (ref 40–150)
ALP SERPL-CCNC: 72 U/L (ref 40–150)
ALT SERPL W P-5'-P-CCNC: 34 U/L (ref 0–50)
ALT SERPL W P-5'-P-CCNC: 34 U/L (ref 0–50)
ALT SERPL W P-5'-P-CCNC: 41 U/L (ref 0–50)
ALT SERPL W P-5'-P-CCNC: 49 U/L (ref 0–50)
ALT SERPL W P-5'-P-CCNC: <5 U/L (ref 5–30)
AMMONIA PLAS-SCNC: 19 UMOL/L (ref 10–50)
ANION GAP SERPL CALCULATED.3IONS-SCNC: 10.6 MMOL/L (ref 6–17)
ANION GAP SERPL CALCULATED.3IONS-SCNC: 10.9 MMOL/L (ref 6–17)
ANION GAP SERPL CALCULATED.3IONS-SCNC: 11.1 MMOL/L (ref 6–17)
ANION GAP SERPL CALCULATED.3IONS-SCNC: 11.7 MMOL/L (ref 6–17)
ANION GAP SERPL CALCULATED.3IONS-SCNC: 12.1 MMOL/L (ref 6–17)
ANION GAP SERPL CALCULATED.3IONS-SCNC: 12.9 MMOL/L (ref 6–17)
ANION GAP SERPL CALCULATED.3IONS-SCNC: 2 MMOL/L (ref 3–14)
ANION GAP SERPL CALCULATED.3IONS-SCNC: 4 MMOL/L (ref 3–14)
ANION GAP SERPL CALCULATED.3IONS-SCNC: 5 MMOL/L (ref 3–14)
ANION GAP SERPL CALCULATED.3IONS-SCNC: 6 MMOL/L (ref 3–14)
ANION GAP SERPL CALCULATED.3IONS-SCNC: 6 MMOL/L (ref 3–14)
ANION GAP SERPL CALCULATED.3IONS-SCNC: 8.8 MMOL/L (ref 6–17)
APPEARANCE UR: CLEAR
AST SERPL W P-5'-P-CCNC: 23 U/L (ref 0–45)
AST SERPL W P-5'-P-CCNC: 27 U/L (ref 0–45)
AST SERPL W P-5'-P-CCNC: 28 U/L (ref 0–45)
AST SERPL W P-5'-P-CCNC: 34 U/L (ref 0–45)
BACTERIA SPEC CULT: NO GROWTH
BACTERIA SPEC CULT: NO GROWTH
BASE EXCESS BLDV CALC-SCNC: 1.7 MMOL/L
BASE EXCESS BLDV CALC-SCNC: 2 MMOL/L
BASE EXCESS BLDV CALC-SCNC: 2.2 MMOL/L
BASE EXCESS BLDV CALC-SCNC: 2.7 MMOL/L
BASOPHILS # BLD AUTO: 0 10E9/L (ref 0–0.2)
BASOPHILS NFR BLD AUTO: 0 %
BASOPHILS NFR BLD AUTO: 0.2 %
BASOPHILS NFR BLD AUTO: 0.2 %
BASOPHILS NFR BLD AUTO: 0.5 %
BILIRUB SERPL-MCNC: 0.2 MG/DL (ref 0.2–1.3)
BILIRUB SERPL-MCNC: 0.2 MG/DL (ref 0.2–1.3)
BILIRUB SERPL-MCNC: 0.3 MG/DL (ref 0.2–1.3)
BILIRUB SERPL-MCNC: 0.3 MG/DL (ref 0.2–1.3)
BILIRUB UR QL STRIP: NEGATIVE
BUN SERPL-MCNC: 23 MG/DL (ref 7–30)
BUN SERPL-MCNC: 25 MG/DL (ref 7–30)
BUN SERPL-MCNC: 27 MG/DL (ref 7–30)
BUN SERPL-MCNC: 27 MG/DL (ref 7–30)
BUN SERPL-MCNC: 33 MG/DL (ref 7–30)
BUN SERPL-MCNC: 35 MG/DL (ref 7–30)
BUN SERPL-MCNC: 38 MG/DL (ref 7–30)
BUN SERPL-MCNC: 46 MG/DL (ref 7–30)
BUN SERPL-MCNC: 51 MG/DL (ref 7–30)
BUN SERPL-MCNC: 52 MG/DL (ref 7–30)
BUN SERPL-MCNC: 54 MG/DL (ref 7–30)
BUN SERPL-MCNC: 54 MG/DL (ref 7–30)
BUN SERPL-MCNC: 58 MG/DL (ref 7–30)
CALCIUM SERPL-MCNC: 10 MG/DL (ref 8.5–10.5)
CALCIUM SERPL-MCNC: 8 MG/DL (ref 8.5–10.1)
CALCIUM SERPL-MCNC: 8.1 MG/DL (ref 8.5–10.1)
CALCIUM SERPL-MCNC: 8.2 MG/DL (ref 8.5–10.1)
CALCIUM SERPL-MCNC: 8.3 MG/DL (ref 8.5–10.1)
CALCIUM SERPL-MCNC: 8.3 MG/DL (ref 8.5–10.1)
CALCIUM SERPL-MCNC: 8.4 MG/DL (ref 8.5–10.1)
CALCIUM SERPL-MCNC: 8.5 MG/DL (ref 8.5–10.1)
CALCIUM SERPL-MCNC: 8.6 MG/DL (ref 8.5–10.1)
CALCIUM SERPL-MCNC: 8.7 MG/DL (ref 8.5–10.1)
CALCIUM SERPL-MCNC: 9 MG/DL (ref 8.5–10.5)
CALCIUM SERPL-MCNC: 9.2 MG/DL (ref 8.5–10.1)
CALCIUM SERPL-MCNC: 9.4 MG/DL (ref 8.5–10.5)
CALCIUM SERPL-MCNC: 9.5 MG/DL (ref 8.5–10.5)
CALCIUM SERPL-MCNC: 9.5 MG/DL (ref 8.5–10.5)
CALCIUM SERPL-MCNC: 9.6 MG/DL (ref 8.5–10.5)
CALCIUM SERPL-MCNC: 9.9 MG/DL (ref 8.5–10.5)
CHLORIDE SERPL-SCNC: 100 MMOL/L (ref 94–109)
CHLORIDE SERPL-SCNC: 100 MMOL/L (ref 98–107)
CHLORIDE SERPL-SCNC: 101 MMOL/L (ref 94–109)
CHLORIDE SERPL-SCNC: 101 MMOL/L (ref 98–107)
CHLORIDE SERPL-SCNC: 102 MMOL/L (ref 98–107)
CHLORIDE SERPL-SCNC: 103 MMOL/L (ref 94–109)
CHLORIDE SERPL-SCNC: 103 MMOL/L (ref 94–109)
CHLORIDE SERPL-SCNC: 104 MMOL/L (ref 98–107)
CHLORIDE SERPL-SCNC: 96 MMOL/L (ref 94–109)
CHLORIDE SERPL-SCNC: 98 MMOL/L (ref 94–109)
CHLORIDE SERPL-SCNC: 98 MMOL/L (ref 94–109)
CHOLEST SERPL-MCNC: 109 MG/DL
CK SERPL-CCNC: 69 U/L (ref 30–225)
CO2 SERPL-SCNC: 25 MMOL/L (ref 23–29)
CO2 SERPL-SCNC: 26 MMOL/L (ref 20–32)
CO2 SERPL-SCNC: 26 MMOL/L (ref 23–29)
CO2 SERPL-SCNC: 26 MMOL/L (ref 23–29)
CO2 SERPL-SCNC: 27 MMOL/L (ref 20–32)
CO2 SERPL-SCNC: 27 MMOL/L (ref 20–32)
CO2 SERPL-SCNC: 27 MMOL/L (ref 23–29)
CO2 SERPL-SCNC: 28 MMOL/L (ref 20–32)
CO2 SERPL-SCNC: 28 MMOL/L (ref 23–29)
CO2 SERPL-SCNC: 29 MMOL/L (ref 20–32)
CO2 SERPL-SCNC: 29 MMOL/L (ref 23–29)
CO2 SERPL-SCNC: 30 MMOL/L (ref 20–32)
CO2 SERPL-SCNC: 30 MMOL/L (ref 23–29)
CO2 SERPL-SCNC: 31 MMOL/L (ref 20–32)
COLOR UR AUTO: YELLOW
CREAT SERPL-MCNC: 1.34 MG/DL (ref 0.7–1.3)
CREAT SERPL-MCNC: 1.39 MG/DL (ref 0.7–1.3)
CREAT SERPL-MCNC: 1.39 MG/DL (ref 0.7–1.3)
CREAT SERPL-MCNC: 1.42 MG/DL (ref 0.7–1.3)
CREAT SERPL-MCNC: 1.46 MG/DL (ref 0.52–1.04)
CREAT SERPL-MCNC: 1.46 MG/DL (ref 0.7–1.3)
CREAT SERPL-MCNC: 1.5 MG/DL (ref 0.7–1.3)
CREAT SERPL-MCNC: 1.67 MG/DL (ref 0.7–1.3)
CREAT SERPL-MCNC: 1.75 MG/DL (ref 0.52–1.04)
CREAT SERPL-MCNC: 1.78 MG/DL (ref 0.52–1.04)
CREAT SERPL-MCNC: 1.79 MG/DL (ref 0.52–1.04)
CREAT SERPL-MCNC: 1.81 MG/DL (ref 0.52–1.04)
CREAT SERPL-MCNC: 1.83 MG/DL (ref 0.52–1.04)
CREAT SERPL-MCNC: 1.84 MG/DL (ref 0.52–1.04)
CREAT SERPL-MCNC: 1.85 MG/DL (ref 0.52–1.04)
CREAT SERPL-MCNC: 1.87 MG/DL (ref 0.52–1.04)
CREAT UR-MCNC: 100 MG/DL
DIFFERENTIAL METHOD BLD: ABNORMAL
EOSINOPHIL # BLD AUTO: 0.1 10E9/L (ref 0–0.7)
EOSINOPHIL NFR BLD AUTO: 1.3 %
EOSINOPHIL NFR BLD AUTO: 1.3 %
EOSINOPHIL NFR BLD AUTO: 1.7 %
EOSINOPHIL NFR BLD AUTO: 1.8 %
ERYTHROCYTE [DISTWIDTH] IN BLOOD BY AUTOMATED COUNT: 15.2 % (ref 10–15)
ERYTHROCYTE [DISTWIDTH] IN BLOOD BY AUTOMATED COUNT: 15.2 % (ref 10–15)
ERYTHROCYTE [DISTWIDTH] IN BLOOD BY AUTOMATED COUNT: 15.7 % (ref 10–15)
ERYTHROCYTE [DISTWIDTH] IN BLOOD BY AUTOMATED COUNT: 15.8 % (ref 10–15)
FRACT EXCRET NA UR+SERPL-RTO: 0.1 %
GFR SERPL CREATININE-BSD FRML MDRD: 25 ML/MIN/1.7M2
GFR SERPL CREATININE-BSD FRML MDRD: 26 ML/MIN/1.7M2
GFR SERPL CREATININE-BSD FRML MDRD: 27 ML/MIN/1.7M2
GFR SERPL CREATININE-BSD FRML MDRD: 29 ML/MIN/1.7M2
GFR SERPL CREATININE-BSD FRML MDRD: 33 ML/MIN/1.7M2
GFR SERPL CREATININE-BSD FRML MDRD: 34 ML/MIN/1.7M2
GFR SERPL CREATININE-BSD FRML MDRD: 34 ML/MIN/1.7M2
GFR SERPL CREATININE-BSD FRML MDRD: 35 ML/MIN/1.7M2
GFR SERPL CREATININE-BSD FRML MDRD: 36 ML/MIN/1.7M2
GFR SERPL CREATININE-BSD FRML MDRD: 36 ML/MIN/1.7M2
GFR SERPL CREATININE-BSD FRML MDRD: 37 ML/MIN/1.7M2
GLUCOSE BLDC GLUCOMTR-MCNC: 101 MG/DL (ref 70–99)
GLUCOSE BLDC GLUCOMTR-MCNC: 102 MG/DL (ref 70–99)
GLUCOSE BLDC GLUCOMTR-MCNC: 103 MG/DL (ref 70–99)
GLUCOSE BLDC GLUCOMTR-MCNC: 105 MG/DL (ref 70–99)
GLUCOSE BLDC GLUCOMTR-MCNC: 110 MG/DL (ref 70–99)
GLUCOSE BLDC GLUCOMTR-MCNC: 114 MG/DL (ref 70–99)
GLUCOSE BLDC GLUCOMTR-MCNC: 117 MG/DL (ref 70–99)
GLUCOSE BLDC GLUCOMTR-MCNC: 118 MG/DL (ref 70–99)
GLUCOSE BLDC GLUCOMTR-MCNC: 118 MG/DL (ref 70–99)
GLUCOSE BLDC GLUCOMTR-MCNC: 119 MG/DL (ref 70–99)
GLUCOSE BLDC GLUCOMTR-MCNC: 125 MG/DL (ref 70–99)
GLUCOSE BLDC GLUCOMTR-MCNC: 132 MG/DL (ref 70–99)
GLUCOSE BLDC GLUCOMTR-MCNC: 136 MG/DL (ref 70–99)
GLUCOSE BLDC GLUCOMTR-MCNC: 137 MG/DL (ref 70–99)
GLUCOSE BLDC GLUCOMTR-MCNC: 138 MG/DL (ref 70–99)
GLUCOSE BLDC GLUCOMTR-MCNC: 138 MG/DL (ref 70–99)
GLUCOSE BLDC GLUCOMTR-MCNC: 141 MG/DL (ref 70–99)
GLUCOSE BLDC GLUCOMTR-MCNC: 141 MG/DL (ref 70–99)
GLUCOSE BLDC GLUCOMTR-MCNC: 149 MG/DL (ref 70–99)
GLUCOSE BLDC GLUCOMTR-MCNC: 157 MG/DL (ref 70–99)
GLUCOSE BLDC GLUCOMTR-MCNC: 172 MG/DL (ref 70–99)
GLUCOSE BLDC GLUCOMTR-MCNC: 77 MG/DL (ref 70–99)
GLUCOSE BLDC GLUCOMTR-MCNC: 77 MG/DL (ref 70–99)
GLUCOSE BLDC GLUCOMTR-MCNC: 82 MG/DL (ref 70–99)
GLUCOSE BLDC GLUCOMTR-MCNC: 82 MG/DL (ref 70–99)
GLUCOSE BLDC GLUCOMTR-MCNC: 83 MG/DL (ref 70–99)
GLUCOSE BLDC GLUCOMTR-MCNC: 86 MG/DL (ref 70–99)
GLUCOSE BLDC GLUCOMTR-MCNC: 90 MG/DL (ref 70–99)
GLUCOSE BLDC GLUCOMTR-MCNC: 91 MG/DL (ref 70–99)
GLUCOSE BLDC GLUCOMTR-MCNC: 92 MG/DL (ref 70–99)
GLUCOSE BLDC GLUCOMTR-MCNC: 93 MG/DL (ref 70–99)
GLUCOSE BLDC GLUCOMTR-MCNC: 99 MG/DL (ref 70–99)
GLUCOSE SERPL-MCNC: 100 MG/DL (ref 70–105)
GLUCOSE SERPL-MCNC: 106 MG/DL (ref 70–99)
GLUCOSE SERPL-MCNC: 115 MG/DL (ref 70–99)
GLUCOSE SERPL-MCNC: 117 MG/DL (ref 70–105)
GLUCOSE SERPL-MCNC: 121 MG/DL (ref 70–105)
GLUCOSE SERPL-MCNC: 130 MG/DL (ref 70–99)
GLUCOSE SERPL-MCNC: 140 MG/DL (ref 70–99)
GLUCOSE SERPL-MCNC: 144 MG/DL (ref 70–99)
GLUCOSE SERPL-MCNC: 60 MG/DL (ref 70–99)
GLUCOSE SERPL-MCNC: 66 MG/DL (ref 70–99)
GLUCOSE SERPL-MCNC: 75 MG/DL (ref 70–99)
GLUCOSE SERPL-MCNC: 79 MG/DL (ref 70–105)
GLUCOSE SERPL-MCNC: 83 MG/DL (ref 70–99)
GLUCOSE SERPL-MCNC: 92 MG/DL (ref 70–105)
GLUCOSE SERPL-MCNC: 95 MG/DL (ref 70–99)
GLUCOSE SERPL-MCNC: 96 MG/DL (ref 70–105)
GLUCOSE SERPL-MCNC: 97 MG/DL (ref 70–105)
GLUCOSE UR STRIP-MCNC: NEGATIVE MG/DL
HBA1C MFR BLD: 5.3 % (ref 0–5.6)
HCO3 BLDV-SCNC: 30 MMOL/L (ref 21–28)
HCT VFR BLD AUTO: 28.7 % (ref 35–47)
HCT VFR BLD AUTO: 29 % (ref 35–47)
HCT VFR BLD AUTO: 30.4 % (ref 35–47)
HCT VFR BLD AUTO: 31.7 % (ref 35–47)
HDLC SERPL-MCNC: 36 MG/DL
HGB BLD-MCNC: 10.2 G/DL (ref 11.7–15.7)
HGB BLD-MCNC: 9.1 G/DL (ref 11.7–15.7)
HGB BLD-MCNC: 9.4 G/DL (ref 11.7–15.7)
HGB BLD-MCNC: 9.5 G/DL (ref 11.7–15.7)
HGB BLD-MCNC: 9.7 G/DL (ref 11.7–15.7)
HGB UR QL STRIP: NEGATIVE
HYALINE CASTS #/AREA URNS LPF: 2 /LPF (ref 0–2)
IMM GRANULOCYTES # BLD: 0 10E9/L (ref 0–0.4)
IMM GRANULOCYTES NFR BLD: 0 %
IMM GRANULOCYTES NFR BLD: 0.2 %
IMM GRANULOCYTES NFR BLD: 0.2 %
IMM GRANULOCYTES NFR BLD: 0.3 %
INTERPRETATION ECG - MUSE: NORMAL
KETONES UR STRIP-MCNC: NEGATIVE MG/DL
LACTATE BLD-SCNC: 0.7 MMOL/L (ref 0.7–2)
LDLC SERPL CALC-MCNC: 55 MG/DL
LEUKOCYTE ESTERASE UR QL STRIP: NEGATIVE
LIPASE SERPL-CCNC: 112 U/L (ref 73–393)
LYMPHOCYTES # BLD AUTO: 0.4 10E9/L (ref 0.8–5.3)
LYMPHOCYTES # BLD AUTO: 0.5 10E9/L (ref 0.8–5.3)
LYMPHOCYTES # BLD AUTO: 0.6 10E9/L (ref 0.8–5.3)
LYMPHOCYTES # BLD AUTO: 0.6 10E9/L (ref 0.8–5.3)
LYMPHOCYTES NFR BLD AUTO: 10.9 %
LYMPHOCYTES NFR BLD AUTO: 14.1 %
LYMPHOCYTES NFR BLD AUTO: 16.3 %
LYMPHOCYTES NFR BLD AUTO: 8.2 %
Lab: NORMAL
Lab: NORMAL
MAGNESIUM SERPL-MCNC: 1.8 MG/DL (ref 1.6–2.3)
MAGNESIUM SERPL-MCNC: 1.9 MG/DL (ref 1.6–2.3)
MCH RBC QN AUTO: 30.2 PG (ref 26.5–33)
MCH RBC QN AUTO: 30.3 PG (ref 26.5–33)
MCH RBC QN AUTO: 30.4 PG (ref 26.5–33)
MCH RBC QN AUTO: 30.5 PG (ref 26.5–33)
MCHC RBC AUTO-ENTMCNC: 31.3 G/DL (ref 31.5–36.5)
MCHC RBC AUTO-ENTMCNC: 31.7 G/DL (ref 31.5–36.5)
MCHC RBC AUTO-ENTMCNC: 32.2 G/DL (ref 31.5–36.5)
MCHC RBC AUTO-ENTMCNC: 32.4 G/DL (ref 31.5–36.5)
MCV RBC AUTO: 94 FL (ref 78–100)
MCV RBC AUTO: 94 FL (ref 78–100)
MCV RBC AUTO: 96 FL (ref 78–100)
MCV RBC AUTO: 97 FL (ref 78–100)
MONOCYTES # BLD AUTO: 0.2 10E9/L (ref 0–1.3)
MONOCYTES # BLD AUTO: 0.3 10E9/L (ref 0–1.3)
MONOCYTES # BLD AUTO: 0.4 10E9/L (ref 0–1.3)
MONOCYTES # BLD AUTO: 0.4 10E9/L (ref 0–1.3)
MONOCYTES NFR BLD AUTO: 10 %
MONOCYTES NFR BLD AUTO: 10.9 %
MONOCYTES NFR BLD AUTO: 4.1 %
MONOCYTES NFR BLD AUTO: 7 %
NEUTROPHILS # BLD AUTO: 2.7 10E9/L (ref 1.6–8.3)
NEUTROPHILS # BLD AUTO: 3 10E9/L (ref 1.6–8.3)
NEUTROPHILS # BLD AUTO: 3.9 10E9/L (ref 1.6–8.3)
NEUTROPHILS # BLD AUTO: 3.9 10E9/L (ref 1.6–8.3)
NEUTROPHILS NFR BLD AUTO: 71.1 %
NEUTROPHILS NFR BLD AUTO: 73.1 %
NEUTROPHILS NFR BLD AUTO: 83.1 %
NEUTROPHILS NFR BLD AUTO: 83.5 %
NITRATE UR QL: NEGATIVE
NONHDLC SERPL-MCNC: 73 MG/DL
NRBC # BLD AUTO: 0 10*3/UL
NRBC BLD AUTO-RTO: 0 /100
NT-PROBNP SERPL-MCNC: 6182 PG/ML (ref 0–1800)
NT-PROBNP SERPL-MCNC: 6997 PG/ML (ref 0–1800)
O2/TOTAL GAS SETTING VFR VENT: 100 %
O2/TOTAL GAS SETTING VFR VENT: ABNORMAL %
O2/TOTAL GAS SETTING VFR VENT: ABNORMAL %
OXYHGB MFR BLDV: 77 %
OXYHGB MFR BLDV: 90 %
OXYHGB MFR BLDV: 93 %
OXYHGB MFR BLDV: 98 %
PCO2 BLDV: 59 MM HG (ref 40–50)
PCO2 BLDV: 65 MM HG (ref 40–50)
PCO2 BLDV: 69 MM HG (ref 40–50)
PCO2 BLDV: 73 MM HG (ref 40–50)
PH BLDV: 7.23 PH (ref 7.32–7.43)
PH BLDV: 7.25 PH (ref 7.32–7.43)
PH BLDV: 7.27 PH (ref 7.32–7.43)
PH BLDV: 7.31 PH (ref 7.32–7.43)
PH UR STRIP: 5 PH (ref 5–7)
PHOSPHATE SERPL-MCNC: 4.2 MG/DL (ref 2.5–4.5)
PHOSPHATE SERPL-MCNC: 4.3 MG/DL (ref 2.5–4.5)
PLATELET # BLD AUTO: 106 10E9/L (ref 150–450)
PLATELET # BLD AUTO: 110 10E9/L (ref 150–450)
PLATELET # BLD AUTO: 113 10E9/L (ref 150–450)
PLATELET # BLD AUTO: 99 10E9/L (ref 150–450)
PO2 BLDV: 45 MM HG (ref 25–47)
PO2 BLDV: 62 MM HG (ref 25–47)
PO2 BLDV: 73 MM HG (ref 25–47)
PO2 BLDV: 97 MM HG (ref 25–47)
POTASSIUM SERPL-SCNC: 4.6 MMOL/L (ref 3.5–5.1)
POTASSIUM SERPL-SCNC: 4.7 MMOL/L (ref 3.5–5.1)
POTASSIUM SERPL-SCNC: 4.9 MMOL/L (ref 3.5–5.1)
POTASSIUM SERPL-SCNC: 4.9 MMOL/L (ref 3.5–5.1)
POTASSIUM SERPL-SCNC: 5.1 MMOL/L (ref 3.4–5.3)
POTASSIUM SERPL-SCNC: 5.1 MMOL/L (ref 3.5–5.1)
POTASSIUM SERPL-SCNC: 5.1 MMOL/L (ref 3.5–5.1)
POTASSIUM SERPL-SCNC: 5.2 MMOL/L (ref 3.4–5.3)
POTASSIUM SERPL-SCNC: 5.3 MMOL/L (ref 3.4–5.3)
POTASSIUM SERPL-SCNC: 5.4 MMOL/L (ref 3.4–5.3)
POTASSIUM SERPL-SCNC: 5.5 MMOL/L (ref 3.4–5.3)
POTASSIUM SERPL-SCNC: 5.6 MMOL/L (ref 3.4–5.3)
POTASSIUM SERPL-SCNC: 5.7 MMOL/L (ref 3.4–5.3)
POTASSIUM SERPL-SCNC: 5.8 MMOL/L (ref 3.4–5.3)
POTASSIUM SERPL-SCNC: 5.8 MMOL/L (ref 3.5–5.1)
POTASSIUM SERPL-SCNC: 5.9 MMOL/L (ref 3.4–5.3)
PROCALCITONIN SERPL-MCNC: <0.05 NG/ML
PROT SERPL-MCNC: 5.5 G/DL (ref 6.8–8.8)
PROT SERPL-MCNC: 5.8 G/DL (ref 6.8–8.8)
PROT SERPL-MCNC: 6 G/DL (ref 6.8–8.8)
PROT SERPL-MCNC: 6.9 G/DL (ref 6.8–8.8)
RBC # BLD AUTO: 2.99 10E12/L (ref 3.8–5.2)
RBC # BLD AUTO: 3.08 10E12/L (ref 3.8–5.2)
RBC # BLD AUTO: 3.14 10E12/L (ref 3.8–5.2)
RBC # BLD AUTO: 3.38 10E12/L (ref 3.8–5.2)
RBC #/AREA URNS AUTO: 0 /HPF (ref 0–2)
SODIUM SERPL-SCNC: 127 MMOL/L (ref 133–144)
SODIUM SERPL-SCNC: 130 MMOL/L (ref 133–144)
SODIUM SERPL-SCNC: 131 MMOL/L (ref 133–144)
SODIUM SERPL-SCNC: 131 MMOL/L (ref 133–144)
SODIUM SERPL-SCNC: 132 MMOL/L (ref 133–144)
SODIUM SERPL-SCNC: 133 MMOL/L (ref 133–144)
SODIUM SERPL-SCNC: 133 MMOL/L (ref 133–144)
SODIUM SERPL-SCNC: 134 MMOL/L (ref 136–145)
SODIUM SERPL-SCNC: 135 MMOL/L (ref 133–144)
SODIUM SERPL-SCNC: 135 MMOL/L (ref 133–144)
SODIUM SERPL-SCNC: 135 MMOL/L (ref 136–145)
SODIUM SERPL-SCNC: 136 MMOL/L (ref 133–144)
SODIUM SERPL-SCNC: 136 MMOL/L (ref 136–145)
SODIUM SERPL-SCNC: 137 MMOL/L (ref 133–144)
SODIUM UR-SCNC: 8 MMOL/L
SOURCE: ABNORMAL
SP GR UR STRIP: 1.01 (ref 1–1.03)
SPECIMEN SOURCE: NORMAL
SPECIMEN SOURCE: NORMAL
T4 FREE SERPL-MCNC: 1.23 NG/DL (ref 0.76–1.46)
TRIGL SERPL-MCNC: 90 MG/DL
TROPONIN I SERPL-MCNC: 0.02 UG/L (ref 0–0.04)
TROPONIN I SERPL-MCNC: 0.03 UG/L (ref 0–0.04)
TSH SERPL DL<=0.005 MIU/L-ACNC: 3.15 MU/L (ref 0.4–4)
TSH SERPL DL<=0.005 MIU/L-ACNC: 3.32 MU/L (ref 0.4–4)
TSH SERPL DL<=0.005 MIU/L-ACNC: 7.3 MU/L (ref 0.4–4)
URATE SERPL-MCNC: 7.5 MG/DL (ref 2.6–6)
UROBILINOGEN UR STRIP-MCNC: NORMAL MG/DL (ref 0–2)
VIT B1 BLD-MCNC: 105 NMOL/L (ref 70–180)
WBC # BLD AUTO: 3.8 10E9/L (ref 4–11)
WBC # BLD AUTO: 4 10E9/L (ref 4–11)
WBC # BLD AUTO: 4.7 10E9/L (ref 4–11)
WBC # BLD AUTO: 4.7 10E9/L (ref 4–11)
WBC #/AREA URNS AUTO: <1 /HPF (ref 0–5)

## 2018-01-01 PROCEDURE — 00000146 ZZHCL STATISTIC GLUCOSE BY METER IP

## 2018-01-01 PROCEDURE — 85025 COMPLETE CBC W/AUTO DIFF WBC: CPT | Performed by: HOSPITALIST

## 2018-01-01 PROCEDURE — 80053 COMPREHEN METABOLIC PANEL: CPT | Performed by: HOSPITALIST

## 2018-01-01 PROCEDURE — 80048 BASIC METABOLIC PNL TOTAL CA: CPT | Performed by: HOSPITALIST

## 2018-01-01 PROCEDURE — 71046 X-RAY EXAM CHEST 2 VIEWS: CPT

## 2018-01-01 PROCEDURE — 36415 COLL VENOUS BLD VENIPUNCTURE: CPT | Performed by: HOSPITALIST

## 2018-01-01 PROCEDURE — 99214 OFFICE O/P EST MOD 30 MIN: CPT | Performed by: PHYSICIAN ASSISTANT

## 2018-01-01 PROCEDURE — 36415 COLL VENOUS BLD VENIPUNCTURE: CPT | Performed by: NURSE PRACTITIONER

## 2018-01-01 PROCEDURE — 97530 THERAPEUTIC ACTIVITIES: CPT | Mod: GP | Performed by: PHYSICAL THERAPIST

## 2018-01-01 PROCEDURE — 36415 COLL VENOUS BLD VENIPUNCTURE: CPT | Performed by: PHYSICIAN ASSISTANT

## 2018-01-01 PROCEDURE — 80048 BASIC METABOLIC PNL TOTAL CA: CPT | Performed by: PHYSICIAN ASSISTANT

## 2018-01-01 PROCEDURE — 93306 TTE W/DOPPLER COMPLETE: CPT | Mod: 26 | Performed by: INTERNAL MEDICINE

## 2018-01-01 PROCEDURE — 93294 REM INTERROG EVL PM/LDLS PM: CPT | Performed by: INTERNAL MEDICINE

## 2018-01-01 PROCEDURE — 25000132 ZZH RX MED GY IP 250 OP 250 PS 637: Performed by: HOSPITALIST

## 2018-01-01 PROCEDURE — 83735 ASSAY OF MAGNESIUM: CPT | Performed by: HOSPITALIST

## 2018-01-01 PROCEDURE — 97535 SELF CARE MNGMENT TRAINING: CPT | Mod: GO | Performed by: OCCUPATIONAL THERAPIST

## 2018-01-01 PROCEDURE — 25000132 ZZH RX MED GY IP 250 OP 250 PS 637: Performed by: NURSE PRACTITIONER

## 2018-01-01 PROCEDURE — 25000131 ZZH RX MED GY IP 250 OP 636 PS 637: Performed by: HOSPITALIST

## 2018-01-01 PROCEDURE — 80053 COMPREHEN METABOLIC PANEL: CPT | Performed by: EMERGENCY MEDICINE

## 2018-01-01 PROCEDURE — 92526 ORAL FUNCTION THERAPY: CPT | Mod: GN | Performed by: SPEECH-LANGUAGE PATHOLOGIST

## 2018-01-01 PROCEDURE — 69210 REMOVE IMPACTED EAR WAX UNI: CPT | Performed by: INTERNAL MEDICINE

## 2018-01-01 PROCEDURE — 93281 PM DEVICE PROGR EVAL MULTI: CPT | Performed by: INTERNAL MEDICINE

## 2018-01-01 PROCEDURE — 25000128 H RX IP 250 OP 636: Performed by: EMERGENCY MEDICINE

## 2018-01-01 PROCEDURE — 93005 ELECTROCARDIOGRAM TRACING: CPT

## 2018-01-01 PROCEDURE — 21000001 ZZH R&B HEART CARE

## 2018-01-01 PROCEDURE — 25000128 H RX IP 250 OP 636: Performed by: HOSPITALIST

## 2018-01-01 PROCEDURE — 82565 ASSAY OF CREATININE: CPT | Performed by: HOSPITALIST

## 2018-01-01 PROCEDURE — 84300 ASSAY OF URINE SODIUM: CPT | Performed by: INTERNAL MEDICINE

## 2018-01-01 PROCEDURE — 36415 COLL VENOUS BLD VENIPUNCTURE: CPT | Performed by: INTERNAL MEDICINE

## 2018-01-01 PROCEDURE — 27210338 ZZH CIRCUIT HUMID FACE/TRACH MSK

## 2018-01-01 PROCEDURE — 84295 ASSAY OF SERUM SODIUM: CPT | Performed by: HOSPITALIST

## 2018-01-01 PROCEDURE — 93010 ELECTROCARDIOGRAM REPORT: CPT | Performed by: INTERNAL MEDICINE

## 2018-01-01 PROCEDURE — 99214 OFFICE O/P EST MOD 30 MIN: CPT | Performed by: INTERNAL MEDICINE

## 2018-01-01 PROCEDURE — 25800025 ZZH RX 258: Performed by: HOSPITALIST

## 2018-01-01 PROCEDURE — 94640 AIRWAY INHALATION TREATMENT: CPT

## 2018-01-01 PROCEDURE — 99232 SBSQ HOSP IP/OBS MODERATE 35: CPT | Performed by: NURSE PRACTITIONER

## 2018-01-01 PROCEDURE — 99207 ZZC APP CREDIT; MD BILLING SHARED VISIT: CPT | Performed by: NURSE PRACTITIONER

## 2018-01-01 PROCEDURE — 99223 1ST HOSP IP/OBS HIGH 75: CPT | Mod: AI | Performed by: HOSPITALIST

## 2018-01-01 PROCEDURE — 99223 1ST HOSP IP/OBS HIGH 75: CPT | Performed by: NURSE PRACTITIONER

## 2018-01-01 PROCEDURE — 25000125 ZZHC RX 250: Performed by: HOSPITALIST

## 2018-01-01 PROCEDURE — 96374 THER/PROPH/DIAG INJ IV PUSH: CPT

## 2018-01-01 PROCEDURE — 99233 SBSQ HOSP IP/OBS HIGH 50: CPT | Performed by: HOSPITALIST

## 2018-01-01 PROCEDURE — 25000128 H RX IP 250 OP 636: Performed by: NURSE PRACTITIONER

## 2018-01-01 PROCEDURE — 40000133 ZZH STATISTIC OT WARD VISIT: Performed by: OCCUPATIONAL THERAPIST

## 2018-01-01 PROCEDURE — 99358 PROLONG SERVICE W/O CONTACT: CPT | Performed by: NURSE PRACTITIONER

## 2018-01-01 PROCEDURE — 97110 THERAPEUTIC EXERCISES: CPT | Mod: GP | Performed by: PHYSICAL THERAPIST

## 2018-01-01 PROCEDURE — P9047 ALBUMIN (HUMAN), 25%, 50ML: HCPCS | Performed by: HOSPITALIST

## 2018-01-01 PROCEDURE — 83605 ASSAY OF LACTIC ACID: CPT | Performed by: HOSPITALIST

## 2018-01-01 PROCEDURE — 80061 LIPID PANEL: CPT | Performed by: PHYSICIAN ASSISTANT

## 2018-01-01 PROCEDURE — 93296 REM INTERROG EVL PM/IDS: CPT | Performed by: INTERNAL MEDICINE

## 2018-01-01 PROCEDURE — 25000132 ZZH RX MED GY IP 250 OP 250 PS 637: Performed by: EMERGENCY MEDICINE

## 2018-01-01 PROCEDURE — 40000275 ZZH STATISTIC RCP TIME EA 10 MIN

## 2018-01-01 PROCEDURE — 99232 SBSQ HOSP IP/OBS MODERATE 35: CPT | Performed by: INTERNAL MEDICINE

## 2018-01-01 PROCEDURE — 99214 OFFICE O/P EST MOD 30 MIN: CPT | Mod: GW | Performed by: PHYSICIAN ASSISTANT

## 2018-01-01 PROCEDURE — 77063 BREAST TOMOSYNTHESIS BI: CPT

## 2018-01-01 PROCEDURE — 80048 BASIC METABOLIC PNL TOTAL CA: CPT | Mod: GW | Performed by: PHYSICIAN ASSISTANT

## 2018-01-01 PROCEDURE — 40000225 ZZH STATISTIC SLP WARD VISIT: Performed by: SPEECH-LANGUAGE PATHOLOGIST

## 2018-01-01 PROCEDURE — 84443 ASSAY THYROID STIM HORMONE: CPT | Performed by: PHYSICIAN ASSISTANT

## 2018-01-01 PROCEDURE — 97112 NEUROMUSCULAR REEDUCATION: CPT | Mod: GP | Performed by: PHYSICAL THERAPIST

## 2018-01-01 PROCEDURE — G8983 BODY POS D/C STATUS: HCPCS | Mod: GP | Performed by: PHYSICAL THERAPIST

## 2018-01-01 PROCEDURE — 84145 PROCALCITONIN (PCT): CPT | Performed by: HOSPITALIST

## 2018-01-01 PROCEDURE — G8982 BODY POS GOAL STATUS: HCPCS | Mod: GP | Performed by: PHYSICAL THERAPIST

## 2018-01-01 PROCEDURE — 83690 ASSAY OF LIPASE: CPT | Performed by: EMERGENCY MEDICINE

## 2018-01-01 PROCEDURE — 84460 ALANINE AMINO (ALT) (SGPT): CPT | Performed by: PHYSICIAN ASSISTANT

## 2018-01-01 PROCEDURE — 84132 ASSAY OF SERUM POTASSIUM: CPT | Performed by: HOSPITALIST

## 2018-01-01 PROCEDURE — 25000132 ZZH RX MED GY IP 250 OP 250 PS 637: Performed by: INTERNAL MEDICINE

## 2018-01-01 PROCEDURE — 97530 THERAPEUTIC ACTIVITIES: CPT | Mod: GP

## 2018-01-01 PROCEDURE — 84484 ASSAY OF TROPONIN QUANT: CPT | Performed by: HOSPITALIST

## 2018-01-01 PROCEDURE — G8981 BODY POS CURRENT STATUS: HCPCS | Mod: GP | Performed by: PHYSICAL THERAPIST

## 2018-01-01 PROCEDURE — 81001 URINALYSIS AUTO W/SCOPE: CPT | Performed by: EMERGENCY MEDICINE

## 2018-01-01 PROCEDURE — 99283 EMERGENCY DEPT VISIT LOW MDM: CPT

## 2018-01-01 PROCEDURE — 97165 OT EVAL LOW COMPLEX 30 MIN: CPT | Mod: GO | Performed by: OCCUPATIONAL THERAPIST

## 2018-01-01 PROCEDURE — 72125 CT NECK SPINE W/O DYE: CPT

## 2018-01-01 PROCEDURE — 70450 CT HEAD/BRAIN W/O DYE: CPT

## 2018-01-01 PROCEDURE — 99222 1ST HOSP IP/OBS MODERATE 55: CPT | Performed by: NURSE PRACTITIONER

## 2018-01-01 PROCEDURE — 99232 SBSQ HOSP IP/OBS MODERATE 35: CPT | Performed by: HOSPITALIST

## 2018-01-01 PROCEDURE — 97161 PT EVAL LOW COMPLEX 20 MIN: CPT | Mod: GP | Performed by: PHYSICAL THERAPIST

## 2018-01-01 PROCEDURE — 84100 ASSAY OF PHOSPHORUS: CPT | Performed by: HOSPITALIST

## 2018-01-01 PROCEDURE — 82140 ASSAY OF AMMONIA: CPT | Performed by: HOSPITALIST

## 2018-01-01 PROCEDURE — 94660 CPAP INITIATION&MGMT: CPT

## 2018-01-01 PROCEDURE — 71045 X-RAY EXAM CHEST 1 VIEW: CPT

## 2018-01-01 PROCEDURE — 99214 OFFICE O/P EST MOD 30 MIN: CPT | Mod: 25 | Performed by: PHYSICIAN ASSISTANT

## 2018-01-01 PROCEDURE — 84550 ASSAY OF BLOOD/URIC ACID: CPT | Performed by: HOSPITALIST

## 2018-01-01 PROCEDURE — 84443 ASSAY THYROID STIM HORMONE: CPT | Performed by: INTERNAL MEDICINE

## 2018-01-01 PROCEDURE — 25500064 ZZH RX 255 OP 636: Performed by: HOSPITALIST

## 2018-01-01 PROCEDURE — 80048 BASIC METABOLIC PNL TOTAL CA: CPT | Performed by: INTERNAL MEDICINE

## 2018-01-01 PROCEDURE — 82805 BLOOD GASES W/O2 SATURATION: CPT | Performed by: HOSPITALIST

## 2018-01-01 PROCEDURE — 82550 ASSAY OF CK (CPK): CPT | Performed by: HOSPITALIST

## 2018-01-01 PROCEDURE — 99222 1ST HOSP IP/OBS MODERATE 55: CPT | Performed by: INTERNAL MEDICINE

## 2018-01-01 PROCEDURE — 40000193 ZZH STATISTIC PT WARD VISIT

## 2018-01-01 PROCEDURE — 99207 ZZC CDG-MDM COMPONENT: MEETS LOW - DOWN CODED: CPT | Performed by: INTERNAL MEDICINE

## 2018-01-01 PROCEDURE — 25000128 H RX IP 250 OP 636: Performed by: INTERNAL MEDICINE

## 2018-01-01 PROCEDURE — 21000000 ZZH R&B IMCU HEART CARE

## 2018-01-01 PROCEDURE — 84439 ASSAY OF FREE THYROXINE: CPT | Performed by: INTERNAL MEDICINE

## 2018-01-01 PROCEDURE — 99207 ZZC NON-BILLABLE SERV PER CHARTING: CPT | Performed by: INTERNAL MEDICINE

## 2018-01-01 PROCEDURE — 82570 ASSAY OF URINE CREATININE: CPT | Performed by: INTERNAL MEDICINE

## 2018-01-01 PROCEDURE — 27210339 ZZH CIRCUIT HUMIDITY W/CPAP BIP

## 2018-01-01 PROCEDURE — 92610 EVALUATE SWALLOWING FUNCTION: CPT | Mod: GN | Performed by: SPEECH-LANGUAGE PATHOLOGIST

## 2018-01-01 PROCEDURE — 83880 ASSAY OF NATRIURETIC PEPTIDE: CPT | Performed by: EMERGENCY MEDICINE

## 2018-01-01 PROCEDURE — 84484 ASSAY OF TROPONIN QUANT: CPT | Performed by: EMERGENCY MEDICINE

## 2018-01-01 PROCEDURE — 97530 THERAPEUTIC ACTIVITIES: CPT | Mod: GO | Performed by: OCCUPATIONAL THERAPIST

## 2018-01-01 PROCEDURE — 83880 ASSAY OF NATRIURETIC PEPTIDE: CPT | Performed by: HOSPITALIST

## 2018-01-01 PROCEDURE — 99285 EMERGENCY DEPT VISIT HI MDM: CPT | Mod: 25

## 2018-01-01 PROCEDURE — 40000264 ECHO COMPLETE WITH OPTISON

## 2018-01-01 PROCEDURE — 85025 COMPLETE CBC W/AUTO DIFF WBC: CPT | Performed by: EMERGENCY MEDICINE

## 2018-01-01 PROCEDURE — 85018 HEMOGLOBIN: CPT | Mod: GW | Performed by: PHYSICIAN ASSISTANT

## 2018-01-01 PROCEDURE — 83036 HEMOGLOBIN GLYCOSYLATED A1C: CPT | Performed by: HOSPITALIST

## 2018-01-01 PROCEDURE — 87040 BLOOD CULTURE FOR BACTERIA: CPT | Performed by: HOSPITALIST

## 2018-01-01 PROCEDURE — 97161 PT EVAL LOW COMPLEX 20 MIN: CPT | Mod: GP

## 2018-01-01 PROCEDURE — 84443 ASSAY THYROID STIM HORMONE: CPT | Performed by: HOSPITALIST

## 2018-01-01 PROCEDURE — 93306 TTE W/DOPPLER COMPLETE: CPT

## 2018-01-01 PROCEDURE — 84425 ASSAY OF VITAMIN B-1: CPT | Performed by: NURSE PRACTITIONER

## 2018-01-01 PROCEDURE — 36415 COLL VENOUS BLD VENIPUNCTURE: CPT | Mod: GW | Performed by: PHYSICIAN ASSISTANT

## 2018-01-01 RX ORDER — NALOXONE HYDROCHLORIDE 0.4 MG/ML
.1-.4 INJECTION, SOLUTION INTRAMUSCULAR; INTRAVENOUS; SUBCUTANEOUS
Status: DISCONTINUED | OUTPATIENT
Start: 2018-01-01 | End: 2018-01-01 | Stop reason: HOSPADM

## 2018-01-01 RX ORDER — ACETAMINOPHEN 325 MG/1
650 TABLET ORAL EVERY 4 HOURS PRN
Status: DISCONTINUED | OUTPATIENT
Start: 2018-01-01 | End: 2018-01-01

## 2018-01-01 RX ORDER — FLUTICASONE PROPIONATE 50 MCG
SPRAY, SUSPENSION (ML) NASAL
Qty: 16 G | Refills: 10 | Status: ON HOLD | OUTPATIENT
Start: 2018-01-01 | End: 2018-01-01

## 2018-01-01 RX ORDER — SPIRONOLACTONE 25 MG/1
25 TABLET ORAL DAILY
Qty: 90 TABLET | Refills: 3 | Status: SHIPPED | OUTPATIENT
Start: 2018-01-01 | End: 2018-01-01

## 2018-01-01 RX ORDER — SIMVASTATIN 20 MG
20 TABLET ORAL AT BEDTIME
Qty: 90 TABLET | Refills: 3 | Status: ON HOLD | OUTPATIENT
Start: 2018-01-01 | End: 2018-01-01

## 2018-01-01 RX ORDER — BISACODYL 10 MG
SUPPOSITORY, RECTAL RECTAL
Qty: 12 SUPPOSITORY | Refills: 1 | Status: SHIPPED | OUTPATIENT
Start: 2018-01-01

## 2018-01-01 RX ORDER — PROMETHAZINE HYDROCHLORIDE 25 MG/1
25 TABLET ORAL ONCE
Status: COMPLETED | OUTPATIENT
Start: 2018-01-01 | End: 2018-01-01

## 2018-01-01 RX ORDER — LEVOTHYROXINE SODIUM 112 UG/1
TABLET ORAL
Qty: 90 TABLET | Refills: 2 | Status: ON HOLD | OUTPATIENT
Start: 2018-01-01 | End: 2018-01-01

## 2018-01-01 RX ORDER — LEVOTHYROXINE SODIUM 112 UG/1
TABLET ORAL
Qty: 90 TABLET | Refills: 0 | Status: SHIPPED | OUTPATIENT
Start: 2018-01-01 | End: 2018-01-01

## 2018-01-01 RX ORDER — THIAMINE HYDROCHLORIDE 100 MG/ML
100 INJECTION, SOLUTION INTRAMUSCULAR; INTRAVENOUS DAILY
Status: DISCONTINUED | OUTPATIENT
Start: 2018-01-01 | End: 2018-01-01

## 2018-01-01 RX ORDER — DEXTROSE MONOHYDRATE 25 G/50ML
25 INJECTION, SOLUTION INTRAVENOUS ONCE
Status: COMPLETED | OUTPATIENT
Start: 2018-01-01 | End: 2018-01-01

## 2018-01-01 RX ORDER — TRAZODONE HYDROCHLORIDE 50 MG/1
25-50 TABLET, FILM COATED ORAL
Qty: 30 TABLET | Refills: 0 | Status: SHIPPED | OUTPATIENT
Start: 2018-01-01 | End: 2018-01-01

## 2018-01-01 RX ORDER — TORSEMIDE 10 MG/1
10 TABLET ORAL DAILY
Status: DISCONTINUED | OUTPATIENT
Start: 2018-01-01 | End: 2018-01-01

## 2018-01-01 RX ORDER — ALBUMIN (HUMAN) 12.5 G/50ML
12.5 SOLUTION INTRAVENOUS EVERY 8 HOURS
Status: COMPLETED | OUTPATIENT
Start: 2018-01-01 | End: 2018-01-01

## 2018-01-01 RX ORDER — LORAZEPAM 0.5 MG/1
.25-.5 TABLET ORAL EVERY 4 HOURS PRN
Qty: 30 TABLET | Refills: 1 | Status: SHIPPED | OUTPATIENT
Start: 2018-01-01

## 2018-01-01 RX ORDER — CARVEDILOL 25 MG/1
TABLET ORAL
Qty: 180 TABLET | Refills: 2 | Status: ON HOLD | OUTPATIENT
Start: 2018-01-01 | End: 2018-01-01

## 2018-01-01 RX ORDER — FUROSEMIDE 10 MG/ML
40 INJECTION INTRAMUSCULAR; INTRAVENOUS ONCE
Status: COMPLETED | OUTPATIENT
Start: 2018-01-01 | End: 2018-01-01

## 2018-01-01 RX ORDER — ONDANSETRON 4 MG/1
4 TABLET, ORALLY DISINTEGRATING ORAL EVERY 6 HOURS PRN
Status: DISCONTINUED | OUTPATIENT
Start: 2018-01-01 | End: 2018-01-01 | Stop reason: HOSPADM

## 2018-01-01 RX ORDER — AMOXICILLIN 250 MG
1 CAPSULE ORAL 2 TIMES DAILY
Status: DISCONTINUED | OUTPATIENT
Start: 2018-01-01 | End: 2018-01-01

## 2018-01-01 RX ORDER — ALBUTEROL SULFATE 0.83 MG/ML
2.5 SOLUTION RESPIRATORY (INHALATION) ONCE
Status: COMPLETED | OUTPATIENT
Start: 2018-01-01 | End: 2018-01-01

## 2018-01-01 RX ORDER — PREDNISOLONE ACETATE 10 MG/ML
1 SUSPENSION/ DROPS OPHTHALMIC EVERY EVENING
Status: DISCONTINUED | OUTPATIENT
Start: 2018-01-01 | End: 2018-01-01 | Stop reason: HOSPADM

## 2018-01-01 RX ORDER — LEVOTHYROXINE SODIUM 112 UG/1
112 TABLET ORAL
Status: DISCONTINUED | OUTPATIENT
Start: 2018-01-01 | End: 2018-01-01

## 2018-01-01 RX ORDER — HALOPERIDOL 0.5 MG/1
.5-1 TABLET ORAL EVERY 6 HOURS PRN
Qty: 30 TABLET | Refills: 1 | Status: SHIPPED | OUTPATIENT
Start: 2018-01-01

## 2018-01-01 RX ORDER — MICONAZOLE NITRATE 20 MG/G
CREAM TOPICAL 2 TIMES DAILY
Status: DISCONTINUED | OUTPATIENT
Start: 2018-01-01 | End: 2018-01-01 | Stop reason: HOSPADM

## 2018-01-01 RX ORDER — ACETAMINOPHEN 650 MG/1
650 SUPPOSITORY RECTAL EVERY 4 HOURS PRN
Qty: 12 SUPPOSITORY | Refills: 1 | Status: SHIPPED | OUTPATIENT
Start: 2018-01-01

## 2018-01-01 RX ORDER — DEXTROSE MONOHYDRATE 25 G/50ML
25-50 INJECTION, SOLUTION INTRAVENOUS
Status: DISCONTINUED | OUTPATIENT
Start: 2018-01-01 | End: 2018-01-01 | Stop reason: HOSPADM

## 2018-01-01 RX ORDER — ATROPINE SULFATE 10 MG/ML
1-2 SOLUTION/ DROPS OPHTHALMIC
Status: DISCONTINUED | OUTPATIENT
Start: 2018-01-01 | End: 2018-01-01 | Stop reason: HOSPADM

## 2018-01-01 RX ORDER — NICOTINE POLACRILEX 4 MG
15-30 LOZENGE BUCCAL
Status: DISCONTINUED | OUTPATIENT
Start: 2018-01-01 | End: 2018-01-01

## 2018-01-01 RX ORDER — TORSEMIDE 5 MG/1
5 TABLET ORAL DAILY
Qty: 90 TABLET | Refills: 3 | Status: SHIPPED | OUTPATIENT
Start: 2018-01-01 | End: 2018-01-01

## 2018-01-01 RX ORDER — ANALGESIC BALM 1.74; 4.06 G/29G; G/29G
OINTMENT TOPICAL EVERY 6 HOURS PRN
Status: DISCONTINUED | OUTPATIENT
Start: 2018-01-01 | End: 2018-01-01 | Stop reason: HOSPADM

## 2018-01-01 RX ORDER — LIDOCAINE 4 G/G
3 PATCH TOPICAL
Status: DISCONTINUED | OUTPATIENT
Start: 2018-01-01 | End: 2018-01-01 | Stop reason: HOSPADM

## 2018-01-01 RX ORDER — SENNOSIDES 8.6 MG
1-2 TABLET ORAL 2 TIMES DAILY
Qty: 30 TABLET | Refills: 1 | Status: SHIPPED | OUTPATIENT
Start: 2018-01-01

## 2018-01-01 RX ORDER — DEXTROSE MONOHYDRATE 25 G/50ML
50 INJECTION, SOLUTION INTRAVENOUS ONCE
Status: COMPLETED | OUTPATIENT
Start: 2018-01-01 | End: 2018-01-01

## 2018-01-01 RX ORDER — LEVOTHYROXINE SODIUM ANHYDROUS 100 UG/5ML
60 INJECTION, POWDER, LYOPHILIZED, FOR SOLUTION INTRAVENOUS DAILY
Status: DISCONTINUED | OUTPATIENT
Start: 2018-01-01 | End: 2018-01-01

## 2018-01-01 RX ORDER — LIDOCAINE 4 G/G
3 PATCH TOPICAL
Status: DISCONTINUED | OUTPATIENT
Start: 2018-01-01 | End: 2018-01-01

## 2018-01-01 RX ORDER — BISACODYL 10 MG
10 SUPPOSITORY, RECTAL RECTAL
Status: DISCONTINUED | OUTPATIENT
Start: 2018-01-01 | End: 2018-01-01 | Stop reason: HOSPADM

## 2018-01-01 RX ORDER — TORSEMIDE 5 MG/1
TABLET ORAL
COMMUNITY
Start: 2018-01-01 | End: 2018-01-01

## 2018-01-01 RX ORDER — ONDANSETRON 2 MG/ML
4 INJECTION INTRAMUSCULAR; INTRAVENOUS EVERY 6 HOURS PRN
Status: DISCONTINUED | OUTPATIENT
Start: 2018-01-01 | End: 2018-01-01 | Stop reason: HOSPADM

## 2018-01-01 RX ORDER — TRAZODONE HYDROCHLORIDE 50 MG/1
TABLET, FILM COATED ORAL
Qty: 30 TABLET | Refills: 0 | Status: ON HOLD | OUTPATIENT
Start: 2018-01-01 | End: 2018-01-01

## 2018-01-01 RX ORDER — ACETAMINOPHEN 325 MG/1
975 TABLET ORAL EVERY 8 HOURS
Status: DISCONTINUED | OUTPATIENT
Start: 2018-01-01 | End: 2018-01-01 | Stop reason: HOSPADM

## 2018-01-01 RX ORDER — AMOXICILLIN 250 MG
1 CAPSULE ORAL 2 TIMES DAILY PRN
Status: DISCONTINUED | OUTPATIENT
Start: 2018-01-01 | End: 2018-01-01 | Stop reason: HOSPADM

## 2018-01-01 RX ORDER — SIMVASTATIN 20 MG
20 TABLET ORAL AT BEDTIME
Qty: 90 TABLET | Refills: 1 | Status: SHIPPED | OUTPATIENT
Start: 2018-01-01 | End: 2018-01-01

## 2018-01-01 RX ORDER — ISOSORBIDE MONONITRATE 30 MG/1
TABLET, EXTENDED RELEASE ORAL
Qty: 45 TABLET | Refills: 3 | Status: ON HOLD | OUTPATIENT
Start: 2018-01-01 | End: 2018-01-01

## 2018-01-01 RX ORDER — TORSEMIDE 5 MG/1
10 TABLET ORAL DAILY
Qty: 60 TABLET | Refills: 11 | Status: ON HOLD | OUTPATIENT
Start: 2018-01-01 | End: 2018-01-01

## 2018-01-01 RX ORDER — HYDROMORPHONE HYDROCHLORIDE 10 MG/ML
1-2 INJECTION INTRAMUSCULAR; INTRAVENOUS; SUBCUTANEOUS
Status: DISCONTINUED | OUTPATIENT
Start: 2018-01-01 | End: 2018-01-01 | Stop reason: HOSPADM

## 2018-01-01 RX ORDER — TRAMADOL HYDROCHLORIDE 50 MG/1
50 TABLET ORAL ONCE
Status: COMPLETED | OUTPATIENT
Start: 2018-01-01 | End: 2018-01-01

## 2018-01-01 RX ORDER — TORSEMIDE 5 MG/1
TABLET ORAL
Qty: 90 TABLET | Refills: 3 | Status: SHIPPED | OUTPATIENT
Start: 2018-01-01 | End: 2018-01-01

## 2018-01-01 RX ORDER — DEXTROSE MONOHYDRATE 100 MG/ML
INJECTION, SOLUTION INTRAVENOUS CONTINUOUS
Status: DISCONTINUED | OUTPATIENT
Start: 2018-01-01 | End: 2018-01-01

## 2018-01-01 RX ORDER — IBUPROFEN 200 MG
1900 CAPSULE ORAL
Status: DISCONTINUED | OUTPATIENT
Start: 2018-01-01 | End: 2018-01-01

## 2018-01-01 RX ORDER — GLYCOPYRROLATE 0.2 MG/ML
.2-.4 INJECTION, SOLUTION INTRAMUSCULAR; INTRAVENOUS EVERY 4 HOURS PRN
Status: DISCONTINUED | OUTPATIENT
Start: 2018-01-01 | End: 2018-01-01 | Stop reason: HOSPADM

## 2018-01-01 RX ORDER — SODIUM CHLORIDE 9 MG/ML
INJECTION, SOLUTION INTRAVENOUS CONTINUOUS
Status: DISCONTINUED | OUTPATIENT
Start: 2018-01-01 | End: 2018-01-01

## 2018-01-01 RX ORDER — DEXTROSE MONOHYDRATE 25 G/50ML
25-50 INJECTION, SOLUTION INTRAVENOUS
Status: DISCONTINUED | OUTPATIENT
Start: 2018-01-01 | End: 2018-01-01

## 2018-01-01 RX ORDER — SPIRONOLACTONE 25 MG/1
12.5 TABLET ORAL DAILY
Qty: 45 TABLET | Refills: 3 | Status: SHIPPED | OUTPATIENT
Start: 2018-01-01 | End: 2018-01-01

## 2018-01-01 RX ORDER — HYDROMORPHONE HYDROCHLORIDE 1 MG/ML
1-2 SOLUTION ORAL
Qty: 30 ML | Refills: 0 | Status: SHIPPED | OUTPATIENT
Start: 2018-01-01

## 2018-01-01 RX ORDER — NICOTINE POLACRILEX 4 MG
15-30 LOZENGE BUCCAL
Status: DISCONTINUED | OUTPATIENT
Start: 2018-01-01 | End: 2018-01-01 | Stop reason: HOSPADM

## 2018-01-01 RX ORDER — LANOLIN ALCOHOL/MO/W.PET/CERES
100 CREAM (GRAM) TOPICAL DAILY
Status: COMPLETED | OUTPATIENT
Start: 2018-01-01 | End: 2018-01-01

## 2018-01-01 RX ORDER — TORSEMIDE 5 MG/1
10 TABLET ORAL DAILY
Qty: 60 TABLET | Refills: 11 | Status: SHIPPED | OUTPATIENT
Start: 2018-01-01 | End: 2018-01-01

## 2018-01-01 RX ORDER — TRAMADOL HYDROCHLORIDE 50 MG/1
50 TABLET ORAL EVERY 6 HOURS PRN
Qty: 12 TABLET | Refills: 0 | Status: ON HOLD | OUTPATIENT
Start: 2018-01-01 | End: 2018-01-01

## 2018-01-01 RX ORDER — FLUTICASONE PROPIONATE 50 MCG
1 SPRAY, SUSPENSION (ML) NASAL DAILY
Status: DISCONTINUED | OUTPATIENT
Start: 2018-01-01 | End: 2018-01-01 | Stop reason: HOSPADM

## 2018-01-01 RX ORDER — ASPIRIN 81 MG/1
81 TABLET, CHEWABLE ORAL DAILY
Status: DISCONTINUED | OUTPATIENT
Start: 2018-01-01 | End: 2018-01-01

## 2018-01-01 RX ADMIN — DEXTROSE MONOHYDRATE 50 ML: 25 INJECTION, SOLUTION INTRAVENOUS at 06:20

## 2018-01-01 RX ADMIN — MICONAZOLE NITRATE: 20 CREAM TOPICAL at 08:08

## 2018-01-01 RX ADMIN — LEVOTHYROXINE SODIUM 112 MCG: 112 TABLET ORAL at 09:32

## 2018-01-01 RX ADMIN — MICONAZOLE NITRATE: 20 CREAM TOPICAL at 20:51

## 2018-01-01 RX ADMIN — ASPIRIN 81 MG 81 MG: 81 TABLET ORAL at 09:15

## 2018-01-01 RX ADMIN — FLUTICASONE PROPIONATE 1 SPRAY: 50 SPRAY, METERED NASAL at 11:08

## 2018-01-01 RX ADMIN — SODIUM CHLORIDE 250 ML: 9 INJECTION, SOLUTION INTRAVENOUS at 13:39

## 2018-01-01 RX ADMIN — DEXTROSE MONOHYDRATE: 100 INJECTION, SOLUTION INTRAVENOUS at 09:34

## 2018-01-01 RX ADMIN — SODIUM CHLORIDE: 9 INJECTION, SOLUTION INTRAVENOUS at 11:52

## 2018-01-01 RX ADMIN — MICONAZOLE NITRATE: 20 CREAM TOPICAL at 22:09

## 2018-01-01 RX ADMIN — MICONAZOLE NITRATE: 20 CREAM TOPICAL at 21:18

## 2018-01-01 RX ADMIN — FUROSEMIDE 40 MG: 10 INJECTION, SOLUTION INTRAVENOUS at 16:07

## 2018-01-01 RX ADMIN — DEXTROSE MONOHYDRATE 100 ML/HR: 100 INJECTION, SOLUTION INTRAVENOUS at 20:23

## 2018-01-01 RX ADMIN — MICONAZOLE NITRATE: 20 CREAM TOPICAL at 09:32

## 2018-01-01 RX ADMIN — ALBUMIN HUMAN 12.5 G: 0.25 SOLUTION INTRAVENOUS at 17:34

## 2018-01-01 RX ADMIN — Medication 1 MG: at 22:42

## 2018-01-01 RX ADMIN — ALBUMIN HUMAN 12.5 G: 0.25 SOLUTION INTRAVENOUS at 09:14

## 2018-01-01 RX ADMIN — LEVOTHYROXINE SODIUM 112 MCG: 112 TABLET ORAL at 11:08

## 2018-01-01 RX ADMIN — IRON SUCROSE 200 MG: 20 INJECTION, SOLUTION INTRAVENOUS at 12:51

## 2018-01-01 RX ADMIN — MENTHOL 1 PATCH: 205.5 PATCH TOPICAL at 11:34

## 2018-01-01 RX ADMIN — ASPIRIN 81 MG 81 MG: 81 TABLET ORAL at 08:07

## 2018-01-01 RX ADMIN — FLUTICASONE PROPIONATE 1 SPRAY: 50 SPRAY, METERED NASAL at 09:18

## 2018-01-01 RX ADMIN — ACETAMINOPHEN 650 MG: 325 TABLET, FILM COATED ORAL at 09:02

## 2018-01-01 RX ADMIN — ACETAMINOPHEN 975 MG: 325 TABLET, FILM COATED ORAL at 09:50

## 2018-01-01 RX ADMIN — LIDOCAINE 3 PATCH: 560 PATCH PERCUTANEOUS; TOPICAL; TRANSDERMAL at 17:33

## 2018-01-01 RX ADMIN — FUROSEMIDE 40 MG: 10 INJECTION, SOLUTION INTRAVENOUS at 11:36

## 2018-01-01 RX ADMIN — ACETAMINOPHEN 650 MG: 325 TABLET, FILM COATED ORAL at 14:38

## 2018-01-01 RX ADMIN — ALBUTEROL SULFATE 2.5 MG: 2.5 SOLUTION RESPIRATORY (INHALATION) at 06:20

## 2018-01-01 RX ADMIN — SODIUM CHLORIDE: 9 INJECTION, SOLUTION INTRAVENOUS at 08:37

## 2018-01-01 RX ADMIN — ACETAMINOPHEN 975 MG: 325 TABLET, FILM COATED ORAL at 20:32

## 2018-01-01 RX ADMIN — MICONAZOLE NITRATE: 20 CREAM TOPICAL at 09:45

## 2018-01-01 RX ADMIN — ACETAMINOPHEN 650 MG: 325 TABLET, FILM COATED ORAL at 02:49

## 2018-01-01 RX ADMIN — PREDNISOLONE ACETATE 1 DROP: 10 SUSPENSION/ DROPS OPHTHALMIC at 20:33

## 2018-01-01 RX ADMIN — Medication 100 MG: at 09:15

## 2018-01-01 RX ADMIN — CALCIUM CITRATE 200 MG (950 MG) TABLET 1900 MG: at 12:37

## 2018-01-01 RX ADMIN — SODIUM CHLORIDE 10 UNITS: 9 INJECTION, SOLUTION INTRAVENOUS at 06:20

## 2018-01-01 RX ADMIN — HUMAN ALBUMIN MICROSPHERES AND PERFLUTREN 9 ML: 10; .22 INJECTION, SOLUTION INTRAVENOUS at 15:30

## 2018-01-01 RX ADMIN — DEXTROSE MONOHYDRATE 25 ML: 25 INJECTION, SOLUTION INTRAVENOUS at 10:11

## 2018-01-01 RX ADMIN — MENTHOL 1 PATCH: 205.5 PATCH TOPICAL at 20:36

## 2018-01-01 RX ADMIN — TORSEMIDE 10 MG: 10 TABLET ORAL at 11:08

## 2018-01-01 RX ADMIN — SODIUM CHLORIDE: 9 INJECTION, SOLUTION INTRAVENOUS at 07:54

## 2018-01-01 RX ADMIN — ACETAMINOPHEN 650 MG: 325 TABLET, FILM COATED ORAL at 13:10

## 2018-01-01 RX ADMIN — MICONAZOLE NITRATE: 20 CREAM TOPICAL at 20:32

## 2018-01-01 RX ADMIN — Medication 100 MG: at 08:07

## 2018-01-01 RX ADMIN — DEXTROSE AND SODIUM CHLORIDE: 5; 900 INJECTION, SOLUTION INTRAVENOUS at 09:08

## 2018-01-01 RX ADMIN — ANALGESIC BALM: 1.74; 4.06 OINTMENT TOPICAL at 15:52

## 2018-01-01 RX ADMIN — PREDNISOLONE ACETATE 1 DROP: 10 SUSPENSION/ DROPS OPHTHALMIC at 22:31

## 2018-01-01 RX ADMIN — ACETAMINOPHEN 975 MG: 325 TABLET, FILM COATED ORAL at 22:36

## 2018-01-01 RX ADMIN — MICONAZOLE NITRATE: 20 CREAM TOPICAL at 14:14

## 2018-01-01 RX ADMIN — ANALGESIC BALM: 1.74; 4.06 OINTMENT TOPICAL at 13:41

## 2018-01-01 RX ADMIN — ALBUMIN HUMAN 12.5 G: 0.25 SOLUTION INTRAVENOUS at 00:27

## 2018-01-01 RX ADMIN — MICONAZOLE NITRATE 1 EACH: 20 CREAM TOPICAL at 22:18

## 2018-01-01 RX ADMIN — MICONAZOLE NITRATE: 20 CREAM TOPICAL at 15:53

## 2018-01-01 RX ADMIN — MICONAZOLE NITRATE: 20 CREAM TOPICAL at 15:56

## 2018-01-01 RX ADMIN — LIDOCAINE 3 PATCH: 560 PATCH PERCUTANEOUS; TOPICAL; TRANSDERMAL at 18:50

## 2018-01-01 RX ADMIN — TRAMADOL HYDROCHLORIDE 50 MG: 50 TABLET, FILM COATED ORAL at 18:20

## 2018-01-01 RX ADMIN — DEXTROSE MONOHYDRATE 50 ML: 25 INJECTION, SOLUTION INTRAVENOUS at 08:51

## 2018-01-01 RX ADMIN — THIAMINE HYDROCHLORIDE 100 MG: 100 INJECTION, SOLUTION INTRAMUSCULAR; INTRAVENOUS at 17:35

## 2018-01-01 RX ADMIN — ONDANSETRON 4 MG: 2 INJECTION INTRAMUSCULAR; INTRAVENOUS at 04:37

## 2018-01-01 RX ADMIN — PROMETHAZINE HYDROCHLORIDE 25 MG: 25 TABLET ORAL at 11:01

## 2018-01-01 ASSESSMENT — ACTIVITIES OF DAILY LIVING (ADL)
ADLS_ACUITY_SCORE: 16
ADLS_ACUITY_SCORE: 14
ADLS_ACUITY_SCORE: 15
ADLS_ACUITY_SCORE: 17
ADLS_ACUITY_SCORE: 15
ADLS_ACUITY_SCORE: 16
ADLS_ACUITY_SCORE: 17
ADLS_ACUITY_SCORE: 14
PREVIOUS_RESPONSIBILITIES: MEAL PREP;LAUNDRY;SHOPPING;MEDICATION MANAGEMENT;DRIVING
ADLS_ACUITY_SCORE: 15
ADLS_ACUITY_SCORE: 14
ADLS_ACUITY_SCORE: 17
ADLS_ACUITY_SCORE: 17
ADLS_ACUITY_SCORE: 14
ADLS_ACUITY_SCORE: 15
ADLS_ACUITY_SCORE: 17
ADLS_ACUITY_SCORE: 16
ADLS_ACUITY_SCORE: 17
ADLS_ACUITY_SCORE: 16
ADLS_ACUITY_SCORE: 15
ADLS_ACUITY_SCORE: 15
ADLS_ACUITY_SCORE: 17

## 2018-01-01 ASSESSMENT — ENCOUNTER SYMPTOMS
WEAKNESS: 0
ABDOMINAL PAIN: 0
FEVER: 0
ABDOMINAL DISTENTION: 0
COUGH: 0
VOMITING: 0
SHORTNESS OF BREATH: 0
NAUSEA: 1
NUMBNESS: 0
NECK PAIN: 1
CONSTIPATION: 0

## 2018-01-01 ASSESSMENT — PAIN DESCRIPTION - DESCRIPTORS
DESCRIPTORS: HEADACHE
DESCRIPTORS: ACHING

## 2018-01-18 NOTE — PROGRESS NOTES
[id number]  HPI and Plan:   See dictation    Orders this Visit:  Orders Placed This Encounter   Procedures     Basic metabolic panel     Follow-Up with [facility] - [facility] visit     Orders Placed This Encounter   Medications     DISCONTD: torsemide (DEMADEX) 5 MG tablet     Sig: Take 5mg every other day, alternating with 10mg every other day     Dispense:  90 tablet     Refill:  3     torsemide (DEMADEX) 5 MG tablet     Sig: Take 1 tablet (5 mg) by mouth daily     Dispense:  90 tablet     Refill:  3     REPLACES previous RX     Medications Discontinued During This Encounter   Medication Reason     torsemide (DEMADEX) 20 MG tablet Reorder     torsemide (DEMADEX) 5 MG tablet Reorder         Encounter Diagnoses   Name Primary?     Nonrheumatic aortic valve stenosis      Chronic systolic congestive heart failure (H) Yes       CURRENT MEDICATIONS:  Current Outpatient Prescriptions   Medication Sig Dispense Refill     torsemide (DEMADEX) 5 MG tablet Take 1 tablet (5 mg) by mouth daily 90 tablet 3     hydrALAZINE (APRESOLINE) 25 MG tablet Take 1 tablet (25 mg) by mouth 3 times daily 270 tablet 3     levothyroxine (SYNTHROID/LEVOTHROID) 112 MCG tablet TAKE 1 TABLET DAILY 90 tablet 0     spironolactone (ALDACTONE) 25 MG tablet Take 0.5 tablets (12.5 mg) by mouth daily 45 tablet 3     simvastatin (ZOCOR) 20 MG tablet Take 1 tablet (20 mg) by mouth At Bedtime 90 tablet 3     isosorbide mononitrate (IMDUR) 30 MG 24 hr tablet 1/2 tablet daily AT NIGHT 45 tablet 3     carvedilol (COREG) 25 MG tablet TAKE 1 TABLET TWICE A DAY WITH MEALS 180 tablet 2     MELATONIN PO Take 3 mg by mouth nightly as needed       fluticasone (FLONASE) 50 MCG/ACT nasal spray Spray 1-2 sprays into both nostrils daily as needed for rhinitis or allergies (TAKE ONCE DAILY DURIGN ALLERGY SEASON) 16 g 11     aspirin 81 MG tablet Take 81 mg by mouth daily       Iron TABS Take 324 mg by mouth daily Patient states she takes OTC iron furrous gluconate 324 mg tablets  USP       calcium citrate (CALCITRATE) 950 MG tablet Take 2 tablets by mouth daily At Noon       VITAMIN D, CHOLECALCIFEROL, PO Take 2,000 Units by mouth daily        Lutein 20 MG CAPS Take 20 mg by mouth daily       prednisoLONE acetate (PRED FORTE) 1 % ophthalmic suspension Place 1 drop into the right eye every evening  1 Bottle      [DISCONTINUED] torsemide (DEMADEX) 5 MG tablet Take 5mg every other day, alternating with 10mg every other day 90 tablet 3     [DISCONTINUED] torsemide (DEMADEX) 20 MG tablet Take 5mg every other day, alternating with 10mg every other day 30 tablet        ALLERGIES     Allergies   Allergen Reactions     Atenolol Anaphylaxis     Hydrochlorothiazide      hyponatremia     Pollen Extract        PAST MEDICAL HISTORY:  Past Medical History:   Diagnosis Date     Aortic stenosis      CAD (coronary artery disease)     CT calcium rapqs=555 May 2013     Cardiomyopathy, idiopathic (H)     cardiomyopathy HTN vs viral; EF as low as 15% in 2009; CRT-P implanted 1/19/17     CHF (NYHA class II, ACC/AHA stage C) (H) 2/14/2013     Glaucoma      H/O angiography 9-1-2016    No angiographic evidence of obstructive coronary artery disease.     Hypertension      Hypothyroidism 2/14/2013     Pulmonary hypertension        PAST SURGICAL HISTORY:  Past Surgical History:   Procedure Laterality Date     CARDIAC CATHERIZATION  9/1/16     GYN SURGERY      Hysterectomy 1995     HYSTERECTOMY, PAP NO LONGER INDICATED       IMPLANT PACEMAKER  1/19/17    CRT-P     ORTHOPEDIC SURGERY      knee replacement 1998     ORTHOPEDIC SURGERY      Knee replacement 2008     ORTHOPEDIC SURGERY      knee surgery [place]       FAMILY HISTORY:  Family History   Problem Relation Age of Onset     CANCER Mother      Uterius     Breast Cancer Daughter        SOCIAL HISTORY:  Social History     Social History     Marital status:      Spouse name: N/A     Number of children: N/A     Years of education: N/A     Social History Main Topics  "    Smoking status: Never Smoker     Smokeless tobacco: Never Used     Alcohol use No     Drug use: No     Sexual activity: No     Other Topics Concern     Parent/Sibling W/ Cabg, Mi Or Angioplasty Before 65f 55m? No     Caffeine Concern No     Sleep Concern No     Stress Concern No     Weight Concern No     Special Diet Yes     low sodium     Exercise Yes     therapy     Seat Belt Yes     Social History Narrative       Review of Systems:  Skin:  Negative     Eyes:  Positive for cataracts  ENT:  Negative    Respiratory:  Negative    Cardiovascular:  Negative    Gastroenterology: Positive for    Genitourinary:  Positive for nocturia;urgency  Musculoskeletal:  Positive for arthritis  Neurologic:  Positive for numbness or tingling of hands  Psychiatric:  Negative    Heme/Lymph/Imm:  Negative    Endocrine:  Positive for thyroid disorder    Physical Exam:  Vitals: /72  Pulse 70  Ht 1.651 m (5' 5\")  Wt 79.6 kg (175 lb 6.4 oz)  SpO2 95%  BMI 29.19 kg/m2   Please refer to dictation for physical exam    Recent Lab Results:  LIPID RESULTS:  Lab Results   Component Value Date    CHOL 113 03/22/2016    HDL 39 (L) 03/22/2016    LDL 54 03/22/2016    TRIG 98 03/22/2016    CHOLHDLRATIO 3.5 07/06/2015       LIVER ENZYME RESULTS:  Lab Results   Component Value Date    AST 50 (H) 11/14/2016    ALT 89 (H) 11/14/2016       CBC RESULTS:  Lab Results   Component Value Date    WBC 4.8 04/17/2017    RBC 2.98 (L) 04/17/2017    HGB 9.3 (L) 08/28/2017    HCT 28.7 (L) 04/17/2017    MCV 96 04/17/2017    MCH 30.9 04/17/2017    MCHC 32.1 04/17/2017    RDW 15.3 (H) 04/17/2017     (L) 04/17/2017       BMP RESULTS:  Lab Results   Component Value Date     (L) 01/18/2018    POTASSIUM 4.7 01/18/2018    CHLORIDE 100 01/18/2018    CO2 27 01/18/2018    ANIONGAP 11.7 01/18/2018     (H) 01/18/2018    BUN 38 (H) 01/18/2018    CR 1.67 (H) 01/18/2018    GFRESTIMATED 29 (L) 01/18/2018    GFRESTBLACK 35 (L) 01/18/2018    SHOBHA 9.9 " 01/18/2018        A1C RESULTS:  No results found for: A1C    INR RESULTS:  Lab Results   Component Value Date    INR 1.12 09/01/2016           CC  Romeo Echols MD  [address]  [place] [id number]

## 2018-01-18 NOTE — LETTER
1/18/2018      Ander Shrestha MD  [address] [facility] [address]      RE: Mckayla Oconnell       Dear Colleague,    I had the pleasure of seeing Mckayla Oconnell in the [facility].    PRIMARY CARDIOLOGIST:  Romeo Echols MD       HISTORY OF PRESENT ILLNESS:  Ms. Oconnell is a delightful 89-year-old woman with past medical history significant for the following.     1.  Nonischemic cardiomyopathy with EF varying between 10% and 45% with last echocardiogram in 10/2017 showing EF about 45%.   2.  Aortic stenosis, moderate to severe with some degree of low-flow, low-gradient aortic stenosis, moderate tricuspid regurgitation and moderate mitral regurgitation.   3.  Chronic kidney disease with baseline between 1.5 and 2.0.     4.  Hyperlipidemia.   5.  Biventricular pacemaker in place since 01/2017.   6.  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   7.  Peripheral arterial disease with symptoms of claudication.   8.  Lymphedema.   9.  Previously fairly elevated pulmonary pressures, but improved to a RVSP of 30s on her last echo.      I had met her in 11/2016 when she was hospitalized with profound fatigue, weight gain and shortness of breath.  She diuresed about 20 pounds on a dobutamine drip with Bumex and weight of 179 pounds.  She struggled significantly with quick weight gain until she had a biventricular pacemaker placed.        Since then she has responded phenomenally well with a home weights right around 169 pounds.  She was last seen by Dr. Echols in the fall and was doing well at that time.  He noted that her creatinine was improving and discussed that if she can get off the water pill, she will likely have and even better creatinine.  Since that time she has cut back her torsemide from what was about a total of 110 mg a week to about 35 mg a week.  If she goes below that, she seems to gain weight.  She has also been elevating her feet and this has  resulted in her having almost in her having almost no edema at the end of the day.        She denies orthopnea or PND.  She has absolutely no shortness of breath.  She has no syncope or presyncope.  She is monitoring her diet very closely, both for renal diet and her sodium.      SOCIAL HISTORY:  She lives in her own home with her , Jaime.  Unfortunately, Jaime is currently hospitalized, which what sounds with what sounds like CHF as well.  They have children.  Their daughter, Sandra, is a physical therapist.  She is lifelong nonsmoker, no alcohol use.  She is very involved locally at her [employer] as well as her cabin.      PHYSICAL EXAMINATION:   GENERAL:  Elderly-appearing woman in no acute distress.   VITAL SIGNS:  Blood pressure today is 130/72 with pulse of 70.   HEENT:  Normocephalic, atraumatic.   HEART:  Regular with a 4/6 systolic murmur heard best at the right sternal border but heard throughout the precordium.  It does not radiate to her carotids.   LUNGS:  Clear with good airflow deep in bilateral lobes.   EXTREMITIES:  She has essentially No peripheral edema today.   SKIN:  Warm and dry.   NECK:  Neck veins are flat at 90 degrees.     I did not ask her to climb on exam table.  She walks with a walker.      ASSESSMENT AND PLAN:   1.  Chronic systolic heart failure with class II symptoms with truthfully most of her improvement due to her biventricular pacemaker.  Her medication regimen includes carvedilol 25 mg b.i.d., Imdur 15 mg each day at bedtime, spironolactone 12.5 mg daily, hydralazine 25 mg t.i.d. and 5 mg daily of torsemide.     We spent some time talking today about her kidney numbers being imbalanced and being not too dehydrated nor too congested and then she will likely need some degree of diuretic.  We have her on hydralazine and nitrates due to her elevated creatinine, although the spironolactone has been helpful been very helpful for her right-sided symptoms and replaced potassium.     At  this point, I am going to have her continue on 5 mg of torsemide a day.  She may try to decrease just by 1 tablet once a week, but I do not want to do a drastic cuts as I think she will actually retain more fluid.  Her [employer] weights are stable right around 169 pounds.  I am going to adjust her range to be from 165-171.  She will continue on 5 mg of torsemide a day.  For weights higher than 171, she will increase to 10 mg a day for 3 days and then go back down.  Overall, she is doing remarkably well.   2.  Moderate to severe aortic stenosis, moderate tricuspid and mitral regurgitation.  Her pulmonary pressures are markedly improved, and although her EF continues to progress, numerically she does not have symptoms.  We will repeat this echo in February as it will have been 6 months and then continue to follow symptoms before sending her back to [facility].     3.  Biventricular pacemaker in place.  Device check today shows 99% BiV pacing.   4.  Lymphedema with known severely abnormal ABIs.  Given that [facility] does not recommend compression stockings, she is doing well without elevating her legs and will continue.   5.  Hypertension, well controlled.   7.  Chronic anemia.  Last hemoglobin was 9.3.  We will recheck going forward.      Thank you for allowing me to participate in this delightful 89-year-old's care.  We will see her back in about 4 months, sooner with concerns.        Outpatient Encounter Prescriptions as of 1/18/2018   Medication Sig Dispense Refill     torsemide (DEMADEX) 5 MG tablet Take 1 tablet (5 mg) by mouth daily 90 tablet 3     hydrALAZINE (APRESOLINE) 25 MG tablet Take 1 tablet (25 mg) by mouth 3 times daily 270 tablet 3     levothyroxine (SYNTHROID/LEVOTHROID) 112 MCG tablet TAKE 1 TABLET DAILY 90 tablet 0     spironolactone (ALDACTONE) 25 MG tablet Take 0.5 tablets (12.5 mg) by mouth daily 45 tablet 3     simvastatin (ZOCOR) 20 MG tablet Take 1 tablet (20 mg) by mouth At  Bedtime 90 tablet 3     isosorbide mononitrate (IMDUR) 30 MG 24 hr tablet 1/2 tablet daily AT NIGHT 45 tablet 3     carvedilol (COREG) 25 MG tablet TAKE 1 TABLET TWICE A DAY WITH MEALS 180 tablet 2     MELATONIN PO Take 3 mg by mouth nightly as needed       fluticasone (FLONASE) 50 MCG/ACT nasal spray Spray 1-2 sprays into both nostrils daily as needed for rhinitis or allergies (TAKE ONCE DAILY DURIGN ALLERGY SEASON) 16 g 11     aspirin 81 MG tablet Take 81 mg by mouth daily       Iron TABS Take 324 mg by mouth daily Patient states she takes OTC iron furrous gluconate 324 mg tablets USP       calcium citrate (CALCITRATE) 950 MG tablet Take 2 tablets by mouth daily At Noon       VITAMIN D, CHOLECALCIFEROL, PO Take 2,000 Units by mouth daily        Lutein 20 MG CAPS Take 20 mg by mouth daily       prednisoLONE acetate (PRED FORTE) 1 % ophthalmic suspension Place 1 drop into the right eye every evening  1 Bottle      [DISCONTINUED] torsemide (DEMADEX) 5 MG tablet Take 5mg every other day, alternating with 10mg every other day 90 tablet 3     [DISCONTINUED] torsemide (DEMADEX) 20 MG tablet Take 5mg every other day, alternating with 10mg every other day 30 tablet      No facility-administered encounter medications on file as of 1/18/2018.      Again, thank you for allowing me to participate in the care of your patient.      Sincerely,    Divya Kraft PA-C     [facility]

## 2018-01-18 NOTE — PROGRESS NOTES
"[employer] Valitude CRT-Pacemaker Device Check    AP: 86% BiVP: 99 %  Mode: DDDR         Underlying Rhythm: SB in the 40s-50s  Heart Rate: blunted at 70 with minimal variation. Patient ambulates slowly with a walker and states that she \"feels great\"  Sensing: WNL    Pacing Threshold: WNL   Impedance: WNL  Battery Status: estimated 9.5 years longevity remaining  Device Site: remains well healed  Atrial Arrhythmia: none  Ventricular Arrhythmia: none  Setting Change: no changes made today.     Care Plan: Return to Q3 month remote checks on 4/25/2018. Patient is seeing Divya today. Zahida           "

## 2018-01-18 NOTE — PROGRESS NOTES
PRIMARY CARDIOLOGIST:  Romeo Echols MD       HISTORY OF PRESENT ILLNESS:  Ms. Oconnell is a delightful 89-year-old woman with past medical history significant for the following.     1.  Nonischemic cardiomyopathy with EF varying between 10% and 45% with last echocardiogram in 10/2017 showing EF about 45%.   2.  Aortic stenosis, moderate to severe with some degree of low-flow, low-gradient aortic stenosis, moderate tricuspid regurgitation and moderate mitral regurgitation.   3.  Chronic kidney disease with baseline between 1.5 and 2.0.     4.  Hyperlipidemia.   5.  Biventricular pacemaker in place since 01/2017.   6.  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   7.  Peripheral arterial disease with symptoms of claudication.   8.  Lymphedema.   9.  Previously fairly elevated pulmonary pressures, but improved to a RVSP of 30s on her last echo.      I had met her in 11/2016 when she was hospitalized with profound fatigue, weight gain and shortness of breath.  She diuresed about 20 pounds on a dobutamine drip with Bumex and weight of 179 pounds.  She struggled significantly with quick weight gain until she had a biventricular pacemaker placed.        Since then she has responded phenomenally well with a home weights right around 169 pounds.  She was last seen by Dr. Echols in the fall and was doing well at that time.  He noted that her creatinine was improving and discussed that if she can get off the water pill, she will likely have and even better creatinine.  Since that time she has cut back her torsemide from what was about a total of 110 mg a week to about 35 mg a week.  If she goes below that, she seems to gain weight.  She has also been elevating her feet and this has resulted in her having almost in her having almost no edema at the end of the day.        She denies orthopnea or PND.  She has absolutely no shortness of breath.  She has no syncope or presyncope.  She is monitoring her diet very closely, both  for renal diet and her sodium.      SOCIAL HISTORY:  She lives in her own home with her , Jaime.  Unfortunately, Jaime is currently hospitalized, which what sounds with what sounds like CHF as well.  They have children.  Their daughter, Sandra, is a physical therapist.  She is lifelong nonsmoker, no alcohol use.  She is very involved locally at her [employer] as well as her cabin.      PHYSICAL EXAMINATION:   GENERAL:  Elderly-appearing woman in no acute distress.   VITAL SIGNS:  Blood pressure today is 130/72 with pulse of 70.   HEENT:  Normocephalic, atraumatic.   HEART:  Regular with a 4/6 systolic murmur heard best at the right sternal border but heard throughout the precordium.  It does not radiate to her carotids.   LUNGS:  Clear with good airflow deep in bilateral lobes.   EXTREMITIES:  She has essentially No peripheral edema today.   SKIN:  Warm and dry.   NECK:  Neck veins are flat at 90 degrees.     I did not ask her to climb on exam table.  She walks with a walker.      ASSESSMENT AND PLAN:   1.  Chronic systolic heart failure with class II symptoms with truthfully most of her improvement due to her biventricular pacemaker.  Her medication regimen includes carvedilol 25 mg b.i.d., Imdur 15 mg each day at bedtime, spironolactone 12.5 mg daily, hydralazine 25 mg t.i.d. and 5 mg daily of torsemide.     We spent some time talking today about her kidney numbers being imbalanced and being not too dehydrated nor too congested and then she will likely need some degree of diuretic.  We have her on hydralazine and nitrates due to her elevated creatinine, although the spironolactone has been helpful been very helpful for her right-sided symptoms and replaced potassium.     At this point, I am going to have her continue on 5 mg of torsemide a day.  She may try to decrease just by 1 tablet once a week, but I do not want to do a drastic cuts as I think she will actually retain more fluid.  Her [employer] weights are  stable right around 169 pounds.  I am going to adjust her range to be from 165-171.  She will continue on 5 mg of torsemide a day.  For weights higher than 171, she will increase to 10 mg a day for 3 days and then go back down.  Overall, she is doing remarkably well.   2.  Moderate to severe aortic stenosis, moderate tricuspid and mitral regurgitation.  Her pulmonary pressures are markedly improved, and although her EF continues to progress, numerically she does not have symptoms.  We will repeat this echo in February as it will have been 6 months and then continue to follow symptoms before sending her back to [facility].     3.  Biventricular pacemaker in place.  Device check today shows 99% BiV pacing.   4.  Lymphedema with known severely abnormal ABIs.  Given that [facility] does not recommend compression stockings, she is doing well without elevating her legs and will continue.   5.  Hypertension, well controlled.   7.  Chronic anemia.  Last hemoglobin was 9.3.  We will recheck going forward.      Thank you for allowing me to participate in this delightful 89-year-old's care.  We will see her back in about 4 months, sooner with concerns.         MELISSA BURKETT PA-C             D: 2018 13:49   T: 2018 14:53   MT: CARSON      Name:     VICKEY CHURCH   MRN:      [id number]        Account:      [id number]   :      1927           Service Date: 2018      Document: [id number]

## 2018-01-18 NOTE — MR AVS SNAPSHOT
After Visit Summary   1/18/2018    Mckayla Oconnell    MRN: [id number]           Patient Information     Date Of Birth          7/2/1927        Visit Information        Provider Department      1/18/2018 10:50 AM More, Divya Mejia PA-C [facility]   [place]        Today's Diagnoses     Nonrheumatic aortic valve stenosis          Care Instructions    Call CORE nurse for any questions or concerns:  [phone]   *If you have concerns after hours, please call [phone], option 2 to speak with on call Cardiologist.    Schedule your echocardiogram for February.      For aortic stenosis watch for symptoms of shortness of breath, chest tightness or chest heaviness, palpitations, passing out or nearly passing out.      1. Medication changes from today:  Continue current medications.    You can try skipping torsemide 1 day a week.  Don't stop more than that at a time.        2. Weigh yourself daily and write it down.     3. Call CORE nurse if your weight is up more than 2 pounds in one day or 5 pounds in one week.     4. Call CORE nurse if you feel more short of breath, have more abdominal bloating, or leg swelling.     5. Continue low sodium diet (less than 2000 mg daily). If you eat less salt, you will retain less fluid.     6. Alcohol can weaken your heart further. You should avoid alcohol or limit its use to special times, such as a holiday or birthday.      7. Do NOT take Aleve or ibuprofen without talking to your doctor first.      8. Lab Results: Labs look good overall.       Component      Latest Ref Rng & Units 10/23/2017 1/18/2018   Sodium      136 - 145 mmol/L 136 134 (L)   Potassium      3.5 - 5.1 mmol/L 4.7 4.7   Chloride      98 - 107 mmol/L 102 100   Carbon Dioxide      23 - 29 mmol/L 27 27   Anion Gap      6 - 17 mmol/L 11.7 11.7   Glucose      70 - 105 mg/dL 104 121 (H)   Urea Nitrogen      7 - 30 mg/dL 43 (H) 38 (H)   Creatinine      0.70 - 1.30  mg/dL 1.49 (H) 1.67 (H)   GFR Estimate      >60 mL/min/1.7m2 33 (L) 29 (L)   GFR Estimate If Black      >60 mL/min/1.7m2 40 (L) 35 (L)   Calcium      8.5 - 10.5 mg/dL 9.5 9.9     [facility]: Cardiomyopathy, Optimization, Rehabilitation, Education  The [facility] Clinic is a heart failure specialty clinic within the [facility] where you will work with specialized nurse practitioners, physician assistants, doctors, and registered nurses. They are dedicated to helping patients with heart failure to carefully adjust medications, receive education, and learn who and when to call if symptoms develop. They specialize in helping you better understand your condition, slow the progression of your disease, improve the length and quality of your life, help you detect future heart problems before they become life threatening, and avoid hospitalizations.              Follow-ups after your visit        Additional Services     Follow-Up with CORE Clinic - TANYA visit                 Your next 10 appointments already scheduled     Apr 25, 2018  3:00 PM CDT   Remote PPM Check with CUENCA TECH1   [facility]   [place] ([facility])    [address] [place] [id number] [phone]           This appointment is for a remote check of your pacemaker.  This is not an appointment at the office.              Future tests that were ordered for you today     Open Future Orders        Priority Expected Expires Ordered    Follow-Up with CORE Clinic - TANYA visit Routine 5/18/2018 1/18/2019 1/18/2018    Basic metabolic panel Routine 5/18/2018 1/18/2019 1/18/2018            Who to contact     If you have questions or need follow up information about today's clinic visit or your schedule please contact [facility] directly at [phone].  Normal or non-critical lab and imaging results will be communicated to you by MyChart, letter or phone within 4  "business days after the clinic has received the results. If you do not hear from us within 7 days, please contact the clinic through LinkCloud or phone. If you have a critical or abnormal lab result, we will notify you by phone as soon as possible.  Submit refill requests through LinkCloud or call your pharmacy and they will forward the refill request to us. Please allow 3 business days for your refill to be completed.          Additional Information About Your Visit        LinkCloud Information     LinkCloud gives you secure access to your electronic health record. If you see a primary care provider, you can also send messages to your care team and make appointments. If you have questions, please call your primary care clinic.  If you do not have a primary care provider, please call [phone] and they will assist you.        Care EveryWhere ID     This is your Care EveryWhere ID. This could be used by other organizations to access your [place] medical records  [id number]        Your Vitals Were     Pulse Height Pulse Oximetry BMI (Body Mass Index)          70 1.651 m (5' 5\") 95% 29.19 kg/m2         Blood Pressure from Last 3 Encounters:   01/18/18 130/72   10/23/17 137/72   09/25/17 130/62    Weight from Last 3 Encounters:   01/18/18 79.6 kg (175 lb 6.4 oz)   10/23/17 79.7 kg (175 lb 9.6 oz)   09/25/17 77.7 kg (171 lb 4.8 oz)              We Performed the Following     Follow-Up with Cardiac Advanced Practice Provider        Primary Care Provider Office Phone # Fax #    Ander Shrestha [phone]       [address] [place] [id number]        Equal Access to Services     [facility] : Hadii juan pablo Lainez, waaxda luqadaha, qaybta kaalmabrooklyn pressley. So [facility] [phone].    ATENCIÓN: Si habla español, tiene a villalba disposición servicios gratuitos de asistencia lingüística. Llame al [phone].    We comply with applicable federal civil " rights laws and [place] laws. We do not discriminate on the basis of race, color, national origin, age, disability, sex, sexual orientation, or gender identity.            Thank you!     Thank you for choosing [facility]   PAULINE  for your care. Our goal is always to provide you with excellent care. Hearing back from our patients is one way we can continue to improve our services. Please take a few minutes to complete the written survey that you may receive in the mail after your visit with us. Thank you!             Your Updated Medication List - Protect others around you: Learn how to safely use, store and throw away your medicines at www.disposemymeds.org.          This list is accurate as of: 1/18/18 11:38 AM.  Always use your most recent med list.                   Brand Name Dispense Instructions for use Diagnosis    aspirin 81 MG tablet      Take 81 mg by mouth daily        calcium citrate 950 MG tablet    CALCITRATE     Take 2 tablets by mouth daily At Noon        carvedilol 25 MG tablet    COREG    180 tablet    TAKE 1 TABLET TWICE A DAY WITH MEALS    Cardiomyopathy, idiopathic (H), Chronic systolic heart failure (H), Essential hypertension, benign, Coronary artery disease involving native coronary artery of native heart without angina pectoris       fluticasone 50 MCG/ACT spray    FLONASE    16 g    Spray 1-2 sprays into both nostrils daily as needed for rhinitis or allergies (TAKE ONCE DAILY DURIGN ALLERGY SEASON)    Seasonal allergic rhinitis       hydrALAZINE 25 MG tablet    APRESOLINE    270 tablet    Take 1 tablet (25 mg) by mouth 3 times daily    Acute on chronic congestive heart failure, unspecified congestive heart failure type (H)       Iron Tabs      Take 324 mg by mouth daily Patient states she takes OTC iron furrous gluconate 324 mg tablets USP        isosorbide mononitrate 30 MG 24 hr tablet    IMDUR    45 tablet    1/2 tablet daily AT NIGHT    Cardiomyopathy,  idiopathic (H)       levothyroxine 112 MCG tablet    SYNTHROID/LEVOTHROID    90 tablet    TAKE 1 TABLET DAILY    Hypothyroidism, unspecified type       Lutein 20 MG Caps      Take 20 mg by mouth daily        MELATONIN PO      Take 3 mg by mouth nightly as needed        prednisoLONE acetate 1 % ophthalmic susp    PRED FORTE    1 Bottle    Place 1 drop into the right eye every evening        simvastatin 20 MG tablet    ZOCOR    90 tablet    Take 1 tablet (20 mg) by mouth At Bedtime    Coronary artery disease involving native coronary artery of native heart without angina pectoris       spironolactone 25 MG tablet    ALDACTONE    45 tablet    Take 0.5 tablets (12.5 mg) by mouth daily    Cardiomyopathy, idiopathic (H), Localized edema       torsemide 20 MG tablet    DEMADEX    30 tablet    Take 5mg every other day, alternating with 10mg every other day        VITAMIN D (CHOLECALCIFEROL) PO      Take 2,000 Units by mouth daily

## 2018-01-18 NOTE — MR AVS SNAPSHOT
After Visit Summary   1/18/2018    Mckayla Oconnell    MRN: [id number]           Patient Information     Date Of Birth          7/2/1927        Visit Information        Provider Department      1/18/2018 10:00 AM CUENCA DCR2 [facility]        Today's Diagnoses     Cardiac pacemaker in situ           Follow-ups after your visit        Your next 10 appointments already scheduled     Jan 18, 2018 10:50 AM CST   Core Return with Divya Kraft PA-C   [facility] ([facility])    [address]  [facility] [address] [phone]            Apr 25, 2018  3:00 PM CDT   Remote PPM Check with CUENCA TECH1   [facility] ([facility])    [id number]  [facility] [address] [phone]           This appointment is for a remote check of your pacemaker.  This is not an appointment at the office.              Who to contact     If you have questions or need follow up information about today's clinic visit or your schedule please contact [facility] directly at [phone].  Normal or non-critical lab and imaging results will be communicated to you by Metatomixhart, letter or phone within 4 business days after the clinic has received the results. If you do not hear from us within 7 days, please contact the clinic through Metatomixhart or phone. If you have a critical or abnormal lab result, we will notify you by phone as soon as possible.  Submit refill requests through Pfenex or call your pharmacy and they will forward the refill request to us. Please allow 3 business days for your refill to be completed.          Additional Information About Your Visit        MyChart Information     Pfenex gives you secure access to your electronic health record. If you see a primary care provider, you can also send messages to  your care team and make appointments. If you have questions, please call your primary care clinic.  If you do not have a primary care provider, please call [phone] and they will assist you.        Care EveryWhere ID     This is your Care EveryWhere ID. This could be used by other organizations to access your [place] medical records  [id number]         Blood Pressure from Last 3 Encounters:   10/23/17 137/72   09/25/17 130/62   08/28/17 108/60    Weight from Last 3 Encounters:   10/23/17 79.7 kg (175 lb 9.6 oz)   09/25/17 77.7 kg (171 lb 4.8 oz)   08/28/17 78.3 kg (172 lb 9.6 oz)              We Performed the Following     Follow-Up with Device Clinic     PM DEVICE PROGRAMMING EVAL, MULTI LEAD PACER ([id number])        Primary Care Provider Office Phone # Fax #    Ander Shrestha [phone]       [address] [place] [address]        Equal Access to Services     SHELBY SHEPARD : Hadii aad ku hadasho Soomaali, waaxda luqadaha, qaybta kaalmada adeegyada, waxay idiin haysebn peewee santana . So [facility] [phone].    ATENCIÓN: Si habla español, tiene a villalba disposición servicios gratuitos de asistencia lingüística. Llame al [phone].    We comply with applicable federal civil rights laws and [place] laws. We do not discriminate on the basis of race, color, national origin, age, disability, sex, sexual orientation, or gender identity.            Thank you!     Thank you for choosing [facility]  for your care. Our goal is always to provide you with excellent care. Hearing back from our patients is one way we can continue to improve our services. Please take a few minutes to complete the written survey that you may receive in the mail after your visit with us. Thank you!             Your Updated Medication List - Protect others around you: Learn how to safely use, store and throw away your medicines at www.disposemymeds.org.          This list  is accurate as of: 1/18/18 10:18 AM.  Always use your most recent med list.                   Brand Name Dispense Instructions for use Diagnosis    aspirin 81 MG tablet      Take 81 mg by mouth daily        calcium citrate 950 MG tablet    CALCITRATE     Take 2 tablets by mouth daily At Noon        carvedilol 25 MG tablet    COREG    180 tablet    TAKE 1 TABLET TWICE A DAY WITH MEALS    Cardiomyopathy, idiopathic (H), Chronic systolic heart failure (H), Essential hypertension, benign, Coronary artery disease involving native coronary artery of native heart without angina pectoris       fluticasone 50 MCG/ACT spray    FLONASE    16 g    Spray 1-2 sprays into both nostrils daily as needed for rhinitis or allergies (TAKE ONCE DAILY DURIGN ALLERGY SEASON)    Seasonal allergic rhinitis       hydrALAZINE 25 MG tablet    APRESOLINE    270 tablet    Take 1 tablet (25 mg) by mouth 3 times daily    Acute on chronic congestive heart failure, unspecified congestive heart failure type (H)       Iron Tabs      Take 324 mg by mouth daily Patient states she takes OTC iron furrous gluconate 324 mg tablets USP        isosorbide mononitrate 30 MG 24 hr tablet    IMDUR    45 tablet    1/2 tablet daily AT NIGHT    Cardiomyopathy, idiopathic (H)       levothyroxine 112 MCG tablet    SYNTHROID/LEVOTHROID    90 tablet    TAKE 1 TABLET DAILY    Hypothyroidism, unspecified type       Lutein 20 MG Caps      Take 20 mg by mouth daily        MELATONIN PO      Take 3 mg by mouth nightly as needed        prednisoLONE acetate 1 % ophthalmic susp    PRED FORTE    1 Bottle    Place 1 drop into the right eye every evening        simvastatin 20 MG tablet    ZOCOR    90 tablet    Take 1 tablet (20 mg) by mouth At Bedtime    Coronary artery disease involving native coronary artery of native heart without angina pectoris       spironolactone 25 MG tablet    ALDACTONE    45 tablet    Take 0.5 tablets (12.5 mg) by mouth daily    Cardiomyopathy, idiopathic  (H), Localized edema       torsemide 20 MG tablet    DEMADEX    30 tablet    Take 5mg every other day, alternating with 10mg every other day        VITAMIN D (CHOLECALCIFEROL) PO      Take 2,000 Units by mouth daily

## 2018-01-18 NOTE — PATIENT INSTRUCTIONS
Call CORE nurse for any questions or concerns:  [phone]   *If you have concerns after hours, please call [phone], option 2 to speak with on call Cardiologist.    Schedule your echocardiogram for February.      For aortic stenosis watch for symptoms of shortness of breath, chest tightness or chest heaviness, palpitations, passing out or nearly passing out.      1. Medication changes from today:  Continue current medications.    You can try skipping torsemide 1 day a week.  Don't stop more than that at a time.        2. Weigh yourself daily and write it down.     3. Call CORE nurse if your weight is up more than 2 pounds in one day or 5 pounds in one week.     4. Call CORE nurse if you feel more short of breath, have more abdominal bloating, or leg swelling.     5. Continue low sodium diet (less than 2000 mg daily). If you eat less salt, you will retain less fluid.     6. Alcohol can weaken your heart further. You should avoid alcohol or limit its use to special times, such as a holiday or birthday.      7. Do NOT take Aleve or ibuprofen without talking to your doctor first.      8. Lab Results: Labs look good overall.       Component      Latest Ref Rng & Units 10/23/2017 1/18/2018   Sodium      136 - 145 mmol/L 136 134 (L)   Potassium      3.5 - 5.1 mmol/L 4.7 4.7   Chloride      98 - 107 mmol/L 102 100   Carbon Dioxide      23 - 29 mmol/L 27 27   Anion Gap      6 - 17 mmol/L 11.7 11.7   Glucose      70 - 105 mg/dL 104 121 (H)   Urea Nitrogen      7 - 30 mg/dL 43 (H) 38 (H)   Creatinine      0.70 - 1.30 mg/dL 1.49 (H) 1.67 (H)   GFR Estimate      >60 mL/min/1.7m2 33 (L) 29 (L)   GFR Estimate If Black      >60 mL/min/1.7m2 40 (L) 35 (L)   Calcium      8.5 - 10.5 mg/dL 9.5 9.9     [facility]: Cardiomyopathy, Optimization, Rehabilitation, Education  The [facility] Clinic is a heart failure specialty clinic within the [facility] where you will work with specialized nurse practitioners, physician  assistants, doctors, and registered nurses. They are dedicated to helping patients with heart failure to carefully adjust medications, receive education, and learn who and when to call if symptoms develop. They specialize in helping you better understand your condition, slow the progression of your disease, improve the length and quality of your life, help you detect future heart problems before they become life threatening, and avoid hospitalizations.

## 2018-02-12 NOTE — TELEPHONE ENCOUNTER
Pt requested simvastatin refill. Last lipid panel 3/22/16.    Filled rx for 6mo supply, and ordered lipid panel, ALT to be drawn at time of next f/u which is ordered for Dr. Echols ~4/2018-reviewed this need w/ pt.     Thea Spence [facility] Clinic RN 12:05 PM 02/12/18  [phone]

## 2018-02-16 NOTE — TELEPHONE ENCOUNTER
"Requested Prescriptions   Pending Prescriptions Disp Refills     levothyroxine (SYNTHROID/LEVOTHROID) 112 MCG tablet [Pharmacy Med Name: L-THYROXINE (SYNTHROID) TABS 112MCG] 90 tablet 0     Sig: TAKE 1 TABLET DAILY    Thyroid Protocol Failed    2/16/2018 12:19 PM       Failed - Normal TSH on file in past 12 months    Recent Labs   Lab Test  01/18/18   0932   TSH  7.30*             Passed - Patient is 12 years or older       Passed - Recent or future visit with authorizing provider's specialty    Patient had office visit in the last year or has a visit in the next 30 days with authorizing provider.  See \"Patient Info\" tab in inbasket, or \"Choose Columns\" in Meds & Orders section of the refill encounter.            Passed - No active pregnancy on record    If patient is pregnant or has had a positive pregnancy test, please check TSH.         Passed - No positive pregnancy test in past 12 months    If patient is pregnant or has had a positive pregnancy test, please check TSH.          Routing refill request to provider for review/approval because:  Labs out of range:  tsh        "

## 2018-04-19 PROBLEM — J30.2 SEASONAL ALLERGIC RHINITIS, UNSPECIFIED CHRONICITY, UNSPECIFIED TRIGGER: Status: ACTIVE | Noted: 2018-01-01

## 2018-04-19 NOTE — TELEPHONE ENCOUNTER
"Requested Prescriptions   Pending Prescriptions Disp Refills     fluticasone (FLONASE) 50 MCG/ACT spray [Pharmacy Med Name: Fluticasone Propionate Nasal Suspension 50 MCG/ACT] 16 g 10     Si to 2 sprays in both nostrils once daily as needed for rhinitis or allergies    Inhaled Steroids Protocol Passed    2018  6:02 PM       Passed - Patient is age 12 or older       Passed - Recent (12 mo) or future (30 days) visit within the authorizing provider's specialty    Patient had office visit in the last 12 months or has a visit in the next 30 days with authorizing provider or within the authorizing provider's specialty.  See \"Patient Info\" tab in inbasket, or \"Choose Columns\" in Meds & Orders section of the refill encounter.            Routing refill request to provider for review/approval because:  Patient needs to be seen because it has been more than 1 year since last office visit.  Last seen by you 16        "

## 2018-04-23 NOTE — LETTER
4/23/2018      Ander Shrestha MD  [address] [facility] [address]      RE: Mckayla Delacruzlyn Anish       Dear Colleague,    I had the pleasure of seeing Mckayla Swanson Anish in the [facility].    Service Date: 04/23/2018      HISTORY OF PRESENT ILLNESS:  Mckayla is a very sweet 89-year-old woman with past medical history significant for moderate to severe aortic stenosis and idiopathic cardiomyopathy, pulmonary hypertension, anomalous left anterior descending artery, first noted in 1995, coming off of her right coronary artery.  CT scan in 2013 confirmed an anterior benign course.  Cardiomyopathy ranged from as low as 15% to 45%.  Recent baseline has been in the 30%-35% range.  She has hypertriglyceridemia, HDL deficiency, hypothyroidism and anemia for which she sees Dr. Parker and has received iron infusions.  She has had problems with hyponatremia thought to be secondary to hydrochlorothiazide and another episode thought to be secondary to trazodone.      Her aortic valve was evaluated by a dobutamine stress echo which demonstrated that her aortic stenosis was still not severe but remained in the moderate range.  Ultimately, she received a biventricular defibrillator which had a dramatic improvement in her heart failure symptoms.  She follows closely in our [facility] and returns for followup.  Over time, we have managed to his steadily decrease her diuretic regimen and she has done a very good job of trying to follow the salt in her diet and elevating her legs in order to not need diuretics.      Mckayla returns to clinic stating overall she is doing quite well, although she states she had a particularly bad last night and was up most of the night, was not able to elevate her legs today and as a result, she has got worsening edema today, but for the most part states usually it is doing quite well.  She has no ankle edema at all.  She states she is having no chest,  arm, neck, jaw or shoulder discomfort, no dyspnea on exertion, orthopnea or PND.  No palpitations, lightheadedness, dizziness, syncope, near-syncope.  She notes no side effects or problems with her current medical regimen.  She states she thinks she is following a 1525-1465 mg low-salt diet.  Her diuretic regimen is down to 2.5 mg of torsemide daily and 12.5 mg of spironolactone.      ASSESSMENT AND PLAN:  Mckayla appears doing quite well from a cardiac standpoint without clinical evidence of ischemia.      Heart failure is very well compensated.  She follows in our [facility] program.  She watches her sodium like a hawk.  At this time, I told her she can try stopping the spironolactone altogether because I suspect she is dry and possibly a little hypokalemic secondary to the spironolactone.  We will continue her torsemide at 2.5 mg.  I have congratulated on her excellent job of watching the salt and elevating her legs twice a day in order to balance her peripheral edema.      Clinically, she has no symptoms to suggest her aortic valve is severe and her echocardiogram still remains consistent with moderate to severe aortic stenosis.      Cardiomyopathy appears to be unchanged from previous echocardiograms.  She does have moderate diastolic dysfunction that appears to be grade 2.  Pulmonary pressures are elevated, estimated to be 52 mmHg plus right atrial pressure.  I will have her follow up in [facility] in 3 months.  At this time, I plan on repeating her echocardiogram in 6 months.  If she should have any problems, I would be glad to see her sooner.      Thank you for allowing me to participate in her care.         GUCCI RUBIO MD, [facility]             D: 2018   T: 2018   MT: CARSON      Name:     MCKAYLA CHURCH   MRN:      [id number]        Account:      [id number]   :      1927           Service Date: 2018      Document: [id number]         Outpatient Encounter Prescriptions  as of 4/23/2018   Medication Sig Dispense Refill     aspirin 81 MG tablet Take 81 mg by mouth daily       calcium citrate (CALCITRATE) 950 MG tablet Take 2 tablets by mouth daily At Noon       carvedilol (COREG) 25 MG tablet TAKE 1 TABLET TWICE A DAY WITH MEALS 180 tablet 2     fluticasone (FLONASE) 50 MCG/ACT spray 1 to 2 sprays in both nostrils once daily as needed for rhinitis or allergies 16 g 10     hydrALAZINE (APRESOLINE) 25 MG tablet Take 1 tablet (25 mg) by mouth 3 times daily 270 tablet 3     Iron TABS Take 324 mg by mouth daily Patient states she takes OTC iron furrous gluconate 324 mg tablets USP       isosorbide mononitrate (IMDUR) 30 MG 24 hr tablet 1/2 tablet daily AT NIGHT 45 tablet 3     levothyroxine (SYNTHROID/LEVOTHROID) 112 MCG tablet TAKE 1 TABLET DAILY 90 tablet 0     Lutein 20 MG CAPS Take 20 mg by mouth daily       MELATONIN PO Take 3 mg by mouth nightly as needed       prednisoLONE acetate (PRED FORTE) 1 % ophthalmic suspension Place 1 drop into the right eye every evening  1 Bottle      simvastatin (ZOCOR) 20 MG tablet Take 1 tablet (20 mg) by mouth At Bedtime 90 tablet 1     torsemide (DEMADEX) 5 MG tablet Take 1 tablet (5 mg) by mouth daily (Patient taking differently: Take 2.5 mg by mouth daily ) 90 tablet 3     VITAMIN D, CHOLECALCIFEROL, PO Take 2,000 Units by mouth daily        [DISCONTINUED] spironolactone (ALDACTONE) 25 MG tablet Take 0.5 tablets (12.5 mg) by mouth daily 45 tablet 3     No facility-administered encounter medications on file as of 4/23/2018.        Again, thank you for allowing me to participate in the care of your patient.      Sincerely,    Romeo Echols MD     [facility]

## 2018-04-23 NOTE — PROGRESS NOTES
Service Date: 04/23/2018      HISTORY OF PRESENT ILLNESS:  Mckayla is a very sweet 89-year-old woman with past medical history significant for moderate to severe aortic stenosis and idiopathic cardiomyopathy, pulmonary hypertension, anomalous left anterior descending artery, first noted in 1995, coming off of her right coronary artery.  CT scan in 2013 confirmed an anterior benign course.  Cardiomyopathy ranged from as low as 15% to 45%.  Recent baseline has been in the 30%-35% range.  She has hypertriglyceridemia, HDL deficiency, hypothyroidism and anemia for which she sees Dr. Parker and has received iron infusions.  She has had problems with hyponatremia thought to be secondary to hydrochlorothiazide and another episode thought to be secondary to trazodone.      Her aortic valve was evaluated by a dobutamine stress echo which demonstrated that her aortic stenosis was still not severe but remained in the moderate range.  Ultimately, she received a biventricular defibrillator which had a dramatic improvement in her heart failure symptoms.  She follows closely in our [facility] and returns for followup.  Over time, we have managed to his steadily decrease her diuretic regimen and she has done a very good job of trying to follow the salt in her diet and elevating her legs in order to not need diuretics.      Mckayla returns to clinic stating overall she is doing quite well, although she states she had a particularly bad last night and was up most of the night, was not able to elevate her legs today and as a result, she has got worsening edema today, but for the most part states usually it is doing quite well.  She has no ankle edema at all.  She states she is having no chest, arm, neck, jaw or shoulder discomfort, no dyspnea on exertion, orthopnea or PND.  No palpitations, lightheadedness, dizziness, syncope, near-syncope.  She notes no side effects or problems with her current medical regimen.  She states she  thinks she is following a 0479-8400 mg low-salt diet.  Her diuretic regimen is down to 2.5 mg of torsemide daily and 12.5 mg of spironolactone.      ASSESSMENT AND PLAN:  Mckayla appears doing quite well from a cardiac standpoint without clinical evidence of ischemia.      Heart failure is very well compensated.  She follows in our [facility] program.  She watches her sodium like a hawk.  At this time, I told her she can try stopping the spironolactone altogether because I suspect she is dry and possibly a little hypokalemic secondary to the spironolactone.  We will continue her torsemide at 2.5 mg.  I have congratulated on her excellent job of watching the salt and elevating her legs twice a day in order to balance her peripheral edema.      Clinically, she has no symptoms to suggest her aortic valve is severe and her echocardiogram still remains consistent with moderate to severe aortic stenosis.      Cardiomyopathy appears to be unchanged from previous echocardiograms.  She does have moderate diastolic dysfunction that appears to be grade 2.  Pulmonary pressures are elevated, estimated to be 52 mmHg plus right atrial pressure.  I will have her follow up in [facility] in 3 months.  At this time, I plan on repeating her echocardiogram in 6 months.  If she should have any problems, I would be glad to see her sooner.      Thank you for allowing me to participate in her care.         GUCCI RUBIO MD, [facility]             D: 2018   T: 2018   MT: CARSON      Name:     MCKAYLA CHURCH   MRN:      [id number]        Account:      [id number]   :      1927           Service Date: 2018      Document: [id number]

## 2018-04-23 NOTE — LETTER
4/23/2018    Ander Shrestha MD  [address] [facility] [address]    RE: Mckayla Oconnell       Dear Colleague,    I had the pleasure of seeing Mckayla Oconnell in the [facility].    HPI and Plan:   See dictation    Orders Placed This Encounter   Procedures     Basic metabolic panel     Lipid Profile     ALT     Basic metabolic panel     Follow-Up with Cardiologist     Follow-Up with Cardiac Advanced Practice Provider     Echocardiogram       No orders of the defined types were placed in this encounter.      Medications Discontinued During This Encounter   Medication Reason     spironolactone (ALDACTONE) 25 MG tablet          Encounter Diagnoses   Name Primary?     Nonrheumatic aortic valve stenosis      Coronary artery disease involving native coronary artery of native heart without angina pectoris Yes     Pure hypercholesterolemia      Cardiomyopathy, idiopathic (H)      Essential hypertension, benign      Chronic systolic heart failure (H)      Peripheral edema      Chronic kidney disease, stage III (moderate)      Hyponatremia        CURRENT MEDICATIONS:  Current Outpatient Prescriptions   Medication Sig Dispense Refill     aspirin 81 MG tablet Take 81 mg by mouth daily       calcium citrate (CALCITRATE) 950 MG tablet Take 2 tablets by mouth daily At Noon       carvedilol (COREG) 25 MG tablet TAKE 1 TABLET TWICE A DAY WITH MEALS 180 tablet 2     fluticasone (FLONASE) 50 MCG/ACT spray 1 to 2 sprays in both nostrils once daily as needed for rhinitis or allergies 16 g 10     hydrALAZINE (APRESOLINE) 25 MG tablet Take 1 tablet (25 mg) by mouth 3 times daily 270 tablet 3     Iron TABS Take 324 mg by mouth daily Patient states she takes OTC iron furrous gluconate 324 mg tablets USP       isosorbide mononitrate (IMDUR) 30 MG 24 hr tablet 1/2 tablet daily AT NIGHT 45 tablet 3     levothyroxine (SYNTHROID/LEVOTHROID) 112 MCG tablet TAKE 1 TABLET DAILY 90 tablet 0      Lutein 20 MG CAPS Take 20 mg by mouth daily       MELATONIN PO Take 3 mg by mouth nightly as needed       prednisoLONE acetate (PRED FORTE) 1 % ophthalmic suspension Place 1 drop into the right eye every evening  1 Bottle      simvastatin (ZOCOR) 20 MG tablet Take 1 tablet (20 mg) by mouth At Bedtime 90 tablet 1     torsemide (DEMADEX) 5 MG tablet Take 1 tablet (5 mg) by mouth daily (Patient taking differently: Take 2.5 mg by mouth daily ) 90 tablet 3     VITAMIN D, CHOLECALCIFEROL, PO Take 2,000 Units by mouth daily          ALLERGIES     Allergies   Allergen Reactions     Atenolol Anaphylaxis     Hydrochlorothiazide      hyponatremia     Pollen Extract        PAST MEDICAL HISTORY:  Past Medical History:   Diagnosis Date     Aortic stenosis      CAD (coronary artery disease)     angiogram 2016: non-obstructive, CT calcium [id number]=050 May 2013     Cardiomyopathy, idiopathic (H)     cardiomyopathy HTN vs viral; EF as low as 15% in 2009; CRT-P implanted 1/19/17     CHF (NYHA class II, ACC/AHA stage C) (H) 2/14/2013     Glaucoma      H/O angiography 9-1-2016    No angiographic evidence of obstructive coronary artery disease.     Hypertension      Hypothyroidism 2/14/2013     Nonrheumatic mitral valve regurgitation      Nonrheumatic tricuspid valve regurgitation      Pulmonary hypertension        PAST SURGICAL HISTORY:  Past Surgical History:   Procedure Laterality Date     CARDIAC CATHERIZATION  9/1/16     GYN SURGERY      Hysterectomy 1995     HYSTERECTOMY, PAP NO LONGER INDICATED       IMPLANT PACEMAKER  1/19/17    CRT-P     ORTHOPEDIC SURGERY      knee replacement 1998     ORTHOPEDIC SURGERY      Knee replacement 2008     ORTHOPEDIC SURGERY      knee surgery [facility]       FAMILY HISTORY:  Family History   Problem Relation Age of Onset     CANCER Mother      Uterius     Breast Cancer Daughter        SOCIAL HISTORY:  Social History     Social History     Marital status:      Spouse name: N/A     Number of  children: N/A     Years of education: N/A     Social History Main Topics     Smoking status: Never Smoker     Smokeless tobacco: Never Used     Alcohol use No     Drug use: No     Sexual activity: No     Other Topics Concern     Parent/Sibling W/ Cabg, Mi Or Angioplasty Before 65f 55m? No     Caffeine Concern No     Sleep Concern No     Stress Concern No     Weight Concern No     Special Diet Yes     low sodium     Exercise Yes     therapy     Seat Belt Yes     Social History Narrative       Review of Systems:  Skin:  Negative       Eyes:  Positive for glasses    ENT:  Negative      Respiratory:  Positive for       Cardiovascular:  Negative      Gastroenterology: Negative      Genitourinary:  Negative      Musculoskeletal:  Positive for arthritis    Neurologic:  Negative      Psychiatric:  Negative      Heme/Lymph/Imm:  Positive for allergies seasonal  Endocrine:  Positive for thyroid disorder      Physical Exam:  Vitals: /77  Pulse 80  Wt 79.6 kg (175 lb 6.4 oz)  BMI 29.19 kg/m2    Constitutional:  cooperative, alert and oriented, well developed, well nourished, in no acute distress overweight Use walker    Skin:  warm and dry to the touch, no apparent skin lesions or masses noted          Head:  normocephalic, no masses or lesions        Eyes:  pupils equal and round;conjunctivae and lids unremarkable;sclera white;no xanthalasma;no nystagmus   Ptosis of right eye    Lymph:      ENT:  no pallor or cyanosis, dentition good        Neck:  carotid pulses are full and equal bilaterally;no carotid bruit        Respiratory:  normal breath sounds, clear to auscultation, normal A-P diameter, normal symmetry, normal respiratory excursion, no use of accessory muscles         Cardiac: regular rhythm       systolic murmur;grade 3;radiation to the carotid     late peaking  pulses full and equal                                        GI:           Extremities and Muscular Skeletal:  no spinal abnormalities noted;normal  muscle strength and tone   bilateral LE edema;1+          Neurological:  no gross motor deficits        Psych:  affect appropriate, oriented to time, person and place        CC  Romeo Echols MD  [address]  [place] [address]                Thank you for allowing me to participate in the care of your patient.      Sincerely,     Romeo Echols MD     [facility]    cc:   Romeo Echols MD  [address]  [place] [address]

## 2018-04-23 NOTE — MR AVS SNAPSHOT
After Visit Summary   4/23/2018    Mckayla Oconnell    MRN: [id number]           Patient Information     Date Of Birth          7/2/1927        Visit Information        Provider Department      4/23/2018 3:15 PM Romeo Echols MD [facility]   [place]        Today's Diagnoses     Nonrheumatic aortic valve stenosis           Follow-ups after your visit        Additional Services     Follow-Up with Cardiac Advanced Practice Provider           Follow-Up with Cardiologist                 Your next 10 appointments already scheduled     Apr 25, 2018  3:00 PM CDT   Remote PPM Check with [facility] TECH1   [facility]   [place] ([facility])    [address]  [place] [address] [phone] OPT 2           This appointment is for a remote check of your pacemaker.  This is not an appointment at the office.              Future tests that were ordered for you today     Open Future Orders        Priority Expected Expires Ordered    Basic metabolic panel Routine 4/23/2019 4/24/2019 4/23/2018    Lipid Profile Routine 4/23/2019 4/23/2019 4/23/2018    ALT Routine 4/23/2019 4/23/2019 4/23/2018    Echocardiogram Routine 4/23/2019 4/24/2019 4/23/2018    Follow-Up with Cardiologist Routine 4/23/2019 4/24/2019 4/23/2018    Basic metabolic panel Routine 7/22/2018 4/23/2019 4/23/2018    Follow-Up with Cardiac Advanced Practice Provider Routine 7/22/2018 4/23/2019 4/23/2018            Who to contact     If you have questions or need follow up information about today's clinic visit or your schedule please contact [facility]   [place] directly at [phone].  Normal or non-critical lab and imaging results will be communicated to you by MyChart, letter or phone within 4 business days after the clinic has received the results. If you do not hear from us within 7 days, please contact the clinic through mVisum  or phone. If you have a critical or abnormal lab result, we will notify you by phone as soon as possible.  Submit refill requests through [employer] or call your pharmacy and they will forward the refill request to us. Please allow 3 business days for your refill to be completed.          Additional Information About Your Visit        [employer] Information     [employer] gives you secure access to your electronic health record. If you see a primary care provider, you can also send messages to your care team and make appointments. If you have questions, please call your primary care clinic.  If you do not have a primary care provider, please call [phone] and they will assist you.        Care EveryWhere ID     This is your Care EveryWhere ID. This could be used by other organizations to access your [facility] medical records  [id number]        Your Vitals Were     Pulse BMI (Body Mass Index)                80 29.19 kg/m2           Blood Pressure from Last 3 Encounters:   04/23/18 157/77   01/18/18 130/72   10/23/17 137/72    Weight from Last 3 Encounters:   04/23/18 79.6 kg (175 lb 6.4 oz)   01/18/18 79.6 kg (175 lb 6.4 oz)   10/23/17 79.7 kg (175 lb 9.6 oz)              We Performed the Following     Follow-Up with Cardiologist          Today's Medication Changes          These changes are accurate as of 4/23/18  3:50 PM.  If you have any questions, ask your nurse or doctor.               These medicines have changed or have updated prescriptions.        Dose/Directions    torsemide 5 MG tablet   Commonly known as:  DEMADEX   This may have changed:  how much to take   Used for:  Chronic systolic congestive heart failure (H)        Dose:  5 mg   Take 1 tablet (5 mg) by mouth daily   Quantity:  90 tablet   Refills:  3         Stop taking these medicines if you haven't already. Please contact your care team if you have questions.     spironolactone 25 MG tablet   Commonly known as:  ALDACTONE   Stopped by:  Kinza  Romeo LORENZO MD                    Primary Care Provider Office Phone # Fax #    Ander Momo Shrestha [phone]       [address] [facility] [address]        Equal Access to Services     SHELBY SHEPARD : Hadii juan pablo ku aryano Soomaali, waaxda luqadaha, qaybta kaalmada adeegyada, [place] pérezflor sorensen. So [facility] [phone].    ATENCIÓN: Si habla español, tiene a villalba disposición servicios gratuitos de asistencia lingüística. Llame al [phone].    We comply with applicable federal civil rights laws and [place] laws. We do not discriminate on the basis of race, color, national origin, age, disability, sex, sexual orientation, or gender identity.            Thank you!     Thank you for choosing [facility]  for your care. Our goal is always to provide you with excellent care. Hearing back from our patients is one way we can continue to improve our services. Please take a few minutes to complete the written survey that you may receive in the mail after your visit with us. Thank you!             Your Updated Medication List - Protect others around you: Learn how to safely use, store and throw away your medicines at www.disposemymeds.org.          This list is accurate as of 4/23/18  3:50 PM.  Always use your most recent med list.                   Brand Name Dispense Instructions for use Diagnosis    aspirin 81 MG tablet      Take 81 mg by mouth daily        calcium citrate 950 MG tablet    CALCITRATE     Take 2 tablets by mouth daily At Noon        carvedilol 25 MG tablet    COREG    180 tablet    TAKE 1 TABLET TWICE A DAY WITH MEALS    Cardiomyopathy, idiopathic (H), Chronic systolic heart failure (H), Essential hypertension, benign, Coronary artery disease involving native coronary artery of native heart without angina pectoris       fluticasone 50 MCG/ACT spray    FLONASE    16 g    1 to 2 sprays in both nostrils once daily as needed for  rhinitis or allergies    Seasonal allergic rhinitis, unspecified chronicity, unspecified trigger       hydrALAZINE 25 MG tablet    APRESOLINE    270 tablet    Take 1 tablet (25 mg) by mouth 3 times daily    Acute on chronic congestive heart failure, unspecified congestive heart failure type (H)       Iron Tabs      Take 324 mg by mouth daily Patient states she takes OTC iron furrous gluconate 324 mg tablets USP        isosorbide mononitrate 30 MG 24 hr tablet    IMDUR    45 tablet    1/2 tablet daily AT NIGHT    Cardiomyopathy, idiopathic (H)       levothyroxine 112 MCG tablet    SYNTHROID/LEVOTHROID    90 tablet    TAKE 1 TABLET DAILY    Hypothyroidism, unspecified type       Lutein 20 MG Caps      Take 20 mg by mouth daily        MELATONIN PO      Take 3 mg by mouth nightly as needed        prednisoLONE acetate 1 % ophthalmic susp    PRED FORTE    1 Bottle    Place 1 drop into the right eye every evening        simvastatin 20 MG tablet    ZOCOR    90 tablet    Take 1 tablet (20 mg) by mouth At Bedtime    Coronary artery disease involving native coronary artery of native heart without angina pectoris       torsemide 5 MG tablet    DEMADEX    90 tablet    Take 1 tablet (5 mg) by mouth daily    Chronic systolic congestive heart failure (H)       VITAMIN D (CHOLECALCIFEROL) PO      Take 2,000 Units by mouth daily

## 2018-04-23 NOTE — PROGRESS NOTES
HPI and Plan:   See dictation    Orders Placed This Encounter   Procedures     Basic metabolic panel     Lipid Profile     ALT     Basic metabolic panel     Follow-Up with Cardiologist     Follow-Up with Cardiac Advanced Practice Provider     Echocardiogram       No orders of the defined types were placed in this encounter.      Medications Discontinued During This Encounter   Medication Reason     spironolactone (ALDACTONE) 25 MG tablet          Encounter Diagnoses   Name Primary?     Nonrheumatic aortic valve stenosis      Coronary artery disease involving native coronary artery of native heart without angina pectoris Yes     Pure hypercholesterolemia      Cardiomyopathy, idiopathic (H)      Essential hypertension, benign      Chronic systolic heart failure (H)      Peripheral edema      Chronic kidney disease, stage III (moderate)      Hyponatremia        CURRENT MEDICATIONS:  Current Outpatient Prescriptions   Medication Sig Dispense Refill     aspirin 81 MG tablet Take 81 mg by mouth daily       calcium citrate (CALCITRATE) 950 MG tablet Take 2 tablets by mouth daily At Noon       carvedilol (COREG) 25 MG tablet TAKE 1 TABLET TWICE A DAY WITH MEALS 180 tablet 2     fluticasone (FLONASE) 50 MCG/ACT spray 1 to 2 sprays in both nostrils once daily as needed for rhinitis or allergies 16 g 10     hydrALAZINE (APRESOLINE) 25 MG tablet Take 1 tablet (25 mg) by mouth 3 times daily 270 tablet 3     Iron TABS Take 324 mg by mouth daily Patient states she takes OTC iron furrous gluconate 324 mg tablets USP       isosorbide mononitrate (IMDUR) 30 MG 24 hr tablet 1/2 tablet daily AT NIGHT 45 tablet 3     levothyroxine (SYNTHROID/LEVOTHROID) 112 MCG tablet TAKE 1 TABLET DAILY 90 tablet 0     Lutein 20 MG CAPS Take 20 mg by mouth daily       MELATONIN PO Take 3 mg by mouth nightly as needed       prednisoLONE acetate (PRED FORTE) 1 % ophthalmic suspension Place 1 drop into the right eye every evening  1 Bottle       simvastatin (ZOCOR) 20 MG tablet Take 1 tablet (20 mg) by mouth At Bedtime 90 tablet 1     torsemide (DEMADEX) 5 MG tablet Take 1 tablet (5 mg) by mouth daily (Patient taking differently: Take 2.5 mg by mouth daily ) 90 tablet 3     VITAMIN D, CHOLECALCIFEROL, PO Take 2,000 Units by mouth daily          ALLERGIES     Allergies   Allergen Reactions     Atenolol Anaphylaxis     Hydrochlorothiazide      hyponatremia     Pollen Extract        PAST MEDICAL HISTORY:  Past Medical History:   Diagnosis Date     Aortic stenosis      CAD (coronary artery disease)     angiogram 2016: non-obstructive, CT calcium [id number]=479 May 2013     Cardiomyopathy, idiopathic (H)     cardiomyopathy HTN vs viral; EF as low as 15% in 2009; CRT-P implanted 1/19/17     CHF (NYHA class II, ACC/AHA stage C) (H) 2/14/2013     Glaucoma      H/O angiography 9-1-2016    No angiographic evidence of obstructive coronary artery disease.     Hypertension      Hypothyroidism 2/14/2013     Nonrheumatic mitral valve regurgitation      Nonrheumatic tricuspid valve regurgitation      Pulmonary hypertension        PAST SURGICAL HISTORY:  Past Surgical History:   Procedure Laterality Date     CARDIAC CATHERIZATION  9/1/16     GYN SURGERY      Hysterectomy 1995     HYSTERECTOMY, PAP NO LONGER INDICATED       IMPLANT PACEMAKER  1/19/17    CRT-P     ORTHOPEDIC SURGERY      knee replacement 1998     ORTHOPEDIC SURGERY      Knee replacement 2008     ORTHOPEDIC SURGERY      knee surgery [place]       FAMILY HISTORY:  Family History   Problem Relation Age of Onset     CANCER Mother      Uterius     Breast Cancer Daughter        SOCIAL HISTORY:  Social History     Social History     Marital status:      Spouse name: N/A     Number of children: N/A     Years of education: N/A     Social History Main Topics     Smoking status: Never Smoker     Smokeless tobacco: Never Used     Alcohol use No     Drug use: No     Sexual activity: No     Other Topics Concern      Parent/Sibling W/ Cabg, Mi Or Angioplasty Before 65f 55m? No     Caffeine Concern No     Sleep Concern No     Stress Concern No     Weight Concern No     Special Diet Yes     low sodium     Exercise Yes     therapy     Seat Belt Yes     Social History Narrative       Review of Systems:  Skin:  Negative       Eyes:  Positive for glasses    ENT:  Negative      Respiratory:  Positive for       Cardiovascular:  Negative      Gastroenterology: Negative      Genitourinary:  Negative      Musculoskeletal:  Positive for arthritis    Neurologic:  Negative      Psychiatric:  Negative      Heme/Lymph/Imm:  Positive for allergies seasonal  Endocrine:  Positive for thyroid disorder      Physical Exam:  Vitals: /77  Pulse 80  Wt 79.6 kg (175 lb 6.4 oz)  BMI 29.19 kg/m2    Constitutional:  cooperative, alert and oriented, well developed, well nourished, in no acute distress overweight Use walker    Skin:  warm and dry to the touch, no apparent skin lesions or masses noted          Head:  normocephalic, no masses or lesions        Eyes:  pupils equal and round;conjunctivae and lids unremarkable;sclera white;no xanthalasma;no nystagmus   Ptosis of right eye    Lymph:      ENT:  no pallor or cyanosis, dentition good        Neck:  carotid pulses are full and equal bilaterally;no carotid bruit        Respiratory:  normal breath sounds, clear to auscultation, normal A-P diameter, normal symmetry, normal respiratory excursion, no use of accessory muscles         Cardiac: regular rhythm       systolic murmur;grade 3;radiation to the carotid     late peaking  pulses full and equal                                        GI:           Extremities and Muscular Skeletal:  no spinal abnormalities noted;normal muscle strength and tone   bilateral LE edema;1+          Neurological:  no gross motor deficits        Psych:  affect appropriate, oriented to time, person and place        CC  Romeo Echols MD  [id number]  [id number]  [place] [id number]

## 2018-04-25 NOTE — PROGRESS NOTES
[employer] Valitude CRT-P Remote PPM Device Check  AP: 87 % BIVP: 99 %  Mode: DDDR        Presenting Rhythm: AP/BIVP  Heart Rate: Rates mostly in the 70's per histogram  Sensing: Stable    Pacing Threshold: Stable    Impedance: Stable  Battery Status: 9.5 years  Atrial Arrhythmia: None  Ventricular Arrhythmia: None     Care Plan: F/u PPM Latitude q 3 months. LM with results. HUANG GordilloT

## 2018-04-25 NOTE — PROGRESS NOTES
Pt called, she saw Dr. Echols 4/23, she did not get a copy of her lab results on AVS as she normally does. Pt requesting copy of labs from 4/23 be mailed to her home for her records. Results letter mailed as requested. CARMELLA Gee 1:10 PM 04/25/18

## 2018-04-25 NOTE — MR AVS SNAPSHOT
After Visit Summary   4/25/2018    Mckayla Oconnell    MRN: [id number]           Patient Information     Date Of Birth          7/2/1927        Visit Information        Provider Department      4/25/2018 3:00 PM [facility]        Today's Diagnoses     Cardiac pacemaker in situ    -  1       Follow-ups after your visit        Your next 10 appointments already scheduled     Apr 25, 2018  3:00 PM CDT   Remote PPM Check with [facility] TECH1   [facility] ([facility])    [address] [place] [address] [id number] [phone] OPT 2           This appointment is for a remote check of your pacemaker.  This is not an appointment at the office.            Jul 25, 2018 11:30 AM CDT   LAB with [facility]   [facility] [place] ([facility])    [address] [place] [address] [id number] [phone]           Please do not eat 10-12 hours before your appointment if you are coming in fasting for labs on lipids, cholesterol, or glucose (sugar). This does not apply to pregnant women. Water, hot tea and black coffee (with nothing added) are okay. Do not drink other fluids, diet soda or chew gum.            Jul 25, 2018 12:30 PM CDT   Return Visit with Divya Kraft PA-C   [facility] ([facility])    [address] [place] [address] [id number] [phone] OPT 2              Who to contact     If you have questions or need follow up information about today's clinic visit or your schedule please contact [facility] directly at [phone].  Normal or non-critical lab and imaging results will be communicated to you by MyChart, letter or phone within 4 business days after the clinic has received the results. If you do not hear from us within 7 days, please contact  the clinic through [facility] or phone. If you have a critical or abnormal lab result, we will notify you by phone as soon as possible.  Submit refill requests through [facility] or call your pharmacy and they will forward the refill request to us. Please allow 3 business days for your refill to be completed.          Additional Information About Your Visit        [facility] Information     [facility] gives you secure access to your electronic health record. If you see a primary care provider, you can also send messages to your care team and make appointments. If you have questions, please call your primary care clinic.  If you do not have a primary care provider, please call [phone] and they will assist you.        Care EveryWhere ID     This is your Care EveryWhere ID. This could be used by other organizations to access your [place] medical records  [id number]         Blood Pressure from Last 3 Encounters:   04/23/18 157/77   01/18/18 130/72   10/23/17 137/72    Weight from Last 3 Encounters:   04/23/18 79.6 kg (175 lb 6.4 oz)   01/18/18 79.6 kg (175 lb 6.4 oz)   10/23/17 79.7 kg (175 lb 9.6 oz)              We Performed the Following     INTERROGATION DEVICE EVAL REMOTE, PACER/ICD (19291)     PM DEVICE INTERROGATE REMOTE (56038)          Today's Medication Changes          These changes are accurate as of 4/25/18  9:31 AM.  If you have any questions, ask your nurse or doctor.               These medicines have changed or have updated prescriptions.        Dose/Directions    torsemide 5 MG tablet   Commonly known as:  DEMADEX   This may have changed:  how much to take   Used for:  Chronic systolic congestive heart failure (H)        Dose:  5 mg   Take 1 tablet (5 mg) by mouth daily   Quantity:  90 tablet   Refills:  3                Primary Care Provider Office Phone # Fax #    Ander Shrestha [phone]       [address] [place] [id number]        Equal Access to Services     SHELBY SHEPARD  AH: Hadii juan pablo francisstephypatrick Dexterali, waaxda luqadaha, qaybta kaizaiah agathafranklyn, brooklyn nguyen hayflor mcelroybhanujeffry sorensen. So [facility] [phone].    ATENCIÓN: Si florla santos, tiene a villalba disposición servicios gratuitos de asistencia lingüística. Llame al [phone].    We comply with applicable federal civil rights laws and [place] laws. We do not discriminate on the basis of race, color, national origin, age, disability, sex, sexual orientation, or gender identity.            Thank you!     Thank you for choosing [facility]  for your care. Our goal is always to provide you with excellent care. Hearing back from our patients is one way we can continue to improve our services. Please take a few minutes to complete the written survey that you may receive in the mail after your visit with us. Thank you!             Your Updated Medication List - Protect others around you: Learn how to safely use, store and throw away your medicines at www.disposemymeds.org.          This list is accurate as of 4/25/18  9:31 AM.  Always use your most recent med list.                   Brand Name Dispense Instructions for use Diagnosis    aspirin 81 MG tablet      Take 81 mg by mouth daily        calcium citrate 950 MG tablet    CALCITRATE     Take 2 tablets by mouth daily At Noon        carvedilol 25 MG tablet    COREG    180 tablet    TAKE 1 TABLET TWICE A DAY WITH MEALS    Cardiomyopathy, idiopathic (H), Chronic systolic heart failure (H), Essential hypertension, benign, Coronary artery disease involving native coronary artery of native heart without angina pectoris       fluticasone 50 MCG/ACT spray    FLONASE    16 g    1 to 2 sprays in both nostrils once daily as needed for rhinitis or allergies    Seasonal allergic rhinitis, unspecified chronicity, unspecified trigger       hydrALAZINE 25 MG tablet    APRESOLINE    270 tablet    Take 1 tablet (25 mg) by mouth 3 times daily    Acute on  chronic congestive heart failure, unspecified congestive heart failure type (H)       Iron Tabs      Take 324 mg by mouth daily Patient states she takes OTC iron furrous gluconate 324 mg tablets USP        isosorbide mononitrate 30 MG 24 hr tablet    IMDUR    45 tablet    1/2 tablet daily AT NIGHT    Cardiomyopathy, idiopathic (H)       levothyroxine 112 MCG tablet    SYNTHROID/LEVOTHROID    90 tablet    TAKE 1 TABLET DAILY    Hypothyroidism, unspecified type       Lutein 20 MG Caps      Take 20 mg by mouth daily        MELATONIN PO      Take 3 mg by mouth nightly as needed        prednisoLONE acetate 1 % ophthalmic susp    PRED FORTE    1 Bottle    Place 1 drop into the right eye every evening        simvastatin 20 MG tablet    ZOCOR    90 tablet    Take 1 tablet (20 mg) by mouth At Bedtime    Coronary artery disease involving native coronary artery of native heart without angina pectoris       torsemide 5 MG tablet    DEMADEX    90 tablet    Take 1 tablet (5 mg) by mouth daily    Chronic systolic congestive heart failure (H)       VITAMIN D (CHOLECALCIFEROL) PO      Take 2,000 Units by mouth daily

## 2018-04-26 NOTE — PROGRESS NOTES
Telemanagment     Alert for: wt 1# above max parameters  Current diuretic: Torsemide 2.5mg (1/2 tablet) daily  Heart Failure sx: None reported  Next follow up: 7/25/18 with MANAS King and labs.    Wt up 1# from yesterday, now 1# above parameters. Pt saw Dr. Echols on 4/23/18. Wt that day was 169#. Per Dr. Echols's note, he thought pt looked a little dry, he instructed pt to stop Spironolactone and continue Torsemide. No symptoms reported. Messaged to MANAS King to review parameters. CARMELLA Gee 9:24 AM 04/26/18

## 2018-04-26 NOTE — PROGRESS NOTES
Let's follow for now.  If wt continues to trend up, will add back mio- perhaps qod.  Divya Kraft PA-C 4/26/2018 10:52 AM

## 2018-04-26 NOTE — PROGRESS NOTES
Will continue to monitor. Wt parameter increase by 1#. If wt goes up any further then will review again with MANAS King. CARMELLA Gee 11:00 AM 04/26/18

## 2018-05-07 NOTE — PROGRESS NOTES
Pt hadn't called in her wt to HF Telemanagement system x3 days. Called pt-she says she was out of town and forgot to call in. I entered her answers into the system for today and asked that she resume calling tomorrow.        Thea Spence [facility] RN 12:43 PM 05/07/18  [phone]

## 2018-05-08 NOTE — PROGRESS NOTES
"  SUBJECTIVE:   Mckayla Oconnell is a 89 year old female who presents to clinic today for the following health issues:      Insomnia  Onset: couple weeks    Usual bedtime around 4581-2145.  Usually wakes up around 200-230 to urinate, has always gotten up to urinate.   Had not been able to get back to sleep.   Has no problems falling asleep.   Takes the torsemide in the evening \"because I have always taken the water pill at bedtime\"    Has been having more allergy symptoms cause a lot of nasal congestion, some eye irritation.  Taking fluticasone nasal spray once per dya in the morning.           Description:   Time to fall asleep (sleep latency):   Middle of night awakening:  YES  Early morning awakening:  YES    Progression of Symptoms:  worsening    Accompanying Signs & Symptoms:  Daytime sleepiness/napping: no   Excessive snoring/apnea: no   Restless legs: no   Frequent urination: no   Chronic pain:  no     History:  Prior Insomnia: no     Precipitating factors:   New stressful situation: no   Caffeine intake: coffee , nothing different then what she was drinking before  OTC decongestants: no   Any new medications: YES melatonin with no relief     Alleviating factors:  Self medicating (alcohol, etc.):  no    Therapies Tried and outcome: melatonin with no relief         Problem list and histories reviewed & adjusted, as indicated.  Additional history: as documented        Reviewed and updated as needed this visit by clinical staff  Allergies  Meds       Reviewed and updated as needed this visit by Provider           Past Medical History:  ---------------------------  Past Medical History:   Diagnosis Date     Aortic stenosis      CAD (coronary artery disease)     angiogram 2016: non-obstructive, CT calcium kljpz=200 May 2013     Cardiomyopathy, idiopathic (H)     cardiomyopathy HTN vs viral; EF as low as 15% in 2009; CRT-P implanted 1/19/17     CHF (NYHA class II, ACC/AHA stage C) (H) 2/14/2013     " Glaucoma      H/O angiography 9-1-2016    No angiographic evidence of obstructive coronary artery disease.     Hypertension      Hypothyroidism 2/14/2013     Nonrheumatic mitral valve regurgitation      Nonrheumatic tricuspid valve regurgitation      Pulmonary hypertension        Past Surgical History:  ---------------------------  Past Surgical History:   Procedure Laterality Date     CARDIAC CATHERIZATION  9/1/16     GYN SURGERY      Hysterectomy 1995     HYSTERECTOMY, PAP NO LONGER INDICATED       IMPLANT PACEMAKER  1/19/17    CRT-P     ORTHOPEDIC SURGERY      knee replacement 1998     ORTHOPEDIC SURGERY      Knee replacement 2008     ORTHOPEDIC SURGERY      knee surgery [place]       Current Medications:  ---------------------------  Current Outpatient Prescriptions   Medication Sig Dispense Refill     aspirin 81 MG tablet Take 81 mg by mouth daily       calcium citrate (CALCITRATE) 950 MG tablet Take 2 tablets by mouth daily At Noon       carvedilol (COREG) 25 MG tablet TAKE 1 TABLET TWICE A DAY WITH MEALS 180 tablet 2     fluticasone (FLONASE) 50 MCG/ACT spray 1 to 2 sprays in both nostrils once daily as needed for rhinitis or allergies 16 g 10     hydrALAZINE (APRESOLINE) 25 MG tablet Take 1 tablet (25 mg) by mouth 3 times daily 270 tablet 3     Iron TABS Take 324 mg by mouth daily Patient states she takes OTC iron furrous gluconate 324 mg tablets USP       isosorbide mononitrate (IMDUR) 30 MG 24 hr tablet 1/2 tablet daily AT NIGHT 45 tablet 3     levothyroxine (SYNTHROID/LEVOTHROID) 112 MCG tablet TAKE 1 TABLET DAILY 90 tablet 0     Lutein 20 MG CAPS Take 20 mg by mouth daily       MELATONIN PO Take 3 mg by mouth nightly as needed       prednisoLONE acetate (PRED FORTE) 1 % ophthalmic suspension Place 1 drop into the right eye every evening  1 Bottle      simvastatin (ZOCOR) 20 MG tablet Take 1 tablet (20 mg) by mouth At Bedtime 90 tablet 1     torsemide (DEMADEX) 5 MG tablet Take 1 tablet (5 mg) by mouth daily  (Patient taking differently: Take 2.5 mg by mouth daily ) 90 tablet 3     VITAMIN D, CHOLECALCIFEROL, PO Take 2,000 Units by mouth daily          Allergies:  -------------  Allergies   Allergen Reactions     Atenolol Anaphylaxis     Hydrochlorothiazide      hyponatremia     Pollen Extract        Social History:  -------------------  Social History     Social History     Marital status:      Spouse name: N/A     Number of children: N/A     Years of education: N/A     Occupational History     Not on file.     Social History Main Topics     Smoking status: Never Smoker     Smokeless tobacco: Never Used     Alcohol use No     Drug use: No     Sexual activity: No     Other Topics Concern     Parent/Sibling W/ Cabg, Mi Or Angioplasty Before 65f 55m? No     Caffeine Concern No     Sleep Concern No     Stress Concern No     Weight Concern No     Special Diet Yes     low sodium     Exercise Yes     therapy     Seat Belt Yes     Social History Narrative       Family Medical History:  ------------------------------  Family History   Problem Relation Age of Onset     CANCER Mother      Uterius     Breast Cancer Daughter          ROS:  REVIEW OF SYSTEMS:    RESP: negative for cough, dyspnea, wheezing, hemoptysis  CV: negative for chest pain, palpitations, PND, GRAY, orthopnea  GI: negative for dysphagia, N/V, pain, melena, diarrhea and constipation  NEURO: negative for numbness/tingling, paralysis, incoordination, or focal weakness     OBJECTIVE:                                                    /76  Pulse 70  Temp 97.6  F (36.4  C) (Oral)  Wt 179 lb 14.4 oz (81.6 kg)  SpO2 97%  BMI 29.94 kg/m2     GENERAL alert and no distress  EYES:  Normal sclera,conjunctiva, EOMI  HENT: oral and posterior pharynx without lesions or erythema, facies symmetric  NECK: Neck supple. No LAD, without thyroidmegaly or JVD., Carotids without bruits.  RESP: Clear to ausculation bilaterally without wheezes or crackles. Normal BS in  all fields.  CV: RRR normal S1S2, 2-3/6 systolic murmur, without rubs or gallops. PMI normal  LYMPH: no cervical lymph adenopathy appreciated  MS: extremities- no gross deformities of the visible extremities noted, no edema  PSYCH: Alert and oriented times 3; speech- coherent  SKIN:  No obvious significant skin lesions on visible portions of face          ASSESSMENT/PLAN:                                                      (F51.01) Primary insomnia  (primary encounter diagnosis)  Comment: as bleow. OK to use trazoadone, but watch clsoely for side effects   Reviewed side effects with her and she desperately wants to try this.   Plan: traZODone (DESYREL) 50 MG tablet            (I25.10) Coronary artery disease involving native coronary artery of native heart without angina pectoris  Comment: This condition is currently controlled on the current medical regimen.  Continue current therapy.   The patient does not report any signs or symptoms of angina or active cardiac ischemia.   They do not report any relative changes in their ability to perform physical activities over the past year.    We discussed aggressive secondary risk factor modification, including aggressive BP control (under 130/ideally), aggressive LDL lowering with statin (goal under 100 for sure/under 70 ideally), no smoking, diabetes prevention/management, no smoking, and use of either ASA or similar anti platelet agent if tolerated.     Plan:     (I50.22) Chronic systolic heart failure (H)  Comment: no signs and symptoms of CHF at [place], followed closely by [facility] clinic.   Plan:     (I10) Essential hypertension, benign  Comment: This condition is currently controlled on the current medical regimen.  Continue current therapy.   Plan:     (I42.8) Cardiomyopathy, idiopathic (H)  Comment:   Plan:     (N18.4,  D63.1) 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)  Comment: This condition is currently controlled on the current  medical regimen.  Continue current therapy.   Plan:     (I35.0) Nonrheumatic aortic valve stenosis  Comment: This condition is currently controlled on the current medical regimen.  Continue current therapy.   Plan:            *  Insomnia probably caused by combination of allergies and congestion, plus extra worry.     *  Move the diuretic (torsemide to the morning) to help reduce the need to urinate at night time.      --Take Torsemide in the morning instead of the evening.      *  Be sure to drink as little fluid as possible within 2 hours of bedtime and emoty your bladder before bedtime.     *  Use the steroid nasal spray (fluticasone) 2 sprays into each nostril twice per (morning and evening)    *  Take an over the counter allergy medication (generic Zyrtec or Claritin) once per day during your main allergy season.     *  Consdier a small dose of Nyquil at bedtime to help with allergies.      *  Try sleeping with the head of the bed elevated slightly (around 30 degrees)    *  Sleeping aids are very dangerous for patients over age 75 because they cause excessive drowsiness, dizziness, imbalance, memory problems all of which can greatly increase your risk of falls.     If you are still not able to sleep, you can try a very low dose of a sleeping medication.      *  Trial of Trazodone at bedtime to help facilitate sleep.     Take 25 mg (1/2 of 50 mg tablet) at bedtime or when you return to bed after you get up after the nighttime trip to the bathroom.    If the 25 mg dose is not helping much after 2 weeks, then go to 50 mg at bedtime.    The main side effect from this medication is drowsiness.  Do not operate motor vehicles or dangerous equipment after taking and do not take this after drinking alcohol.        See Patient Instructions    DREA ROBLERO M.D., MD  [facility]     Spent greater than 25 minutes with pt, greater than 50% of time was educational and counseling.

## 2018-05-08 NOTE — PATIENT INSTRUCTIONS
*  Insomnia probably caused by combination of allergies and congestion, plus extra worry.     *  Move the diuretic (torsemide to the morning) to help reduce the need to urinate at night time.      --Take Torsemide in the morning instead of the evening.      *  Be sure to drink as little fluid as possible within 2 hours of bedtime and emoty your bladder before bedtime.     *  Use the steroid nasal spray (fluticasone) 2 sprays into each nostril twice per (morning and evening)    *  Take an over the counter allergy medication (generic Zyrtec or Claritin) once per day during your main allergy season.     *  Consdier a small dose of Nyquil at bedtime to help with allergies.      *  Try sleeping with the head of the bed elevated slightly (around 30 degrees)    *  Sleeping aids are very dangerous for patients over age 75 because they cause excessive drowsiness, dizziness, imbalance, memory problems all of which can greatly increase your risk of falls.     If you are still not able to sleep, you can try a very low dose of a sleeping medication.      *  Trial of Trazodone at bedtime to help facilitate sleep.     Take 25 mg (1/2 of 50 mg tablet) at bedtime or when you return to bed after you get up after the nighttime trip to the bathroom.    If the 25 mg dose is not helping much after 2 weeks, then go to 50 mg at bedtime.    The main side effect from this medication is drowsiness.  Do not operate motor vehicles or dangerous equipment after taking and do not take this after drinking alcohol.

## 2018-05-08 NOTE — MR AVS SNAPSHOT
After Visit Summary   5/8/2018    Mckayla Oconnell    MRN: [id number]           Patient Information     Date Of Birth          7/2/1927        Visit Information        Provider Department      5/8/2018 1:40 PM Ander Shrestha MD [facility]        Today's Diagnoses     Primary insomnia    -  1      Care Instructions        *  Insomnia probably caused by combination of allergies and congestion, plus extra worry.     *  Move the diuretic (torsemide to the morning) to help reduce the need to urinate at night time.      --Take Torsemide in the morning instead of the evening.      *  Be sure to drink as little fluid as possible within 2 hours of bedtime and emoty your bladder before bedtime.     *  Use the steroid nasal spray (fluticasone) 2 sprays into each nostril twice per (morning and evening)    *  Take an over the counter allergy medication (generic Zyrtec or Claritin) once per day during your main allergy season.     *  Consdier a small dose of Nyquil at bedtime to help with allergies.      *  Try sleeping with the head of the bed elevated slightly (around 30 degrees)    *  Sleeping aids are very dangerous for patients over age 75 because they cause excessive drowsiness, dizziness, imbalance, memory problems all of which can greatly increase your risk of falls.     If you are still not able to sleep, you can try a very low dose of a sleeping medication.      *  Trial of Trazodone at bedtime to help facilitate sleep.     Take 25 mg (1/2 of 50 mg tablet) at bedtime or when you return to bed after you get up after the nighttime trip to the bathroom.    If the 25 mg dose is not helping much after 2 weeks, then go to 50 mg at bedtime.    The main side effect from this medication is drowsiness.  Do not operate motor vehicles or dangerous equipment after taking and do not take this after drinking alcohol.            Follow-ups after your visit        Your next 10  appointments already scheduled     Jul 25, 2018 11:30 AM CDT   LAB with [facility]   [facility] [place] ([facility])    [address] [address]   [phone]           Please do not eat 10-12 hours before your appointment if you are coming in fasting for labs on lipids, cholesterol, or glucose (sugar). This does not apply to pregnant women. Water, hot tea and black coffee (with nothing added) are okay. Do not drink other fluids, diet soda or chew gum.            Jul 25, 2018 12:30 PM CDT   Return Visit with Divya Kraft PA-C   [facility] ([facility])    [id number]  [facility] [address] [phone] OPT 2            Aug 01, 2018  3:45 PM CDT   Remote PPM Check with [facility] TECH1   [facility] ([facility])    [address] [address] [phone] OPT 2           This appointment is for a remote check of your pacemaker.  This is not an appointment at the office.              Who to contact     If you have questions or need follow up information about today's clinic visit or your schedule please contact [facility] directly at [phone].  Normal or non-critical lab and imaging results will be communicated to you by [employer], letter or phone within 4 business days after the clinic has received the results. If you do not hear from us within 7 days, please contact the clinic through Telelog[employer] or phone. If you have a critical or abnormal lab result, we will notify you by phone as soon as possible.  Submit refill requests through [employer]Nlion or call your pharmacy and they will forward the refill request to us. Please allow 3 business days for your refill to be completed.          Additional Information About Your Visit        CDNlion Information     [employer] gives you secure access to  your electronic health record. If you see a primary care provider, you can also send messages to your care team and make appointments. If you have questions, please call your primary care clinic.  If you do not have a primary care provider, please call [phone] and they will assist you.        Care EveryWhere ID     This is your Care EveryWhere ID. This could be used by other organizations to access your [place] medical records  [id number]        Your Vitals Were     Pulse Temperature Pulse Oximetry BMI (Body Mass Index)          70 97.6  F (36.4  C) (Oral) 97% 29.94 kg/m2         Blood Pressure from Last 3 Encounters:   05/08/18 120/76   04/23/18 157/77   01/18/18 130/72    Weight from Last 3 Encounters:   05/08/18 179 lb 14.4 oz (81.6 kg)   04/23/18 175 lb 6.4 oz (79.6 kg)   01/18/18 175 lb 6.4 oz (79.6 kg)              Today, you had the following     No orders found for display         Today's Medication Changes          These changes are accurate as of 5/8/18  2:14 PM.  If you have any questions, ask your nurse or doctor.               Start taking these medicines.        Dose/Directions    traZODone 50 MG tablet   Commonly known as:  DESYREL   Used for:  Primary insomnia   Started by:  Ander Shrestha MD        Dose:  25-50 mg   Take 0.5-1 tablets (25-50 mg) by mouth nightly as needed for sleep   Quantity:  30 tablet   Refills:  0         These medicines have changed or have updated prescriptions.        Dose/Directions    torsemide 5 MG tablet   Commonly known as:  DEMADEX   This may have changed:  how much to take   Used for:  Chronic systolic congestive heart failure (H)        Dose:  5 mg   Take 1 tablet (5 mg) by mouth daily   Quantity:  90 tablet   Refills:  3            Where to get your medicines      These medications were sent to [id number] - [place] - [address]  [address] [place] [address]     Phone:  [phone]     traZODone 50 MG  tablet                Primary Care Provider Office Phone # Fax #    Ander Shrestha [phone]       [address] [facility] [address]        Equal Access to Services     SHELBY SHEPARD : Hadolivia juan pablo rob caitie Lainez, waaxda luqadaha, qaybta kaalmada david, brooklyn angelo laIsaelflor sorensen. So [facility] [phone].    ATENCIÓN: Si habla español, tiene a villalba disposición servicios gratuitos de asistencia lingüística. Llame al [phone].    We comply with applicable federal civil rights laws and [place] laws. We do not discriminate on the basis of race, color, national origin, age, disability, sex, sexual orientation, or gender identity.            Thank you!     Thank you for choosing [facility]  for your care. Our goal is always to provide you with excellent care. Hearing back from our patients is one way we can continue to improve our services. Please take a few minutes to complete the written survey that you may receive in the mail after your visit with us. Thank you!             Your Updated Medication List - Protect others around you: Learn how to safely use, store and throw away your medicines at www.disposemymeds.org.          This list is accurate as of 5/8/18  2:14 PM.  Always use your most recent med list.                   Brand Name Dispense Instructions for use Diagnosis    aspirin 81 MG tablet      Take 81 mg by mouth daily        calcium citrate 950 MG tablet    CALCITRATE     Take 2 tablets by mouth daily At Noon        carvedilol 25 MG tablet    COREG    180 tablet    TAKE 1 TABLET TWICE A DAY WITH MEALS    Cardiomyopathy, idiopathic (H), Chronic systolic heart failure (H), Essential hypertension, benign, Coronary artery disease involving native coronary artery of native heart without angina pectoris       fluticasone 50 MCG/ACT spray    FLONASE    16 g    1 to 2 sprays in both nostrils once daily as needed for rhinitis or allergies     Seasonal allergic rhinitis, unspecified chronicity, unspecified trigger       hydrALAZINE 25 MG tablet    APRESOLINE    270 tablet    Take 1 tablet (25 mg) by mouth 3 times daily    Acute on chronic congestive heart failure, unspecified congestive heart failure type (H)       Iron Tabs      Take 324 mg by mouth daily Patient states she takes OTC iron furrous gluconate 324 mg tablets USP        isosorbide mononitrate 30 MG 24 hr tablet    IMDUR    45 tablet    1/2 tablet daily AT NIGHT    Cardiomyopathy, idiopathic (H)       levothyroxine 112 MCG tablet    SYNTHROID/LEVOTHROID    90 tablet    TAKE 1 TABLET DAILY    Hypothyroidism, unspecified type       Lutein 20 MG Caps      Take 20 mg by mouth daily        MELATONIN PO      Take 3 mg by mouth nightly as needed        prednisoLONE acetate 1 % ophthalmic susp    PRED FORTE    1 Bottle    Place 1 drop into the right eye every evening        simvastatin 20 MG tablet    ZOCOR    90 tablet    Take 1 tablet (20 mg) by mouth At Bedtime    Coronary artery disease involving native coronary artery of native heart without angina pectoris       torsemide 5 MG tablet    DEMADEX    90 tablet    Take 1 tablet (5 mg) by mouth daily    Chronic systolic congestive heart failure (H)       traZODone 50 MG tablet    DESYREL    30 tablet    Take 0.5-1 tablets (25-50 mg) by mouth nightly as needed for sleep    Primary insomnia       VITAMIN D (CHOLECALCIFEROL) PO      Take 2,000 Units by mouth daily

## 2018-05-16 NOTE — PROGRESS NOTES
[facility]- [facility] Telemanagment     Alert for: Wt 2# above max parameter  Heart Failure sx: none reported.   Current daily diuretic: torsemide 2.5mg daily   Next follow up: OV with Anabel and GRAY on 7/25/18     Last OV on 4/23/18 with Dr. Echols. Home wt that day was 169#. OV note states pt is slightly dry and spironolactone was discontinued.     Received VM from pt who reports that her wt has been climbing ever since she stopped taking spironolactone and is wondering if she should restart this medication.    Called pt to inquire if she has any increased sx, no answer, LVM requesting return call.      Per note from JOI Little below on 4/26/18, would consider having pt restart spironolactone at QOD dosing if wt went up further.    Will route to Anabel for review.    CARMELLA Miller 9:25 AM 5/16/2018

## 2018-05-16 NOTE — PROGRESS NOTES
Called and spoke with pt.  Reviewed Anabel's recommendations to restart spironolactone 12.5mg daily.  Pt verbalized understanding, confirms she does still have about twelve spironolactone 25mg tabs at home and will restart taking 1/2 tab daily.  Also scheduled pt for BMP on 5/23/18 at 11:40am- pt aware she does not need to fast.  Discussed with pt that will send updated spironolactone Rx to pharmacy- she requested rx be sent to [facility] pharmacy in [place], sent as requested.    Will continue to monitor wt/sx.     CARMELLA Miller 1:20 PM 5/16/2018

## 2018-05-18 NOTE — PROGRESS NOTES
[facility]- [facility] Telemanagment     Alert for: Wt 1# above max parameter X 3 days  Heart Failure sx: None reported  Current daily diuretic: torsemide 2.5mg daily, spironolactone 12.5mg daily (advised to restart on 5/16)  Next follow up: OV with Anabel and BMP on 7/25/18    Called pt, no answer, LVM requesting return call to confirm spironolactone 12.5mg daily was restarted and to see how she is feeling.      CARMELLA Miller 9:58 AM 5/18/2018

## 2018-05-21 NOTE — PROGRESS NOTES
[facility]- [facility] Telemanagment     Alert for: wt 2# above parameter x last 5 call ins  Heart Failure sx: none reported  Current daily diuretic: torsemide 2.5mg daily, asked to restart spironolactone 12.5mg daily on 5/16  Next follow up: 5/22 BMP. BMP and LANDON Stokes More PAC 7/25/18    Called pt to review. No answer, LVM requesting call back to review wt/sx and did offer her a visit w/ Divya tomorrow if she'd like--spot held on Divya's schedule.      Thea Spence [facility] RN 9:41 AM 05/21/18  [phone]

## 2018-05-22 NOTE — PROGRESS NOTES
Saw pt in clinic along with her .  Notes worsening pedal edema and sob at night.  Wearing O2 at night due to sob, she thinks this is due to allergies.  But, she's very frustrated her weights remain up- she feels better at lower weights.  Lungs diminished but without rales, 2+ edema in ankles- worse than previous.  Reviewed labs- cr and bun improved.    Will increase spironolactone to 25 mg daily. Rx sent to pharmacy, written instructions given to pt.   Divya Kraft PA-C 5/22/2018 1:12 PM  Cc core team as adal

## 2018-05-22 NOTE — PROGRESS NOTES
Divya-do you want BMP sooner than previously planned on 7/25/18?     Thea Spence [facility] RN 1:18 PM 05/22/18  [phone]

## 2018-05-22 NOTE — PROGRESS NOTES
[facility]- CORE Clinic Telemanagment     Alert for: wt 2# above parameter x last 6 call ins  Heart Failure sx: none reported  Current daily diuretic: torsemide 2.5mg daily, asked to restart spironolactone 12.5mg daily on 5/16  Next follow up: 5/22 BMP. BMP and OV Divya More PAC 7/25/18       Spoke w/ pt, she's feeling well. She confirms she did restart spironolactone as above. I offered her an OV this afternoon w/ Divya More PAC, which she declined. She prefers to make adjustments after BMP drawn later this AM.     Last OV on 4/23/18 with Dr. Echols. Home wt that day was 169#. OV note states pt is slightly dry and spironolactone was discontinued.     FYI to Divya More PAC. Next clinic visit scheduled for 7/25.        Thea Spence CORE Clinic RN 9:51 AM 05/22/18  [phone]

## 2018-05-23 NOTE — PROGRESS NOTES
Called and spoke with pt.  Reviewed that Anabel recommends she get repeat BMP in two weeks to ensure kidney function and electrolytes are stable after increasing spironolactone to 25mg daily.  Pt verbalized understanding and agrees to lab appt on 6/6/18 at 10:30am.      CARMELLA Miller 10:43 AM 5/23/2018

## 2018-05-23 NOTE — PROGRESS NOTES
Wt today 172#, same as yesterday and still above parameters. No symptoms reported. Per MANAS King, Spironolactone increased to 25mg daily yesterday. BMP ordered for 2 weeks. Will continue to monitor. CARMELLA Gee 11:39 AM 05/23/18

## 2018-05-24 NOTE — PROGRESS NOTES
[facility] Heart- CORE Clinic Telemanagment     Alert for: wt above parameters  Heart Failure sx: none reported  Current daily diuretic: Torsemide 2.5mg daily, Spirolactone 25mg daily  Next follow up: 6/6 for BMP; 7/25/18 with MANAS King and BMP.    Wt remains above parameters. No symptoms reported. Pt restarted Spironolactone at 12.5mg daily on 5/16 and increased to 25mg daily on 5/22. Called pt, she is feeling fine. Verified with pt that she increased Spironolactone 25mg daily. Pt states she has only been taking 12.5mg. Reviewed with pt that MANAS King had given her instructions to increase it on 5/22 when they were her for a visit for her . Pt then states she thought so but when she went to pharmacy she thought she got 12.5mg tablets so she took that yesterday. Reviewed with pt that Spironolactone does not usually come in 12.5mg tablets. Asked pt to check bottle and see what dose she has and if she took whole tablet or half tablet. Pt unable to check now. She will call me back. CARMELLA Gee 10:00 AM 05/24/18

## 2018-05-24 NOTE — PROGRESS NOTES
Pt returned call, she has Spironolactone 25mg tablets, and she did increase starting yesterday to 1 full tablet daily. Pt continues to feel fine. Will continue to monitor. CARMELLA Gee 2:41 PM 05/24/18

## 2018-05-24 NOTE — PROGRESS NOTES
Have not heard back from pt, attempted to call her to review Spironolactone. No answer, LVM asking for call back to discuss. CARMELLA Gee 2:36 PM 05/24/18

## 2018-05-25 NOTE — PROGRESS NOTES
[facility] Heart- CORE Clinic Telemanagment     Wt now just 1# above parameter w/o sx. Pt currently takes torsemide 2.5mg daily and spironolactone was recently restarted and then increased.     BMP 6/6 and BMP and OV w/ Divya More PAC 7/25. Will cont to monitor.         Thea Spence [facility] RN 9:22 AM 05/25/18  [phone]

## 2018-05-28 NOTE — TELEPHONE ENCOUNTER
"Requested Prescriptions   Pending Prescriptions Disp Refills     levothyroxine (SYNTHROID/LEVOTHROID) 112 MCG tablet [Pharmacy Med Name: L-THYROXINE (SYNTHROID) TABS 112MCG] 90 tablet 0    Last Written Prescription Date:  2/18/2018  Last Fill Quantity: 90,  # refills: 0   Last office visit: 5/8/2018 with prescribing provider:  5/08/2018   Future Office Visit:     Sig: TAKE 1 TABLET DAILY    Thyroid Protocol Passed    5/27/2018  1:05 PM       Passed - Patient is 12 years or older       Passed - Recent (12 mo) or future (30 days) visit within the authorizing provider's specialty    Patient had office visit in the last 12 months or has a visit in the next 30 days with authorizing provider or within the authorizing provider's specialty.  See \"Patient Info\" tab in inbasket, or \"Choose Columns\" in Meds & Orders section of the refill encounter.           Passed - Normal TSH on file in past 12 months    Recent Labs   Lab Test  04/20/18   0815   TSH  3.15             Passed - No active pregnancy on record    If patient is pregnant or has had a positive pregnancy test, please check TSH.         Passed - No positive pregnancy test in past 12 months    If patient is pregnant or has had a positive pregnancy test, please check TSH.            "

## 2018-05-29 NOTE — PROGRESS NOTES
[facility] Heart- CORE Clinic Telemanagment     Alert for: wt above max parameter  Heart Failure sx: none reported  Current daily diuretic: Torsemide 2.5mg daily; Spironolactone 25mg daily  Next follow up: 6/6/18 BMP, 7/25/18 with MANAS King and GRAY    Wt has been above max parameters for 2 weeks now. Per MANAS King, restarted Spironolactone at 12.5mg daily on 5/16 and increased to 25mg daily on 5/22. Wt down 1# since increase in Spironolactone, but still 1# above max parameters. Called pt, she is feeling well without concerns. Denies SOB or swelling. Reviewed lab appt for 6/6 and follow up in July. Told pt I would update MANAS King and call back if further changes, otherwise will continue to monitor. Pt stated understanding. Messaged MANAS King for review. CARMELLA Gee 10:36 AM 05/29/18

## 2018-05-29 NOTE — PROGRESS NOTES
As long as she feels well, lets adjust parameters to 166-171.  Continue current meds with labs next week.  Divya Kraft PA-C 5/29/2018 3:31 PM

## 2018-06-11 NOTE — MR AVS SNAPSHOT
After Visit Summary   6/11/2018    Mckayla Oconnell    MRN: [id number]           Patient Information     Date Of Birth          7/2/1927        Visit Information        Provider Department      6/11/2018 8:40 AM Ander Shrestha MD [facility]        Today's Diagnoses     Bilateral impacted cerumen    -  1       Follow-ups after your visit        Your next 10 appointments already scheduled     Jul 25, 2018 11:30 AM CDT   LAB with [facility]   [facility] HEART AT [place] ([facility])    [address] [place] [id number]   [phone]           Please do not eat 10-12 hours before your appointment if you are coming in fasting for labs on lipids, cholesterol, or glucose (sugar). This does not apply to pregnant women. Water, hot tea and black coffee (with nothing added) are okay. Do not drink other fluids, diet soda or chew gum.            Jul 25, 2018 12:30 PM CDT   Core Return with Divya Kraft PA-C   [facility] ([facility])    [address] [place] [id number]   [phone] OPT 2            Aug 01, 2018  3:45 PM CDT   Remote PPM Check with [facility] TECH1   [facility] ([facility])    [address] [place] [id number] [phone] OPT 2           This appointment is for a remote check of your pacemaker.  This is not an appointment at the office.              Who to contact     If you have questions or need follow up information about today's clinic visit or your schedule please contact [facility] directly at [phone].  Normal or non-critical lab and imaging results will be communicated to you by MyChart, letter or phone within 4 business days after the clinic has received the results. If you do not hear from us within 7 days, please contact the  clinic through [employer] or phone. If you have a critical or abnormal lab result, we will notify you by phone as soon as possible.  Submit refill requests through [employer] or call your pharmacy and they will forward the refill request to us. Please allow 3 business days for your refill to be completed.          Additional Information About Your Visit        [employer] Information     [employer] gives you secure access to your electronic health record. If you see a primary care provider, you can also send messages to your care team and make appointments. If you have questions, please call your primary care clinic.  If you do not have a primary care provider, please call [phone] and they will assist you.        Care EveryWhere ID     This is your Care EveryWhere ID. This could be used by other organizations to access your [facility] medical records  [id number]        Your Vitals Were     Pulse Temperature Pulse Oximetry Breastfeeding? BMI (Body Mass Index)       70 97.6  F (36.4  C) (Oral) 93% No 29.4 kg/m2        Blood Pressure from Last 3 Encounters:   06/11/18 140/80   05/08/18 120/76   04/23/18 157/77    Weight from Last 3 Encounters:   06/11/18 176 lb 11.2 oz (80.2 kg)   05/08/18 179 lb 14.4 oz (81.6 kg)   04/23/18 175 lb 6.4 oz (79.6 kg)              We Performed the Following     REMOVE [facility]        Primary Care Provider Office Phone # Fax #    Ander Shrestha [phone]       [address] [place] [id number]        Equal Access to Services     SHELBY SHEPARD : Hadii aad ku hadasho Soomaali, waaxda luqadaha, qaybta kaalmada adeegyada, [place] santana . So [facility] [phone].    ATENCIÓN: Si habla español, tiene a villalba disposición servicios gratuitos de asistencia lingüística. Llame al [phone].    We comply with applicable federal civil rights laws and [place] laws. We do not discriminate on the basis of race, color, national origin, age, disability,  sex, sexual orientation, or gender identity.            Thank you!     Thank you for choosing [facility]  for your care. Our goal is always to provide you with excellent care. Hearing back from our patients is one way we can continue to improve our services. Please take a few minutes to complete the written survey that you may receive in the mail after your visit with us. Thank you!             Your Updated Medication List - Protect others around you: Learn how to safely use, store and throw away your medicines at www.disposemymeds.org.          This list is accurate as of 6/11/18 11:41 AM.  Always use your most recent med list.                   Brand Name Dispense Instructions for use Diagnosis    aspirin 81 MG tablet      Take 81 mg by mouth daily        calcium citrate 950 MG tablet    CALCITRATE     Take 2 tablets by mouth daily At Noon        carvedilol 25 MG tablet    COREG    180 tablet    TAKE 1 TABLET TWICE A DAY WITH MEALS    Cardiomyopathy, idiopathic (H), Chronic systolic heart failure (H), Essential hypertension, benign, Coronary artery disease involving native coronary artery of native heart without angina pectoris       fluticasone 50 MCG/ACT spray    FLONASE    16 g    1 to 2 sprays in both nostrils once daily as needed for rhinitis or allergies    Seasonal allergic rhinitis, unspecified chronicity, unspecified trigger       hydrALAZINE 25 MG tablet    APRESOLINE    270 tablet    Take 1 tablet (25 mg) by mouth 3 times daily    Acute on chronic congestive heart failure, unspecified congestive heart failure type (H)       Iron Tabs      Take 324 mg by mouth daily Patient states she takes OTC iron furrous gluconate 324 mg tablets USP        isosorbide mononitrate 30 MG 24 hr tablet    IMDUR    45 tablet    1/2 tablet daily AT NIGHT    Cardiomyopathy, idiopathic (H)       levothyroxine 112 MCG tablet    SYNTHROID/LEVOTHROID    90 tablet    TAKE 1 TABLET DAILY    Hypothyroidism,  unspecified type       Lutein 20 MG Caps      Take 20 mg by mouth daily        MELATONIN PO      Take 3 mg by mouth nightly as needed        prednisoLONE acetate 1 % ophthalmic susp    PRED FORTE    1 Bottle    Place 1 drop into the right eye every evening        simvastatin 20 MG tablet    ZOCOR    90 tablet    Take 1 tablet (20 mg) by mouth At Bedtime    Coronary artery disease involving native coronary artery of native heart without angina pectoris       spironolactone 25 MG tablet    ALDACTONE    90 tablet    Take 1 tablet (25 mg) by mouth daily    Chronic systolic heart failure (H)       torsemide 5 MG tablet    DEMADEX     Take 2.5mg (1/2 tablet) daily    Chronic systolic congestive heart failure (H)       traZODone 50 MG tablet    DESYREL    30 tablet    Take 0.5-1 tablets (25-50 mg) by mouth nightly as needed for sleep    Primary insomnia       VITAMIN D (CHOLECALCIFEROL) PO      Take 2,000 Units by mouth daily

## 2018-06-11 NOTE — PROGRESS NOTES
SUBJECTIVE:   Mckayla Oconnell is a 89 year old female who presents to clinic today for the following health issues:    Pt complains of R ear being plugged, no pain     Acute Illness   Acute illness concerns?- plugged ear  Onset: last week    Fever: no     Fussiness: no     Decreased energy level: no     Conjunctivitis:  YES: both    Ear Pain: no pain  R is plugged     Rhinorrhea: YES    Congestion: no     Sore Throat: no      Cough: no    Wheeze: no     Breathing fast: no     Decreased Appetite: no     Nausea: no     Vomiting: no     Diarrhea:  no     Decreased wet diapers/output:no    Sick/Strep Exposure: no      Therapies Tried and outcome: none           Problem list and histories reviewed & adjusted, as indicated.  Additional history: as documented        Reviewed and updated as needed this visit by clinical staff  Allergies  Meds       Reviewed and updated as needed this visit by Provider           Past Medical History:  ---------------------------  Past Medical History:   Diagnosis Date     Aortic stenosis      CAD (coronary artery disease)     angiogram 2016: non-obstructive, CT calcium mopxs=474 May 2013     Cardiomyopathy, idiopathic (H)     cardiomyopathy HTN vs viral; EF as low as 15% in 2009; CRT-P implanted 1/19/17     CHF (NYHA class II, ACC/AHA stage C) (H) 2/14/2013     Glaucoma      H/O angiography 9-1-2016    No angiographic evidence of obstructive coronary artery disease.     Hypertension      Hypothyroidism 2/14/2013     Nonrheumatic mitral valve regurgitation      Nonrheumatic tricuspid valve regurgitation      Pulmonary hypertension        Past Surgical History:  ---------------------------  Past Surgical History:   Procedure Laterality Date     CARDIAC CATHERIZATION  9/1/16     GYN SURGERY      Hysterectomy 1995     HYSTERECTOMY, PAP NO LONGER INDICATED       IMPLANT PACEMAKER  1/19/17    CRT-P     ORTHOPEDIC SURGERY      knee replacement 1998     ORTHOPEDIC SURGERY      Knee  replacement 2008     ORTHOPEDIC SURGERY      knee surgery [place]       Current Medications:  ---------------------------  Current Outpatient Prescriptions   Medication Sig Dispense Refill     aspirin 81 MG tablet Take 81 mg by mouth daily       calcium citrate (CALCITRATE) 950 MG tablet Take 2 tablets by mouth daily At Noon       carvedilol (COREG) 25 MG tablet TAKE 1 TABLET TWICE A DAY WITH MEALS 180 tablet 2     fluticasone (FLONASE) 50 MCG/ACT spray 1 to 2 sprays in both nostrils once daily as needed for rhinitis or allergies 16 g 10     hydrALAZINE (APRESOLINE) 25 MG tablet Take 1 tablet (25 mg) by mouth 3 times daily 270 tablet 3     Iron TABS Take 324 mg by mouth daily Patient states she takes OTC iron furrous gluconate 324 mg tablets USP       isosorbide mononitrate (IMDUR) 30 MG 24 hr tablet 1/2 tablet daily AT NIGHT 45 tablet 3     levothyroxine (SYNTHROID/LEVOTHROID) 112 MCG tablet TAKE 1 TABLET DAILY 90 tablet 2     Lutein 20 MG CAPS Take 20 mg by mouth daily       MELATONIN PO Take 3 mg by mouth nightly as needed       prednisoLONE acetate (PRED FORTE) 1 % ophthalmic suspension Place 1 drop into the right eye every evening  1 Bottle      simvastatin (ZOCOR) 20 MG tablet Take 1 tablet (20 mg) by mouth At Bedtime 90 tablet 1     spironolactone (ALDACTONE) 25 MG tablet Take 1 tablet (25 mg) by mouth daily 90 tablet 3     torsemide (DEMADEX) 5 MG tablet Take 2.5mg (1/2 tablet) daily       traZODone (DESYREL) 50 MG tablet Take 0.5-1 tablets (25-50 mg) by mouth nightly as needed for sleep 30 tablet 0     VITAMIN D, CHOLECALCIFEROL, PO Take 2,000 Units by mouth daily          Allergies:  -------------  Allergies   Allergen Reactions     Atenolol Anaphylaxis     Hydrochlorothiazide      hyponatremia     Pollen Extract        Social History:  -------------------  Social History     Social History     Marital status:      Spouse name: N/A     Number of children: N/A     Years of education: N/A      Occupational History     Not on file.     Social History Main Topics     Smoking status: Never Smoker     Smokeless tobacco: Never Used     Alcohol use No     Drug use: No     Sexual activity: No     Other Topics Concern     Parent/Sibling W/ Cabg, Mi Or Angioplasty Before 65f 55m? No     Caffeine Concern No     Sleep Concern No     Stress Concern No     Weight Concern No     Special Diet Yes     low sodium     Exercise Yes     therapy     Seat Belt Yes     Social History Narrative       Family Medical History:  ------------------------------  Family History   Problem Relation Age of Onset     CANCER Mother      Uterius     Breast Cancer Daughter          ROS:  EAR:  Muffled hearing.     OBJECTIVE:                                                    /80  Pulse 70  Temp 97.6  F (36.4  C) (Oral)  Wt 176 lb 11.2 oz (80.2 kg)  SpO2 93%  Breastfeeding? No  BMI 29.4 kg/m2     EARS:  both ear canals show thick wax, occluding most of the ear canals, unable to fully see TMs.  The wax was removed from both ear canals with a combination of loop removal by MD and water irrigation.   Canals were normal after, TMs normal.  Pt tolerated well          ASSESSMENT/PLAN:                                                      (H61.23) Bilateral impacted cerumen  (primary encounter diagnosis)  Comment: Impacted cerumen was removed from both ears with a combination of loop removal by MD and water irrigation.  Canal and TM clear afterwards.  Discussed strategies for preventing future ear wax buildups.   Plan: REMOVE IMPACTED CERUMEN               See Patient Instructions    DREA ROBLERO M.D., MD  [facility]

## 2018-06-14 NOTE — PROGRESS NOTES
Pt LVM stating she will not be calling in tomorrow through Monday, as she will be out of town at a graduation. Pt will be back on 6/19. Hold placed on TELEMANAGEMENT through 6/18. CARMELLA Gee 10:02 AM 06/14/18

## 2018-07-25 NOTE — PROGRESS NOTES
[id number]  HPI and Plan:   See dictation    Orders this Visit:  Orders Placed This Encounter   Procedures     Basic metabolic panel     Hemoglobin     Basic metabolic panel     Follow-Up with [facility] - [facility] visit     No orders of the defined types were placed in this encounter.    There are no discontinued medications.      Encounter Diagnoses   Name Primary?     Nonrheumatic aortic valve stenosis      Cardiomyopathy, idiopathic (H) Yes     Essential hypertension, benign      Chronic systolic heart failure (H)      Nonrheumatic tricuspid valve regurgitation      Nonrheumatic mitral valve regurgitation      Pure hypercholesterolemia      Coronary artery disease involving native coronary artery of native heart without angina pectoris        CURRENT MEDICATIONS:  Current Outpatient Prescriptions   Medication Sig Dispense Refill     aspirin 81 MG tablet Take 81 mg by mouth daily       calcium citrate (CALCITRATE) 950 MG tablet Take 2 tablets by mouth daily At Noon       carvedilol (COREG) 25 MG tablet TAKE 1 TABLET TWICE A DAY WITH MEALS 180 tablet 2     fluticasone (FLONASE) 50 MCG/ACT spray 1 to 2 sprays in both nostrils once daily as needed for rhinitis or allergies 16 g 10     hydrALAZINE (APRESOLINE) 25 MG tablet Take 1 tablet (25 mg) by mouth 3 times daily 270 tablet 3     Iron TABS Take 324 mg by mouth daily Patient states she takes OTC iron furrous gluconate 324 mg tablets USP       isosorbide mononitrate (IMDUR) 30 MG 24 hr tablet 1/2 tablet daily AT NIGHT 45 tablet 3     levothyroxine (SYNTHROID/LEVOTHROID) 112 MCG tablet TAKE 1 TABLET DAILY 90 tablet 2     Lutein 20 MG CAPS Take 20 mg by mouth daily       MELATONIN PO Take 3 mg by mouth nightly as needed       prednisoLONE acetate (PRED FORTE) 1 % ophthalmic suspension Place 1 drop into the right eye every evening  1 Bottle      simvastatin (ZOCOR) 20 MG tablet Take 1 tablet (20 mg) by mouth At Bedtime 90 tablet 1     spironolactone (ALDACTONE) 25 MG tablet  Take 1 tablet (25 mg) by mouth daily 90 tablet 3     torsemide (DEMADEX) 5 MG tablet Take 2.5mg (1/2 tablet) daily       traZODone (DESYREL) 50 MG tablet Take 0.5-1 tablets (25-50 mg) by mouth nightly as needed for sleep 30 tablet 0     VITAMIN D, CHOLECALCIFEROL, PO Take 2,000 Units by mouth daily          ALLERGIES     Allergies   Allergen Reactions     Atenolol Anaphylaxis     Hydrochlorothiazide      hyponatremia     Pollen Extract        PAST MEDICAL HISTORY:  Past Medical History:   Diagnosis Date     Aortic stenosis      CAD (coronary artery disease)     angiogram 2016: non-obstructive, CT calcium waszo=666 May 2013     Cardiomyopathy, idiopathic (H)     cardiomyopathy HTN vs viral; EF as low as 15% in 2009; CRT-P implanted 1/19/17     CHF (NYHA class II, ACC/AHA stage C) (H) 2/14/2013     Glaucoma      H/O angiography 9-1-2016    No angiographic evidence of obstructive coronary artery disease.     Hypertension      Hypothyroidism 2/14/2013     Nonrheumatic mitral valve regurgitation      Nonrheumatic tricuspid valve regurgitation      Pulmonary hypertension        PAST SURGICAL HISTORY:  Past Surgical History:   Procedure Laterality Date     CARDIAC CATHERIZATION  9/1/16     GYN SURGERY      Hysterectomy 1995     HYSTERECTOMY, PAP NO LONGER INDICATED       IMPLANT PACEMAKER  1/19/17    CRT-P     ORTHOPEDIC SURGERY      knee replacement 1998     ORTHOPEDIC SURGERY      Knee replacement 2008     ORTHOPEDIC SURGERY      knee surgery [place]       FAMILY HISTORY:  Family History   Problem Relation Age of Onset     Cancer Mother      Uterius     Breast Cancer Daughter        SOCIAL HISTORY:  Social History     Social History     Marital status:      Spouse name: N/A     Number of children: N/A     Years of education: N/A     Social History Main Topics     Smoking status: Never Smoker     Smokeless tobacco: Never Used     Alcohol use No     Drug use: No     Sexual activity: No     Other Topics Concern      "Parent/Sibling W/ Cabg, Mi Or Angioplasty Before 65f 55m? No     Caffeine Concern No     Sleep Concern No     Stress Concern No     Weight Concern No     Special Diet Yes     low sodium     Exercise Yes     therapy     Seat Belt Yes     Social History Narrative       Review of Systems:  Skin:  Negative     Eyes:  Positive for glasses  ENT:  Negative    Respiratory:  Positive for    Cardiovascular:    edema;Positive for  Gastroenterology: Negative    Genitourinary:  Negative    Musculoskeletal:  Positive for arthritis  Neurologic:  Negative    Psychiatric:  Negative    Heme/Lymph/Imm:  Negative    Endocrine:  Positive for thyroid disorder    Physical Exam:  Vitals: /68  Pulse 76  Ht 1.6 m (5' 3\")  Wt 80.5 kg (177 lb 8 oz)  BMI 31.44 kg/m2   Please refer to dictation for physical exam    Recent Lab Results:  LIPID RESULTS:  Lab Results   Component Value Date    CHOL 109 04/20/2018    HDL 36 (L) 04/20/2018    LDL 55 04/20/2018    TRIG 90 04/20/2018    CHOLHDLRATIO 3.5 07/06/2015       LIVER ENZYME RESULTS:  Lab Results   Component Value Date    AST 50 (H) 11/14/2016    ALT <5 (L) 04/20/2018       CBC RESULTS:  Lab Results   Component Value Date    WBC 4.8 04/17/2017    RBC 2.98 (L) 04/17/2017    HGB 9.3 (L) 08/28/2017    HCT 28.7 (L) 04/17/2017    MCV 96 04/17/2017    MCH 30.9 04/17/2017    MCHC 32.1 04/17/2017    RDW 15.3 (H) 04/17/2017     (L) 04/17/2017       BMP RESULTS:  Lab Results   Component Value Date     (L) 07/25/2018    POTASSIUM 5.1 07/25/2018    CHLORIDE 101 07/25/2018    CO2 26 07/25/2018    ANIONGAP 12.1 07/25/2018    GLC 92 07/25/2018    BUN 23 07/25/2018    CR 1.39 (H) 07/25/2018    GFRESTIMATED 36 (L) 07/25/2018    GFRESTBLACK 43 (L) 07/25/2018    SHOBHA 9.6 07/25/2018        A1C RESULTS:  No results found for: A1C    INR RESULTS:  Lab Results   Component Value Date    INR 1.12 09/01/2016           CC  Romeo Echols MD  [id number]  [place] [id number]      "

## 2018-07-25 NOTE — PROGRESS NOTES
"Pt's spouse was supposed to see MANAS Little today in [facility]. Appt canceled and r/s to 7/27/18 with MANAS Collado. Pt here here for a visit of her own, she stopped CMA in [facility] and said she is very worried about her . She went to pick him up from [facility], as they both had appt's with Divya Kraft, and they would not allow him to leave because his oxygen was low and pt was \"full of fluid\".    [facility] Spoke with Ale, pt's husbands nurse. Reviewed above, Ale stated pt's spouse is doing fine. His O2 sats are fine, he is not full of fluid. Pt's spouse seen by [facility] NP today. Pt had called TCU earlier and stated she got lost and could not get to pick pt up on time for his appt, so they rescheduled it. Nurse Ale stated pt seems confused. Updated given to MANAS King. CARMELLA Gee 11:47 AM 07/25/18   "

## 2018-07-25 NOTE — LETTER
2018      Ander Shrestha MD  [address] [facility] [address]      RE: Mckayla Oconnell       Dear Colleague,    I had the pleasure of seeing Mckayla Oconnell in the [facility].    Service Date: 2018      PRIMARY CARDIOLOGIST:  Dr. Echols      REASON FOR VISIT:  Heart failure followup.      HISTORY OF PRESENT ILLNESS:  Ms. Oconnell is a delightful 89-year-old woman with past medical history significant for the followin.  Nonischemic cardiomyopathy with EF between 10%-45%.  Last echocardiogram 2018 showed an EF of about 30%-35%.   2.  Aortic stenosis with a mean gradient of 26, peak gradient of 4.9 and a valve area of 1.4, moderate tricuspid regurgitation, moderate mitral regurgitation.   3.  CKD with baseline creatinine of 1.5 to 2.   4.  Hyperlipidemia.   5.  Biventricular pacemaker since 2017.   6.  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   7.  Peripheral arterial disease with symptoms of claudication.   8.  Lymphedema.   9.  Elevated pulmonary pressures at 51 plus right atrial pressure.  This was 2018.      She comes in today for routine followup.  I had met her in 2016 when she was hospitalized with profound fatigue, weight gain and shortness of breath.  We diuresed her about 20 pounds on a dobutamine drip with Bumex to a weight of 179.      At home, her weights typically run right around 171.  Her biggest issue is pedal edema.  Today, she is very focused on her  who is also a patient of ours who is in a TCU right now.  She went to pick him up and she was told that he was hypoxic and needed transport.  She is very concerned about that.  When she had talked to him the night before, he had been doing well.  She later called back when she was here and was told he was doing well and she was very concerned and confused about that.  She is also wondering when she can get him home.  She personally denies shortness of breath,  chest pain, orthopnea or PND.  She has been very fatigued with Jaime's illness but otherwise is doing okay.      SOCIAL HISTORY:  She lives in her own home with Jaime.  She has a daughter, Sandra.  She is  from her first marriage.  She is a lifelong nonsmoker, no alcohol use.      PHYSICAL EXAMINATION:   GENERAL:  Elderly woman in no acute distress.   HEENT:  Normocephalic, atraumatic.   HEART:  Regular in rate and rhythm with a late-peaking systolic murmur heard best at the right sternal border 3/6.  This is heard throughout the precordium.   RESPIRATORY:  Her lungs are clear without wheezes, rales or rhonchi.   EXTREMITIES:  She has 2+ edema to mid shin.   SKIN:  Warm and dry.      ASSESSMENT AND PLAN:   1.  Chronic systolic heart failure with EF of 30%-35% with biventricular pacemaker in place and maintained just on low-dose diuretics.  She is on Coreg 25 mg twice a day, Imdur 15 mg daily and hydralazine 25 mg t.i.d.  She tells me that she quit taking her torsemide because she thought it would be better along with her spironolactone.  There has been some concern with my nurses that she is not taking her medications correctly and I would concur with this today.  The patient did give me permission to talk to her daughter, both about her care, her  Jaime's care and the situation overall.  At this point, I want her to take 2.5 mg of torsemide daily along with the 25 mg of spironolactone.  Her potassium is mildly elevated at 5.1 and I think the torsemide will help to lower this as well as help control some of the volume.  We will repeat labs in 2 weeks and if it remains elevated, will need to cut her spironolactone in half and likely go to 5 mg of torsemide.  She is already watching her salt closely and will have her continue that.   2.  Moderate aortic stenosis, moderate tricuspid and mitral regurgitation.  We will follow on serial echo.  No symptoms at this point.   3.  Biventricular pacemaker in place.   Last device check April shows 99% BiV paced.   4.  Lymphedema with known severe abnormal ABIs and given this, [facility] does not endorse compression stockings or wrappings.  We will continue with elevation.   5.  Hypertension, borderline today, likely due to stress.   6.  Chronic anemia.  Will recheck at next clinic visit.      Thank you for allowing me to participate in this delightful patient's care.  We will see her back with labs in 2 weeks and me in 3 months with labs at that time.      JOI King PA-C             D: 2018   T: 2018   MT: WILL      Name:     VICKEY CHURCH   MRN:      [id number]        Account:      [id number]   :      1927           Service Date: 2018      Document: [id number]           Outpatient Encounter Prescriptions as of 2018   Medication Sig Dispense Refill     aspirin 81 MG tablet Take 81 mg by mouth daily       calcium citrate (CALCITRATE) 950 MG tablet Take 2 tablets by mouth daily At Noon       carvedilol (COREG) 25 MG tablet TAKE 1 TABLET TWICE A DAY WITH MEALS 180 tablet 2     fluticasone (FLONASE) 50 MCG/ACT spray 1 to 2 sprays in both nostrils once daily as needed for rhinitis or allergies 16 g 10     hydrALAZINE (APRESOLINE) 25 MG tablet Take 1 tablet (25 mg) by mouth 3 times daily 270 tablet 3     Iron TABS Take 324 mg by mouth daily Patient states she takes OTC iron furrous gluconate 324 mg tablets USP       levothyroxine (SYNTHROID/LEVOTHROID) 112 MCG tablet TAKE 1 TABLET DAILY 90 tablet 2     Lutein 20 MG CAPS Take 20 mg by mouth daily       MELATONIN PO Take 3 mg by mouth nightly as needed       prednisoLONE acetate (PRED FORTE) 1 % ophthalmic suspension Place 1 drop into the right eye every evening  1 Bottle      simvastatin (ZOCOR) 20 MG tablet Take 1 tablet (20 mg) by mouth At Bedtime 90 tablet 1     spironolactone (ALDACTONE) 25 MG tablet Take 1 tablet (25 mg) by mouth daily 90 tablet 3      torsemide (DEMADEX) 5 MG tablet Take 2.5mg (1/2 tablet) daily       traZODone (DESYREL) 50 MG tablet Take 0.5-1 tablets (25-50 mg) by mouth nightly as needed for sleep 30 tablet 0     VITAMIN D, CHOLECALCIFEROL, PO Take 2,000 Units by mouth daily        [DISCONTINUED] isosorbide mononitrate (IMDUR) 30 MG 24 hr tablet 1/2 tablet daily AT NIGHT 45 tablet 3     No facility-administered encounter medications on file as of 7/25/2018.        Again, thank you for allowing me to participate in the care of your patient.      Sincerely,    Divya Kraft PA-C     [facility]

## 2018-07-25 NOTE — PROGRESS NOTES
Service Date: 2018      PRIMARY CARDIOLOGIST:  Dr. Echols      REASON FOR VISIT:  Heart failure followup.      HISTORY OF PRESENT ILLNESS:  Ms. Oconnell is a delightful 89-year-old woman with past medical history significant for the followin.  Nonischemic cardiomyopathy with EF between 10%-45%.  Last echocardiogram 2018 showed an EF of about 30%-35%.   2.  Aortic stenosis with a mean gradient of 26, peak gradient of 4.9 and a valve area of 1.4, moderate tricuspid regurgitation, moderate mitral regurgitation.   3.  CKD with baseline creatinine of 1.5 to 2.   4.  Hyperlipidemia.   5.  Biventricular pacemaker since 2017.   6.  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   7.  Peripheral arterial disease with symptoms of claudication.   8.  Lymphedema.   9.  Elevated pulmonary pressures at 51 plus right atrial pressure.  This was 2018.      She comes in today for routine followup.  I had met her in 2016 when she was hospitalized with profound fatigue, weight gain and shortness of breath.  We diuresed her about 20 pounds on a dobutamine drip with Bumex to a weight of 179.      At home, her weights typically run right around 171.  Her biggest issue is pedal edema.  Today, she is very focused on her  who is also a patient of ours who is in a TCU right now.  She went to pick him up and she was told that he was hypoxic and needed transport.  She is very concerned about that.  When she had talked to him the night before, he had been doing well.  She later called back when she was here and was told he was doing well and she was very concerned and confused about that.  She is also wondering when she can get him home.  She personally denies shortness of breath, chest pain, orthopnea or PND.  She has been very fatigued with Jaime's illness but otherwise is doing okay.      SOCIAL HISTORY:  She lives in her own home with Jiame.  She has a daughter, Sandra.  She is  from her first marriage.  She is a  lifelong nonsmoker, no alcohol use.      PHYSICAL EXAMINATION:   GENERAL:  Elderly woman in no acute distress.   HEENT:  Normocephalic, atraumatic.   HEART:  Regular in rate and rhythm with a late-peaking systolic murmur heard best at the right sternal border 3/6.  This is heard throughout the precordium.   RESPIRATORY:  Her lungs are clear without wheezes, rales or rhonchi.   EXTREMITIES:  She has 2+ edema to mid shin.   SKIN:  Warm and dry.      ASSESSMENT AND PLAN:   1.  Chronic systolic heart failure with EF of 30%-35% with biventricular pacemaker in place and maintained just on low-dose diuretics.  She is on Coreg 25 mg twice a day, Imdur 15 mg daily and hydralazine 25 mg t.i.d.  She tells me that she quit taking her torsemide because she thought it would be better along with her spironolactone.  There has been some concern with my nurses that she is not taking her medications correctly and I would concur with this today.  The patient did give me permission to talk to her daughter, both about her care, her  Jaime's care and the situation overall.  At this point, I want her to take 2.5 mg of torsemide daily along with the 25 mg of spironolactone.  Her potassium is mildly elevated at 5.1 and I think the torsemide will help to lower this as well as help control some of the volume.  We will repeat labs in 2 weeks and if it remains elevated, will need to cut her spironolactone in half and likely go to 5 mg of torsemide.  She is already watching her salt closely and will have her continue that.   2.  Moderate aortic stenosis, moderate tricuspid and mitral regurgitation.  We will follow on serial echo.  No symptoms at this point.   3.  Biventricular pacemaker in place.  Last device check April shows 99% BiV paced.   4.  Lymphedema with known severe abnormal ABIs and given this, [facility] does not endorse compression stockings or wrappings.  We will continue with elevation.   5.  Hypertension, borderline  today, likely due to stress.   6.  Chronic anemia.  Will recheck at next clinic visit.      Thank you for allowing me to participate in this delightful patient's care.  We will see her back with labs in 2 weeks and me in 3 months with labs at that time.      JOI King PA-C             D: 2018   T: 2018   MT: WILL      Name:     VICKEY CHURCH   MRN:      [id number]        Account:      [id number]   :      1927           Service Date: 2018      Document: [id number]

## 2018-07-25 NOTE — PATIENT INSTRUCTIONS
Please call [facility] nurse for any questions or concerns:       [phone]    1. Medication changes: start taking torsemide 1/2 pill again once a day again.     Continue other medications    2.  Weigh yourself daily and write it down.     3. Call [facility] nurse if your weight is up more than 2 pounds in one day, or 5 pounds in one week.    4. Call [facility] nurse if you feel more short of breath, have more abdominal bloating or leg swelling.    5. Eat a low sodium diet (less than 2,000mg daily). If you eat less salt, you will retain less fluid.     6. Results: potassium is a bit high, kidney function looks good.    Component      Latest Ref Rng & Units 7/25/2018   Sodium      136 - 145 mmol/L 134 (L)   Potassium      3.5 - 5.1 mmol/L 5.1   Chloride      98 - 107 mmol/L 101   Carbon Dioxide      23 - 29 mmol/L 26   Anion Gap      6 - 17 mmol/L 12.1   Glucose      70 - 105 mg/dL 92   Urea Nitrogen      7 - 30 mg/dL 23   Creatinine      0.70 - 1.30 mg/dL 1.39 (H)   GFR Estimate      >60 mL/min/1.7m2 36 (L)   GFR Estimate If Black      >60 mL/min/1.7m2 43 (L)   Calcium      8.5 - 10.5 mg/dL 9.6     7.  Follow up: labs in 2 weeks, and me in 3 months.      Scheduling phone number: [phone]  Reminder: Please bring in all current medications, over the counter supplements and vitamin bottles to your next appointment.

## 2018-07-25 NOTE — LETTER
2018      Ander Shrestha MD  [address] [facility] [address]      RE: Mckayla Oconnell       Dear Colleague,    I had the pleasure of seeing Mckayla Oconnell in the [facility].    Service Date: 2018      PRIMARY CARDIOLOGIST:  Dr. Echols      REASON FOR VISIT:  Heart failure followup.      HISTORY OF PRESENT ILLNESS:  Ms. Oconnell is a delightful 89-year-old woman with past medical history significant for the followin.  Nonischemic cardiomyopathy with EF between 10%-45%.  Last echocardiogram 2018 showed an EF of about 30%-35%.   2.  Aortic stenosis with a mean gradient of 26, peak gradient of 4.9 and a valve area of 1.4, moderate tricuspid regurgitation, moderate mitral regurgitation.   3.  CKD with baseline creatinine of 1.5 to 2.   4.  Hyperlipidemia.   5.  Biventricular pacemaker since 2017.   6.  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   7.  Peripheral arterial disease with symptoms of claudication.   8.  Lymphedema.   9.  Elevated pulmonary pressures at 51 plus right atrial pressure.  This was 2018.      She comes in today for routine followup.  I had met her in 2016 when she was hospitalized with profound fatigue, weight gain and shortness of breath.  We diuresed her about 20 pounds on a dobutamine drip with Bumex to a weight of 179.      At home, her weights typically run right around 171.  Her biggest issue is pedal edema.  Today, she is very focused on her  who is also a patient of ours who is in a TCU right now.  She went to pick him up and she was told that he was hypoxic and needed transport.  She is very concerned about that.  When she had talked to him the night before, he had been doing well.  She later called back when she was here and was told he was doing well and she was very concerned and confused about that.  She is also wondering when she can get him home.  She personally denies shortness of breath,  chest pain, orthopnea or PND.  She has been very fatigued with Jaime's illness but otherwise is doing okay.      SOCIAL HISTORY:  She lives in her own home with Jaime.  She has a daughter, Sandra.  She is  from her first marriage.  She is a lifelong nonsmoker, no alcohol use.      PHYSICAL EXAMINATION:   GENERAL:  Elderly woman in no acute distress.   HEENT:  Normocephalic, atraumatic.   HEART:  Regular in rate and rhythm with a late-peaking systolic murmur heard best at the right sternal border 3/6.  This is heard throughout the precordium.   RESPIRATORY:  Her lungs are clear without wheezes, rales or rhonchi.   EXTREMITIES:  She has 2+ edema to mid shin.   SKIN:  Warm and dry.      ASSESSMENT AND PLAN:   1.  Chronic systolic heart failure with EF of 30%-35% with biventricular pacemaker in place and maintained just on low-dose diuretics.  She is on Coreg 25 mg twice a day, *** 30 mg, 15 mg daily and hydralazine 25 mg t.i.d.  She tells me that she quit taking her torsemide because she thought it would be better along with her spironolactone.  There has been some concern with my nurses that she is not taking her medications correctly and I would concur with this today.  The patient did give me permission to talk to her daughter, both about her care, her  Jaime's care and the situation overall.  At this point, I want her to take 2.5 mg of torsemide daily along with the 25 mg of spironolactone.  Her potassium is mildly elevated at 5.1 and I think the torsemide will help to lower this as well as help control some of the volume.  We will repeat labs in 2 weeks and if it remains elevated, will need to cut her spironolactone in half and likely go to 5 mg of torsemide.  She is already watching her salt closely and will have her continue that.   2.  Moderate aortic stenosis, moderate tricuspid and mitral regurgitation.  We will follow on serial echo.  No symptoms at this point.   3.  Biventricular pacemaker in  place.  Last device check April shows 99% BiV paced.   4.  Lymphedema with known severe abnormal ABIs and given this, Lymphedema Clinic does not endorse compression stockings or wrappings.  We will continue with elevation.   5.  Hypertension, borderline today, likely due to stress.   6.  Chronic anemia.  Will recheck at next clinic visit.      Thank you for allowing me to participate in this delightful patient's care.  We will see her back with labs in 2 weeks and me in 3 months with labs at that time.      JOI King PA-C             D: 2018   T: 2018   MT: WILL      Name:     VICKEY CHURCH   MRN:      [id number]        Account:      [id number]   :      1927           Service Date: 2018      Document: [id number]         Outpatient Encounter Prescriptions as of 2018   Medication Sig Dispense Refill     aspirin 81 MG tablet Take 81 mg by mouth daily       calcium citrate (CALCITRATE) 950 MG tablet Take 2 tablets by mouth daily At Noon       carvedilol (COREG) 25 MG tablet TAKE 1 TABLET TWICE A DAY WITH MEALS 180 tablet 2     fluticasone (FLONASE) 50 MCG/ACT spray 1 to 2 sprays in both nostrils once daily as needed for rhinitis or allergies 16 g 10     hydrALAZINE (APRESOLINE) 25 MG tablet Take 1 tablet (25 mg) by mouth 3 times daily 270 tablet 3     Iron TABS Take 324 mg by mouth daily Patient states she takes OTC iron furrous gluconate 324 mg tablets USP       isosorbide mononitrate (IMDUR) 30 MG 24 hr tablet 1/2 tablet daily AT NIGHT 45 tablet 3     levothyroxine (SYNTHROID/LEVOTHROID) 112 MCG tablet TAKE 1 TABLET DAILY 90 tablet 2     Lutein 20 MG CAPS Take 20 mg by mouth daily       MELATONIN PO Take 3 mg by mouth nightly as needed       prednisoLONE acetate (PRED FORTE) 1 % ophthalmic suspension Place 1 drop into the right eye every evening  1 Bottle      simvastatin (ZOCOR) 20 MG tablet Take 1 tablet (20 mg) by mouth At Bedtime 90 tablet 1      spironolactone (ALDACTONE) 25 MG tablet Take 1 tablet (25 mg) by mouth daily 90 tablet 3     torsemide (DEMADEX) 5 MG tablet Take 2.5mg (1/2 tablet) daily       traZODone (DESYREL) 50 MG tablet Take 0.5-1 tablets (25-50 mg) by mouth nightly as needed for sleep 30 tablet 0     VITAMIN D, CHOLECALCIFEROL, PO Take 2,000 Units by mouth daily        No facility-administered encounter medications on file as of 7/25/2018.        Again, thank you for allowing me to participate in the care of your patient.      Sincerely,    Divya Kraft PA-C     [facility]

## 2018-07-25 NOTE — TELEPHONE ENCOUNTER
Called daughter Lorrie, with pts permission, to discuss concerns of pts regarding getting Jaime home and concerns that Mckayla is making errors with her medications.    Left voicemail on daughter's cell phone and work number to call back core nurse to find a time we can talk.  Divya Kraft PA-C 7/25/2018 4:50 PM    Copy core team as adal

## 2018-07-25 NOTE — MR AVS SNAPSHOT
After Visit Summary   7/25/2018    Mckayla Oconnell    MRN: [id number]           Patient Information     Date Of Birth          7/2/1927        Visit Information        Provider Department      7/25/2018 12:30 PM Divya Kraft PA-C [facility]   [place]        Today's Diagnoses     Cardiomyopathy, idiopathic (H)    -  1    Nonrheumatic aortic valve stenosis        Essential hypertension, benign        Chronic systolic heart failure (H)        Nonrheumatic tricuspid valve regurgitation        Nonrheumatic mitral valve regurgitation        Pure hypercholesterolemia        Coronary artery disease involving native coronary artery of native heart without angina pectoris          Care Instructions    Please call CORE nurse for any questions or concerns:       [phone]    1. Medication changes: start taking torsemide 1/2 pill again once a day again.     Continue other medications    2.  Weigh yourself daily and write it down.     3. Call CORE nurse if your weight is up more than 2 pounds in one day, or 5 pounds in one week.    4. Call CORE nurse if you feel more short of breath, have more abdominal bloating or leg swelling.    5. Eat a low sodium diet (less than 2,000mg daily). If you eat less salt, you will retain less fluid.     6. Results: potassium is a bit high, kidney function looks good.    Component      Latest Ref Rng & Units 7/25/2018   Sodium      136 - 145 mmol/L 134 (L)   Potassium      3.5 - 5.1 mmol/L 5.1   Chloride      98 - 107 mmol/L 101   Carbon Dioxide      23 - 29 mmol/L 26   Anion Gap      6 - 17 mmol/L 12.1   Glucose      70 - 105 mg/dL 92   Urea Nitrogen      7 - 30 mg/dL 23   Creatinine      0.70 - 1.30 mg/dL 1.39 (H)   GFR Estimate      >60 mL/min/1.7m2 36 (L)   GFR Estimate If Black      >60 mL/min/1.7m2 43 (L)   Calcium      8.5 - 10.5 mg/dL 9.6     7.  Follow up: labs in 2 weeks, and me in 3 months.      Scheduling phone number:  [phone]  Reminder: Please bring in all current medications, over the counter supplements and vitamin bottles to your next appointment.            Follow-ups after your visit        Additional Services     Follow-Up with [facility]ORE Clinic - TANYA visit                 Your next 10 appointments already scheduled     Aug 01, 2018  3:45 PM CDT   Remote PPM Check with [facility] TECH1   [facility]   [place] ([facility])    [address] [place], [place] [address]  [place] [id number] [phone] OPT 2           This appointment is for a remote check of your pacemaker.  This is not an appointment at the office.              Future tests that were ordered for you today     Open Future Orders        Priority Expected Expires Ordered    Basic metabolic panel Routine 8/8/2018 7/25/2019 7/25/2018    Basic metabolic panel Routine 10/23/2018 7/25/2019 7/25/2018    Follow-Up with [facility] Clinic - TANYA visit Routine 10/23/2018 7/25/2019 7/25/2018    Hemoglobin Routine 10/23/2018 7/25/2019 7/25/2018            Who to contact     If you have questions or need follow up information about today's clinic visit or your schedule please contact [facility] directly at [phone].  Normal or non-critical lab and imaging results will be communicated to you by MyChart, letter or phone within 4 business days after the clinic has received the results. If you do not hear from us within 7 days, please contact the clinic through [facility] or phone. If you have a critical or abnormal lab result, we will notify you by phone as soon as possible.  Submit refill requests through [facility] [employer] or call your pharmacy and they will forward the refill request to us. Please allow 3 business days for your refill to be completed.          Additional Information About Your Visit        [facility] Information     [employer] [employer] gives you secure access to your electronic health record. If you see a primary care provider,  "you can also send messages to your care team and make appointments. If you have questions, please call your primary care clinic.  If you do not have a primary care provider, please call [phone] and they will assist you.        Care EveryWhere ID     This is your Care EveryWhere ID. This could be used by other organizations to access your [place] medical records  [id number]        Your Vitals Were     Pulse Height BMI (Body Mass Index)             76 1.6 m (5' 3\") 31.44 kg/m2          Blood Pressure from Last 3 Encounters:   07/25/18 138/68   06/11/18 140/80   05/08/18 120/76    Weight from Last 3 Encounters:   07/25/18 80.5 kg (177 lb 8 oz)   06/11/18 80.2 kg (176 lb 11.2 oz)   05/08/18 81.6 kg (179 lb 14.4 oz)              We Performed the Following     Follow-Up with Cardiac Advanced Practice Provider        Primary Care Provider Office Phone # Fax #    Ander Shrestha [phone]       [address] [place] [id number]        Equal Access to Services     [facility] : Hadii juan pablo ku hadasho Sochicho, waaxda luqadaha, qaybta kaalmada david, [place] santaan . So [facility] [phone].    ATENCIÓN: Si habla español, tiene a villalba disposición servicios gratuitos de asistencia lingüística. [facility] [phone].    We comply with applicable federal civil rights laws and [place] laws. We do not discriminate on the basis of race, color, national origin, age, disability, sex, sexual orientation, or gender identity.            Thank you!     Thank you for choosing [facility]  for your care. Our goal is always to provide you with excellent care. Hearing back from our patients is one way we can continue to improve our services. Please take a few minutes to complete the written survey that you may receive in the mail after your visit with us. Thank you!             Your Updated Medication List - Protect others around you: Learn " how to safely use, store and throw away your medicines at www.disposemymeds.org.          This list is accurate as of 7/25/18  1:08 PM.  Always use your most recent med list.                   Brand Name Dispense Instructions for use Diagnosis    aspirin 81 MG tablet      Take 81 mg by mouth daily        calcium citrate 950 MG tablet    CALCITRATE     Take 2 tablets by mouth daily At Noon        carvedilol 25 MG tablet    COREG    180 tablet    TAKE 1 TABLET TWICE A DAY WITH MEALS    Cardiomyopathy, idiopathic (H), Chronic systolic heart failure (H), Essential hypertension, benign, Coronary artery disease involving native coronary artery of native heart without angina pectoris       fluticasone 50 MCG/ACT spray    FLONASE    16 g    1 to 2 sprays in both nostrils once daily as needed for rhinitis or allergies    Seasonal allergic rhinitis, unspecified chronicity, unspecified trigger       hydrALAZINE 25 MG tablet    APRESOLINE    270 tablet    Take 1 tablet (25 mg) by mouth 3 times daily    Acute on chronic congestive heart failure, unspecified congestive heart failure type (H)       Iron Tabs      Take 324 mg by mouth daily Patient states she takes OTC iron furrous gluconate 324 mg tablets USP        isosorbide mononitrate 30 MG 24 hr tablet    IMDUR    45 tablet    1/2 tablet daily AT NIGHT    Cardiomyopathy, idiopathic (H)       levothyroxine 112 MCG tablet    SYNTHROID/LEVOTHROID    90 tablet    TAKE 1 TABLET DAILY    Hypothyroidism, unspecified type       Lutein 20 MG Caps      Take 20 mg by mouth daily        MELATONIN PO      Take 3 mg by mouth nightly as needed        prednisoLONE acetate 1 % ophthalmic susp    PRED FORTE    1 Bottle    Place 1 drop into the right eye every evening        simvastatin 20 MG tablet    ZOCOR    90 tablet    Take 1 tablet (20 mg) by mouth At Bedtime    Coronary artery disease involving native coronary artery of native heart without angina pectoris       spironolactone 25 MG  tablet    ALDACTONE    90 tablet    Take 1 tablet (25 mg) by mouth daily    Chronic systolic heart failure (H)       torsemide 5 MG tablet    DEMADEX     Take 2.5mg (1/2 tablet) daily    Chronic systolic congestive heart failure (H)       traZODone 50 MG tablet    DESYREL    30 tablet    Take 0.5-1 tablets (25-50 mg) by mouth nightly as needed for sleep    Primary insomnia       VITAMIN D (CHOLECALCIFEROL) PO      Take 2,000 Units by mouth daily

## 2018-07-25 NOTE — LETTER
2018      Ander Shrestha MD  [address] [facility] [address]      RE: Mckayla Oconnell       Dear Colleague,    I had the pleasure of seeing Mckayla Oconnell in the [facility].    Service Date: 2018      PRIMARY CARDIOLOGIST:  Dr. Echols      REASON FOR VISIT:  Heart failure followup.      HISTORY OF PRESENT ILLNESS:  Ms. Oconnell is a delightful 89-year-old woman with past medical history significant for the followin.  Nonischemic cardiomyopathy with EF between 10%-45%.  Last echocardiogram 2018 showed an EF of about 30%-35%.   2.  Aortic stenosis with a mean gradient of 26, peak gradient of 4.9 and a valve area of 1.4, moderate tricuspid regurgitation, moderate mitral regurgitation.   3.  CKD with baseline creatinine of 1.5 to 2.   4.  Hyperlipidemia.   5.  Biventricular pacemaker since 2017.   6.  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   7.  Peripheral arterial disease with symptoms of claudication.   8.  Lymphedema.   9.  Elevated pulmonary pressures at 51 plus right atrial pressure.  This was 2018.      She comes in today for routine followup.  I had met her in 2016 when she was hospitalized with profound fatigue, weight gain and shortness of breath.  We diuresed her about 20 pounds on a dobutamine drip with Bumex to a weight of 179.      At home, her weights typically run right around 171.  Her biggest issue is pedal edema.  Today, she is very focused on her  who is also a patient of ours who is in a TCU right now.  She went to pick him up and she was told that he was hypoxic and needed transport.  She is very concerned about that.  When she had talked to him the night before, he had been doing well.  She later called back when she was here and was told he was doing well and she was very concerned and confused about that.  She is also wondering when she can get him home.  She personally denies shortness of breath,  chest pain, orthopnea or PND.  She has been very fatigued with Jaime's illness but otherwise is doing okay.      SOCIAL HISTORY:  She lives in her own home with Jaime.  She has a daughter, Sandra.  She is  from her first marriage.  She is a lifelong nonsmoker, no alcohol use.      PHYSICAL EXAMINATION:   GENERAL:  Elderly woman in no acute distress.   HEENT:  Normocephalic, atraumatic.   HEART:  Regular in rate and rhythm with a late-peaking systolic murmur heard best at the right sternal border 3/6.  This is heard throughout the precordium.   RESPIRATORY:  Her lungs are clear without wheezes, rales or rhonchi.   EXTREMITIES:  She has 2+ edema to mid shin.   SKIN:  Warm and dry.      ASSESSMENT AND PLAN:   1.  Chronic systolic heart failure with EF of 30%-35% with biventricular pacemaker in place and maintained just on low-dose diuretics.  She is on Coreg 25 mg twice a day, *** 30 mg, 15 mg daily and hydralazine 25 mg t.i.d.  She tells me that she quit taking her torsemide because she thought it would be better along with her spironolactone.  There has been some concern with my nurses that she is not taking her medications correctly and I would concur with this today.  The patient did give me permission to talk to her daughter, both about her care, her  Jaime's care and the situation overall.  At this point, I want her to take 2.5 mg of torsemide daily along with the 25 mg of spironolactone.  Her potassium is mildly elevated at 5.1 and I think the torsemide will help to lower this as well as help control some of the volume.  We will repeat labs in 2 weeks and if it remains elevated, will need to cut her spironolactone in half and likely go to 5 mg of torsemide.  She is already watching her salt closely and will have her continue that.   2.  Moderate aortic stenosis, moderate tricuspid and mitral regurgitation.  We will follow on serial echo.  No symptoms at this point.   3.  Biventricular pacemaker in  place.  Last device check April shows 99% BiV paced.   4.  Lymphedema with known severe abnormal ABIs and given this, Lymphedema Clinic does not endorse compression stockings or wrappings.  We will continue with elevation.   5.  Hypertension, borderline today, likely due to stress.   6.  Chronic anemia.  Will recheck at next clinic visit.      Thank you for allowing me to participate in this delightful patient's care.  We will see her back with labs in 2 weeks and me in 3 months with labs at that time.      JOI King PA-C             D: 2018   T: 2018   MT: WILL      Name:     VICKEY CHURCH   MRN:      [id number]        Account:      [id number]   :      1927           Service Date: 2018      Document: [id number]         Outpatient Encounter Prescriptions as of 2018   Medication Sig Dispense Refill     aspirin 81 MG tablet Take 81 mg by mouth daily       calcium citrate (CALCITRATE) 950 MG tablet Take 2 tablets by mouth daily At Noon       carvedilol (COREG) 25 MG tablet TAKE 1 TABLET TWICE A DAY WITH MEALS 180 tablet 2     fluticasone (FLONASE) 50 MCG/ACT spray 1 to 2 sprays in both nostrils once daily as needed for rhinitis or allergies 16 g 10     hydrALAZINE (APRESOLINE) 25 MG tablet Take 1 tablet (25 mg) by mouth 3 times daily 270 tablet 3     Iron TABS Take 324 mg by mouth daily Patient states she takes OTC iron furrous gluconate 324 mg tablets USP       isosorbide mononitrate (IMDUR) 30 MG 24 hr tablet 1/2 tablet daily AT NIGHT 45 tablet 3     levothyroxine (SYNTHROID/LEVOTHROID) 112 MCG tablet TAKE 1 TABLET DAILY 90 tablet 2     Lutein 20 MG CAPS Take 20 mg by mouth daily       MELATONIN PO Take 3 mg by mouth nightly as needed       prednisoLONE acetate (PRED FORTE) 1 % ophthalmic suspension Place 1 drop into the right eye every evening  1 Bottle      simvastatin (ZOCOR) 20 MG tablet Take 1 tablet (20 mg) by mouth At Bedtime 90 tablet 1      spironolactone (ALDACTONE) 25 MG tablet Take 1 tablet (25 mg) by mouth daily 90 tablet 3     torsemide (DEMADEX) 5 MG tablet Take 2.5mg (1/2 tablet) daily       traZODone (DESYREL) 50 MG tablet Take 0.5-1 tablets (25-50 mg) by mouth nightly as needed for sleep 30 tablet 0     VITAMIN D, CHOLECALCIFEROL, PO Take 2,000 Units by mouth daily        No facility-administered encounter medications on file as of 7/25/2018.        Again, thank you for allowing me to participate in the care of your patient.      Sincerely,    Divya Kraft PA-C     [facility]

## 2018-07-25 NOTE — LETTER
7/25/2018    Ander Shrestha MD  [address] [facility] [address]    RE: Mckayla Oconnell       Dear Colleague,    I had the pleasure of seeing Mckayla Oconnell in the [facility].    [id number]  HPI and Plan:   See dictation    Orders this Visit:  Orders Placed This Encounter   Procedures     Basic metabolic panel     Hemoglobin     Basic metabolic panel     Follow-Up with [facility] - TANYA visit     No orders of the defined types were placed in this encounter.    There are no discontinued medications.      Encounter Diagnoses   Name Primary?     Nonrheumatic aortic valve stenosis      Cardiomyopathy, idiopathic (H) Yes     Essential hypertension, benign      Chronic systolic heart failure (H)      Nonrheumatic tricuspid valve regurgitation      Nonrheumatic mitral valve regurgitation      Pure hypercholesterolemia      Coronary artery disease involving native coronary artery of native heart without angina pectoris        CURRENT MEDICATIONS:  Current Outpatient Prescriptions   Medication Sig Dispense Refill     aspirin 81 MG tablet Take 81 mg by mouth daily       calcium citrate (CALCITRATE) 950 MG tablet Take 2 tablets by mouth daily At Noon       carvedilol (COREG) 25 MG tablet TAKE 1 TABLET TWICE A DAY WITH MEALS 180 tablet 2     fluticasone (FLONASE) 50 MCG/ACT spray 1 to 2 sprays in both nostrils once daily as needed for rhinitis or allergies 16 g 10     hydrALAZINE (APRESOLINE) 25 MG tablet Take 1 tablet (25 mg) by mouth 3 times daily 270 tablet 3     Iron TABS Take 324 mg by mouth daily Patient states she takes OTC iron furrous gluconate 324 mg tablets USP       isosorbide mononitrate (IMDUR) 30 MG 24 hr tablet 1/2 tablet daily AT NIGHT 45 tablet 3     levothyroxine (SYNTHROID/LEVOTHROID) 112 MCG tablet TAKE 1 TABLET DAILY 90 tablet 2     Lutein 20 MG CAPS Take 20 mg by mouth daily       MELATONIN PO Take 3 mg by mouth nightly as needed       prednisoLONE  acetate (PRED FORTE) 1 % ophthalmic suspension Place 1 drop into the right eye every evening  1 Bottle      simvastatin (ZOCOR) 20 MG tablet Take 1 tablet (20 mg) by mouth At Bedtime 90 tablet 1     spironolactone (ALDACTONE) 25 MG tablet Take 1 tablet (25 mg) by mouth daily 90 tablet 3     torsemide (DEMADEX) 5 MG tablet Take 2.5mg (1/2 tablet) daily       traZODone (DESYREL) 50 MG tablet Take 0.5-1 tablets (25-50 mg) by mouth nightly as needed for sleep 30 tablet 0     VITAMIN D, CHOLECALCIFEROL, PO Take 2,000 Units by mouth daily          ALLERGIES     Allergies   Allergen Reactions     Atenolol Anaphylaxis     Hydrochlorothiazide      hyponatremia     Pollen Extract        PAST MEDICAL HISTORY:  Past Medical History:   Diagnosis Date     Aortic stenosis      CAD (coronary artery disease)     angiogram 2016: non-obstructive, CT calcium waukv=228 May 2013     Cardiomyopathy, idiopathic (H)     cardiomyopathy HTN vs viral; EF as low as 15% in 2009; CRT-P implanted 1/19/17     CHF (NYHA class II, ACC/AHA stage C) (H) 2/14/2013     Glaucoma      H/O angiography 9-1-2016    No angiographic evidence of obstructive coronary artery disease.     Hypertension      Hypothyroidism 2/14/2013     Nonrheumatic mitral valve regurgitation      Nonrheumatic tricuspid valve regurgitation      Pulmonary hypertension        PAST SURGICAL HISTORY:  Past Surgical History:   Procedure Laterality Date     CARDIAC CATHERIZATION  9/1/16     GYN SURGERY      Hysterectomy 1995     HYSTERECTOMY, PAP NO LONGER INDICATED       IMPLANT PACEMAKER  1/19/17    CRT-P     ORTHOPEDIC SURGERY      knee replacement 1998     ORTHOPEDIC SURGERY      Knee replacement 2008     ORTHOPEDIC SURGERY      knee surgery [place]       FAMILY HISTORY:  Family History   Problem Relation Age of Onset     Cancer Mother      Uterius     Breast Cancer Daughter        SOCIAL HISTORY:  Social History     Social History     Marital status:      Spouse name: N/A      "Number of children: N/A     Years of education: N/A     Social History Main Topics     Smoking status: Never Smoker     Smokeless tobacco: Never Used     Alcohol use No     Drug use: No     Sexual activity: No     Other Topics Concern     Parent/Sibling W/ Cabg, Mi Or Angioplasty Before 65f 55m? No     Caffeine Concern No     Sleep Concern No     Stress Concern No     Weight Concern No     Special Diet Yes     low sodium     Exercise Yes     therapy     Seat Belt Yes     Social History Narrative       Review of Systems:  Skin:  Negative     Eyes:  Positive for glasses  ENT:  Negative    Respiratory:  Positive for    Cardiovascular:    edema;Positive for  Gastroenterology: Negative    Genitourinary:  Negative    Musculoskeletal:  Positive for arthritis  Neurologic:  Negative    Psychiatric:  Negative    Heme/Lymph/Imm:  Negative    Endocrine:  Positive for thyroid disorder    Physical Exam:  Vitals: /68  Pulse 76  Ht 1.6 m (5' 3\")  Wt 80.5 kg (177 lb 8 oz)  BMI 31.44 kg/m2   Please refer to dictation for physical exam    Recent Lab Results:  LIPID RESULTS:  Lab Results   Component Value Date    CHOL 109 04/20/2018    HDL 36 (L) 04/20/2018    LDL 55 04/20/2018    TRIG 90 04/20/2018    CHOLHDLRATIO 3.5 07/06/2015       LIVER ENZYME RESULTS:  Lab Results   Component Value Date    AST 50 (H) 11/14/2016    ALT <5 (L) 04/20/2018       CBC RESULTS:  Lab Results   Component Value Date    WBC 4.8 04/17/2017    RBC 2.98 (L) 04/17/2017    HGB 9.3 (L) 08/28/2017    HCT 28.7 (L) 04/17/2017    MCV 96 04/17/2017    MCH 30.9 04/17/2017    MCHC 32.1 04/17/2017    RDW 15.3 (H) 04/17/2017     (L) 04/17/2017       BMP RESULTS:  Lab Results   Component Value Date     (L) 07/25/2018    POTASSIUM 5.1 07/25/2018    CHLORIDE 101 07/25/2018    CO2 26 07/25/2018    ANIONGAP 12.1 07/25/2018    GLC 92 07/25/2018    BUN 23 07/25/2018    CR 1.39 (H) 07/25/2018    GFRESTIMATED 36 (L) 07/25/2018    GFRESTBLACK 43 (L) " 2018    SHOBHA 9.6 2018        A1C RESULTS:  No results found for: A1C    INR RESULTS:  Lab Results   Component Value Date    INR 1.12 2016           CC  Romeo Echols MD  [address]  [place] [address]        Service Date: 2018      PRIMARY CARDIOLOGIST:  Dr. Echols      REASON FOR VISIT:  Heart failure followup.      HISTORY OF PRESENT ILLNESS:  Ms. Oconnell is a delightful 89-year-old woman with past medical history significant for the followin.  Nonischemic cardiomyopathy with EF between 10%-45%.  Last echocardiogram 2018 showed an EF of about 30%-35%.   2.  Aortic stenosis with a mean gradient of 26, peak gradient of 4.9 and a valve area of 1.4, moderate tricuspid regurgitation, moderate mitral regurgitation.   3.  CKD with baseline creatinine of 1.5 to 2.   4.  Hyperlipidemia.   5.  Biventricular pacemaker since 2017.   6.  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   7.  Peripheral arterial disease with symptoms of claudication.   8.  Lymphedema.   9.  Elevated pulmonary pressures at 51 plus right atrial pressure.  This was 2018.      She comes in today for routine followup.  I had met her in 2016 when she was hospitalized with profound fatigue, weight gain and shortness of breath.  We diuresed her about 20 pounds on a dobutamine drip with Bumex to a weight of 179.      At home, her weights typically run right around 171.  Her biggest issue is pedal edema.  Today, she is very focused on her  who is also a patient of ours who is in a TCU right now.  She went to pick him up and she was told that he was hypoxic and needed transport.  She is very concerned about that.  When she had talked to him the night before, he had been doing well.  She later called back when she was here and was told he was doing well and she was very concerned and confused about that.  She is also wondering when she can get him home.  She personally denies shortness of breath,  chest pain, orthopnea or PND.  She has been very fatigued with Jaime's illness but otherwise is doing okay.      SOCIAL HISTORY:  She lives in her own home with Jaime.  She has a daughter, Sandra.  She is  from her first marriage.  She is a lifelong nonsmoker, no alcohol use.      PHYSICAL EXAMINATION:   GENERAL:  Elderly woman in no acute distress.   HEENT:  Normocephalic, atraumatic.   HEART:  Regular in rate and rhythm with a late-peaking systolic murmur heard best at the right sternal border 3/6.  This is heard throughout the precordium.   RESPIRATORY:  Her lungs are clear without wheezes, rales or rhonchi.   EXTREMITIES:  She has 2+ edema to mid shin.   SKIN:  Warm and dry.      ASSESSMENT AND PLAN:   1.  Chronic systolic heart failure with EF of 30%-35% with biventricular pacemaker in place and maintained just on low-dose diuretics.  She is on Coreg 25 mg twice a day, *** 30 mg, 15 mg daily and hydralazine 25 mg t.i.d.  She tells me that she quit taking her torsemide because she thought it would be better along with her spironolactone.  There has been some concern with my nurses that she is not taking her medications correctly and I would concur with this today.  The patient did give me permission to talk to her daughter, both about her care, her  Jaime's care and the situation overall.  At this point, I want her to take 2.5 mg of torsemide daily along with the 25 mg of spironolactone.  Her potassium is mildly elevated at 5.1 and I think the torsemide will help to lower this as well as help control some of the volume.  We will repeat labs in 2 weeks and if it remains elevated, will need to cut her spironolactone in half and likely go to 5 mg of torsemide.  She is already watching her salt closely and will have her continue that.   2.  Moderate aortic stenosis, moderate tricuspid and mitral regurgitation.  We will follow on serial echo.  No symptoms at this point.   3.  Biventricular pacemaker in  place.  Last device check April shows 99% BiV paced.   4.  Lymphedema with known severe abnormal ABIs and given this, Lymphedema Clinic does not endorse compression stockings or wrappings.  We will continue with elevation.   5.  Hypertension, borderline today, likely due to stress.   6.  Chronic anemia.  Will recheck at next clinic visit.      Thank you for allowing me to participate in this delightful patient's care.  We will see her back with labs in 2 weeks and me in 3 months with labs at that time.      JOI King PA-C             D: 2018   T: 2018   MT: WILL      Name:     VICKEY CHURCH   MRN:      [id number]        Account:      [id number]   :      1927           Service Date: 2018      Document: [id number]         Thank you for allowing me to participate in the care of your patient.      Sincerely,     Divya Kraft PA-C     [facility]    cc:   Romeo Echols MD  [address]  [place] [address]

## 2018-07-26 NOTE — PROGRESS NOTES
[facility] Telemanagment     Alert for: wt above max parameters  Heart Failure sx: none reported  Current daily diuretic: Torsemide 2.5mg daily  Next follow up: 8/8/18 for BMP; 10/15/18 to see MANAS King and GRAY.     Pt seen in clinic yesterday by MANAS King. Pt had self stopped her Torsemide. Pt was instructed yesterday to restart Torsemide at 2.5mg daily.     Called pt to review, no answer. LVM for pt wanting to verify she restarted Torsemide, and asked if she was able to restart yesterday or today. Asked for call back to discuss. CARMELLA Gee 10:07 AM 07/26/18

## 2018-07-26 NOTE — PROGRESS NOTES
Received return call from pt.  She reports wt is actually down 1# from yesterdays wt of 173#.  She confirms she did restart torsemide 2.5mg daily today.  Discussed with pt that restarting torsemide today should help with bringing wt back down within parameters and will continue to monitor wt/sx- she agrees with this plan.    CARMELLA Miller 2:07 PM 7/26/2018

## 2018-07-26 NOTE — TELEPHONE ENCOUNTER
Received VM from pt's daughter, Lorrie.  She states best times to call her today are 12:00-12:30pm on her cell: [phone] or 3:00-5:00pm at home: [phone].    Routed to SITA Little RN 9:45 AM 7/26/2018

## 2018-08-02 NOTE — PROGRESS NOTES
[employer] Valitude CRT-P Remote PPM Device Check  AP: 87 % BIVP: 99 %  Mode: DDDR        Presenting Rhythm: AP/  Heart Rate: Rates mostly in the 70's per histogram  Sensing: Stable    Pacing Threshold: Stable    Impedance: Stable  Battery Status: 9 years  Atrial Arrhythmia: None  Ventricular Arrhythmia: None     Care Plan: F/u PPM Latitude q 3 months. Gave patient results over the phone. uDyCVT

## 2018-08-02 NOTE — MR AVS SNAPSHOT
After Visit Summary   8/2/2018    Mckayla Oconnell    MRN: [id number]           Patient Information     Date Of Birth          7/2/1927        Visit Information        Provider Department      8/2/2018 4:15 PM [facility]AR[facility]        Today's Diagnoses     Cardiac pacemaker in situ    -  1       Follow-ups after your visit        Your next 10 appointments already scheduled     Aug 02, 2018  4:15 PM CDT   Remote PPM Check with CUENCA TECH1   [facility] ([facility])    [address] [place], [place] [address]  [facility] [address]   [phone] OPT 2           This appointment is for a remote check of your pacemaker.  This is not an appointment at the office.            Aug 08, 2018 11:00 AM CDT   LAB with CUEN[facility] LAB   [facility] [place] ([facility])    [address] [place] [id number] [phone]           Please do not eat 10-12 hours before your appointment if you are coming in fasting for labs on lipids, cholesterol, or glucose (sugar). This does not apply to pregnant women. Water, hot tea and black coffee (with nothing added) are okay. Do not drink other fluids, diet soda or chew gum.            Oct 15, 2018  9:30 AM CDT   LAB with [facility] LAB   [facility] [place] ([facility])    [address] [place] [id number] [phone]           Please do not eat 10-12 hours before your appointment if you are coming in fasting for labs on lipids, cholesterol, or glucose (sugar). This does not apply to pregnant women. Water, hot tea and black coffee (with nothing added) are okay. Do not drink other fluids, diet soda or chew gum.            Oct 15, 2018 10:10 AM CDT   Core Return with Divya Kraft PA-C   [facility] ([facility])    [address] [place] [address]  [address]  [place] [id number] [phone] OPT 2              Who to contact     If you have questions or need follow up information about today's clinic visit or your schedule please contact [facility]   [place] directly at [phone].  Normal or non-critical lab and imaging results will be communicated to you by MyChart, letter or phone within 4 business days after the clinic has received the results. If you do not hear from us within 7 days, please contact the clinic through [facility] or phone. If you have a critical or abnormal lab result, we will notify you by phone as soon as possible.  Submit refill requests through [facility]  or call your pharmacy and they will forward the refill request to us. Please allow 3 business days for your refill to be completed.          Additional Information About Your Visit        MyChart Information     [facility]  gives you secure access to your electronic health record. If you see a primary care provider, you can also send messages to your care team and make appointments. If you have questions, please call your primary care clinic.  If you do not have a primary care provider, please call [phone] and they will assist you.        Care EveryWhere ID     This is your Care EveryWhere ID. This could be used by other organizations to access your [facility] medical records  [id number]         Blood Pressure from Last 3 Encounters:   07/25/18 138/68   06/11/18 140/80   05/08/18 120/76    Weight from Last 3 Encounters:   07/25/18 80.5 kg (177 lb 8 oz)   06/11/18 80.2 kg (176 lb 11.2 oz)   05/08/18 81.6 kg (179 lb 14.4 oz)              We Performed the Following     INTERROGATION DEVICE EVAL REMOTE, PACER/ICD (27403)     PM DEVICE INTERROGATE REMOTE (24207)        Primary Care Provider Office Phone # Fax #    Ander Shrestha [phone] [address] [facility] [address]        Equal Access to Services     SHELBY SHEPARD AH:  Hadii aad darron baezao Sowandaali, waaxda luqadaha, qaybta kaalmada david, brooklyn patrick bandarángel snideralexus naviandrea la'sebángel franchesca. So [facility] [phone].    ATENCIÓN: Si padma graham, tiene a villalba disposición servicios gratuitos de asistencia lingüística. Llame al [phone].    We comply with applicable federal civil rights laws and [place] laws. We do not discriminate on the basis of race, color, national origin, age, disability, sex, sexual orientation, or gender identity.            Thank you!     Thank you for choosing [facility]  for your care. Our goal is always to provide you with excellent care. Hearing back from our patients is one way we can continue to improve our services. Please take a few minutes to complete the written survey that you may receive in the mail after your visit with us. Thank you!             Your Updated Medication List - Protect others around you: Learn how to safely use, store and throw away your medicines at www.disposemymeds.org.          This list is accurate as of 8/2/18  9:53 AM.  Always use your most recent med list.                   Brand Name Dispense Instructions for use Diagnosis    aspirin 81 MG tablet      Take 81 mg by mouth daily        calcium citrate 950 MG tablet    CALCITRATE     Take 2 tablets by mouth daily At Noon        carvedilol 25 MG tablet    COREG    180 tablet    TAKE 1 TABLET TWICE A DAY WITH MEALS    Cardiomyopathy, idiopathic (H), Chronic systolic heart failure (H), Essential hypertension, benign, Coronary artery disease involving native coronary artery of native heart without angina pectoris       fluticasone 50 MCG/ACT spray    FLONASE    16 g    1 to 2 sprays in both nostrils once daily as needed for rhinitis or allergies    Seasonal allergic rhinitis, unspecified chronicity, unspecified trigger       hydrALAZINE 25 MG tablet    APRESOLINE    270 tablet    Take 1 tablet (25 mg) by mouth 3 times daily    Acute on chronic  congestive heart failure, unspecified congestive heart failure type (H)       Iron Tabs      Take 324 mg by mouth daily Patient states she takes OTC iron furrous gluconate 324 mg tablets USP        isosorbide mononitrate 30 MG 24 hr tablet    IMDUR    45 tablet    1/2 tablet daily AT NIGHT    Cardiomyopathy, idiopathic (H)       levothyroxine 112 MCG tablet    SYNTHROID/LEVOTHROID    90 tablet    TAKE 1 TABLET DAILY    Hypothyroidism, unspecified type       Lutein 20 MG Caps      Take 20 mg by mouth daily        MELATONIN PO      Take 3 mg by mouth nightly as needed        prednisoLONE acetate 1 % ophthalmic susp    PRED FORTE    1 Bottle    Place 1 drop into the right eye every evening        simvastatin 20 MG tablet    ZOCOR    90 tablet    Take 1 tablet (20 mg) by mouth At Bedtime    Coronary artery disease involving native coronary artery of native heart without angina pectoris       spironolactone 25 MG tablet    ALDACTONE    90 tablet    Take 1 tablet (25 mg) by mouth daily    Chronic systolic heart failure (H)       torsemide 5 MG tablet    DEMADEX     Take 2.5mg (1/2 tablet) daily    Chronic systolic congestive heart failure (H)       traZODone 50 MG tablet    DESYREL    30 tablet    Take 0.5-1 tablets (25-50 mg) by mouth nightly as needed for sleep    Primary insomnia       VITAMIN D (CHOLECALCIFEROL) PO      Take 2,000 Units by mouth daily

## 2018-08-15 NOTE — PROGRESS NOTES
Received call from pt stating that her wt is stable but she continues to noticed some swelling in her feet and ankles and is wondering if compression stockings would help with this .      Per cart review, Anabel's OV note from 7/25/18 states:  Lymphedema with known severe abnormal ABIs and given this, Lymphedema Clinic does not endorse compression stockings or wrappings.  We will continue with elevation.     Reviewed with pt that compression stocking are not indicated due to abnormal ABIs and advised pt to monitor LE swelling and to call CORE clinic if swelling is more than her baseline.  She verbalized understanding and agrees with this plan.    CARMELLA Miller 2:13 PM 8/15/2018

## 2018-08-29 PROBLEM — M54.50 ACUTE RIGHT-SIDED LOW BACK PAIN WITHOUT SCIATICA: Status: ACTIVE | Noted: 2018-01-01

## 2018-08-29 NOTE — PROGRESS NOTES
SUBJECTIVE:   Mckayla Oconnell is a 89 year old female who presents to clinic today for the following health issues:    Would like ears looked at    Musculoskeletal problem/pain      Duration: 2 weeks ago    Description  Location: middle back R side    Intensity:  9/10    Accompanying signs and symptoms: none    History  Previous similar problem: no   Previous evaluation:  none    Precipitating or alleviating factors:  Trauma or overuse: YES- possibly happened trying to gett out of bed  Aggravating factors include: getting up from laying position aggravates pain    Therapies tried and outcome: heat and ice with relief. She has adjustable bed which she believes it might have caused pain when trying to get up      2.  Blood presure remains well controlled at home  Readings outside clinic are within normal limits.  Reviewed last 6 BP readings in chart:  BP Readings from Last 6 Encounters:   08/29/18 122/46   07/25/18 138/68   06/11/18 140/80   05/08/18 120/76   04/23/18 157/77   01/18/18 130/72     He has not experienced any significant side effects from medicaiotns for hypertension.       3.  History of hypothyroidism.  On replacement therapy.  She has not experienced any significant side effects of this medication.   The patient denies of fatigue, weight changes, heat/cold intolerance, hair changes, nail changes, bowel changes.     Latest labs reviewed:    Lab Results   Component Value Date    TSH 3.15 04/20/2018    TSH 7.30 01/18/2018    TSH 14.36 11/28/2016    TSH 3.27 08/09/2016    TSH 0.68 03/22/2016    TSH 1.21 03/20/2015    TSH 0.81 03/17/2014    TSH 1.21 03/07/2013    TSH 0.810 09/20/2012    TSH 0.178 07/13/2012    TSH 0.247 03/29/2012    TSH 0.026 10/12/2011    TSH 0.035 06/09/2011    TSH 0.675 04/12/2011         4.  Prior of coronary artery disease as listed in medical history.  No current or recent cardiovascaulr symptoms.   No shortness of breath, no episodes of chest pain/pressure, no changes in  their abiliy to perform physical exertion or tasks.  Takes the same amount of time to perform similar physical tasks (which is chronically slow for her).        Problem list and histories reviewed & adjusted, as indicated.  Additional history: as documented        Reviewed and updated as needed this visit by clinical staff  Tobacco  Allergies  Meds       Reviewed and updated as needed this visit by Provider           Past Medical History:  ---------------------------  Past Medical History:   Diagnosis Date     Aortic stenosis      CAD (coronary artery disease)     angiogram 2016: non-obstructive, CT calcium gchjv=202 May 2013     Cardiomyopathy, idiopathic (H)     cardiomyopathy HTN vs viral; EF as low as 15% in 2009; CRT-P implanted 1/19/17     CHF (NYHA class II, ACC/AHA stage C) (H) 2/14/2013     Glaucoma      H/O angiography 9-1-2016    No angiographic evidence of obstructive coronary artery disease.     Hypertension      Hypothyroidism 2/14/2013     Nonrheumatic mitral valve regurgitation      Nonrheumatic tricuspid valve regurgitation      Pulmonary hypertension        Past Surgical History:  ---------------------------  Past Surgical History:   Procedure Laterality Date     CARDIAC CATHERIZATION  9/1/16     GYN SURGERY      Hysterectomy 1995     HYSTERECTOMY, PAP NO LONGER INDICATED       IMPLANT PACEMAKER  1/19/17    CRT-P     ORTHOPEDIC SURGERY      knee replacement 1998     ORTHOPEDIC SURGERY      Knee replacement 2008     ORTHOPEDIC SURGERY      knee surgery [place]       Current Medications:  ---------------------------  Current Outpatient Prescriptions   Medication Sig Dispense Refill     aspirin 81 MG tablet Take 81 mg by mouth daily       calcium citrate (CALCITRATE) 950 MG tablet Take 2 tablets by mouth daily At Noon       carvedilol (COREG) 25 MG tablet TAKE 1 TABLET TWICE A DAY WITH MEALS 180 tablet 2     fluticasone (FLONASE) 50 MCG/ACT spray 1 to 2 sprays in both nostrils once daily as needed for  rhinitis or allergies 16 g 10     hydrALAZINE (APRESOLINE) 25 MG tablet Take 1 tablet (25 mg) by mouth 3 times daily 270 tablet 3     Iron TABS Take 324 mg by mouth daily Patient states she takes OTC iron furrous gluconate 324 mg tablets USP       isosorbide mononitrate (IMDUR) 30 MG 24 hr tablet 1/2 tablet daily AT NIGHT 45 tablet 3     levothyroxine (SYNTHROID/LEVOTHROID) 112 MCG tablet TAKE 1 TABLET DAILY 90 tablet 2     Lutein 20 MG CAPS Take 20 mg by mouth daily       MELATONIN PO Take 3 mg by mouth nightly as needed       prednisoLONE acetate (PRED FORTE) 1 % ophthalmic suspension Place 1 drop into the right eye every evening  1 Bottle      simvastatin (ZOCOR) 20 MG tablet Take 1 tablet (20 mg) by mouth At Bedtime 90 tablet 3     spironolactone (ALDACTONE) 25 MG tablet Take 1 tablet (25 mg) by mouth daily 90 tablet 3     torsemide (DEMADEX) 5 MG tablet Take 2.5mg (1/2 tablet) daily       traZODone (DESYREL) 50 MG tablet Take 0.5-1 tablets (25-50 mg) by mouth nightly as needed for sleep 30 tablet 0     VITAMIN D, CHOLECALCIFEROL, PO Take 2,000 Units by mouth daily          Allergies:  -------------  Allergies   Allergen Reactions     Atenolol Anaphylaxis     Hydrochlorothiazide      hyponatremia     Pollen Extract        Social History:  -------------------  Social History     Social History     Marital status:      Spouse name: N/A     Number of children: N/A     Years of education: N/A     Occupational History     Not on file.     Social History Main Topics     Smoking status: Never Smoker     Smokeless tobacco: Never Used     Alcohol use No     Drug use: No     Sexual activity: No     Other Topics Concern     Parent/Sibling W/ Cabg, Mi Or Angioplasty Before 65f 55m? No     Caffeine Concern No     Sleep Concern No     Stress Concern No     Weight Concern No     Special Diet Yes     low sodium     Exercise Yes     therapy     Seat Belt Yes     Social History Narrative       Family Medical  History:  ------------------------------  Family History   Problem Relation Age of Onset     Cancer Mother      Uterius     Breast Cancer Daughter          ROS:  REVIEW OF SYSTEMS:    RESP: negative for cough,wheezing, hemoptysis  CV: negative for chest pain, palpitations, PND, orthopnea  GI: negative for dysphagia, N/V, pain, melena, diarrhea and constipation  NEURO: negative for numbness/tingling, paralysis, incoordination, or focal weakness     OBJECTIVE:                                                    /46  Pulse 71  Temp 97.7  F (36.5  C) (Oral)  Resp 12  Wt 174 lb 4.8 oz (79.1 kg)  SpO2 93%  BMI 30.88 kg/m2     GENERAL alert and no distress  EYES:  Normal sclera,conjunctiva, EOMI  EARS:  Both canals clear with exefption of small amount of wax near opening, TMs appear normal.   HENT: oral and posterior pharynx without lesions or erythema  NECK: Neck supple. No LAD, without thyroidmegaly or JVD., Carotids without bruits.  RESP: Clear to ausculation bilaterally without wheezes or crackles. Normal BS in all fields.  CV: RRR normal S1S2 with 2/6 systolic murmur  LYMPH: no cervical lymph adenopathy appreciated  PSYCH: Alert and oriented times 3; speech- coherent  BACK:  No pain in midline spine. Pain to palpation of the lumbar muscles in the lower right back area. No pain in the scarum.           ASSESSMENT/PLAN:                                                      (M54.5) Acute right-sided low back pain without sciatica  (primary encounter diagnosis)  Comment: Discussed typical mechanical back pain, typical causes, and atypical back pain, including red flag symptoms.  Discussed conservative tratments inclduing physical therpy, stretching and strengthening, use of heat and/or ice, NSAIDs with food, antispasmodics where indicated, and the use of narcotic pain meds where indicated.    Plan: PAF COMPLETED, [facility], HAND, AND CHIROPRACTIC         REFERRAL            (I25.10) Coronary artery disease  involving native coronary artery of native heart without angina pectoris  Comment: This condition is currently controlled on the current medical regimen.  Continue current therapy.  Continue aggressive secondary risk factor modification and recommended continuing aggressive management of these items.      Plan: PAF COMPLETED            (I50.22) Chronic systolic heart failure (H)  Comment: This condition is currently controlled on the current medical regimen.  Continue current therapy.   Plan: PAF COMPLETED            (I10) Essential hypertension, benign  Comment: This condition is currently controlled on the current medical regimen.  Continue current therapy.   Discussed current hypertension treatment guidelines, including indications for treatment and treatment options.  Discussed the importance for aggressive management of HTN to prevent vascular complications later.  Recommended lower fat, lower carbohydrate, and lower sodium (<2000 mg)diet.  Discussed required intervals for follow up on HTN, lab studies.  Recommened pt. follow their blood pressures outside the clinic to ensure that BPs are remaining within guidelines, and to contact me if the readings are not within guidelines on a regular basis so we can adjust treatment as needed.   Plan: PAF COMPLETED            (I35.0) Nonrheumatic aortic valve stenosis  Comment: This condition is currently controlled on the current medical regimen.  Continue current therapy.   Plan: PAF COMPLETED            (H69.83) Dysfunction of both eustachian tubes  Comment: Discussed the pathophysiology of eustachian tube dysfunciton with the pt including a basic description of the anatomy.  Symptomatic therapy suggested: push fluids, use decongestant nasal spray up to 3 days, decongestant of choice or antihistamine-decongestant of choice as needed, saline nasal spray as needed, Robitussin DM prn.  RTC prn if symptoms persist or worsen. Call or return to clinic prn if these symptoms  worsen or fail to improve as anticipated.   Plan:     (E03.9) Hypothyroidism, unspecified type  Comment: Discussed signs and symptoms of hypo and hyperthyroidism.  Reviewed treatment options.   Recommended absolute medication compliance to avoid adding any additionial variance to the labs.   Plan: PAF COMPLETED               See Patient Instructions    DREA ROBLERO M.D., MD  [facility]

## 2018-08-29 NOTE — PATIENT INSTRUCTIONS
*  Acute mechanical low back pain    *  Physical therapy at [facility] the [facility].     *  Moist heat to the area (no electrical heating pads due to risk of burns)    *  Continue all medications at the same doses.  Contact your usual pharmacy if you need refills.       EUSTACHIAN TUBE DYSFUNCTION:     *  Build up of fluid in the middle ear space.  There is no evidence for infection.    *  No evidence for bacterial infection, No antibiotics indicated.      *  Take an allergy pill like Claritin or Zyrtec every day for the next few weeks.     *  Use the steroid nasal spray (Flonase/fluticasone) 2 sprays into each nostril once per day, every day for the next few weeks.     *  If there is any suggestion of allergies (sneezing, watery eyes, scratchy throat), then Claritin (or Claritin D) can be helpful.      *  Mucinex extended release (Guaifenisin) twice per day on a regular basis for the next few days, then twice per day as needed.  This helps make the secretions drain easier.     *  Affrin nasal spray as needed for severe nasal congestion (especially before the airplane trip), but limit the use to no more than 5-7 days out of fear of producing chronic nasal drainage.      *  If any changes such as fevers, worsening pain, or drainage, then call us and you may need to be re-evaluated.    *  If you fail to improve with conservative measures or if you have multiple recurrences, then we may need to send you to the ENT specialists to evaluate your middle ear.

## 2018-08-29 NOTE — MR AVS SNAPSHOT
After Visit Summary   8/29/2018    Mckayla Oconnell    MRN: [id number]           Patient Information     Date Of Birth          7/2/1927        Visit Information        Provider Department      8/29/2018 8:40 AM Ander Shrestha MD [facility]        Today's Diagnoses     Acute right-sided low back pain without sciatica    -  1    Coronary artery disease involving native coronary artery of native heart without angina pectoris        Chronic systolic heart failure (H)        Essential hypertension, benign        Nonrheumatic aortic valve stenosis        Dysfunction of both eustachian tubes        Hypothyroidism, unspecified type          Care Instructions    *  Acute mechanical low back pain    *  Physical therapy at [facility] the [facility].     *  Moist heat to the area (no electrical heating pads due to risk of burns)    *  Continue all medications at the same doses.  Contact your usual pharmacy if you need refills.       EUSTACHIAN TUBE DYSFUNCTION:     *  Build up of fluid in the middle ear space.  There is no evidence for infection.    *  No evidence for bacterial infection, No antibiotics indicated.      *  Take an allergy pill like Claritin or Zyrtec every day for the next few weeks.     *  Use the steroid nasal spray (Flonase/fluticasone) 2 sprays into each nostril once per day, every day for the next few weeks.     *  If there is any suggestion of allergies (sneezing, watery eyes, scratchy throat), then Claritin (or Claritin D) can be helpful.      *  Mucinex extended release (Guaifenisin) twice per day on a regular basis for the next few days, then twice per day as needed.  This helps make the secretions drain easier.     *  Affrin nasal spray as needed for severe nasal congestion (especially before the airplane trip), but limit the use to no more than 5-7 days out of fear of producing chronic nasal drainage.      *  If any  changes such as fevers, worsening pain, or drainage, then call us and you may need to be re-evaluated.    *  If you fail to improve with conservative measures or if you have multiple recurrences, then we may need to send you to the ENT specialists to evaluate your middle ear.                     Follow-ups after your visit        Additional Services     [facility] PT, HAND, AND CHIROPRACTIC REFERRAL       **This order will print in the [facility] Scheduling Office**    Physical Therapy, Hand Therapy and Chiropractic Care are available through:    *[facility]  *[facility]  *[facility]    Call one number to schedule at any of the above locations: [phone].    Your provider has referred you to: Physical Therapy at [facility] or [facility]    Indication/Reason for Referral: Low Back Pain  Onset of Illness: 3 days  Therapy Orders: Evaluate and Treat  Special Programs: None  Special Request: AS INDICATED    Joe Diaz      Additional Comments for the Therapist or Chiropractor:     Please be aware that coverage of these services is subject to the terms and limitations of your health insurance plan.  Call member services at your health plan with any benefit or coverage questions.      Please bring the following to your appointment:    *Your personal calendar for scheduling future appointments  *Comfortable clothing                  Your next 10 appointments already scheduled     Aug 29, 2018  9:10 AM CDT   (Arrive by 8:55 AM)   [facility] Spine with Divya Theodore PT   [facility] ([facility]  )    [address] [id number] [phone]            Oct 15, 2018  9:30 AM CDT   LAB with [facility]   [facility] [place] ([facility])    [address] [place] [id number] [phone]           Please do not eat 10-12 hours before your appointment if you are  coming in fasting for labs on lipids, cholesterol, or glucose (sugar). This does not apply to pregnant women. Water, hot tea and black coffee (with nothing added) are okay. Do not drink other fluids, diet soda or chew gum.            Oct 15, 2018 10:10 AM CDT   Core Return with Divya Kraft PA-C   [facility] ([facility])    [address]  [facility] [id number] [phone] OPT 2            Nov 08, 2018  3:45 PM CST   Remote PPM Check with [facility] TECH1   [facility] ([facility])    [address]  [facility] [id number] [phone] OPT 2           This appointment is for a remote check of your pacemaker.  This is not an appointment at the office.              Who to contact     If you have questions or need follow up information about today's clinic visit or your schedule please contact [facility] directly at [phone].  Normal or non-critical lab and imaging results will be communicated to you by Bearcht, letter or phone within 4 business days after the clinic has received the results. If you do not hear from us within 7 days, please contact the clinic through Bearcht or phone. If you have a critical or abnormal lab result, we will notify you by phone as soon as possible.  Submit refill requests through Vyyo or call your pharmacy and they will forward the refill request to us. Please allow 3 business days for your refill to be completed.          Additional Information About Your Visit        Vyyo Information     Vyyo gives you secure access to your electronic health record. If you see a primary care provider, you can also send messages to your care team and make appointments. If you have questions, please call your primary care clinic.  If you do not have a primary care provider, please call [phone] and they will assist you.        Care  EveryWhere ID     This is your Care EveryWhere ID. This could be used by other organizations to access your [facility] medical records  [id number]        Your Vitals Were     Pulse Temperature Respirations Pulse Oximetry BMI (Body Mass Index)       71 97.7  F (36.5  C) (Oral) 12 93% 30.88 kg/m2        Blood Pressure from Last 3 Encounters:   08/29/18 122/46   07/25/18 138/68   06/11/18 140/80    Weight from Last 3 Encounters:   08/29/18 174 lb 4.8 oz (79.1 kg)   07/25/18 177 lb 8 oz (80.5 kg)   06/11/18 176 lb 11.2 oz (80.2 kg)              We Performed the Following     [facility] PT, HAND, AND CHIROPRACTIC REFERRAL     PAF COMPLETED        Primary Care Provider Office Phone # Fax #    Ander Shrestha [phone]       [address] [place] [id number]        Equal Access to Services     SHELBY SHEPARD : Hadii aad ku hadasho Soomaali, waaxda luqadaha, qaybta kaalmada adeegyada, waxay idiin haysebn peewee santana . So [facility] [phone].    ATENCIÓN: Si habla español, tiene a villalba disposición servicios gratuitos de asistencia lingüística. Llame al [phone].    We comply with applicable federal civil rights laws and [place] laws. We do not discriminate on the basis of race, color, national origin, age, disability, sex, sexual orientation, or gender identity.            Thank you!     Thank you for choosing [facility]  for your care. Our goal is always to provide you with excellent care. Hearing back from our patients is one way we can continue to improve our services. Please take a few minutes to complete the written survey that you may receive in the mail after your visit with us. Thank you!             Your Updated Medication List - Protect others around you: Learn how to safely use, store and throw away your medicines at www.disposemymeds.org.          This list is accurate as of 8/29/18  9:01 AM.  Always use your most recent med list.                   Brand  Name Dispense Instructions for use Diagnosis    aspirin 81 MG tablet      Take 81 mg by mouth daily        calcium citrate 950 MG tablet    CALCITRATE     Take 2 tablets by mouth daily At Noon        carvedilol 25 MG tablet    COREG    180 tablet    TAKE 1 TABLET TWICE A DAY WITH MEALS    Cardiomyopathy, idiopathic (H), Chronic systolic heart failure (H), Essential hypertension, benign, Coronary artery disease involving native coronary artery of native heart without angina pectoris       fluticasone 50 MCG/ACT spray    FLONASE    16 g    1 to 2 sprays in both nostrils once daily as needed for rhinitis or allergies    Seasonal allergic rhinitis, unspecified chronicity, unspecified trigger       hydrALAZINE 25 MG tablet    APRESOLINE    270 tablet    Take 1 tablet (25 mg) by mouth 3 times daily    Acute on chronic congestive heart failure, unspecified congestive heart failure type (H)       Iron Tabs      Take 324 mg by mouth daily Patient states she takes OTC iron furrous gluconate 324 mg tablets USP        isosorbide mononitrate 30 MG 24 hr tablet    IMDUR    45 tablet    1/2 tablet daily AT NIGHT    Cardiomyopathy, idiopathic (H)       levothyroxine 112 MCG tablet    SYNTHROID/LEVOTHROID    90 tablet    TAKE 1 TABLET DAILY    Hypothyroidism, unspecified type       Lutein 20 MG Caps      Take 20 mg by mouth daily        MELATONIN PO      Take 3 mg by mouth nightly as needed        prednisoLONE acetate 1 % ophthalmic susp    PRED FORTE    1 Bottle    Place 1 drop into the right eye every evening        simvastatin 20 MG tablet    ZOCOR    90 tablet    Take 1 tablet (20 mg) by mouth At Bedtime    Coronary artery disease involving native coronary artery of native heart without angina pectoris       spironolactone 25 MG tablet    ALDACTONE    90 tablet    Take 1 tablet (25 mg) by mouth daily    Chronic systolic heart failure (H)       torsemide 5 MG tablet    DEMADEX     Take 2.5mg (1/2 tablet) daily    Chronic systolic  congestive heart failure (H)       traZODone 50 MG tablet    DESYREL    30 tablet    Take 0.5-1 tablets (25-50 mg) by mouth nightly as needed for sleep    Primary insomnia       VITAMIN D (CHOLECALCIFEROL) PO      Take 2,000 Units by mouth daily

## 2018-08-29 NOTE — PROGRESS NOTES
[place] [facility] Initial Evaluation -- Lumbar    Date: August 29, 2018  Mckayla Oconnell is a 89 year old female with a low R thoracic/upper lumbar condition.   Referral: IM  Employment/Workability(Lost work days):  na  Formal Exerciser (Y/N):  N  Leisure Mechanical Stresses: moving around the house, laundry, making meals, computer use  Functional disability score (AMIRA/STarT Back):  20%; 5/9--MEDIUM  Visual Analog Score (VAS 0-10): 9/10  Patient goals/expectations:  To get the pain to go away.    HISTORY:    Present symptoms: R low thoracic/upper lumbar pain  Pain quality (Sharp/shooting/stabbing/aching/burning/cramping):  Sharp, aching  Paresthesia (yes/no):  no    DFO < 22 days (Y/N):  Y--onset 08/15/2018.   Symptoms (improving/unchanging/worsening):  Worsening over the past 2 nights.   Symptoms commenced as a result of: had severe pain when she went to pivot and turn to get out of her adjustable     Symptoms at onset(back/thigh/leg): R low thoracic/upper lumbar pain  Constant symptoms(back/thigh/leg): none  Intermittent symptoms(back/thigh/leg): R low thoracic/upper lumbar pain    Symptoms are made worse with the following: Always Rising and Always changing positions, no effect still or moving, time of day no effect  Symptoms are made better with the following: Other - icy-hot--temporary    Disturbed sleep (yes/no):  no Sleeping postures (prone/sup/side R/L): supine, sides    Previous episodes (0/1-5/6-10/11+): 0--intermittent problems with LB for many years but never pain in this area Year of first episode: na    Previous history: none  Previous treatments: none      Specific Questions:  Cough/Sneeze/Strain (Pos/Neg): neg  Bowel/Bladder (Normal/Abnormal): normal  Gait (Normal/Abnormal): abnormal--using 4-wheeled walker with significantly flexed trunk, kyphotic with significant cervical protrusion, decreased taisha, decreased step-lengths.  Medications  "(Nil/NSAIDS/Analg/Steroids/Anticoag/Other):  Other - Thyroid and High blood pressure  Medical allergies:  None  General Health (Excellent/Good/Fair/Poor):  good  Pertinent medical history:  Heart problems, High blood pressure, Implanted device, Osteoarthritis and Thyroid problems  Imaging (NA/Xray/MRI):  none  Recent or major surgery (Yes/No):  no  Night pain (Yes/No): no  Accidents (Yes/No): no  Unexplained weight loss (Yes/No): no  Barriers at home: no  Other red flags: no    EXAMINATION    Posture:   Sitting(Good/Fair/Poor): poor  Standing(Good/Fair/Poor):poor  Lordosis (Red/Acc/Normal): red  Correction of posture(Better/Worse/NE): better    Lateral Shift (Right/Left/Nil): nil  Relevant (Yes/No):  na  Other Observations: stands in significant trunk flexion    Neurological:    Motor Deficit:  Not assessed  Reflexes:  Not assessed  Sensory Deficit:  Not assessed  Dural Signs:  Not assessed    Movement Loss:   Dexter Mod Min Nil Pain   Flexion   x  Increases R LB/lower thoracic pain   Extension x    \"feels good\"   Side Gliding R x    NE    Side Gliding L x    NE     Test Movements:   During: produces, abolishes, increases, decreases, no effect, centralizing, peripheralizing   After: better, worse, no better, no worse, no effect, centralized, peripheralized    Pre-test Symptoms Standing: R LB/thoracic pain 4/10   Symptoms During Symptoms After ROM increased ROM decreased No Effect   FIS        Rep FIS        EIS Decreases No Better      Rep EIS Decreases No Better   x   Pre-test Symptoms Lying: not assessed due to difficulty getting into lying position      Symptoms During Symptoms After ROM increased ROM decreased No Effect   FAWN        Rep FAWN        EIL        Rep EIL        If required Pre-test Symptoms:    Symptoms During Symptoms After ROM increased ROM decreased No Effect   SGIS - R        Rep SGIS - R        SGIS - L        Rep SGIS - L          Static Tests:  Sitting Slouched:    Sitting erect:    Standing " Slouched   Standing erect:    Lying prone in extension:   Long sitting:      Other Tests:     Provisional Classification:  Inconclusive/Other - Mechanically Inconclusive versus derangement    Principle of Management:  Education:  Posture--use of lumbar roll in sitting, avoidance of flexion  Equipment provided:  none  Mechanical therapy (Y/N):  y  Extension principle:  DASH w/hips against counter x10 reps, every 2 hours  Lateral Principle:    Flexion principle:    Other:      ASSESSMENT/PLAN    Patient is a 89 year old female with thoracic and lumbar complaints.  Provisional classification of other due to no lasting change with repeated movements.  Her general difficulty with mobility limited repeated motion testing.  She had decreased pain with repeated lumbar extension in standing but no better after.  She will try at home to assess further.  She demonstrates poor posture in standing and sitting, and pain was also decreased with posture correction using a lumbar roll in sitting.  Treatment will focus on lumbar extension exercises in addition to posture training to decrease pain and improve overall function and mobility.    Patient has the following significant findings with corresponding treatment plan.                Diagnosis 1:  R upper lumbar/lower thoracic pain  Pain -  self management, education, directional preference exercise and home program  Decreased ROM/flexibility - therapeutic exercise and home program  Decreased strength - therapeutic exercise, therapeutic activities and home program  Decreased function - therapeutic activities and home program  Impaired posture - neuro re-education and home program    Therapy Evaluation Codes:   1) History comprised of:   Personal factors that impact the plan of care:      None.    Comorbidity factors that impact the plan of care are:      None.     Medications impacting care: None.  2) Examination of Body Systems comprised of:   Body structures and functions that  impact the plan of care:      Lumbar spine and Thoracic Spine.   Activity limitations that impact the plan of care are:      rising from chair, changing positions.  3) Clinical presentation characteristics are:   Stable/Uncomplicated.  4) Decision-Making    Low complexity using standardized patient assessment instrument and/or measureable assessment of functional outcome.  Cumulative Therapy Evaluation is: Low complexity.    Previous and current functional limitations:  (See Goal Flow Sheet for this information)    Short term and Long term goals: (See Goal Flow Sheet for this information)     Communication ability:  Patient appears to be able to clearly communicate and understand verbal and written communication and follow directions correctly.  Treatment Explanation - The following has been discussed with the patient:   RX ordered/plan of care  Anticipated outcomes  Possible risks and side effects  This patient would benefit from PT intervention to resume normal activities.   Rehab potential is good.    Frequency:  1 X week, once daily  Duration:  for 4 weeks tapering to 2 X a month over 1 month  Discharge Plan:  Achieve all LTG.  Independent in home treatment program.  Reach maximal therapeutic benefit.    Please refer to the daily flowsheet for treatment today, total treatment time and time spent performing 1:1 timed codes.

## 2018-08-29 NOTE — MR AVS SNAPSHOT
After Visit Summary   8/29/2018    Mckayla Oconnell    MRN: [id number]           Patient Information     Date Of Birth          7/2/1927        Visit Information        Provider Department      8/29/2018 9:10 AM Divya Theodore PT [place] [facility]        Today's Diagnoses     Acute right-sided low back pain without sciatica    -  1       Follow-ups after your visit        Your next 10 appointments already scheduled     Sep 04, 2018 10:00 AM CDT   [facility] Spine with Divya Theodore PT   [place] [facility] ([facility]    [address] [place] [address]  [facility] [address]   [phone]            Oct 15, 2018  9:30 AM CDT   LAB with CUENCA LAB   [facility] ([facility])    [address] [place] [id number] [phone]           Please do not eat 10-12 hours before your appointment if you are coming in fasting for labs on lipids, cholesterol, or glucose (sugar). This does not apply to pregnant women. Water, hot tea and black coffee (with nothing added) are okay. Do not drink other fluids, diet soda or chew gum.            Oct 15, 2018 10:10 AM CDT   Core Return with Divya Kraft PA-C   [facility] ([facility])    [address] [place] [id number] [phone] OPT 2            Nov 08, 2018  3:45 PM CST   Remote PPM Check with CUENCA TECH1   [facility] ([facility])    [address] [place] [id number] [phone] OPT 2           This appointment is for a remote check of your pacemaker.  This is not an appointment at the office.              Who to contact     If you have questions or need follow up information about today's clinic visit or your schedule please contact [facility] directly at [phone].  Normal or non-critical lab and imaging results will be communicated to you by MyChart, letter or phone within 4 business days after the clinic has received the results. If you do not hear from us within 7 days, please contact the clinic through [facility] or phone. If you have a critical or abnormal lab result, we will notify you by phone as soon as possible.  Submit refill requests through [employer] or call your pharmacy and they will forward the refill request to us. Please allow 3 business days for your refill to be completed.          Additional Information About Your Visit        [facility] Information     [employer] gives you secure access to your electronic health record. If you see a primary care provider, you can also send messages to your care team and make appointments. If you have questions, please call your primary care clinic.  If you do not have a primary care provider, please call [phone] and they will assist you.        Care EveryWhere ID     This is your Care EveryWhere ID. This could be used by other organizations to access your [place] medical records  [id number]         Blood Pressure from Last 3 Encounters:   08/29/18 122/46   07/25/18 138/68   06/11/18 140/80    Weight from Last 3 Encounters:   08/29/18 79.1 kg (174 lb 4.8 oz)   07/25/18 80.5 kg (177 lb 8 oz)   06/11/18 80.2 kg (176 lb 11.2 oz)              We Performed the Following     VALENTINO Inital Eval Report     Neuromuscular Re-Education     PT Eval, Low Complexity (73671)     Therapeutic Exercises        Primary Care Provider Office Phone # Fax #    Ander Shrestha -[phone]       [address] [facility] [address]        Equal Access to Services     SHELBY SHEPARD : Hadii juan pablo Lainez, wadarrell miller, qaybta brooklyn herrera. So [facility] [phone].    ATENCIÓN: Si habla español, tiene a villalba disposición  servicios gratuitos de asistencia lingüística. Itz penn [phone].    We comply with applicable federal civil rights laws and [place] laws. We do not discriminate on the basis of race, color, national origin, age, disability, sex, sexual orientation, or gender identity.            Thank you!     Thank you for choosing [place] [facility]  for your care. Our goal is always to provide you with excellent care. Hearing back from our patients is one way we can continue to improve our services. Please take a few minutes to complete the written survey that you may receive in the mail after your visit with us. Thank you!             Your Updated Medication List - Protect others around you: Learn how to safely use, store and throw away your medicines at www.disposemymeds.org.          This list is accurate as of 8/29/18  3:25 PM.  Always use your most recent med list.                   Brand Name Dispense Instructions for use Diagnosis    aspirin 81 MG tablet      Take 81 mg by mouth daily        calcium citrate 950 MG tablet    CALCITRATE     Take 2 tablets by mouth daily At Noon        carvedilol 25 MG tablet    COREG    180 tablet    TAKE 1 TABLET TWICE A DAY WITH MEALS    Cardiomyopathy, idiopathic (H), Chronic systolic heart failure (H), Essential hypertension, benign, Coronary artery disease involving native coronary artery of native heart without angina pectoris       fluticasone 50 MCG/ACT spray    FLONASE    16 g    1 to 2 sprays in both nostrils once daily as needed for rhinitis or allergies    Seasonal allergic rhinitis, unspecified chronicity, unspecified trigger       hydrALAZINE 25 MG tablet    APRESOLINE    270 tablet    Take 1 tablet (25 mg) by mouth 3 times daily    Acute on chronic congestive heart failure, unspecified congestive heart failure type (H)       Iron Tabs      Take 324 mg by mouth daily Patient states she takes OTC iron furrous gluconate 324 mg  tablets USP        isosorbide mononitrate 30 MG 24 hr tablet    IMDUR    45 tablet    1/2 tablet daily AT NIGHT    Cardiomyopathy, idiopathic (H)       levothyroxine 112 MCG tablet    SYNTHROID/LEVOTHROID    90 tablet    TAKE 1 TABLET DAILY    Hypothyroidism, unspecified type       Lutein 20 MG Caps      Take 20 mg by mouth daily        MELATONIN PO      Take 3 mg by mouth nightly as needed        prednisoLONE acetate 1 % ophthalmic susp    PRED FORTE    1 Bottle    Place 1 drop into the right eye every evening        simvastatin 20 MG tablet    ZOCOR    90 tablet    Take 1 tablet (20 mg) by mouth At Bedtime    Coronary artery disease involving native coronary artery of native heart without angina pectoris       spironolactone 25 MG tablet    ALDACTONE    90 tablet    Take 1 tablet (25 mg) by mouth daily    Chronic systolic heart failure (H)       torsemide 5 MG tablet    DEMADEX     Take 2.5mg (1/2 tablet) daily    Chronic systolic congestive heart failure (H)       traZODone 50 MG tablet    DESYREL    30 tablet    Take 0.5-1 tablets (25-50 mg) by mouth nightly as needed for sleep    Primary insomnia       VITAMIN D (CHOLECALCIFEROL) PO      Take 2,000 Units by mouth daily

## 2018-09-10 NOTE — PROGRESS NOTES
Received VM from pt who states she was informed by O2 supplier, [employer], that they do not have a current order for her on file. Pt requested return call to review further.    Per chart review, Anabel and Dr Echols have not ordered supplemental O2 for pt.      Called and spoke with pt, reviewed that supplemental O2 has not been ordered by Anabel or Dr. Echols and advised to call her PMD clinic for O2 order. She verbalized understanding and agrees with this plan.    CARMELLA Miller 2:30 PM 9/10/2018

## 2018-09-11 NOTE — MR AVS SNAPSHOT
After Visit Summary   9/11/2018    Mckayla Oconnell    MRN: [id number]           Patient Information     Date Of Birth          7/2/1927        Visit Information        Provider Department      9/11/2018 10:00 AM Divya Theodore PT [place] [facility]        Today's Diagnoses     Acute right-sided low back pain without sciatica           Follow-ups after your visit        Your next 10 appointments already scheduled     Oct 15, 2018  9:30 AM CDT   LAB with [facility] LAB   [facility] ([facility])    [address] [place] [address]   [phone]           Please do not eat 10-12 hours before your appointment if you are coming in fasting for labs on lipids, cholesterol, or glucose (sugar). This does not apply to pregnant women. Water, hot tea and black coffee (with nothing added) are okay. Do not drink other fluids, diet soda or chew gum.            Oct 15, 2018 10:10 AM CDT   Core Return with Divya Kraft PA-C   [facility] ([facility])    [address] [place] [id number] [phone] OPT 2            Nov 08, 2018  3:45 PM CST   Remote PPM Check with BANG TECH1   [facility] ([facility])    [address] [place] [id number] [phone] OPT 2           This appointment is for a remote check of your pacemaker.  This is not an appointment at the office.              Who to contact     If you have questions or need follow up information about today's clinic visit or your schedule please contact [place] [facility] directly at [phone].  Normal or non-critical lab and imaging results will be communicated to you by MyChart, letter or phone within 4 business days after the clinic has received the results. If  you do not hear from us within 7 days, please contact the clinic through [employer] or phone. If you have a critical or abnormal lab result, we will notify you by phone as soon as possible.  Submit refill requests through [employer] or call your pharmacy and they will forward the refill request to us. Please allow 3 business days for your refill to be completed.          Additional Information About Your Visit        [employer] Information     [employer] gives you secure access to your electronic health record. If you see a primary care provider, you can also send messages to your care team and make appointments. If you have questions, please call your primary care clinic.  If you do not have a primary care provider, please call [phone] and they will assist you.        Care EveryWhere ID     This is your Care EveryWhere ID. This could be used by other organizations to access your Mobile medical records  [id number]         Blood Pressure from Last 3 Encounters:   08/29/18 122/46   07/25/18 138/68   06/11/18 140/80    Weight from Last 3 Encounters:   08/29/18 79.1 kg (174 lb 4.8 oz)   07/25/18 80.5 kg (177 lb 8 oz)   06/11/18 80.2 kg (176 lb 11.2 oz)              We Performed the Following     VALENTINO Progress Notes Report     Therapeutic Exercises        Primary Care Provider Office Phone # Fax #    Ander Shrestha [phone]       [address] [facility] [address]        Equal Access to Services     SHELBY SHEPARD AH: Hadii aad ku hadasho Soomaali, waaxda luqadaha, qaybta kaalmada adeegyada, brooklyn santana . So [facility] [phone].    ATENCIÓN: Si habla español, tiene a villalba disposición servicios gratuitos de asistencia lingüística. Itz al [phone].    We comply with applicable federal civil rights laws and [place] laws. We do not discriminate on the basis of race, color, national origin, age, disability, sex, sexual orientation, or gender identity.            Thank  you!     Thank you for choosing [facility]  for your care. Our goal is always to provide you with excellent care. Hearing back from our patients is one way we can continue to improve our services. Please take a few minutes to complete the written survey that you may receive in the mail after your visit with us. Thank you!             Your Updated Medication List - Protect others around you: Learn how to safely use, store and throw away your medicines at www.disposemymeds.org.          This list is accurate as of 9/11/18  2:13 PM.  Always use your most recent med list.                   Brand Name Dispense Instructions for use Diagnosis    aspirin 81 MG tablet      Take 81 mg by mouth daily        calcium citrate 950 MG tablet    CALCITRATE     Take 2 tablets by mouth daily At Noon        carvedilol 25 MG tablet    COREG    180 tablet    TAKE 1 TABLET TWICE A DAY WITH MEALS    Cardiomyopathy, idiopathic (H), Chronic systolic heart failure (H), Essential hypertension, benign, Coronary artery disease involving native coronary artery of native heart without angina pectoris       fluticasone 50 MCG/ACT spray    FLONASE    16 g    1 to 2 sprays in both nostrils once daily as needed for rhinitis or allergies    Seasonal allergic rhinitis, unspecified chronicity, unspecified trigger       hydrALAZINE 25 MG tablet    APRESOLINE    270 tablet    Take 1 tablet (25 mg) by mouth 3 times daily    Acute on chronic congestive heart failure, unspecified congestive heart failure type (H)       Iron Tabs      Take 324 mg by mouth daily Patient states she takes OTC iron furrous gluconate 324 mg tablets USP        isosorbide mononitrate 30 MG 24 hr tablet    IMDUR    45 tablet    1/2 tablet daily AT NIGHT    Cardiomyopathy, idiopathic (H)       levothyroxine 112 MCG tablet    SYNTHROID/LEVOTHROID    90 tablet    TAKE 1 TABLET DAILY    Hypothyroidism, unspecified type       Lutein 20 MG  Caps      Take 20 mg by mouth daily        MELATONIN PO      Take 3 mg by mouth nightly as needed        prednisoLONE acetate 1 % ophthalmic susp    PRED FORTE    1 Bottle    Place 1 drop into the right eye every evening        simvastatin 20 MG tablet    ZOCOR    90 tablet    Take 1 tablet (20 mg) by mouth At Bedtime    Coronary artery disease involving native coronary artery of native heart without angina pectoris       spironolactone 25 MG tablet    ALDACTONE    90 tablet    Take 1 tablet (25 mg) by mouth daily    Chronic systolic heart failure (H)       torsemide 5 MG tablet    DEMADEX     Take 2.5mg (1/2 tablet) daily    Chronic systolic congestive heart failure (H)       traZODone 50 MG tablet    DESYREL    30 tablet    Take 0.5-1 tablets (25-50 mg) by mouth nightly as needed for sleep    Primary insomnia       VITAMIN D (CHOLECALCIFEROL) PO      Take 2,000 Units by mouth daily

## 2018-09-11 NOTE — PROGRESS NOTES
"Subjective:  HPI  Oswestry Score: 0 %                 Objective:  System    Physical Exam    General     ROS    Assessment/Plan:    DISCHARGE REPORT    Progress reporting period is from 08/29/2018 to 09/11/2018.       SUBJECTIVE  Subjective: \"My back is feeling just fine.  The only time I have pain is if I sneeze, and I don't do that too often.\"  Patient reports no longer having pain with getting out of bed--has figured out how to do it without twisting.  She no longer has pain with sit to stand like she did previously.  She is pleased with her progress and reports her LB is feeling good.      Current Pain level: 0/10.     Initial Pain level: 7/10.   Changes in function:  Yes, no pain with sit to stand or getting out of bed now.    Adverse reaction to treatment or activity:  None.    OBJECTIVE  Objective: Lumbar AROM:  flexion 75%, NE; extension barely to neutral--significant limitation, NE.       ASSESSMENT/PLAN  Patient has been seen for 3 visits with treatment focusing on lumbar extension exercises in addition to posture training to address her LBP.  She continues to have significantly limited lumbar extension ROM, however, her pain has nearly abolished.  She has a good understanding of her HEP and should do well continuing with this independently to further manage her symptoms and improve her function.    Updated problem list and treatment plan: Diagnosis 1:  LBP  Decreased ROM/flexibility - home program  Decreased strength - home program  Impaired posture - home program  STG/LTGs have been met or progress has been made towards goals:  Yes (See Goal flow sheet completed today.)  Assessment of Progress: The patient's condition is improving.  Self Management Plans:  Patient has been instructed in a home treatment program.  Patient is independent in a home treatment program.  Patient  has been instructed in self management of symptoms.  Patient is independent in self management of symptoms.  Mckayla continues to " require the following intervention to meet STG and LTG's:  PT intervention is no longer required to meet STG/LTG.    Recommendations:  This patient is ready to be discharged from therapy and continue their home treatment program.    Please refer to the daily flowsheet for treatment today, total treatment time and time spent performing 1:1 timed codes.

## 2018-09-12 NOTE — TELEPHONE ENCOUNTER
Reason for Call: Request for an order or referral:oxygen ; oxygen condenser & cylinders    Order or referral being requested:     Date needed: as soon as possible    Has the patient been seen by the PCP for this problem? YES    Additional comments:     Phone number Patient can be reached at:  Home number on file [phone] (home)    Best Time:  asap    Can we leave a detailed message on this number?  YES    Call taken on 9/12/2018 at 11:16 AM by Karen Martinez

## 2018-09-12 NOTE — TELEPHONE ENCOUNTER
Please disregard message as this patient was calling regarding her .  Will document in that chart instead.

## 2018-10-09 NOTE — PROGRESS NOTES
Incoming call from patient w/questions about medications. She stated that she had heard of possible benefits of CBD oil for the treatment of arthritis. She has severe arthritic pain in her neck and she was wondering if CBD oil would be helpful without interfering w/any of her current medications. Patient has a f/u w/SMore on 10/15 - will forward to her for review. Patient will discuss it w/her at this appt.  Mary Martinez RN on 10/9/2018 at 9:34 AM

## 2018-10-13 NOTE — TELEPHONE ENCOUNTER
"Requested Prescriptions   Pending Prescriptions Disp Refills     traZODone (DESYREL) 50 MG tablet [Pharmacy Med Name: TraZODone HCl Oral Tablet 50 MG] 30 tablet 0    Last Written Prescription Date:  5/8/2018  Last Fill Quantity: 30,  # refills: 0   Last office visit: 8/29/2018 with prescribing provider:  8/29/2018   Future Office Visit:   Next 5 appointments (look out 90 days)     Nov 28, 2018  3:45 PM CST   Return Visit with Romeo Echols MD   [facility] ([facility])    [address] [place] [id number] [phone] OPT 2                  Sig: Take 1/2-1 tablet by mouth nightly as needed for sleep    Serotonin Modulators Passed    10/13/2018  7:01 AM       Passed - Recent (12 mo) or future (30 days) visit within the authorizing provider's specialty    Patient had office visit in the last 12 months or has a visit in the next 30 days with authorizing provider or within the authorizing provider's specialty.  See \"Patient Info\" tab in inbasket, or \"Choose Columns\" in Meds & Orders section of the refill encounter.           Passed - Patient is age 18 or older       Passed - No active pregnancy on record       Passed - No positive pregnancy test in past 12 months          "

## 2018-10-23 NOTE — PROGRESS NOTES
[facility]     Alert for: weight today(=172#) 1# above parameters  Heart Failure sx: none per patient report  Current daily diuretic: Torsemide 2.5mg daily   Next follow up: Future Appointments  Date Time Provider Department Center   11/6/2018 1:00 PM [facility] CLIN   11/6/2018 1:50 PM Divya Kraft PA-C [facility]   11/8/2018 3:45 PM [facility] TECH1 [facility]   11/28/2018 12:20 PM [facility]   11/28/2018 12:45 PM [id number]   11/28/2018 3:45 PM Romeo Echols MD [facility]     Patients weight today above parameter by 1#, she denied any sx, will continue to monitor and call patient if weight continues to climb or if she reports any sx.  Mary Martinez RN on 10/23/2018 at 10:35 AM

## 2018-11-01 NOTE — TELEPHONE ENCOUNTER
Patient informed of information below. Patient inquired if Dr. Shrestha would be able to fill out paperwork to renew her handicap parking permit as it has .  Ella Martel, DEVORA on 2018 at 4:37 PM

## 2018-11-01 NOTE — TELEPHONE ENCOUNTER
Patient called and wants to know if the CBD oil would help her severe arthritis in shoulders and neck. She is in considerable nerve pain.Can Dr. Shrestha prescribe this for her?  [phone], ok to lv detailed message  Pharmacy is [facility].

## 2018-11-02 NOTE — TELEPHONE ENCOUNTER
I can fill out the handicapped parking form for her.   She has to complete the top portion and sign it.     Is she still driving? or is this for a card to use when she goes out with others?  I have to answer a question as to whether or not she can safely operate a motor vehicle or not.    I do have my concerns about safe driving given her age and serious medical problems.      Does she want us to just mail it to her?

## 2018-11-05 NOTE — PROGRESS NOTES
[facility]     Alert for: wt 1# above parameter x2 days  Heart Failure sx: none reported  Current daily diuretic: torsemide 2.5mg daily, spironolactone 25mg daily     Next follow up: BMP and OV w/ Divya Kraft PAC tomorrow 11/6/18      As asymptomatic-will await review at OV tomorrow.        Thea Spence RN BSN   11:12 AM 11/05/18

## 2018-11-06 NOTE — PROGRESS NOTES
Pt presented for routine CHF follow up.    Complaining of 12/10 cervical neck pain with radiation to bilateral shoulders.  Has known arthritis in her neck, pain at baseline is 5/10.  Worsened overnight- couldn't sleep due to pain.  States its the worst pain of her life. She denies injury or fall- thinks this is just the change in weather.  Point tender over T1, but not exquisitely so.   Agreeable to ED for eval and pain control.  RNs bringing pt and  to ED.      Complete CHF note to follow.    Divya Kraft PA-C 11/6/2018 2:29 PM    [id number]    HPI and Plan:   See dictation    Orders this Visit:  Orders Placed This Encounter   Procedures     Basic metabolic panel     Basic metabolic panel     Follow-Up with [facility] - [facility] visit     No orders of the defined types were placed in this encounter.    There are no discontinued medications.      Encounter Diagnosis   Name Primary?     Cardiomyopathy, idiopathic (H)        CURRENT MEDICATIONS:  Current Outpatient Prescriptions   Medication Sig Dispense Refill     aspirin 81 MG tablet Take 81 mg by mouth daily       calcium citrate (CALCITRATE) 950 MG tablet Take 2 tablets by mouth daily At Noon       carvedilol (COREG) 25 MG tablet TAKE 1 TABLET TWICE A DAY WITH MEALS 180 tablet 2     fluticasone (FLONASE) 50 MCG/ACT spray 1 to 2 sprays in both nostrils once daily as needed for rhinitis or allergies 16 g 10     hydrALAZINE (APRESOLINE) 25 MG tablet Take 1 tablet (25 mg) by mouth 3 times daily 270 tablet 3     Iron TABS Take 324 mg by mouth daily Patient states she takes OTC iron furrous gluconate 324 mg tablets USP       isosorbide mononitrate (IMDUR) 30 MG 24 hr tablet 1/2 tablet daily AT NIGHT 45 tablet 3     levothyroxine (SYNTHROID/LEVOTHROID) 112 MCG tablet TAKE 1 TABLET DAILY 90 tablet 2     Lutein 20 MG CAPS Take 20 mg by mouth daily       MELATONIN PO Take 3 mg by mouth nightly as needed       prednisoLONE acetate (PRED FORTE) 1 % ophthalmic suspension Place 1  drop into the right eye every evening  1 Bottle      simvastatin (ZOCOR) 20 MG tablet Take 1 tablet (20 mg) by mouth At Bedtime 90 tablet 3     spironolactone (ALDACTONE) 25 MG tablet Take 1 tablet (25 mg) by mouth daily 90 tablet 3     torsemide (DEMADEX) 5 MG tablet Take 2.5mg (1/2 tablet) daily       traZODone (DESYREL) 50 MG tablet Take 1/2-1 tablet by mouth nightly as needed for sleep 30 tablet 0     VITAMIN D, CHOLECALCIFEROL, PO Take 2,000 Units by mouth daily          ALLERGIES     Allergies   Allergen Reactions     Atenolol Anaphylaxis     Hydrochlorothiazide      hyponatremia     Pollen Extract        PAST MEDICAL HISTORY:  Past Medical History:   Diagnosis Date     Aortic stenosis      CAD (coronary artery disease)     angiogram 2016: non-obstructive, CT calcium szrii=575 May 2013     Cardiomyopathy, idiopathic (H)     cardiomyopathy HTN vs viral; EF as low as 15% in 2009; CRT-P implanted 1/19/17     CHF (NYHA class II, ACC/AHA stage C) (H) 2/14/2013     Glaucoma      H/O angiography 9-1-2016    No angiographic evidence of obstructive coronary artery disease.     Hypertension      Hypothyroidism 2/14/2013     Nonrheumatic mitral valve regurgitation      Nonrheumatic tricuspid valve regurgitation      Pulmonary hypertension (H)        PAST SURGICAL HISTORY:  Past Surgical History:   Procedure Laterality Date     CARDIAC CATHERIZATION  9/1/16     GYN SURGERY      Hysterectomy 1995     HYSTERECTOMY, PAP NO LONGER INDICATED       IMPLANT PACEMAKER  1/19/17    CRT-P     ORTHOPEDIC SURGERY      knee replacement 1998     ORTHOPEDIC SURGERY      Knee replacement 2008     ORTHOPEDIC SURGERY      knee surgery [place]       FAMILY HISTORY:  Family History   Problem Relation Age of Onset     Cancer Mother      Uterius     Breast Cancer Daughter        SOCIAL HISTORY:  Social History     Social History     Marital status:      Spouse name: N/A     Number of children: N/A     Years of education: N/A     Social  "History Main Topics     Smoking status: Never Smoker     Smokeless tobacco: Never Used     Alcohol use No     Drug use: No     Sexual activity: No     Other Topics Concern     Parent/Sibling W/ Cabg, Mi Or Angioplasty Before 65f 55m? No     Caffeine Concern No     Sleep Concern No     Stress Concern No     Weight Concern No     Special Diet Yes     low sodium     Exercise Yes     therapy     Seat Belt Yes     Social History Narrative       Review of Systems:  Skin:  Negative     Eyes:  Positive for glasses  ENT:  Negative    Respiratory:  Positive for    Cardiovascular:  Negative    Gastroenterology: Negative    Genitourinary:  Negative    Musculoskeletal:  Positive for arthritis  Neurologic:  Positive for numbness or tingling of hands  Psychiatric:  Negative    Heme/Lymph/Imm:  Negative    Endocrine:  Positive for thyroid disorder    Physical Exam:  Vitals: /72  Pulse 70  Ht 1.6 m (5' 3\")  Wt 82 kg (180 lb 11.2 oz)  SpO2 96%  BMI 32.01 kg/m2   Please refer to dictation for physical exam    Recent Lab Results:  LIPID RESULTS:  Lab Results   Component Value Date    CHOL 109 04/20/2018    HDL 36 (L) 04/20/2018    LDL 55 04/20/2018    TRIG 90 04/20/2018    CHOLHDLRATIO 3.5 07/06/2015       LIVER ENZYME RESULTS:  Lab Results   Component Value Date    AST 50 (H) 11/14/2016    ALT <5 (L) 04/20/2018       CBC RESULTS:  Lab Results   Component Value Date    WBC 4.8 04/17/2017    RBC 2.98 (L) 04/17/2017    HGB 9.3 (L) 08/28/2017    HCT 28.7 (L) 04/17/2017    MCV 96 04/17/2017    MCH 30.9 04/17/2017    MCHC 32.1 04/17/2017    RDW 15.3 (H) 04/17/2017     (L) 04/17/2017       BMP RESULTS:  Lab Results   Component Value Date     (L) 11/06/2018    POTASSIUM 5.8 (H) 11/06/2018    CHLORIDE 102 11/06/2018    CO2 29 11/06/2018    ANIONGAP 8.8 11/06/2018    GLC 79 11/06/2018    BUN 35 (H) 11/06/2018    CR 1.50 (H) 11/06/2018    GFRESTIMATED 33 (L) 11/06/2018    GFRESTBLACK 39 (L) 11/06/2018    SHOBHA 10.0 " 11/06/2018        A1C RESULTS:  No results found for: A1C    INR RESULTS:  Lab Results   Component Value Date    INR 1.12 09/01/2016           CC  Divya Kraft PA-C  [id number]  [place] [id number]

## 2018-11-06 NOTE — DISCHARGE INSTRUCTIONS
Heat and/or ice, contact your doctor tomorrow to get you scheduled and referred to physical therapy.  Tylenol and/or tramadol which you have at home as needed for the pain.        General Neck and Back Pain    Both neck and back pain are usually caused by injury to the muscles or ligaments of the spine. Sometimes the disks that separate each bone of the spine may cause pain by pressing on a nearby nerve. Back and neck pain may appear after a sudden twisting or bending force (such as in a car accident), or sometimes after a simple awkward movement. In either case, muscle spasm is often present and adds to the pain.  Acute neck and back pain usually gets better in 1 to 2 weeks. Pain related to disk disease, arthritis in the spinal joints or spinal stenosis (narrowing of the spinal canal) can become chronic and last for months or years.  Back and neck pain are common problems. Most people feel better in 1 or 2 weeks, and most of the rest in 1 to 2 months. Most people can remain active.  People have and describe pain differently.    Pain can be sharp, stabbing, shooting, aching, cramping, or burning    Movement, standing, bending, lifting, sitting, or walking may worsen the pain    Pain can be localized to one spot or area, or it can be more generalized    Pain can spread or radiate upwards, downwards, to the front, or go down your arms    Muscle spasm may occur.  Most of the time mechanical problems with the muscles or spine cause the pain. it is usually caused by an injury, whether known or not, to the muscles or ligaments. While illnesses can cause back pain, it is usually not caused by a serious illness. Pain is usually related to physical activity, whether sports, exercise, work, or normal activity. Sometimes it can occur without an identifiable cause. This can happen simply by stretching or moving wrong, without noting pain at the time. Other causes include:    Overexertion, lifting, pushing, pulling incorrectly  or too aggressively.    Sudden twisting, bending or stretching from an accident (car or fall), or accidental movement.    Poor posture    Poor conditioning, lack of regular exercise    Spinal disc disease or arthritis    Stress    Pregnancy, or illness like appendicitis, bladder or kidney infection, pelvic infections   Home care    For neck pain: Use a comfortable pillow that supports the head and keeps the spine in a neutral position. The position of the head should not be tilted forward or backward.    When in bed, try to find a position of comfort. A firm mattress is best. Try lying flat on your back with pillows under your knees. You can also try lying on your side with your knees bent up towards your chest and a pillow between your knees.    At first, do not try to stretch out the sore spots. If there is a strain, it is not like the good soreness you get after exercising without an injury. In this case, stretching may make it worse.    Don't sit for long periods, as in long car rides or other travel. This puts more stress on the lower back than standing or walking.    During the first 24 to 72 hours after an injury, apply an ice pack to the painful area for 20 minutes and then remove it for 20 minutes over a period of 60 to 90 minutes or several times a day.     You can alternate ice and heat therapies. Talk with your healthcare provider about the best treatment for your back or neck pain. As a safety precaution, do not use a heating pad at bedtime. Sleeping with a heating pad can lead to skin burns or tissue damage.    Therapeutic massage can help relax the back and neck muscles without stretching them.    Be aware of safe lifting methods and do not lift anything over 15 pounds until all the pain is gone.  Medicines  Talk to your healthcare provider before using medicine, especially if you have other medical problems or are taking other medicines.    You may use over-the-counter medicine to control pain,  unless another pain medicine was prescribed. If you have chronic conditions like diabetes, liver or kidney disease, stomach ulcers,  gastrointestinal bleeding, or are taking blood thinner medicines.    Be careful if you are given pain medicines, narcotics, or medicine for muscle spasm. They can cause drowsiness, and can affect your coordination, reflexes, and judgment. Do not drive or operate heavy machinery.  Follow-up care  Follow up with your healthcare provider, or as advised. Physical therapy or further tests may be needed.  If X-rays were taken, you will be notified of any new findings that may affect your care.  Call 911  Call 911 if any of the following occur:    Trouble breathing    Confusion    Very drowsy or trouble awakening    Fainting or loss of consciousness    Rapid or very slow heart rate    Loss of bowel or bladder control  When to seek medical advice  Call your healthcare provider right away if any of these occur:    Pain becomes worse or spreads into your arms or legs    Weakness, numbness or pain in one or both arms or legs    Numbness in the groin area    Difficulty walking    Fever of 100.4 F (38 C) or higher, or as directed by your healthcare provider  Date Last Reviewed: 7/1/2016 2000-2018 [employer]. [address] [place] [address], [place] [address]. All rights reserved. This information is not intended as a substitute for professional medical care. Always follow your healthcare professional's instructions.

## 2018-11-06 NOTE — LETTER
2018      Ander Shrestha MD  [address] [facility] [address]      RE: Mckayla Oconnell       Dear Colleague,    I had the pleasure of seeing Mckayla Oconnell in the [facility].    Service Date: 2018      PRIMARY CARDIOLOGIST:  Dr. Echols.      REASON FOR VISIT:  Heart failure followup.      HISTORY OF PRESENT ILLNESS:  Ms. Oconnell is a delightful 89-year-old woman with a past medical history significant for the followin.  Nonischemic cardiomyopathy with EF in between 10% and 45%, last documented in 2018 at 30%-35%.   2.  Moderate aortic stenosis, moderate tricuspid regurgitation, moderate mitral regurgitation.   3.  CKD with baseline creatinine 1.5-2.   4.  Hyperlipidemia.   5.  Biventricular pacemaker since 2017.   6.  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   7.  Peripheral arterial disease with symptoms of claudication.   8.  Lymphedema.   9.  Elevated pulmonary pressures.      I routinely see her for heart failure, and she comes in today for routine followup.  I had met her in 2016 when she was admitted with profound fatigue, weight gain and shortness of breath and was diuresed down to about 179 pounds with a Bumex drip.  She has been hanging right around 170 pounds since then.  Unfortunately, today she is not feeling well and it really does not have much to do with her heart failure.  She says she has severe neck pain today.  This is in her cervical neck going up to the base of her skull and into both shoulders.  She says this is a 12 out of 10 pain, and it is the worst pain she has felt in her life.  It started last night, and she was unable to sleep from that.  She was lying flat in bed and breathing okay, but then had to sit up in the chair in order to be anywhere near comfortable.  She has taken some Tylenol without any improvement.  She has not had a fall.  She did not have any injury.  She says she has known arthritis  in her neck, and she thinks this is just because the weather is changing.      From a heart failure standpoint, she denies orthopnea or PND.  She is not eating well because she is worried her weights are going to go up.  They are stable between 171-172 pounds at home.  She denies renita chest pain or shortness of breath.  She has not been taking any Advil or Aleve.  She has been trying to watch her high-potassium foods.      SOCIAL HISTORY:  She lives in her own home with Jaime.  She has a daughter, Sandra.  She is  from her first marriage.  Lifelong nonsmoker.  No alcohol use.  Has been severely resistant to placement.      PHYSICAL EXAMINATION:   GENERAL:  Elderly woman who appears uncomfortable today and not her normal self.  She is poorly groomed.  She is tender over likely spinous process of T1 but not over her cervical vertebrae.  Not tender over her musculature on her upper back.    CARDIAC:  Her heart is regular with a 4/6 systolic murmur heard best at the right sternal border to late peaking.  It seems louder than previous.  This is heard throughout her precordium.    LUNGS:  Her lungs are diminished bilaterally.  She is kyphotic.  There are no wheezes, rales or rhonchi.     EXTREMITIES:  She has 2+ edema to mid shin.   SKIN:  Warm and dry.     This is her baseline.      Her creatinine is 1.5, BUN 35, potassium 5.8, sodium 134.      ASSESSMENT AND PLAN:    1.  Acute on chronic neck pain in this elderly, frail 89-year-old.  This has happened just in the last 12-14 hours.  She insists it is arthritis, but she also describes this as the worst pain in her life.  I am concerned she may have a compression fracture that would be spontaneous causing this pain.  She is agreeable to go to the ER, both for pain control and for further evaluation.  My nurses will bring her there in a wheelchair with her .   2.  Chronic systolic heart failure with EF of 30%-35% with biventricular pacemaker in place, has been  maintained on low-dose diuretics of torsemide at 2.5 mg daily.  Given her she appears slightly hypervolemic on exam and is hyperkalemic, we are going to increase this to 10 mg daily with repeat labs in 3 days.  She will stop her spironolactone for now.  She will continue on hydralazine 25 mg t.i.d., carvedilol 25 mg daily and Imdur 15 mg each day at bedtime.    3.  Biventricular pacemaker in place.  Last device check was August, 99% BiV paced.   4.  Lymphedema with known severe abnormal ABIs with [facility] requesting that she does not get compression stockings or wrappings due to PAD.   5.  Hypertension, well controlled in spite of pain.  Will follow.   6.  Chronic anemia, stable at 9.7.      Unfortunately, Mckayla is doing poorly today.  I do think they likely need placement, but she has told me in the past the one thing that is most important to her in her entire life is staying at home.  Perhaps we can get them to bring nursing services in or something else.  When I have time, I will reach out to her daughter, Sandra, to discuss further.      Thank you for allowing me to participate in her care.  I will see her back with repeat labs in 3 days and clinic followup in a month.      JOI King PA-C             D: 2018   T: 2018   MT: LENI      Name:     MCKAYLA CHURCH   MRN:      [id number]        Account:      [id number]   :      1927           Service Date: 2018      Document: [id number]         Outpatient Encounter Prescriptions as of 2018   Medication Sig Dispense Refill     aspirin 81 MG tablet Take 81 mg by mouth daily       calcium citrate (CALCITRATE) 950 MG tablet Take 2 tablets by mouth daily At Noon       carvedilol (COREG) 25 MG tablet TAKE 1 TABLET TWICE A DAY WITH MEALS 180 tablet 2     fluticasone (FLONASE) 50 MCG/ACT spray 1 to 2 sprays in both nostrils once daily as needed for rhinitis or allergies 16 g 10     hydrALAZINE  (APRESOLINE) 25 MG tablet Take 1 tablet (25 mg) by mouth 3 times daily 270 tablet 3     Iron TABS Take 324 mg by mouth daily Patient states she takes OTC iron furrous gluconate 324 mg tablets USP       isosorbide mononitrate (IMDUR) 30 MG 24 hr tablet 1/2 tablet daily AT NIGHT 45 tablet 3     levothyroxine (SYNTHROID/LEVOTHROID) 112 MCG tablet TAKE 1 TABLET DAILY 90 tablet 2     Lutein 20 MG CAPS Take 20 mg by mouth daily       MELATONIN PO Take 3 mg by mouth nightly as needed       prednisoLONE acetate (PRED FORTE) 1 % ophthalmic suspension Place 1 drop into the right eye every evening  1 Bottle      simvastatin (ZOCOR) 20 MG tablet Take 1 tablet (20 mg) by mouth At Bedtime 90 tablet 3     traZODone (DESYREL) 50 MG tablet Take 1/2-1 tablet by mouth nightly as needed for sleep 30 tablet 0     VITAMIN D, CHOLECALCIFEROL, PO Take 2,000 Units by mouth daily        [DISCONTINUED] spironolactone (ALDACTONE) 25 MG tablet Take 1 tablet (25 mg) by mouth daily 90 tablet 3     [DISCONTINUED] torsemide (DEMADEX) 5 MG tablet Take 2.5mg (1/2 tablet) daily       No facility-administered encounter medications on file as of 11/6/2018.        Again, thank you for allowing me to participate in the care of your patient.      Sincerely,    Divya Kraft PA-C     [facility]

## 2018-11-06 NOTE — LETTER
11/6/2018    Ander Shrestha MD  [address] [facility] [address]    RE: Mckayla Oconnell       Dear Colleague,    I had the pleasure of seeing Mckayla Kiki Oconnell in the [facility].    Pt presented for routine CHF follow up.    Complaining of 12/10 cervical neck pain with radiation to bilateral shoulders.  Has known arthritis in her neck, pain at baseline is 5/10.  Worsened overnight- couldn't sleep due to pain.  States its the worst pain of her life. She denies injury or fall- thinks this is just the change in weather.  Point tender over T1, but not exquisitely so.   Agreeable to ED for eval and pain control.  RNs bringing pt and  to ED.      Complete CHF note to follow.    Divya Kraft PA-C 11/6/2018 2:29 PM    [id number]    HPI and Plan:   See dictation    Orders this Visit:  Orders Placed This Encounter   Procedures     Basic metabolic panel     Basic metabolic panel     Follow-Up with [facility] - TANYA visit     No orders of the defined types were placed in this encounter.    There are no discontinued medications.      Encounter Diagnosis   Name Primary?     Cardiomyopathy, idiopathic (H)        CURRENT MEDICATIONS:  Current Outpatient Prescriptions   Medication Sig Dispense Refill     aspirin 81 MG tablet Take 81 mg by mouth daily       calcium citrate (CALCITRATE) 950 MG tablet Take 2 tablets by mouth daily At Noon       carvedilol (COREG) 25 MG tablet TAKE 1 TABLET TWICE A DAY WITH MEALS 180 tablet 2     fluticasone (FLONASE) 50 MCG/ACT spray 1 to 2 sprays in both nostrils once daily as needed for rhinitis or allergies 16 g 10     hydrALAZINE (APRESOLINE) 25 MG tablet Take 1 tablet (25 mg) by mouth 3 times daily 270 tablet 3     Iron TABS Take 324 mg by mouth daily Patient states she takes OTC iron furrous gluconate 324 mg tablets USP       isosorbide mononitrate (IMDUR) 30 MG 24 hr tablet 1/2 tablet daily AT NIGHT 45 tablet 3     levothyroxine  (SYNTHROID/LEVOTHROID) 112 MCG tablet TAKE 1 TABLET DAILY 90 tablet 2     Lutein 20 MG CAPS Take 20 mg by mouth daily       MELATONIN PO Take 3 mg by mouth nightly as needed       prednisoLONE acetate (PRED FORTE) 1 % ophthalmic suspension Place 1 drop into the right eye every evening  1 Bottle      simvastatin (ZOCOR) 20 MG tablet Take 1 tablet (20 mg) by mouth At Bedtime 90 tablet 3     spironolactone (ALDACTONE) 25 MG tablet Take 1 tablet (25 mg) by mouth daily 90 tablet 3     torsemide (DEMADEX) 5 MG tablet Take 2.5mg (1/2 tablet) daily       traZODone (DESYREL) 50 MG tablet Take 1/2-1 tablet by mouth nightly as needed for sleep 30 tablet 0     VITAMIN D, CHOLECALCIFEROL, PO Take 2,000 Units by mouth daily          ALLERGIES     Allergies   Allergen Reactions     Atenolol Anaphylaxis     Hydrochlorothiazide      hyponatremia     Pollen Extract        PAST MEDICAL HISTORY:  Past Medical History:   Diagnosis Date     Aortic stenosis      CAD (coronary artery disease)     angiogram 2016: non-obstructive, CT calcium eolwr=740 May 2013     Cardiomyopathy, idiopathic (H)     cardiomyopathy HTN vs viral; EF as low as 15% in 2009; CRT-P implanted 1/19/17     CHF (NYHA class II, ACC/AHA stage C) (H) 2/14/2013     Glaucoma      H/O angiography 9-1-2016    No angiographic evidence of obstructive coronary artery disease.     Hypertension      Hypothyroidism 2/14/2013     Nonrheumatic mitral valve regurgitation      Nonrheumatic tricuspid valve regurgitation      Pulmonary hypertension (H)        PAST SURGICAL HISTORY:  Past Surgical History:   Procedure Laterality Date     CARDIAC CATHERIZATION  9/1/16     GYN SURGERY      Hysterectomy 1995     HYSTERECTOMY, PAP NO LONGER INDICATED       IMPLANT PACEMAKER  1/19/17    CRT-P     ORTHOPEDIC SURGERY      knee replacement 1998     ORTHOPEDIC SURGERY      Knee replacement 2008     ORTHOPEDIC SURGERY      knee surgery [place]       FAMILY HISTORY:  Family History   Problem Relation  "Age of Onset     Cancer Mother      Uterius     Breast Cancer Daughter        SOCIAL HISTORY:  Social History     Social History     Marital status:      Spouse name: N/A     Number of children: N/A     Years of education: N/A     Social History Main Topics     Smoking status: Never Smoker     Smokeless tobacco: Never Used     Alcohol use No     Drug use: No     Sexual activity: No     Other Topics Concern     Parent/Sibling W/ Cabg, Mi Or Angioplasty Before 65f 55m? No     Caffeine Concern No     Sleep Concern No     Stress Concern No     Weight Concern No     Special Diet Yes     low sodium     Exercise Yes     therapy     Seat Belt Yes     Social History Narrative       Review of Systems:  Skin:  Negative     Eyes:  Positive for glasses  ENT:  Negative    Respiratory:  Positive for    Cardiovascular:  Negative    Gastroenterology: Negative    Genitourinary:  Negative    Musculoskeletal:  Positive for arthritis  Neurologic:  Positive for numbness or tingling of hands  Psychiatric:  Negative    Heme/Lymph/Imm:  Negative    Endocrine:  Positive for thyroid disorder    Physical Exam:  Vitals: /72  Pulse 70  Ht 1.6 m (5' 3\")  Wt 82 kg (180 lb 11.2 oz)  SpO2 96%  BMI 32.01 kg/m2   Please refer to dictation for physical exam    Recent Lab Results:  LIPID RESULTS:  Lab Results   Component Value Date    CHOL 109 04/20/2018    HDL 36 (L) 04/20/2018    LDL 55 04/20/2018    TRIG 90 04/20/2018    CHOLHDLRATIO 3.5 07/06/2015       LIVER ENZYME RESULTS:  Lab Results   Component Value Date    AST 50 (H) 11/14/2016    ALT <5 (L) 04/20/2018       CBC RESULTS:  Lab Results   Component Value Date    WBC 4.8 04/17/2017    RBC 2.98 (L) 04/17/2017    HGB 9.3 (L) 08/28/2017    HCT 28.7 (L) 04/17/2017    MCV 96 04/17/2017    MCH 30.9 04/17/2017    MCHC 32.1 04/17/2017    RDW 15.3 (H) 04/17/2017     (L) 04/17/2017       BMP RESULTS:  Lab Results   Component Value Date     (L) 11/06/2018    POTASSIUM 5.8 (H) " 11/06/2018    CHLORIDE 102 11/06/2018    CO2 29 11/06/2018    ANIONGAP 8.8 11/06/2018    GLC 79 11/06/2018    BUN 35 (H) 11/06/2018    CR 1.50 (H) 11/06/2018    GFRESTIMATED 33 (L) 11/06/2018    GFRESTBLACK 39 (L) 11/06/2018    SHOBHA 10.0 11/06/2018        A1C RESULTS:  No results found for: A1C    INR RESULTS:  Lab Results   Component Value Date    INR 1.12 09/01/2016           CC  Divya Kraft PA-C  [address]  [place] [address]        Thank you for allowing me to participate in the care of your patient.      Sincerely,     Divya Kraft PA-C     [facility]    cc:   Divya Kraft PA-C  [address]  [place] [address]

## 2018-11-06 NOTE — MR AVS SNAPSHOT
After Visit Summary   11/6/2018    Mckayla Oconnell    MRN: [id number]           Patient Information     Date Of Birth          7/2/1927        Visit Information        Provider Department      11/6/2018 1:50 PM More, Divya Mejia PA-C [facility]   [facility]        Today's Diagnoses     Cardiomyopathy, idiopathic (H)          Care Instructions    Call CORE nurse for any questions or concerns:  [phone]   *If you have concerns after hours, please call [phone], option 2 to speak with on call Cardiologist.    1. Medication changes from today:  For now stop taking spironolactone.    Increase torsemide to 10 mg once a day.    Continue other medications.    Repeat labs in 2 days.       2. Weigh yourself daily and write it down.     3. Call CORE nurse if your weight is up more than 2 pounds in one day or 5 pounds in one week.     4. Call CORE nurse if you feel more short of breath, have more abdominal bloating, or leg swelling.     5. Continue low sodium diet (less than 2000 mg daily). If you eat less salt, you will retain less fluid.     6. Alcohol can weaken your heart further. You should avoid alcohol or limit its use to special times, such as a holiday or birthday.      7. Do NOT take Aleve or ibuprofen without talking to your doctor first.      8. Lab Results: **     Component      Latest Ref Rng & Units 8/8/2018 11/6/2018   Sodium      136 - 145 mmol/L 135 (L) 134 (L)   Potassium      3.5 - 5.1 mmol/L 4.9 5.8 (H)   Chloride      98 - 107 mmol/L 104 102   Carbon Dioxide      23 - 29 mmol/L 25 29   Anion Gap      6 - 17 mmol/L 10.9 8.8   Glucose      70 - 105 mg/dL 96 79   Urea Nitrogen      7 - 30 mg/dL 27 35 (H)   Creatinine      0.70 - 1.30 mg/dL 1.39 (H) 1.50 (H)   GFR Estimate      >60 mL/min/1.7m2 36 (L) 33 (L)   GFR Estimate If Black      >60 mL/min/1.7m2 43 (L) 39 (L)   Calcium      8.5 - 10.5 mg/dL 9.0 10.0     [facility]: Cardiomyopathy,  Optimization, Rehabilitation, Education  The [facility] is a heart failure specialty clinic within the [facility] where you will work with specialized nurse practitioners, physician assistants, doctors, and registered nurses. They are dedicated to helping patients with heart failure to carefully adjust medications, receive education, and learn who and when to call if symptoms develop. They specialize in helping you better understand your condition, slow the progression of your disease, improve the length and quality of your life, help you detect future heart problems before they become life threatening, and avoid hospitalizations.              Follow-ups after your visit        Additional Services     Follow-Up with [facility] - TANYA visit                 Your next 10 appointments already scheduled     Nov 08, 2018  3:45 PM CST   Remote PPM Check with CUENCA TECH1   [facility] ([facility])    [address] [id number]  [facility] [address] [phone] OPT 2           This appointment is for a remote check of your pacemaker.  This is not an appointment at the office.            Nov 28, 2018 12:20 PM CST   LAB with CUENCA LAB   [facility] [place] ([facility])    [address] [place], [place] [address]  [facility] [address] [phone]           Please do not eat 10-12 hours before your appointment if you are coming in fasting for labs on lipids, cholesterol, or glucose (sugar). This does not apply to pregnant women. Water, hot tea and black coffee (with nothing added) are okay. Do not drink other fluids, diet soda or chew gum.            Nov 28, 2018 12:45 PM CST   Ech Complete with [id number]   [facility] CV Echocardiography ([facility])    [address] [place] [address]  [address]  [facility] [address] [phone]           1.  Please bring or wear a comfortable two-piece outfit.  2.  You may eat, drink and take your normal medicines. 3.  For any questions that cannot be answered, please contact the ordering physician 4.  Please do not wear perfumes or scented lotions on the day of your exam.            Nov 28, 2018  3:45 PM CST   Return Visit with Romeo Echols MD   [facility]   [facility] ([facility])    [address] [place], [place] [address]  [facility] [address] [phone] OPT 2              Future tests that were ordered for you today     Open Future Orders        Priority Expected Expires Ordered    Basic metabolic panel Routine 11/8/2018 11/6/2019 11/6/2018    Follow-Up with [facility] - TANYA visit Routine 12/4/2018 11/6/2019 11/6/2018    Basic metabolic panel Routine 12/4/2018 11/6/2019 11/6/2018            Who to contact     If you have questions or need follow up information about today's clinic visit or your schedule please contact [facility] directly at [phone].  Normal or non-critical lab and imaging results will be communicated to you by Paystikhart, letter or phone within 4 business days after the clinic has received the results. If you do not hear from us within 7 days, please contact the clinic through Shaket or phone. If you have a critical or abnormal lab result, we will notify you by phone as soon as possible.  Submit refill requests through "[employer]" or call your pharmacy and they will forward the refill request to us. Please allow 3 business days for your refill to be completed.          Additional Information About Your Visit        Paystikhart Information     "[employer] gives you secure access to your electronic health record. If you see a primary care provider, you can also send messages to your care team and make appointments. If you have questions, please call your primary care clinic.  If you do not have a primary care provider, please call [phone] and they will assist you.        Care  "EveryWhere ID     This is your Care EveryWhere ID. This could be used by other organizations to access your [facility] medical records  [id number]        Your Vitals Were     Pulse Height Pulse Oximetry BMI (Body Mass Index)          70 1.6 m (5' 3\") 96% 32.01 kg/m2         Blood Pressure from Last 3 Encounters:   11/06/18 116/72   08/29/18 122/46   07/25/18 138/68    Weight from Last 3 Encounters:   11/06/18 82 kg (180 lb 11.2 oz)   08/29/18 79.1 kg (174 lb 4.8 oz)   07/25/18 80.5 kg (177 lb 8 oz)              We Performed the Following     Follow-Up with [facility] - TANYA visit        Primary Care Provider Office Phone # Fax #    Ander Shrestha [phone]       [address] [facility] [address]        Equal Access to Services     [facility]AN [facility] : Hadii aad darron hadasho Sowandaali, waaxda luqadaha, qaybta kaalmada adeegyada, waxay kaileyin hayflor santana . So [facility] [place] [phone].    ATENCIÓN: Si habla español, tiene a villalba disposición servicios gratuitos de asistencia lingüística. Itz al [phone].    We comply with applicable federal civil rights laws and [place] laws. We do not discriminate on the basis of race, color, national origin, age, disability, sex, sexual orientation, or gender identity.            Thank you!     Thank you for choosing [facility]  for your care. Our goal is always to provide you with excellent care. Hearing back from our patients is one way we can continue to improve our services. Please take a few minutes to complete the written survey that you may receive in the mail after your visit with us. Thank you!             Your Updated Medication List - Protect others around you: Learn how to safely use, store and throw away your medicines at www.disposemymeds.org.          This list is accurate as of 11/6/18  2:20 PM.  Always use your most recent med list.                   Brand Name Dispense Instructions for use " Diagnosis    aspirin 81 MG tablet      Take 81 mg by mouth daily        calcium citrate 950 MG tablet    CALCITRATE     Take 2 tablets by mouth daily At Noon        carvedilol 25 MG tablet    COREG    180 tablet    TAKE 1 TABLET TWICE A DAY WITH MEALS    Cardiomyopathy, idiopathic (H), Chronic systolic heart failure (H), Essential hypertension, benign, Coronary artery disease involving native coronary artery of native heart without angina pectoris       fluticasone 50 MCG/ACT spray    FLONASE    16 g    1 to 2 sprays in both nostrils once daily as needed for rhinitis or allergies    Seasonal allergic rhinitis, unspecified chronicity, unspecified trigger       hydrALAZINE 25 MG tablet    APRESOLINE    270 tablet    Take 1 tablet (25 mg) by mouth 3 times daily    Acute on chronic congestive heart failure, unspecified congestive heart failure type       Iron Tabs      Take 324 mg by mouth daily Patient states she takes OTC iron furrous gluconate 324 mg tablets USP        isosorbide mononitrate 30 MG 24 hr tablet    IMDUR    45 tablet    1/2 tablet daily AT NIGHT    Cardiomyopathy, idiopathic (H)       levothyroxine 112 MCG tablet    SYNTHROID/LEVOTHROID    90 tablet    TAKE 1 TABLET DAILY    Hypothyroidism, unspecified type       Lutein 20 MG Caps      Take 20 mg by mouth daily        MELATONIN PO      Take 3 mg by mouth nightly as needed        prednisoLONE acetate 1 % ophthalmic susp    PRED FORTE    1 Bottle    Place 1 drop into the right eye every evening        simvastatin 20 MG tablet    ZOCOR    90 tablet    Take 1 tablet (20 mg) by mouth At Bedtime    Coronary artery disease involving native coronary artery of native heart without angina pectoris       spironolactone 25 MG tablet    ALDACTONE    90 tablet    Take 1 tablet (25 mg) by mouth daily    Chronic systolic heart failure (H)       torsemide 5 MG tablet    DEMADEX     Take 2.5mg (1/2 tablet) daily    Chronic systolic congestive heart failure (H)        traZODone 50 MG tablet    DESYREL    30 tablet    Take 1/2-1 tablet by mouth nightly as needed for sleep    Primary insomnia       VITAMIN D (CHOLECALCIFEROL) PO      Take 2,000 Units by mouth daily

## 2018-11-06 NOTE — TELEPHONE ENCOUNTER
Called pt's dtr Irina per her request in [facility] msg, ok for med info per consent to communicate.    LVM for Irina that pt seen briefly by Divya HENRY today and was sent to ED for severe neck pain. Gave [facility] RN number for questions.     Thea Spence RN BSN   2:59 PM 11/06/18

## 2018-11-06 NOTE — ED PROVIDER NOTES
"  History     Chief Complaint:  Neck Pain    HPI   Mckayla Oconnell is a 89 year old female with a history of congestive heart failure and hypertension who presents to the emergency department today for evaluation of neck pain. Per chart review, patient was seen by her cardiologist at [facility] earlier today for congestive heart failure follow up where she mentioned worse pain in her neck compared to her normal known arthritis. She was sent to the emergency department for further evaluation and pain control. Here, patient reports that her neck pain has become \"severe\" and is improved with massaging. She denies recent falls or numbness or weakness in her arms.     Allergies:  Atenolol  Hydrochlorothiazide   Pollen Extract      Medications:    Aspirin   Coreg  Hydralazine  Levothyroxine  Zocor   Aldactone   Demadex  Desyrel     Past Medical History:    Aortic stenosis   CAD (coronary artery disease)   Cardiomyopathy, idiopathic   CHF (congestive heart failure)   Glaucoma    Hypertension   Hypothyroidism   Nonrheumatic mitral valve regurgitation   Nonrheumatic tricuspid valve regurgitation   Pulmonary hypertension     Past Surgical History:    Angiography   Cardiac catherization   Hysterectomy  Implant pacemaker   Knee replacement x2  Knee surgery      Family History:    Mother: Uterine cancer  Daughter: Breast cancer     Social History:  The patient was accompanied to the ED by .  Smoking Status: Never Smoker  Smokeless Tobacco: Never Used  Alcohol Use: Negative    Marital Status:      Review of Systems   Musculoskeletal: Positive for neck pain.   Neurological: Negative for weakness and numbness.   All other systems reviewed and are negative.    Physical Exam     Patient Vitals for the past 24 hrs:   BP Temp Temp src Pulse Heart Rate Resp SpO2 Weight   11/06/18 1443 122/63 98.4  F (36.9  C) Temporal 74 74 16 97 % 79.4 kg (175 lb)     Physical Exam  Nursing note and " vitals reviewed.    Constitutional:  Appears well-developed and well-nourished, comfortable.    Cardiovascular:  Normal rate, regular rhythm.     Peripheral pulse 2+ in radial pulses.  Cap refill less than 2 seconds.  Musculoskeletal:  Range of motion normal in her neck. No extremity deformity or upper extremity    edema.     Tenderness over the paraspinous muscles of the cervical spine, no midline    tenderness.  No cyanosis in her hands.   Neurological:   Alert and oriented. Exhibits good muscle tone and normal  strength equally..      Sensation in the right hand is normal.     GCS eye subscore is 4. GCS verbal subscore is 5.      GCS motor subscore is 6.   Skin:    Skin is warm and dry. No rash noted.  Small bruise on her left hand.     No erythema. No pallor.  No lesions.   Psychiatric:   Behavior is normal. Appropriate mood and affect.     Judgment and thought content normal.     Emergency Department Course     Interventions:  1820 Tramadol 50 mg Oral    Emergency Department Course:    1736 Nursing notes and vitals reviewed.    1747 I performed an exam of the patient as documented above.     1845 I personally answered all related questions prior to discharge.    Impression & Plan      Medical Decision Making:  Mckayla Oconnell is a 89 year old female who presents to the emergency department today for evaluation of neck pain.  She has chronic neck pain from arthritis in the last couple of days has been worse.  No falls, she denies any new neurologic symptoms down her arms or legs.  When she was waiting in the lobby, the nurse had actually massaged her neck and placed heat on it and it was dramatically improved.  I had my tech rub her neck for a while, I rubbed it briefly and so did the nurse.  She is feeling much better had good range of motion and I feel she can be discharged.  This was nontraumatic, an acute exacerbation of a chronic problem with arthritis and so I did not feel imaging is indicated.   If her neck pain continues to bother her a lot despite physical therapy, then I think an MRI or further imaging could be indicated at that time but as an outpatient.  She was given a tramadol tablet here which did seem to help.  I am going to prescribe her some and have her contact her doctor tomorrow to get scheduled for some physical therapy.    Diagnosis:    ICD-10-CM    1. Neck pain M54.2     acute on chronic     Disposition:   The patient is discharged to home.    Heat and/or ice, contact your doctor tomorrow to get you scheduled and referred to physical therapy. Tylenol and/or tramadol which you have at home as needed for the pain.    Discharge Medications:  New Prescriptions    TRAMADOL (ULTRAM) 50 MG TABLET    Take 1 tablet (50 mg) by mouth every 6 hours as needed for severe pain     Scribe Disclosure:  I, Fatuma Turner, am serving as a scribe at 5:38 PM on 11/6/2018 to document services personally performed by Britt Sánchez MD based on my observations and the provider's statements to me.       EMERGENCY DEPARTMENT       Britt Sánchez MD  11/06/18 8785

## 2018-11-06 NOTE — PATIENT INSTRUCTIONS
Call [facility] nurse for any questions or concerns:  [phone]   *If you have concerns after hours, please call [phone], option 2 to speak with on call Cardiologist.    1. Medication changes from today:  For now stop taking spironolactone.    Increase torsemide to 10 mg once a day.    Continue other medications.    Repeat labs in 2 days.       2. Weigh yourself daily and write it down.     3. Call [facility] nurse if your weight is up more than 2 pounds in one day or 5 pounds in one week.     4. Call [facility] nurse if you feel more short of breath, have more abdominal bloating, or leg swelling.     5. Continue low sodium diet (less than 2000 mg daily). If you eat less salt, you will retain less fluid.     6. Alcohol can weaken your heart further. You should avoid alcohol or limit its use to special times, such as a holiday or birthday.      7. Do NOT take Aleve or ibuprofen without talking to your doctor first.      8. Lab Results: **     Component      Latest Ref Rng & Units 8/8/2018 11/6/2018   Sodium      136 - 145 mmol/L 135 (L) 134 (L)   Potassium      3.5 - 5.1 mmol/L 4.9 5.8 (H)   Chloride      98 - 107 mmol/L 104 102   Carbon Dioxide      23 - 29 mmol/L 25 29   Anion Gap      6 - 17 mmol/L 10.9 8.8   Glucose      70 - 105 mg/dL 96 79   Urea Nitrogen      7 - 30 mg/dL 27 35 (H)   Creatinine      0.70 - 1.30 mg/dL 1.39 (H) 1.50 (H)   GFR Estimate      >60 mL/min/1.7m2 36 (L) 33 (L)   GFR Estimate If Black      >60 mL/min/1.7m2 43 (L) 39 (L)   Calcium      8.5 - 10.5 mg/dL 9.0 10.0     [facility]: Cardiomyopathy, Optimization, Rehabilitation, Education  The [facility] is a heart failure specialty clinic within the [facility] where you will work with specialized nurse practitioners, physician assistants, doctors, and registered nurses. They are dedicated to helping patients with heart failure to carefully adjust medications, receive education, and learn who and when to call if symptoms develop. They  specialize in helping you better understand your condition, slow the progression of your disease, improve the length and quality of your life, help you detect future heart problems before they become life threatening, and avoid hospitalizations.

## 2018-11-06 NOTE — ED AVS SNAPSHOT
Emergency Department    [address] [facility] [address]    Phone:  [phone]    Fax:  [phone]                                       Mckayla Oconnell   MRN: [id number]    Department:   Emergency Department   Date of Visit:  11/6/2018           Patient Information     Date Of Birth          7/2/1927        Your diagnoses for this visit were:     Neck pain acute on chronic       You were seen by Britt Sánchez MD.      Follow-up Information     Call Ander Shrestha MD.    Specialty:  Internal Medicine    Why:  To get referral for PT and a followup appointment    Contact information:    [address] [facility] [address]  [phone]          Discharge Instructions       Heat and/or ice, contact your doctor tomorrow to get you scheduled and referred to physical therapy.  Tylenol and/or tramadol which you have at home as needed for the pain.        General Neck and Back Pain    Both neck and back pain are usually caused by injury to the muscles or ligaments of the spine. Sometimes the disks that separate each bone of the spine may cause pain by pressing on a nearby nerve. Back and neck pain may appear after a sudden twisting or bending force (such as in a car accident), or sometimes after a simple awkward movement. In either case, muscle spasm is often present and adds to the pain.  Acute neck and back pain usually gets better in 1 to 2 weeks. Pain related to disk disease, arthritis in the spinal joints or spinal stenosis (narrowing of the spinal canal) can become chronic and last for months or years.  Back and neck pain are common problems. Most people feel better in 1 or 2 weeks, and most of the rest in 1 to 2 months. Most people can remain active.  People have and describe pain differently.    Pain can be sharp, stabbing, shooting, aching, cramping, or burning    Movement, standing, bending, lifting, sitting, or walking may worsen the pain    Pain can be localized  to one spot or area, or it can be more generalized    Pain can spread or radiate upwards, downwards, to the front, or go down your arms    Muscle spasm may occur.  Most of the time mechanical problems with the muscles or spine cause the pain. it is usually caused by an injury, whether known or not, to the muscles or ligaments. While illnesses can cause back pain, it is usually not caused by a serious illness. Pain is usually related to physical activity, whether sports, exercise, work, or normal activity. Sometimes it can occur without an identifiable cause. This can happen simply by stretching or moving wrong, without noting pain at the time. Other causes include:    Overexertion, lifting, pushing, pulling incorrectly or too aggressively.    Sudden twisting, bending or stretching from an accident (car or fall), or accidental movement.    Poor posture    Poor conditioning, lack of regular exercise    Spinal disc disease or arthritis    Stress    Pregnancy, or illness like appendicitis, bladder or kidney infection, pelvic infections   Home care    For neck pain: Use a comfortable pillow that supports the head and keeps the spine in a neutral position. The position of the head should not be tilted forward or backward.    When in bed, try to find a position of comfort. A firm mattress is best. Try lying flat on your back with pillows under your knees. You can also try lying on your side with your knees bent up towards your chest and a pillow between your knees.    At first, do not try to stretch out the sore spots. If there is a strain, it is not like the good soreness you get after exercising without an injury. In this case, stretching may make it worse.    Don't sit for long periods, as in long car rides or other travel. This puts more stress on the lower back than standing or walking.    During the first 24 to 72 hours after an injury, apply an ice pack to the painful area for 20 minutes and then remove it for 20  minutes over a period of 60 to 90 minutes or several times a day.     You can alternate ice and heat therapies. Talk with your healthcare provider about the best treatment for your back or neck pain. As a safety precaution, do not use a heating pad at bedtime. Sleeping with a heating pad can lead to skin burns or tissue damage.    Therapeutic massage can help relax the back and neck muscles without stretching them.    Be aware of safe lifting methods and do not lift anything over 15 pounds until all the pain is gone.  Medicines  Talk to your healthcare provider before using medicine, especially if you have other medical problems or are taking other medicines.    You may use over-the-counter medicine to control pain, unless another pain medicine was prescribed. If you have chronic conditions like diabetes, liver or kidney disease, stomach ulcers,  gastrointestinal bleeding, or are taking blood thinner medicines.    Be careful if you are given pain medicines, narcotics, or medicine for muscle spasm. They can cause drowsiness, and can affect your coordination, reflexes, and judgment. Do not drive or operate heavy machinery.  Follow-up care  Follow up with your healthcare provider, or as advised. Physical therapy or further tests may be needed.  If X-rays were taken, you will be notified of any new findings that may affect your care.  Call 911  Call 911 if any of the following occur:    Trouble breathing    Confusion    Very drowsy or trouble awakening    Fainting or loss of consciousness    Rapid or very slow heart rate    Loss of bowel or bladder control  When to seek medical advice  Call your healthcare provider right away if any of these occur:    Pain becomes worse or spreads into your arms or legs    Weakness, numbness or pain in one or both arms or legs    Numbness in the groin area    Difficulty walking    Fever of 100.4 F (38 C) or higher, or as directed by your healthcare provider  Date Last Reviewed:  7/1/2016 2000-2018 [email or web address]. [address] [place] [address], [place] [address]. All rights reserved. This information is not intended as a substitute for professional medical care. Always follow your healthcare professional's instructions.          Your next 10 appointments already scheduled     Nov 08, 2018  3:45 PM CST   Remote PPM Check with CUENCA TECH1   [facility] ([facility])    [address] [place], [place] [id number]  [facility] [address] [phone] OPT 2           This appointment is for a remote check of your pacemaker.  This is not an appointment at the office.            Nov 28, 2018 12:20 PM CST   LAB with CUENCA LAB   [facility] [place] ([facility])    [address] [place] [id number] [phone]           Please do not eat 10-12 hours before your appointment if you are coming in fasting for labs on lipids, cholesterol, or glucose (sugar). This does not apply to pregnant women. Water, hot tea and black coffee (with nothing added) are okay. Do not drink other fluids, diet soda or chew gum.            Nov 28, 2018 12:45 PM CST   Ech Complete with [id number]   [facility] CV Echocardiography ([facility])    [address] [place] [id number] [phone]           1.  Please bring or wear a comfortable two-piece outfit. 2.  You may eat, drink and take your normal medicines. 3.  For any questions that cannot be answered, please contact the ordering physician 4.  Please do not wear perfumes or scented lotions on the day of your exam.            Nov 28, 2018  3:45 PM CST   Return Visit with Romeo Echols MD   [facility] ([facility])    [address] [place] [id number] [phone] OPT 2              24 Hour Appointment Hotline       To make an appointment  at any [facility] clinic, call [phone] ([phone]). If you don't have a family doctor or clinic, we will help you find one. [facility] clinics are conveniently located to serve the needs of you and your family.             Review of your medicines      Our records show that you are taking the medicines listed below. If these are incorrect, please call your family doctor or clinic.        Dose / Directions Last dose taken    aspirin 81 MG tablet   Dose:  81 mg        Take 81 mg by mouth daily   Refills:  0        calcium citrate 950 MG tablet   Commonly known as:  CALCITRATE   Dose:  2 tablet        Take 2 tablets by mouth daily At Noon   Refills:  0        carvedilol 25 MG tablet   Commonly known as:  COREG   Quantity:  180 tablet        TAKE 1 TABLET TWICE A DAY WITH MEALS   Refills:  2        fluticasone 50 MCG/ACT spray   Commonly known as:  FLONASE   Quantity:  16 g        1 to 2 sprays in both nostrils once daily as needed for rhinitis or allergies   Refills:  10        hydrALAZINE 25 MG tablet   Commonly known as:  APRESOLINE   Dose:  25 mg   Quantity:  270 tablet        Take 1 tablet (25 mg) by mouth 3 times daily   Refills:  3        Iron Tabs   Dose:  324 mg        Take 324 mg by mouth daily Patient states she takes OTC iron furrous gluconate 324 mg tablets USP   Refills:  0        isosorbide mononitrate 30 MG 24 hr tablet   Commonly known as:  IMDUR   Quantity:  45 tablet        1/2 tablet daily AT NIGHT   Refills:  3        levothyroxine 112 MCG tablet   Commonly known as:  SYNTHROID/LEVOTHROID   Quantity:  90 tablet        TAKE 1 TABLET DAILY   Refills:  2        Lutein 20 MG Caps   Dose:  20 mg        Take 20 mg by mouth daily   Refills:  0        MELATONIN PO   Dose:  3 mg        Take 3 mg by mouth nightly as needed   Refills:  0        prednisoLONE acetate 1 % ophthalmic susp   Commonly known as:  PRED FORTE   Dose:  1 drop   Quantity:  1 Bottle        Place 1 drop into the right eye every  evening   Refills:  0        simvastatin 20 MG tablet   Commonly known as:  ZOCOR   Dose:  20 mg   Quantity:  90 tablet        Take 1 tablet (20 mg) by mouth At Bedtime   Refills:  3        spironolactone 25 MG tablet   Commonly known as:  ALDACTONE   Dose:  25 mg   Quantity:  90 tablet        Take 1 tablet (25 mg) by mouth daily   Refills:  3        torsemide 5 MG tablet   Commonly known as:  DEMADEX        Take 2.5mg (1/2 tablet) daily   Refills:  0        traZODone 50 MG tablet   Commonly known as:  DESYREL   Quantity:  30 tablet        Take 1/2-1 tablet by mouth nightly as needed for sleep   Refills:  0        VITAMIN D (CHOLECALCIFEROL) PO   Dose:  2000 Units        Take 2,000 Units by mouth daily   Refills:  0                Orders Needing Specimen Collection     None      Pending Results     No orders found from 11/4/2018 to 11/7/2018.            Pending Culture Results     No orders found from 11/4/2018 to 11/7/2018.            Pending Results Instructions     If you had any lab results that were not finalized at the time of your Discharge, you can call the ED Lab Result RN at [phone]. You will be contacted by this team for any positive Lab results or changes in treatment. The nurses are available 7 days a week from 10A to 6:30P.  You can leave a message 24 hours per day and they will return your call.        Test Results From Your Hospital Stay               Clinical Quality Measure: Blood Pressure Screening     Your blood pressure was checked while you were in the emergency department today. The last reading we obtained was  BP: 122/63 . Please read the guidelines below about what these numbers mean and what you should do about them.  If your systolic blood pressure (the top number) is less than 120 and your diastolic blood pressure (the bottom number) is less than 80, then your blood pressure is normal. There is nothing more that you need to do about it.  If your systolic blood pressure (the top  number) is 120-139 or your diastolic blood pressure (the bottom number) is 80-89, your blood pressure may be higher than it should be. You should have your blood pressure rechecked within a year by a primary care provider.  If your systolic blood pressure (the top number) is 140 or greater or your diastolic blood pressure (the bottom number) is 90 or greater, you may have high blood pressure. High blood pressure is treatable, but if left untreated over time it can put you at risk for heart attack, stroke, or kidney failure. You should have your blood pressure rechecked by a primary care provider within the next 4 weeks.  If your provider in the emergency department today gave you specific instructions to follow-up with your doctor or provider even sooner than that, you should follow that instruction and not wait for up to 4 weeks for your follow-up visit.        Thank you for choosing [facility]       Thank you for choosing [facility] for your care. Our goal is always to provide you with excellent care. Hearing back from our patients is one way we can continue to improve our services. Please take a few minutes to complete the written survey that you may receive in the mail after you visit with us. Thank you!        [facility] Information     [employer] gives you secure access to your electronic health record. If you see a primary care provider, you can also send messages to your care team and make appointments. If you have questions, please call your primary care clinic.  If you do not have a primary care provider, please call [phone] and they will assist you.        Care EveryWhere ID     This is your Care EveryWhere ID. This could be used by other organizations to access your [facility] medical records  [id number]        Equal Access to Services     SHELBY SHEPARD : Luba Lainez, wadarrell miller, qaybta kaizaiah hernandez, brooklyn sorensen. So [facility] [phone].    ATENCIÓN: Si  habla santos, tiene a villalba disposición servicios gratuitos de asistencia lingüística. Llame al [phone].    We comply with applicable federal civil rights laws and [place] laws. We do not discriminate on the basis of race, color, national origin, age, disability, sex, sexual orientation, or gender identity.            After Visit Summary       This is your record. Keep this with you and show to your community pharmacist(s) and doctor(s) at your next visit.

## 2018-11-07 NOTE — PROGRESS NOTES
Service Date: 2018      PRIMARY CARDIOLOGIST:  Dr. Echols.      REASON FOR VISIT:  Heart failure followup.      HISTORY OF PRESENT ILLNESS:  Ms. Oconnell is a delightful 89-year-old woman with a past medical history significant for the followin.  Nonischemic cardiomyopathy with EF in between 10% and 45%, last documented in 2018 at 30%-35%.   2.  Moderate aortic stenosis, moderate tricuspid regurgitation, moderate mitral regurgitation.   3.  CKD with baseline creatinine 1.5-2.   4.  Hyperlipidemia.   5.  Biventricular pacemaker since 2017.   6.  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   7.  Peripheral arterial disease with symptoms of claudication.   8.  Lymphedema.   9.  Elevated pulmonary pressures.      I routinely see her for heart failure, and she comes in today for routine followup.  I had met her in  when she was admitted with profound fatigue, weight gain and shortness of breath and was diuresed down to about 179 pounds with a Bumex drip.  She has been hanging right around 170 pounds since then.  Unfortunately, today she is not feeling well and it really does not have much to do with her heart failure.  She says she has severe neck pain today.  This is in her cervical neck going up to the base of her skull and into both shoulders.  She says this is a 12 out of 10 pain, and it is the worst pain she has felt in her life.  It started last night, and she was unable to sleep from that.  She was lying flat in bed and breathing okay, but then had to sit up in the chair in order to be anywhere near comfortable.  She has taken some Tylenol without any improvement.  She has not had a fall.  She did not have any injury.  She says she has known arthritis in her neck, and she thinks this is just because the weather is changing.      From a heart failure standpoint, she denies orthopnea or PND.  She is not eating well because she is worried her weights are going to go up.  They are stable  between 171-172 pounds at home.  She denies renita chest pain or shortness of breath.  She has not been taking any Advil or Aleve.  She has been trying to watch her high-potassium foods.      SOCIAL HISTORY:  She lives in her own home with Jaime.  She has a daughter, Sandra.  She is  from her first marriage.  Lifelong nonsmoker.  No alcohol use.  Has been severely resistant to placement.      PHYSICAL EXAMINATION:   GENERAL:  Elderly woman who appears uncomfortable today and not her normal self.  She is poorly groomed.  She is tender over likely spinous process of T1 but not over her cervical vertebrae.  Not tender over her musculature on her upper back.    CARDIAC:  Her heart is regular with a 4/6 systolic murmur heard best at the right sternal border to late peaking.  It seems louder than previous.  This is heard throughout her precordium.    LUNGS:  Her lungs are diminished bilaterally.  She is kyphotic.  There are no wheezes, rales or rhonchi.     EXTREMITIES:  She has 2+ edema to mid shin.   SKIN:  Warm and dry.     This is her baseline.      Her creatinine is 1.5, BUN 35, potassium 5.8, sodium 134.      ASSESSMENT AND PLAN:    1.  Acute on chronic neck pain in this elderly, frail 89-year-old.  This has happened just in the last 12-14 hours.  She insists it is arthritis, but she also describes this as the worst pain in her life.  I am concerned she may have a compression fracture that would be spontaneous causing this pain.  She is agreeable to go to the ER, both for pain control and for further evaluation.  My nurses will bring her there in a wheelchair with her .   2.  Chronic systolic heart failure with EF of 30%-35% with biventricular pacemaker in place, has been maintained on low-dose diuretics of torsemide at 2.5 mg daily.  Given her she appears slightly hypervolemic on exam and is hyperkalemic, we are going to increase this to 10 mg daily with repeat labs in 3 days.  She will stop her  spironolactone for now.  She will continue on hydralazine 25 mg t.i.d., carvedilol 25 mg daily and Imdur 15 mg each day at bedtime.    3.  Biventricular pacemaker in place.  Last device check was August, 99% BiV paced.   4.  Lymphedema with known severe abnormal AB[facility] with [facility] requesting that she does not get compression stockings or wrappings due to PAD.   5.  Hypertension, well controlled in spite of pain.  Will follow.   6.  Chronic anemia, stable at 9.7.      Unfortunately, Mckayla is doing poorly today.  I do think they likely need placement, but she has told me in the past the one thing that is most important to her in her entire life is staying at home.  Perhaps we can get them to bring nursing services in or something else.  When I have time, I will reach out to her daughter, Sandra, to discuss further.      Thank you for allowing me to participate in her care.  I will see her back with repeat labs in 3 days and clinic followup in a month.      JOI King PA-C             D: 2018   T: 2018   MT: LENI      Name:     MCKAYLA CHURCH   MRN:      [id number]        Account:      [id number]   :      1927           Service Date: 2018      Document: [id number]

## 2018-11-08 NOTE — MR AVS SNAPSHOT
After Visit Summary   11/8/2018    Mckayla Oconnell    MRN: [id number]           Patient Information     Date Of Birth          7/2/1927        Visit Information        Provider Department      11/8/2018 3:45 PM [facility] TECH1 [facility]        Today's Diagnoses     Cardiac pacemaker in situ    -  1       Follow-ups after your visit        Your next 10 appointments already scheduled     Nov 08, 2018  3:45 PM CST   Remote PPM Check with [facility] TECH1   [facility] ([facility])    [address] [place] [id number]   [phone] OPT 2           This appointment is for a remote check of your pacemaker.  This is not an appointment at the office.            Nov 09, 2018 11:00 AM CST   LAB with [facility]   [facility] [place] ([facility])    [address] [place] [id number] [phone]           Please do not eat 10-12 hours before your appointment if you are coming in fasting for labs on lipids, cholesterol, or glucose (sugar). This does not apply to pregnant women. Water, hot tea and black coffee (with nothing added) are okay. Do not drink other fluids, diet soda or chew gum.            Nov 28, 2018 12:20 PM CST   LAB with [facility]   [facility] [place] ([facility])    [address] [place] [id number] [phone]           Please do not eat 10-12 hours before your appointment if you are coming in fasting for labs on lipids, cholesterol, or glucose (sugar). This does not apply to pregnant women. Water, hot tea and black coffee (with nothing added) are okay. Do not drink other fluids, diet soda or chew gum.            Nov 28, 2018 12:45 PM CST   Ech Complete with [id number]   [facility] CV Echocardiography ([facility] at [facility])    [address] [place] [address]  [facility]  [address]  [facility] [address] [phone]           1.  Please bring or wear a comfortable two-piece outfit. 2.  You may eat, drink and take your normal medicines. 3.  For any questions that cannot be answered, please contact the ordering physician 4.  Please do not wear perfumes or scented lotions on the day of your exam.            Nov 28, 2018  3:45 PM CST   Return Visit with Romeo Echols MD   [facility] ([facility])    [address] [facility] [address]  [facility] [id number] [phone] OPT 2            Feb 08, 2019 11:15 AM CST   Pacemaker Check with BANG RON   [facility] ([facility])    [id number]  [facility] [id number] [phone] OPT 2              Who to contact     If you have questions or need follow up information about today's clinic visit or your schedule please contact [facility] directly at [phone].  Normal or non-critical lab and imaging results will be communicated to you by [employer], letter or phone within 4 business days after the clinic has received the results. If you do not hear from us within 7 days, please contact the clinic through [employer] or phone. If you have a critical or abnormal lab result, we will notify you by phone as soon as possible.  Submit refill requests through [employer] or call your pharmacy and they will forward the refill request to us. Please allow 3 business days for your refill to be completed.          Additional Information About Your Visit        [employer] Information     [employer] gives you secure access to your electronic health record. If you see a primary care provider, you can also send messages to your care team and make appointments. If you have questions, please call your primary care clinic.  If you do not have a primary care provider, please call [phone] and they will assist you.         Care EveryWhere ID     This is your Care EveryWhere ID. This could be used by other organizations to access your [place] medical records  [id number]         Blood Pressure from Last 3 Encounters:   11/06/18 120/62   11/06/18 116/72   08/29/18 122/46    Weight from Last 3 Encounters:   11/06/18 79.4 kg (175 lb)   11/06/18 82 kg (180 lb 11.2 oz)   08/29/18 79.1 kg (174 lb 4.8 oz)              We Performed the Following     INTERROGATION DEVICE EVAL REMOTE, PACER/ICD (73809)     PM DEVICE INTERROGATE REMOTE (53610)          Today's Medication Changes          These changes are accurate as of 11/8/18 10:19 AM.  If you have any questions, ask your nurse or doctor.               Start taking these medicines.        Dose/Directions    torsemide 5 MG tablet   Commonly known as:  DEMADEX   Used for:  Chronic systolic congestive heart failure (H)   Started by:  Amparo Pizarro RN        Dose:  10 mg   Take 2 tablets (10 mg) by mouth daily   Quantity:  60 tablet   Refills:  11            Where to get your medicines      These medications [id number] [address] [address] [facility]  [address] [address]     Phone:  [phone]     torsemide 5 MG tablet                Primary Care Provider Office Phone # Fax #    Ander Shrestha [phone]       [address] [facility] [address]        Equal Access to Services     SHELBY SHEPARD AH: Hadii juan pablo baezao Sochicho, waaxda luqadaha, qaybta kaalmada david, waxjesús patrick sorensen. So [facility] [phone].    ATENCIÓN: Si habla español, tiene a villalba disposición servicios gratuitos de asistencia lingüística. Llame al [phone].    We comply with applicable federal civil rights laws and [place] laws. We do not discriminate on the basis of race, color, national origin, age, disability, sex, sexual orientation, or gender identity.            Thank you!     Thank you  for choosing [facility]  for your care. Our goal is always to provide you with excellent care. Hearing back from our patients is one way we can continue to improve our services. Please take a few minutes to complete the written survey that you may receive in the mail after your visit with us. Thank you!             Your Updated Medication List - Protect others around you: Learn how to safely use, store and throw away your medicines at www.disposemymeds.org.          This list is accurate as of 11/8/18 10:19 AM.  Always use your most recent med list.                   Brand Name Dispense Instructions for use Diagnosis    aspirin 81 MG tablet      Take 81 mg by mouth daily        calcium citrate 950 MG tablet    CALCITRATE     Take 2 tablets by mouth daily At Noon        carvedilol 25 MG tablet    COREG    180 tablet    TAKE 1 TABLET TWICE A DAY WITH MEALS    Cardiomyopathy, idiopathic (H), Chronic systolic heart failure (H), Essential hypertension, benign, Coronary artery disease involving native coronary artery of native heart without angina pectoris       fluticasone 50 MCG/ACT spray    FLONASE    16 g    1 to 2 sprays in both nostrils once daily as needed for rhinitis or allergies    Seasonal allergic rhinitis, unspecified chronicity, unspecified trigger       hydrALAZINE 25 MG tablet    APRESOLINE    270 tablet    Take 1 tablet (25 mg) by mouth 3 times daily    Acute on chronic congestive heart failure, unspecified congestive heart failure type       Iron Tabs      Take 324 mg by mouth daily Patient states she takes OTC iron furrous gluconate 324 mg tablets USP        isosorbide mononitrate 30 MG 24 hr tablet    IMDUR    45 tablet    1/2 tablet daily AT NIGHT    Cardiomyopathy, idiopathic (H)       levothyroxine 112 MCG tablet    SYNTHROID/LEVOTHROID    90 tablet    TAKE 1 TABLET DAILY    Hypothyroidism, unspecified type       Lutein 20 MG Caps      Take 20 mg by mouth daily         MELATONIN PO      Take 3 mg by mouth nightly as needed        prednisoLONE acetate 1 % ophthalmic susp    PRED FORTE    1 Bottle    Place 1 drop into the right eye every evening        simvastatin 20 MG tablet    ZOCOR    90 tablet    Take 1 tablet (20 mg) by mouth At Bedtime    Coronary artery disease involving native coronary artery of native heart without angina pectoris       torsemide 5 MG tablet    DEMADEX    60 tablet    Take 2 tablets (10 mg) by mouth daily    Chronic systolic congestive heart failure (H)       traMADol 50 MG tablet    ULTRAM    12 tablet    Take 1 tablet (50 mg) by mouth every 6 hours as needed for severe pain        traZODone 50 MG tablet    DESYREL    30 tablet    Take 1/2-1 tablet by mouth nightly as needed for sleep    Primary insomnia       VITAMIN D (CHOLECALCIFEROL) PO      Take 2,000 Units by mouth daily

## 2018-11-08 NOTE — PROGRESS NOTES
Reviewed with pt that Anabel recommends lab appt be rescheduled to 11/12/18 since she just increased torsemide dose today.  Pt verbalized understanding and agrees to reschedule BMP lab appt to 11/12/18 at 11:00am.    CARMELLA Miller 12:01 PM 11/8/2018

## 2018-11-08 NOTE — PROGRESS NOTES
[employer] Valitude X4 CRT-P (BiV) Remote PPM Device Check  AP: 86% RVP: 99%  LVP: 99%  Mode: DDDR        Presenting Rhythm: AP/  Heart Rate: histogram shows minimal rate variation  Sensing: stable    Pacing Threshold: RA & RV: stable LV: not performed    Impedance: stable  Battery Status: 8.5 years remaining  Atrial Arrhythmia: no mode switch episodes. 3 PMT episodes; EGMs confirm PMT with algorithm breaking within seconds.   Ventricular Arrhythmia: none     Care Plan: Pt scheduled to see Dr. Echols st the end of the month.  Annual threshold scheduled in February. Gave results over the phone to olivier Douglas CVT

## 2018-11-08 NOTE — PROGRESS NOTES
[facility] Telemanagment     Alert for: wt above max parameter  Heart Failure sx: none reported  Current daily diuretic: torsemide 10mg daily (increased from 2.5mg daily by Anabel at OV on 11/6/18)  Next follow up: BMP on 11/9/18, OV with Dr. Echols and BMP on 11/28/18     Per chart review, pt was advised by Anabel at OV on 11/6/18 to stop taking spironolactone 25mg daily and increase torsemide dose from 2.5mg (1/2 tab) daily to 10mg (2 tabs) daily and get repeat BMP done in 2-3 days.      Called and spoke with pt.  She confirms she did stop taking spironolactone 25mg daily as prescribed; however, she states she has continued taking torsemide 2.5mg daily. Reviewed Anabel's recommendations to increase torsemide dose to 10mg daily and advised she start increased dose today.  Pt verbalized understanding and agrees with this plan. Epic med list updated and updated torsemide Rx sent to [facility] pharmacy and [facility] pharmacy called to review spironolactone has been d/c'd.  Also reviewed need for BMP done today or tomorrow.  Pt agrees to lab appt tomorrow at 11:00am.      Will continue to monitor.  CARMELLA Miller 10:01 AM 11/8/2018

## 2018-11-08 NOTE — PROGRESS NOTES
Reviewed during clinic visit.  Please see progress note for plan.  Divya Kraft PA-C 11/8/2018 12:31 PM

## 2018-11-11 PROBLEM — I50.23 ACUTE ON CHRONIC SYSTOLIC (CONGESTIVE) HEART FAILURE (H): Status: ACTIVE | Noted: 2018-01-01

## 2018-11-11 NOTE — IP AVS SNAPSHOT
"` `     [facility] CARDIAC SPECIALTY CARE: [phone]                                              INTERAGENCY TRANSFER FORM - NURSING   2018                    Hospital Admission Date: 2018  VICKEY CHURCH   : [id number]  Sex: Female        Attending Provider: Edilberto Mack DO     Allergies:  Atenolol, Hydrochlorothiazide, Pollen Extract    Infection:  None   Service:  HOSPITALIST    Ht:  1.6 m (5' 3\")   Wt:  82.9 kg (182 lb 12.2 oz)   Admission Wt:  81.1 kg (178 lb 12.8 oz)    BMI:  32.37 kg/m 2   BSA:  1.92 m 2            Patient PCP Information     Provider PCP Type    Ander Shrestha MD General      Current Code Status     Date Active Code Status Order ID Comments User Context       Prior      Code Status History     Date Active Date Inactive Code Status Order ID Comments User Context    11/15/2018  5:00 PM  DNR/DNI [id number] POLST signed and on paper chart Amy Curran MD Outpatient    2018  1:43 PM 11/15/2018  5:00 PM DNR/DNI [id number] Verified with Edilberto Mack DO Inpatient    2016  9:05 PM 2016  9:20 PM DNR/DNI [id number]  Daron Arvizu MD Inpatient    2016  2:18 PM 2016  9:05 PM DNR/DNI [id number]  Daquan Yeager MD Outpatient    2016  8:43 PM 2016  2:18 PM DNR/DNI [id number]  Ramon Cole MD Inpatient    2016  2:11 PM 2016  8:43 PM DNR/DNI [id number]  Cruz Boucher MD Outpatient    2016 11:12 AM 2016  2:11 PM DNR/DNI [id number]  Daron Arvizu MD Inpatient    2013  1:19 PM 2016 11:12 AM DNR/DNI [id number]  Khris Gimenez PA Outpatient    2013  4:47 PM 2013  1:19 PM DNR/DNI [id number]  Pia Malagon, RN Inpatient      Advance Directives        Scanned docmt in ACP Activity?           Yes, scanned ACP docmt        Hospital Problems as of 2018              Priority Class Noted POA    Acute on chronic systolic " (congestive) heart failure (H) Medium  11/11/2018 Yes      Non-Hospital Problems as of 11/16/2018              Priority Class Noted    Pure hypercholesterolemia Medium  2/14/2013    Hypothyroidism, unspecified type Medium  2/14/2013    Health Care Home Medium  8/19/2013    Cardiomyopathy, idiopathic (H) Medium  Unknown    Nonrheumatic aortic valve stenosis Medium  Unknown    Glaucoma Medium  Unknown    Essential hypertension, benign Medium  6/9/2015    Coronary artery disease involving native coronary artery of native heart without angina pectoris Medium  8/19/2015    Iron deficiency anemia, unspecified Medium  7/19/2016    Iron deficiency anemia secondary to inadequate dietary iron intake Medium  7/19/2016    Drug-induced malabsorption Medium  7/19/2016    Iron and its compounds causing adverse effect in therapeutic use(E934.0) Medium  7/19/2016    Anemia in chronic kidney disease(285.21) Medium  7/19/2016    Chronic kidney disease, stage III (moderate) (H) Medium  7/19/2016    Hyponatremia Medium  8/5/2016    Fluid overload Medium  8/15/2016    Hypoxia Medium  8/15/2016    Peripheral edema Medium  8/17/2016    Anemia, unspecified Medium  9/6/2016    Malabsorption Medium  9/6/2016    Adverse effect of iron Medium  9/6/2016    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) Medium  9/6/2016    Chronic systolic heart failure (H) Medium  11/23/2016    Nonrheumatic tricuspid valve regurgitation Medium  Unknown    Nonrheumatic mitral valve regurgitation Medium  Unknown    Seasonal allergic rhinitis, unspecified chronicity, unspecified trigger Medium  4/19/2018    Acute right-sided low back pain without sciatica Medium  8/29/2018      Immunizations     Name Date      Influenza (High Dose) 3 valent vaccine 10/03/16     Influenza (High Dose) 3 valent vaccine 09/01/15     Influenza (High Dose) 3 valent vaccine 10/09/13     Influenza (IIV3) PF 10/01/12     Pneumo Conj 13-V (2010&after) 09/01/15      Pneumococcal 23 valent 11/01/94     TD (ADULT, 7+) 09/06/07     TDAP Vaccine (Adacel) 03/22/16     Zoster vaccine, live 01/01/10          END      ASSESSMENT     Discharge Profile Flowsheet     EXPECTED DISCHARGE     Patient's communication style  spoken language (English or Bilingual);hard of hearing 11/11/18 1401    Expected Discharge Date  11/16/18 (TCU) 11/14/18 1153   FINAL RESOURCES      DISCHARGE NEEDS ASSESSMENT     Resources List  [facility];Tele-Monitoring 07/31/17 1232    Concerns To Be Addressed  all concerns addressed in this encounter 07/31/17 1232   Other Resources  -- (na) 07/31/17 1232    Concerns Comments  -- (na) 07/31/17 1232   PAS Number  -- (na) 07/31/17 1232    Equipment Currently Used at Home  grab bar;raised toilet;shower chair;walker, rolling (4WW, RTS, shower chair, hand-held shower hose, grab bars) 11/13/18 1136   SKIN      Transportation Available  car (Pt.driving PTA) 11/13/18 1050   Inspection of bony prominences  Full 11/16/18 1048    # of Referrals Placed by CTS  Hospice;Post Acute Facilities 11/14/18 1709   Skin WDL  ex 11/16/18 1048    Primary Care Clinic Name  [facility] 11/23/16 1520   Inspection under devices  Full 11/16/18 1048    Primary Care MD Name  Dr NICOLE Shrestha 11/23/16 1519   Skin Color/Characteristics  pale 11/16/18 1048    GASTROINTESTINAL (ADULT,PEDIATRIC,OB)     Skin Temperature  warm 11/16/18 1048    GI WDL  -- (not assessed, pt on comfort care) 11/16/18 1048   Skin Moisture  dry 11/16/18 1048    Abdominal Appearance  obese 11/15/18 0620   Skin Elasticity  slow return to original state 11/16/18 1048    All Quadrants Bowel Sounds  audible and normoactive 11/14/18 2059   Skin Integrity  rash(es);bruise(s) 11/16/18 1048    Last Bowel Movement  -- (pt unable to remember) 11/13/18 0151   SAFETY      Passing flatus  yes 11/14/18 2059   Safety WDL  WDL 11/16/18 1048    COMMUNICATION ASSESSMENT     All Alarms  alarm(s) activated and audible 11/16/18 1048                "  Assessment WDL (Within Defined Limits) Definitions           Safety WDL     Effective: 09/28/15    Row Information: <b>WDL Definition:</b> Bed in low position, wheels locked; call light in reach; upper side rails up x 2; ID band on<br> <font color=\"gray\"><i>Item=AS safety wdl>>List=AS safety wdl>>Version=F14</i></font>      Skin WDL     Effective: 09/28/15    Row Information: <b>WDL Definition:</b> Warm; dry; intact; elastic; without discoloration; pressure points without redness<br> <font color=\"gray\"><i>Item=AS skin wdl>>List=AS skin wdl>>Version=F14</i></font>      Vitals     Vital Signs Flowsheet     COMMENTS     PAIN ASSESSMENT/NONVERBAL ICU (ADULT)      Comments  Comfort Care 11/16/18 1042   Presence of Pain  No nonverbal indicators of pain present 11/12/18 2359    VITAL SIGNS     ANALGESIA SIDE EFFECTS MONITORING      Temp  97.5  F (36.4  C) 11/15/18 0057   Side Effects Monitoring: Respiratory Quality  R 11/16/18 1042    Temp src  Oral 11/15/18 0057   Side Effects Monitoring: Respiratory Depth  N 11/16/18 1042    Resp  16 11/16/18 0342   Side Effects Monitoring: Sedation Level  S 11/16/18 1042    Pulse  70 11/14/18 1323   RACHEL COMA SCALE      Heart Rate  70 11/15/18 2222   Best Eye Response  4-->(E4) spontaneous 11/15/18 2345    Pulse/Heart Rate Source  Monitor 11/15/18 0057   Best Motor Response  6-->(M6) obeys commands 11/15/18 2345    BP  111/64 11/15/18 2222   Best Verbal Response  5-->(V5) oriented 11/15/18 2345    BP Location  Right arm 11/15/18 2222   Readfield Coma Scale Score  15 11/15/18 2345    OXYGEN THERAPY     HEIGHT AND WEIGHT      SpO2  96 % 11/15/18 2223   Weight  82.9 kg (182 lb 12.2 oz) 11/16/18 0556    O2 Device  Nasal cannula 11/15/18 2223   Weight Method  Bed scale 11/16/18 0556    FiO2 (%)  50 % 11/12/18 2211   Bed Scale  Standard (fitted sheet, draw sheet/ pad, cover/flat sheet, blanket, two pillows);Call light;Goldstein bag - must be empty before weighing 11/15/18 0606    Oxygen " Delivery  1 LPM 11/15/18 2223   POSITIONING      PAIN/COMFORT     Body Position  supine;legs elevated 11/16/18 1042    Patient Currently in Pain  denies 11/16/18 1042   Head of Bed (HOB)  HOB at 30-45 degrees 11/16/18 1042    Preferred Pain Scale  number (Numeric Rating Pain Scale) 11/16/18 1029   Chair  Upright in chair 11/13/18 1000    Patient's Stated Pain Goal  Unable to Assess 11/12/18 1154   Positioning/Transfer Devices  pillows;in use 11/16/18 1042    0-10 Pain Scale  0 11/16/18 1042   DAILY CARE      Word Pain Scale  4 11/14/18 2030   Activity Management  bedrest 11/16/18 1042    Pain Location  Head 11/16/18 1029   Activity Assistance Provided  assistance, 2 people 11/16/18 1042    Pain Orientation  Anterior 11/16/18 1042   ECG      Pain Descriptors  Headache 11/16/18 1042   ECG Rhythm  Paced rhythm 11/14/18 1630    Pain Management Interventions  cold applied 11/16/18 1042   POINT OF CARE TESTING      Pain Intervention(s)  Medication (See eMAR);Cold applied 11/16/18 1042   Puncture Site  fingertip 11/15/18 0617    Response to Interventions  Relief 11/16/18 1042   Bedside Glucose (mg/dl )   91 mg/dl 11/15/18 0617    Additional Documentation  -- (has significant pain in left arm with movement) 11/11/18 1325                 Patient Lines/Drains/Airways Status    Active LINES/DRAINS/AIRWAYS     Name: Placement date: Placement time: Site: Days: Last dressing change:    Urethral Catheter 11/12/18   0552      4     Incision/Surgical Site Right;Posterior Calf                     Patient Lines/Drains/Airways Status    Active PICC/CVC     None            Intake/Output Detail Report     Date Intake       Output Net    Shift P.O. I.V. IV Piggyback Colloid Total Urine Total       Noc 11/14/18 2300 - 11/15/18 0659 -- -- -- -- -- 300 300 -300    Day 11/15/18 0700 - 11/15/18 1459 360 -- -- -- 360 750 750 -390    Rupa 11/15/18 1500 - 11/15/18 2259 240 -- -- -- 240 200 200 40    Noc 11/15/18 2300 - 11/16/18 0659 -- -- --  -- -- 250 250 -250    Day 11/16/18 0700 - 11/16/18 1459 120 -- -- -- 120 350 350 -230      Last Void/BM       Most Recent Value    Urine Occurrence -- [straight-cathed for 700 clear light yellow urine] at 11/11/2018 1645    Stool Occurrence       Case Management/Discharge Planning     Case Management/Discharge Planning Flowsheet     REFERRAL INFORMATION     Do you work full or part-time?  no 11/14/18 1709    Did the Initial Social Work Assessment result in a Social Work Case?  Yes 11/14/18 1709   COPING/STRESS      Admission Type  inpatient 11/14/18 1709   Major Change/Loss/Stressor  unable to assess 11/12/18 0352    Arrived From  home or self-care 11/14/18 1709   EXPECTED DISCHARGE      Referral Source  physician 11/14/18 1709   Expected Discharge Date  11/16/18 (TCU) 11/14/18 1153    # of Referrals Placed by CTS  Hospice;Post Acute Facilities 11/14/18 1709   ASSESSMENT/CONCERNS TO BE ADDRESSED      Post Acute Facilities  Other ([facility]) 11/14/18 1709   Concerns To Be Addressed  all concerns addressed in this encounter 07/31/17 1232    Reason For Consult  discharge planning 11/14/18 1709   Concerns Comments  -- (na) 07/31/17 1232    Record Reviewed  medical record 11/14/18 1709   DISCHARGE PLANNING      CTS Assigned to Case  Cherelle Holden 11/14/18 1709   Transportation Available  car (Pt.driving PTA) 11/13/18 1050    [facility] Name  [facility] 11/23/16 1520   FINAL RESOURCES      Primary Care MD Name  Dr NICOLE Shrestha 11/23/16 1519   Equipment Currently Used at Home  grab bar;raised toilet;shower chair;walker, rolling (4WW, RTS, shower chair, hand-held shower hose, grab bars) 11/13/18 1136    LIVING ENVIRONMENT     Resources List  [facility];Tele-Monitoring 07/31/17 1232    Lives With  spouse 11/14/18 1709   Other Resources  -- (na) 07/31/17 1232    Living Arrangements  house ([facility]) 11/14/18 1709   PAS Number  -- (na) 07/31/17 1232    Quality Of Family Relationships  supportive;involved 11/14/18  1709   ABUSE RISK SCREEN      ASSESSMENT OF FAMILY/SOCIAL SUPPORT     QUESTION TO PATIENT:  Has a member of your family or a partner(now or in the past) intimidated, hurt, manipulated, or controlled you in any way?  no 11/11/18 0919    Marital Status   11/14/18 1709   QUESTION TO PATIENT: Do you feel safe going back to the place where you are living?  yes 11/11/18 0919    Who is your support system?  ;Children 11/14/18 1709   OBSERVATION: Is there reason to believe there has been maltreatment of a vulnerable adult (ie. Physical/Sexual/Emotional abuse, self neglect, lack of adequate food, shelter, medical care, or financial exploitation)?  no 11/11/18 0919    Description of Support System  Supportive;Involved 11/14/18 1709   OTHER      Support Assessment  Adequate family and caregiver support 11/14/18 1709   Are you depressed or being treated for depression?  No 11/13/18 1344    EMPLOYMENT

## 2018-11-11 NOTE — IP AVS SNAPSHOT
` [facility] CARDIAC SPECIALTY CARE: [phone]            Medication Administration Report for Mckayla Oconnell as of 11/16/18 1115   Legend:    Given Hold Not Given Due Canceled Entry Other Actions    Time Time (Time) Time  Time-Action       Inactive    Active    Linked        Medications 11/10/18 11/11/18 11/12/18 11/13/18 11/14/18 11/15/18 11/16/18    acetaminophen (TYLENOL) tablet 975 mg  Dose: 975 mg  Freq: EVERY 8 HOURS Route: PO  Start: 11/14/18 2110   Admin Instructions: Alternate ibuprofen (if ordered) with acetaminophen.  Maximum acetaminophen dose from all sources = 75 mg/kg/day not to exceed 4 grams/day.    Admin. Amount: 3 tablet (3 × 325 mg tablet)  Last Admin: 11/16/18 0950  Dispense Loc:  [facility]         2032 (975 mg)-Given        (0606)-Not Given       (1422)-Not Given       (2151)-Not Given       2236 (975 mg)-Given [C]        (0532)-Not Given       0950 (975 mg)-Given       [ ] 1315       [ ] 2115           atropine 1 % ophthalmic solution 1-2 drop  Dose: 1-2 drop  Freq: EVERY 1 HOUR PRN Route: SL  PRN Reason: other  PRN Comment: secretions  Start: 11/14/18 1624   Admin. Amount: 1-2 drop  Dispense Loc:  Main Pharmacy  Volume: 5 mL               fluticasone (FLONASE) 50 MCG/ACT spray 1 spray  Dose: 1 spray  Freq: DAILY Route: BOTH NOSTRIL  Start: 11/14/18 1000   Admin. Amount: 1 spray  Last Admin: 11/16/18 0918  Dispense Loc:  Main Pharmacy         1108 (1 spray)-Given        (0933)-Not Given        0918 (1 spray)-Given           glucose gel 15-30 g  Dose: 15-30 g  Freq: EVERY 15 MIN PRN Route: PO  PRN Reason: low blood sugar  Start: 11/12/18 0848   Admin Instructions: Give 15 g for BG 51 to 69 mg/dL IF patient is conscious and able to swallow. Give 30 g for BG less than or equal to 50 mg/dL IF patient is conscious and able to swallow. Do NOT give glucose gel via enteral tube.  IF patient has enteral tube: give apple juice 120 mL (4 oz or 15 g of CHO) via enteral  tube for BG 51 to 69 mg/dL.  Give apple juice 240 mL (8 oz or 30 g of CHO) via enteral tube for BG less than or equal to 50 mg/dL.    ~Oral gel is preferable for conscious and able to swallow patient.   ~IF gel unavailable or patient refuses may provide apple juice 120 mL (4 oz or 15 g of CHO). Document juice on I and O flowsheet.    Admin. Amount: 15-30 g  Dispense Loc: [facility]  Volume: 93.75 mL                     Or  dextrose 50 % injection 25-50 mL  Dose: 25-50 mL  Freq: EVERY 15 MIN PRN Route: IV  PRN Reason: low blood sugar  Start: 11/12/18 0848   Admin Instructions: Use if have IV access, BG less than 70 mg/dL and meet dose criteria below:  Dose if conscious and alert (or disorientated) and NPO = 25 mL  Dose if unconscious / not alert = 50 mL  Vesicant. For ordered doses up to 25 g, give IV Push undiluted. Give each 5g over 1 minute.    Admin. Amount: 25-50 mL  Last Admin: 11/12/18 0851  Dispense Loc: [facility]  Infused Over: 1-5 Minutes  Volume: 50 mL       0851 (50 mL)-Given              Or  glucagon injection 1 mg  Dose: 1 mg  Freq: EVERY 15 MIN PRN Route: SC  PRN Reason: low blood sugar  PRN Comment: May repeat x 1 only  Start: 11/12/18 0848   Admin Instructions: May give SQ or IM. ONLY use glucagon IF patient has NO IV access AND is UNABLE to swallow AND blood glucose is LESS than or EQUAL to 50 mg/dL.  If ordered IV, give IV Push over 1 minute. Reconstitute with 1mL sterile water.    Admin. Amount: 1 mg  Dispense Loc: [facility]                      glycopyrrolate (ROBINUL) injection 0.2-0.4 mg  Dose: 0.2-0.4 mg  Freq: EVERY 4 HOURS PRN Route: IV  PRN Reason: other  PRN Comment: secretions  Start: 11/14/18 1624   Admin Instructions: For ordered IV doses 0.1-0.2 mg, give IV Push undiluted over 1-2 minutes.    Admin. Amount: 0.2-0.4 mg = 1-2 mL Conc: 0.2 mg/mL  Dispense Loc: [facility]  Infused Over: 1-2 Minutes  Volume: 2 mL               HYDROmorphone (DILAUDID) (HIGH CONC) oral solution 1-2  mg  Dose: 1-2 mg  Freq: EVERY 2 HOURS PRN Route: SL  PRN Reasons: moderate to severe pain,other  PRN Comment: SOB, dyspnea  Start: 11/14/18 1629   Admin Instructions: Injectable use ORALLY. CAUTION: Special high concentration formulation. Verify dose and volume before administration    Admin. Amount: 1-2 mg = 0.1-0.2 mL Conc: 4 mg/0.4 mL  Dispense Loc: [facility]  Volume: 0.4 mL               hypromellose-dextran (ARTIFICAL TEARS) 0.1-0.3 % ophthalmic solution 1 drop  Dose: 1 drop  Freq: EVERY 1 HOUR PRN Route: Both Eyes  PRN Reason: dry eyes  Start: 11/12/18 1117   Admin. Amount: 1 drop  Dispense Loc:  Main Pharmacy  Volume: 15 mL               Lidocaine (LIDOCARE) 4 % Patch 3 patch  Dose: 3 patch  Freq: EVERY 24 HOURS 0800 Route: TD  Start: 11/14/18 1648   Admin Instructions: Apply patch(s) to left shoulder and neck. To prevent lidocaine toxicity, patient should be patch free for 12 hrs daily. Patches may be cut to smaller size prior to removing release liner.  NEVER APPLY HEAT OVER PATCH which increases absorption and risk of local anesthetic toxicity. Do not apply over area where liposomal bupivacaine was injected for 96 hours post injection.    Admin. Amount: 3 patch  Last Admin: 11/15/18 1850  Dispense Loc: [facility]  Infused Over: 12 Hours         1733 (3 patch)-Given        1850 (3 patch)-Given        [ ] 1800           lidocaine patch in PLACE  Freq: EVERY 8 HOURS Route: TD  Start: 11/14/18 1800   Admin Instructions: Chart every shift, confirming that patch is still in place on patient (no barcode scan needed). See patch order for dose information.  NEVER APPLY HEAT OVER PATCH which will increase absorption and may lead to risk of local anesthetic toxicity. Do not apply over area where liposomal bupivacaine injected for 96 hours.    Last Admin: 11/16/18 0200  Dispense Loc: [facility]         1849 ( )-Patch in Place        0223 ( )-Patch in Place       0600 ( )-Patch Removed       1850 ( )-Patch  in Place        0200 ( )-Patch in Place       (0956)-Not Given [C]       [ ] 1800           lidocaine patch REMOVAL  Freq: EVERY 24 HOURS 2000 Route: TD  Start: 11/15/18 0600   Admin Instructions: Patient should have a 12 hour patch free interval    Dispense Loc: [facility]          0606 ( )-Patch Removed        0638 ( )-Patch Removed           menthol (ICY HOT) 5 % patch 1 patch  Dose: 1 patch  Freq: EVERY 8 HOURS PRN Route: Top  PRN Reason: muscle soreness  Start: 11/14/18 1850   Admin Instructions: Apply to Skin.  Remove 'old patch' and chart on Medication Patch Removal order when new patch is applied. Avoid placing heating pad over the patch.    Admin. Amount: 1 patch  Last Admin: 11/14/18 2036  Dispense Loc: [facility]         2036 (1 patch)-Given            And  menthol (ICY HOT) Patch in Place  Freq: EVERY 8 HOURS Route: TD  Start: 11/14/18 1900   Admin Instructions: Chart every shift, confirming that patch is still in place on patient (no barcode scan needed). See patch order for dose information.    Last Admin: 11/15/18 1354  Dispense Loc: [facility]         (2050)-Not Given [C]        0605 ( )-Patch in Place       1354 ( )-Patch in Place       (2220)-Not Given [C]        (0339)-Not Given [C]       [ ] 1230       [ ] 2030          And  menthol (ICY HOT) patch REMOVAL  Freq: EVERY 8 HOURS PRN Route: TD  PRN Comment: for patch removal  Start: 11/14/18 1850   Admin Instructions: Remove patch when new patch is applied or patch is discontinued.    Dispense Loc: [facility]               methyl salicylate-menthol ([place]) ointment  Freq: EVERY 6 HOURS PRN Route: Top  PRN Comment: neck pain  Start: 11/13/18 1111   Admin Instructions: Apply to back of neck/upper shoulders    Last Admin: 11/13/18 1552  Dispense Loc: [facility]        1341 ( )-Given       1552 ( )-Given              miconazole (MICATIN) 2 % cream  Freq: 2 TIMES DAILY Route: Top  Start: 11/11/18 2100   Admin Instructions:  Apply to right groin rash    Last Admin: 11/16/18 0945  Dispense Loc:  Main Pharmacy      1556 ( )-Given       2032 ( )-Given        1414 ( )-Given              2218 (1 each)-Given        (1148)-Not Given       1553 ( )-Given       2118 ( )-Given        0808 ( )-Given       2051 ( )-Given        0932 ( )-Given       2209 ( )-Given        0945 ( )-Given       [ ] 2100           naloxone (NARCAN) injection 0.1-0.4 mg  Dose: 0.1-0.4 mg  Freq: EVERY 2 MIN PRN Route: IV  PRN Reason: opioid reversal  Start: 11/11/18 1318   Admin Instructions: For respiratory rate LESS than or EQUAL to 8.  Partial reversal dose:  0.1 mg titrated q 2 minutes for Analgesia Side Effects Monitoring Sedation Level of 3 (frequently drowsy, arousable, drifts to sleep during conversation).Full reversal dose:  0.4 mg bolus for Analgesia Side Effects Monitoring Sedation Level of 4 (somnolent, minimal or no response to stimulation).  For ordered IV doses 0.1-2mg give IVP. Give each 0.4mg over 15 seconds in emergency situations. For non-emergent situations further dilute in 9mL of NS to facilitate titration of response.    Admin. Amount: 0.1-0.4 mg = 0.25-1 mL Conc: 0.4 mg/mL  Dispense Loc: [facility]  Volume: 1 mL               No lozenges or gum should be given while patient on BIPAP/AVAPS/AVAPS AE  Freq: CONTINUOUS PRN Route: XX  Start: 11/12/18 1117   [facility]               OLANZapine zydis (zyPREXA) ODT half-tab 2.5-5 mg  Dose: 2.5-5 mg  Freq: EVERY 6 HOURS PRN Route: PO  PRN Reason: agitation  Start: 11/14/18 1630   Admin Instructions: Combined IM and PO doses may significantly increase the risk of orthostatic hypotension at 30 mg per day or higher.  With dry hands, peel back foil backing and gently remove tablet; do not push oral disintegrating tablet through foil backing; administer immediately on tongue and oral disintegrating tablet dissolves in seconds; then swallow with saliva; liquid not required.    Admin.  Amount: 1-2 half-tab (1-2 × 2.5 mg half-tab)  Dispense Loc:  [facility]               ondansetron (ZOFRAN-ODT) ODT tab 4 mg  Dose: 4 mg  Freq: EVERY 6 HOURS PRN Route: PO  PRN Reasons: nausea,vomiting  Start: 11/11/18 1325   Admin Instructions: This is Step 1 of nausea and vomiting management.  If nausea not resolved in 15 minutes, go to Step 2 prochlorperazine (COMPAZINE). Do not push through foil backing. Peel back foil and gently remove. Place on tongue immediately. Administration with liquid unnecessary  With dry hands, peel back foil backing and gently remove tablet; do not push oral disintegrating tablet through foil backing; administer immediately on tongue and oral disintegrating tablet dissolves in seconds; then swallow with saliva; liquid not required.    Admin. Amount: 1 tablet (1 × 4 mg tablet)  Dispense Loc:  [facility]                     Or  ondansetron (ZOFRAN) injection 4 mg  Dose: 4 mg  Freq: EVERY 6 HOURS PRN Route: IV  PRN Reasons: nausea,vomiting  Start: 11/11/18 1325   Admin Instructions: This is Step 1 of nausea and vomiting management.  If nausea not resolved in 15 minutes, go to Step 2 prochlorperazine (COMPAZINE).  Irritant. For ordered IV doses 0.1-4 mg, give IV Push undiluted over 2-5 minutes.    Admin. Amount: 4 mg = 2 mL Conc: 4 mg/2 mL  Last Admin: 11/14/18 0437  Dispense Loc:  [facility]  Infused Over: 2-5 Minutes  Volume: 2 mL         0437 (4 mg)-Given             Patient may continue current oral medications  Freq: CONTINUOUS PRN Route: XX  Start: 11/12/18 1118   Dispense Loc: [facility] Floor Stock               prednisoLONE acetate (PRED FORTE) 1 % ophthalmic susp 1 drop  Dose: 1 drop  Freq: EVERY EVENING Route: RIGHT EYE  Start: 11/14/18 2000   Admin. Amount: 1 drop  Last Admin: 11/15/18 2231  Dispense Loc:  Main Pharmacy  Volume: 1 mL         2033 (1 drop)-Given        2231 (1 drop)-Given        [ ] 2000           senna-docusate (SENOKOT-S;PERICOLACE) 8.6-50 MG per tablet 1  tablet  Dose: 1 tablet  Freq: 2 TIMES DAILY PRN Route: PO  PRN Reason: constipation  Start: 18 1632   Admin. Amount: 1 tablet  Dispense Loc: [facility]              Future Medications  Medications 11/10/18 11/11/18 11/12/18 11/13/18 11/14/18 11/15/18 11/16/18       bisacodyl (DULCOLAX) Suppository 10 mg  Dose: 10 mg  Freq: ONCE PRN Route: RE  PRN Reason: other  PRN Comment: for no bowel movement for 72 hours  Start: 18 0000   Admin Instructions: Give only after rectal check for impacted stool.    Admin. Amount: 1 suppository (1 × 10 mg suppository)  Dispense Loc: [facility]  Administrations Remainin              Completed Medications  Medications 11/10/18 11/11/18 11/12/18 11/13/18 11/14/18 11/15/18 11/16/18         Dose: 100 mg  Freq: DAILY Route: PO  Start: 18 0915   End: 18 0807   Admin Instructions: IV to PO per pharmacy protocol    Admin. Amount: 1 tablet (1 × 100 mg tablet)  Last Admin: 18 0807  Dispense Loc: [facility]  Administrations Remainin        0915 (100 mg)-Given        0807 (100 mg)-Given            Discontinued Medications  Medications 11/10/18 11/11/18 11/12/18 11/13/18 11/14/18 11/15/18 11/16/18         Dose: 650 mg  Freq: EVERY 4 HOURS PRN Route: PO  PRN Reason: mild pain  Start: 18 1324   End: 18 1635   Admin Instructions: Alternate ibuprofen (if ordered) with acetaminophen.  Maximum acetaminophen dose from all sources = 75 mg/kg/day not to exceed 4 grams/day.    Admin. Amount: 2 tablet (2 × 325 mg tablet)  Last Admin: 18 1310  Dispense Loc: [facility]        0249 (650 mg)-Given       1438 (650 mg)-Given        0902 (650 mg)-Given       1310 (650 mg)-Given       1635-Med Discontinued           Dose: 81 mg  Freq: DAILY Route: PO  Start: 18 1345   End: 18 1637   Admin. Amount: 1 tablet (1 × 81 mg tablet)  Last Admin: 18 0807  Dispense Loc:  [facility]      ()-Not Given        (909)-Not Given [C]        0959 (81  mg)-Given        0807 (81 mg)-Given       1637-Med Discontinued           Dose: 1,900 mg  Freq: DAILY BEFORE LUNCH Route: PO  Start: 18 1130   End: 18   Admin. Amount: 2 tablet (2 × 950 mg tablet)  Last Admin: 18 1237  Dispense Loc:  [facility]         1237 (1,900 mg)-Given       1637-Med Discontinued           Dose: 200 mg  Freq: DAILY Route: IV  Last Dose: 200 mg (18 1251)  Start: 18 1200   End: 18   Admin. Amount: 200 mg  Last Admin: 18 1251  Dispense Loc:  Main Pharmacy  Infused Over: 30 Minutes  Administrations Remainin  Volume: 110 mL   Mixture Administration Information:   Medication Type Amount   iron sucrose 20 MG/ML SOLN Medications 200 mg   sodium chloride 0.9 % SOLN Base 100 mL                 1251 (200 mg)-New Bag       1637-Med Discontinued           Dose: 112 mcg  Freq: EVERY MORNING BEFORE BREAKFAST Route: PO  Start: 18 1015   End: 11/15/18 1641   Admin Instructions: Separate oral administration of iron- or calcium-containing products and levothyroxine by at least 4 hours.    Admin. Amount: 1 tablet (1 × 112 mcg tablet)  Last Admin: 11/15/18 0932  Dispense Loc:  [facility]         1108 (112 mcg)-Given        0932 (112 mcg)-Given       1641-Med Discontinued          Dose: 3 patch  Freq: EVERY 24 HOURS 0800 Route: TD  Start: 18   End: 18   Admin Instructions: Apply patch(s) to left shoulder and neck. To prevent lidocaine toxicity, patient should be patch free for 12 hrs daily. Patches may be cut to smaller size prior to removing release liner.  NEVER APPLY HEAT OVER PATCH which increases absorption and risk of local anesthetic toxicity. Do not apply over area where liposomal bupivacaine was injected for 96 hours post injection.    Admin. Amount: 3 patch  Dispense Loc:  [facility]  Infused Over: 12 Hours         8-Med Discontinued                 Dose: 1 mg  Freq: AT BEDTIME PRN Route: PO  PRN Reason:  sleep  Start: 11/11/18 1318   End: 11/15/18 1641   Admin Instructions: Do not give unless at least 6 hours of uninterrupted sleep is expected.    Admin. Amount: 1 tablet (1 × 1 mg tablet)  Last Admin: 11/14/18 2242  Dispense Loc:  [facility]         2242 (1 mg)-Given        1641-Med Discontinued          Dose: 1 tablet  Freq: 2 TIMES DAILY Route: PO  Start: 11/14/18 2100   End: 11/14/18 1632   Admin. Amount: 1 tablet         1632-Med Discontinued           Rate: 50 mL/hr   Freq: CONTINUOUS Route: IV  Start: 11/13/18 1100   End: 11/14/18 1637   Last Admin: 11/14/18 0754  Dispense Loc: [facility] Floor Stock  Volume: 1,000 mL        1152 ( )-New Bag        0754 ( )-New Bag       1637-Med Discontinued           Dose: 10 mg  Freq: DAILY Route: PO  Start: 11/14/18 1000   End: 11/14/18 1328   Admin. Amount: 1 tablet (1 × 10 mg tablet)  Last Admin: 11/14/18 1108  Dispense Loc: [facility]         1108 (10 mg)-Given       1328-Med Discontinued      Medications 11/10/18 11/11/18 11/12/18 11/13/18 11/14/18 11/15/18 11/16/18

## 2018-11-11 NOTE — CONSULTS
Cardiology Consultation      Mckayla Oconnell MRN# [id number]   YOB: 1927 Age: 89 year old   Date of Admission: 11/11/2018     Reason for consult: NICM           Assessment and Plan:     1. NICM with LVEF 30-35%  2. Moderate MR/AS  3. Nausea with a possible component of CHF    PLAN  - Agree with IV lasix x 1 and checking echo  - hopefully her mental status will improve to the point where a more comprehensive history can be taken  - will follow             Chief Complaint:   Nausea (since last night, pt has not had any episodes of vomiting, received 4mg of Zofran Sublingual via EMS.  )           History of Present Illness:   This patient is a 89 year old female who is followed by Dr. Scott in our cardiology clinic.  She has a history of a nonischemic cardiomyopathy (no CAD on cor angio 2016) with severely reduced left ventricular systolic function and an ejection fraction of 30-35% on her most recent echocardiogram from April of this year.  She also has a history of moderate mitral regurgitation and moderate aortic stenosis.  She has actually undergone a structural heart evaluation for potential TAVR.  This included a dobutamine stress echo that concluded that her aortic stenosis was most likely moderate.  She has undergone biventricular pacemaker implantation in 2017.    She was recently seen by Chayo Faith PA-C in our clinic on 11/6/2018.  At that time she appeared to be stable from a cardiac standpoint but was complaining of severe neck discomfort that prompted referral to the emergency department.  Her evaluation there was essentially unremarkable.    Today, however, she was admitted to [facility] with complaints of severe nausea.  In the emergency department she was noted to be hypoxic and therefore received intravenous Lasix x1.  Cardiac troponins were minimally elevated and cardiac consultation was requested.    The patient at this time is unable to provide any meaningful  "history. She is somnolent and states that she \"feels lousy\". She denies any chest discomfort.              Physical Exam:   Vitals were reviewed  Blood pressure 125/61, pulse 70, temperature 96.7  F (35.9  C), temperature source Temporal, resp. rate 14, SpO2 95 %, not currently breastfeeding.  Temperatures:  Current - Temp:  (unable to get thermometer to register orally or axillary); Max - Temp  Av.7  F (35.9  C)  Min: 96.7  F (35.9  C)  Max: 96.7  F (35.9  C)  Respiration range: Resp  Avg: 15  Min: 14  Max: 16  Pulse range: Pulse  Av  Min: 70  Max: 70  Blood pressure range: Systolic (24hrs), Av , Min:113 , Max:141   ; Diastolic (24hrs), Av, Min:55, Max:74    Pulse oximetry range: SpO2  Av.1 %  Min: 91 %  Max: 98 %  No intake or output data in the 24 hours ending 18 1410  Constitutional:   Eyes closed, states feels \"lousy\"     Eyes:   Lids and lashes normal, pupils equal, round and reactive to light, extra ocular muscles intact, sclera clear, conjunctiva normal     Neck:   JVP elevated at 7 cm above sternal angle     Back:   symmetric     Lungs:   No increased work of breathing, good air exchange, clear to auscultation bilaterally, no crackles or wheezing       Cardiovascular:   Normal apical impulse, regular rate and rhythm, normal S1 and S2, no S3 or S4, and no murmur noted.        Abdomen:   non-tender     Musculoskeletal:   motor strength is 5 out of 5 all extremities bilaterally     Neurologic:   Grossly nonfocal     Skin:   no bruising or bleeding     Additional findings:     +2 BLE edema               Past Medical History:   I have reviewed this patient's past medical history  Past Medical History:   Diagnosis Date     Aortic stenosis      CAD (coronary artery disease)     angiogram 2016: non-obstructive, CT calcium hqyje=779 May 2013     Cardiomyopathy, idiopathic (H)     cardiomyopathy HTN vs viral; EF as low as 15% in ; CRT-P implanted 17     CHF (NYHA class II, " ACC/AHA stage C) (H) 2/14/2013     Glaucoma      H/O angiography 9-1-2016    No angiographic evidence of obstructive coronary artery disease.     Hypertension      Hypothyroidism 2/14/2013     Nonrheumatic mitral valve regurgitation      Nonrheumatic tricuspid valve regurgitation      Pulmonary hypertension (H)              Past Surgical History:   I have reviewed this patient's past surgical history  Past Surgical History:   Procedure Laterality Date     CARDIAC CATHERIZATION  9/1/16     GYN SURGERY      Hysterectomy 1995     HYSTERECTOMY, PAP NO LONGER INDICATED       IMPLANT PACEMAKER  1/19/17    CRT-P     ORTHOPEDIC SURGERY      knee replacement 1998     ORTHOPEDIC SURGERY      Knee replacement 2008     ORTHOPEDIC SURGERY      knee surgery [place]               Social History:   I have reviewed this patient's social history  Social History   Substance Use Topics     Smoking status: Never Smoker     Smokeless tobacco: Never Used     Alcohol use No             Family History:   I have reviewed this patient's family history  Family History   Problem Relation Age of Onset     Cancer Mother      Uterius     Breast Cancer Daughter              Allergies:     Allergies   Allergen Reactions     Atenolol Anaphylaxis     Hydrochlorothiazide      hyponatremia     Pollen Extract              Medications:   I have reviewed this patient's current medications  Prescriptions Prior to Admission   Medication Sig Dispense Refill Last Dose     carvedilol (COREG) 25 MG tablet TAKE 1 TABLET TWICE A DAY WITH MEALS 180 tablet 2 Taking     fluticasone (FLONASE) 50 MCG/ACT spray 1 to 2 sprays in both nostrils once daily as needed for rhinitis or allergies 16 g 10 Taking     hydrALAZINE (APRESOLINE) 25 MG tablet Take 1 tablet (25 mg) by mouth 3 times daily 270 tablet 3 Taking     isosorbide mononitrate (IMDUR) 30 MG 24 hr tablet 1/2 tablet daily AT NIGHT 45 tablet 3 Taking     levothyroxine (SYNTHROID/LEVOTHROID) 112 MCG tablet TAKE 1 TABLET  DAILY 90 tablet 2 Taking     prednisoLONE acetate (PRED FORTE) 1 % ophthalmic suspension Place 1 drop into the right eye every evening  1 Bottle  Taking     simvastatin (ZOCOR) 20 MG tablet Take 1 tablet (20 mg) by mouth At Bedtime 90 tablet 3 Taking     torsemide (DEMADEX) 5 MG tablet Take 2 tablets (10 mg) by mouth daily 60 tablet 11      traZODone (DESYREL) 50 MG tablet Take 1/2-1 tablet by mouth nightly as needed for sleep 30 tablet 0 Taking     aspirin 81 MG tablet Take 81 mg by mouth daily   Taking     calcium citrate (CALCITRATE) 950 MG tablet Take 2 tablets by mouth daily At Noon   Taking     Iron TABS Take 324 mg by mouth daily Patient states she takes OTC iron furrous gluconate 324 mg tablets USP   Taking     Lutein 20 MG CAPS Take 20 mg by mouth daily   Taking     MELATONIN PO Take 3 mg by mouth nightly as needed   Taking     traMADol (ULTRAM) 50 MG tablet Take 1 tablet (50 mg) by mouth every 6 hours as needed for severe pain 12 tablet 0      VITAMIN D, CHOLECALCIFEROL, PO Take 2,000 Units by mouth daily    Taking     Current Facility-Administered Medications Ordered in Epic   Medication Dose Route Frequency Last Rate Last Dose     acetaminophen (TYLENOL) tablet 650 mg  650 mg Oral Q4H PRN         aspirin tablet 81 mg  81 mg Oral Daily         melatonin tablet 1 mg  1 mg Oral At Bedtime PRN         miconazole (MICATIN) 2 % cream   Topical BID         naloxone (NARCAN) injection 0.1-0.4 mg  0.1-0.4 mg Intravenous Q2 Min PRN         ondansetron (ZOFRAN-ODT) ODT tab 4 mg  4 mg Oral Q6H PRN        Or     ondansetron (ZOFRAN) injection 4 mg  4 mg Intravenous Q6H PRN         No current [facility]-ordered outpatient prescriptions on file.             Review of Systems:   The 10 point Review of Systems is negative other than noted in the HPI            Data:   All laboratory data reviewed  Results for orders placed or performed during the hospital encounter of 11/11/18 (from the past 24 hour(s))   CBC with platelets +  differential   Result Value Ref Range    WBC 4.7 4.0 - 11.0 10e9/L    RBC Count 3.38 (L) 3.8 - 5.2 10e12/L    Hemoglobin 10.2 (L) 11.7 - 15.7 g/dL    Hematocrit 31.7 (L) 35.0 - 47.0 %    MCV 94 78 - 100 fl    MCH 30.2 26.5 - 33.0 pg    MCHC 32.2 31.5 - 36.5 g/dL    RDW 15.7 (H) 10.0 - 15.0 %    Platelet Count 113 (L) 150 - 450 10e9/L    Diff Method Automated Method     % Neutrophils 83.1 %    % Lymphocytes 8.2 %    % Monocytes 7.0 %    % Eosinophils 1.3 %    % Basophils 0.2 %    % Immature Granulocytes 0.2 %    Nucleated RBCs 0 0 /100    Absolute Neutrophil 3.9 1.6 - 8.3 10e9/L    Absolute Lymphocytes 0.4 (L) 0.8 - 5.3 10e9/L    Absolute Monocytes 0.3 0.0 - 1.3 10e9/L    Absolute Eosinophils 0.1 0.0 - 0.7 10e9/L    Absolute Basophils 0.0 0.0 - 0.2 10e9/L    Abs Immature Granulocytes 0.0 0 - 0.4 10e9/L    Absolute Nucleated RBC 0.0    Troponin I   Result Value Ref Range    Troponin I ES 0.021 0.000 - 0.045 ug/L   Comprehensive metabolic panel   Result Value Ref Range    Sodium 132 (L) 133 - 144 mmol/L    Potassium 5.3 3.4 - 5.3 mmol/L    Chloride 100 94 - 109 mmol/L    Carbon Dioxide 27 20 - 32 mmol/L    Anion Gap 5 3 - 14 mmol/L    Glucose 83 70 - 99 mg/dL    Urea Nitrogen 46 (H) 7 - 30 mg/dL    Creatinine 1.46 (H) 0.52 - 1.04 mg/dL    GFR Estimate 34 (L) >60 mL/min/1.7m2    GFR Estimate If Black 41 (L) >60 mL/min/1.7m2    Calcium 9.2 8.5 - 10.1 mg/dL    Bilirubin Total 0.2 0.2 - 1.3 mg/dL    Albumin 3.3 (L) 3.4 - 5.0 g/dL    Protein Total 6.9 6.8 - 8.8 g/dL    Alkaline Phosphatase 72 40 - 150 U/L    ALT 49 0 - 50 U/L    AST 34 0 - 45 U/L   Lipase   Result Value Ref Range    Lipase 112 73 - 393 U/L   UA with Microscopic   Result Value Ref Range    Color Urine Yellow     Appearance Urine Clear     Glucose Urine Negative NEG^Negative mg/dL    Bilirubin Urine Negative NEG^Negative    Ketones Urine Negative NEG^Negative mg/dL    Specific Gravity Urine 1.013 1.003 - 1.035    Blood Urine Negative NEG^Negative    pH Urine  5.0 5.0 - 7.0 pH    Protein Albumin Urine 10 (A) NEG^Negative mg/dL    Urobilinogen mg/dL Normal 0.0 - 2.0 mg/dL    Nitrite Urine Negative NEG^Negative    Leukocyte Esterase Urine Negative NEG^Negative    Source Catheterized Urine     WBC Urine <1 0 - 5 /HPF    RBC Urine 0 0 - 2 /HPF    Hyaline Casts 2 0 - 2 /LPF   Nt probnp inpatient   Result Value Ref Range    N-Terminal Pro BNP Inpatient 6182 (H) 0 - 1800 pg/mL   Chest XR,  PA & LAT    Narrative    CHEST TWO VIEWS   11/11/2018 10:15 AM     HISTORY: Nausea.     COMPARISON: 1/19/2017      Impression    IMPRESSION: Lungs are slightly hypoinflated. Patchy bilateral  perihilar and right greater than left basilar opacities are present  which may represent edema, aspiration, or infection. Probable trace  right pleural effusion. Cardiac silhouette is enlarged, unchanged.  Triple lead cardiac pacing device is in unchanged position. Upper  lumbar (approximate L1) compression fracture is present with  approximately 80% height loss, new compared to 1/19/2017.    BREE CORDERO MD

## 2018-11-11 NOTE — ED NOTES
Bed: ED24  Expected date: 11/11/18  Expected time: 9:03 AM  Means of arrival: Ambulance  Comments:  516 89f nausea  Eta 0942

## 2018-11-11 NOTE — IP AVS SNAPSHOT
"[facility] CARD[facility]AC [facility]ARE: [phone]                                              INTERAGENCY TRANSFER FORM - PHYSICIAN ORDERS   2018                    Hospital Admission Date: 2018  VICKEY CHURCH   : 1927  Sex: Female        Attending Provider: Edilberto Mack DO     Allergies:  Atenolol, Hydrochlorothiazide, Pollen Extract    Infection:  None   Service:  HOSPITALIST    Ht:  1.6 m (5' 3\")   Wt:  82.9 kg (182 lb 12.2 oz)   Admission Wt:  81.1 kg (178 lb 12.8 oz)    BMI:  32.37 kg/m 2   BSA:  1.92 m 2            Patient PCP Information     Provider PCP Type    Ander Shrestha MD General      ED Clinical Impression     Diagnosis Description Comment Added By Time Added    Acute on chronic congestive heart failure, unspecified heart failure type (H) [I50.9] Acute on chronic congestive heart failure, unspecified heart failure type (H) [I50.9]  Erasmo Carrion DO 2018 11:34 AM    Nausea [R11.0] Nausea [R11.0]  Erasmo Carrion DO 2018 11:34 AM    Acute pulmonary edema (H) [J81.0] Acute pulmonary edema (H) [J81.0]  Erasmo Carrion,  2018  5:51 PM      Hospital Problems as of 2018              Priority Class Noted POA    Acute on chronic systolic (congestive) heart failure (H) Medium  2018 Yes      Non-Hospital Problems as of 2018              Priority Class Noted    Pure hypercholesterolemia Medium  2013    Hypothyroidism, unspecified type Medium  2013    Health Care Home Medium  2013    Cardiomyopathy, idiopathic (H) Medium  Unknown    Nonrheumatic aortic valve stenosis Medium  Unknown    Glaucoma Medium  Unknown    Essential hypertension, benign Medium  2015    Coronary artery disease involving native coronary artery of native heart without angina pectoris Medium  2015    Iron deficiency anemia, unspecified Medium  2016    Iron deficiency " anemia secondary to inadequate dietary iron intake Medium  7/19/2016    Drug-induced malabsorption Medium  7/19/2016    Iron and its compounds causing adverse effect in therapeutic use(E934.0) Medium  7/19/2016    Anemia in chronic kidney disease(285.21) Medium  7/19/2016    Chronic kidney disease, stage III (moderate) (H) Medium  7/19/2016    Hyponatremia Medium  8/5/2016    Fluid overload Medium  8/15/2016    Hypoxia Medium  8/15/2016    Peripheral edema Medium  8/17/2016    Anemia, unspecified Medium  9/6/2016    Malabsorption Medium  9/6/2016    Adverse effect of iron Medium  9/6/2016    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) Medium  9/6/2016    Chronic systolic heart failure (H) Medium  11/23/2016    Nonrheumatic tricuspid valve regurgitation Medium  Unknown    Nonrheumatic mitral valve regurgitation Medium  Unknown    Seasonal allergic rhinitis, unspecified chronicity, unspecified trigger Medium  4/19/2018    Acute right-sided low back pain without sciatica Medium  8/29/2018      Code Status History     Date Active Date Inactive Code Status Order ID Comments User Context    11/15/2018  5:00 PM  DNR/DNI [id number] POLST signed and on paper chart Amy Curran MD Outpatient    11/11/2018  1:43 PM 11/15/2018  5:00 PM DNR/DNI [id number] Verified with Edilberto Mack DO Inpatient    11/14/2016  9:05 PM 11/23/2016  9:20 PM DNR/DNI [id number]  Daron Arvizu MD Inpatient    8/13/2016  2:18 PM 11/14/2016  9:05 PM DNR/DNI [id number]  Daquan Yeager MD Outpatient    8/5/2016  8:43 PM 8/13/2016  2:18 PM DNR/DNI [id number]  Ramon Cole MD Inpatient    6/1/2016  2:11 PM 8/5/2016  8:43 PM DNR/DNI [id number]  Cruz Boucher MD Outpatient    5/30/2016 11:12 AM 6/1/2016  2:11 PM DNR/DNI [id number]  Daron Arvizu MD Inpatient    8/22/2013  1:19 PM 5/30/2016 11:12 AM DNR/DNI [id number]  Khris Gimenez PA Outpatient    8/18/2013  4:47 PM  8/22/2013  1:19 PM DNR/DNI [id number]  Pia Malagon RN Inpatient         Medication Review      START taking        Dose / Directions Comments    acetaminophen 650 MG Suppository   Commonly known as:  TYLENOL   Used for:  End of life care        Dose:  650 mg   Place 1 suppository (650 mg) rectally every 4 hours as needed for fever or mild pain (Do not exceed 4000 mg total acetaminophen per day.) Unwrap prior to insertion.   Quantity:  12 suppository   Refills:  1        bisacodyl 10 MG Suppository   Commonly known as:  DULCOLAX   Used for:  End of life care        Unwrap and insert 1 suppository rectally twice daily as needed for constipation.   Quantity:  12 suppository   Refills:  1        haloperidol 0.5 MG tablet   Commonly known as:  HALDOL   Used for:  End of life care        Dose:  0.5-1 mg   Take 1-2 tablets (0.5-1 mg) by mouth, place under tongue or insert rectally every 6 hours as needed for agitation (nausea)   Quantity:  30 tablet   Refills:  1        HYDROmorphone (HIGH CONC) 10 mg/mL Liqd oral   Commonly known as:  DILAUDID   Used for:  End of life care        Dose:  1-2 mg   Take 0.1-0.2 mLs (1-2 mg) by mouth or place under tongue every 2 hours as needed for moderate to severe pain (and/or??shortness of breath)   Quantity:  30 mL   Refills:  0        LORazepam 0.5 MG tablet   Commonly known as:  ATIVAN   Used for:  End of life care        Dose:  0.25-0.5 mg   Take 0.5-1 tablets (0.25-0.5 mg) by mouth, place under tongue or insert rectally every 4 hours as needed for anxiety (restlessness)   Quantity:  30 tablet   Refills:  1        MEDICATION INSTRUCTION   Used for:  End of life care        If care facility cannot accept or use ranges, facility is instructed to use lower end of dosing range   Refills:  0        sennosides 8.6 MG tablet   Commonly known as:  SENOKOT   Used for:  End of life care        Dose:  1-2 tablet   Take 1-2 tablets by mouth 2 times daily   Quantity:  30 tablet   Refills:   1          CONTINUE these medications which have NOT CHANGED        Dose / Directions Comments    prednisoLONE acetate 1 % ophthalmic susp   Commonly known as:  PRED FORTE        Dose:  1 drop   Place 1 drop into the right eye every evening   Quantity:  1 Bottle   Refills:  0          STOP taking     aspirin 81 MG tablet           calcium citrate 950 MG tablet   Commonly known as:  CALCITRATE           carvedilol 25 MG tablet   Commonly known as:  COREG           fluticasone 50 MCG/ACT spray   Commonly known as:  FLONASE           hydrALAZINE 25 MG tablet   Commonly known as:  APRESOLINE           isosorbide mononitrate 30 MG 24 hr tablet   Commonly known as:  IMDUR           levothyroxine 112 MCG tablet   Commonly known as:  SYNTHROID/LEVOTHROID           Lutein 20 MG Caps           MELATONIN PO           simvastatin 20 MG tablet   Commonly known as:  ZOCOR           torsemide 5 MG tablet   Commonly known as:  DEMADEX           traMADol 50 MG tablet   Commonly known as:  ULTRAM           traZODone 50 MG tablet   Commonly known as:  DESYREL           VITAMIN D (CHOLECALCIFEROL) PO                   Summary of Visit     Reason for your hospital stay       Heart failure, renal failure             After Care     Activity       Your activity upon discharge: activity as tolerated       Diet       Follow this diet upon discharge: Combination Diet Dysphagia Diet Level 2: Mechan Altered; Nectar Thickened Liquids (pre-thickened or use instant food thickener) (No straws)             Follow-Up Appointment Instructions     Future Labs/Procedures    Follow-up and recommended labs and tests      Comments:    [facility] enrollment on 11/16/18      Follow-Up Appointment Instructions     Follow-up and recommended labs and tests        [facility] enrollment on 11/16/18             Statement of Approval     Ordered          11/16/18 0931  I have reviewed and agree with all the recommendations and orders detailed in this  document.  EFFECTIVE NOW     Approved and electronically signed by:  Trevor Grace MD

## 2018-11-11 NOTE — IP AVS SNAPSHOT
MRN:[id number]                      After Visit Summary   11/11/2018    Mckayla Oconnell    MRN: [id number]           Thank you!     Thank you for choosing [facility] for your care. Our goal is always to provide you with excellent care. Hearing back from our patients is one way we can continue to improve our services. Please take a few minutes to complete the written survey that you may receive in the mail after you visit with us. Thank you!        Patient Information     Date Of Birth          7/2/1927        Designated Caregiver       Most Recent Value    Caregiver    Will someone help with your care after discharge? no      About your hospital stay     You were admitted on:  November 11, 2018 You last received care in the:  [facility]    You were discharged on:  November 16, 2018        Reason for your hospital stay       Heart failure, renal failure                  Who to Call     For medical emergencies, please call 911.  For non-urgent questions about your medical care, please call your primary care provider or clinic, [phone]          Attending Provider     Provider Specialty    Erasmo Carrion,  Emergency Medicine    Edilberto Mack, DO HOSPITALIST       Primary Care Provider Office Phone # Fax #    Ander Shrestha [phone]      After Care Instructions     Activity       Your activity upon discharge: activity as tolerated            Diet       Follow this diet upon discharge: Combination Diet Dysphagia Diet Level 2: Mechan Altered; Nectar Thickened Liquids (pre-thickened or use instant food thickener) (No straws)                  Follow-up Appointments     Follow-up and recommended labs and tests        [facility] enrollment on 11/16/18                  Pending Results     Date and Time Order Name Status Description    11/11/2018 1343 Blood culture Preliminary     11/11/2018 1343 Blood culture  Preliminary             Statement of Approval     Ordered          11/16/18 0931  I have reviewed and agree with all the recommendations and orders detailed in this document.  EFFECTIVE NOW     Approved and electronically signed by:  Trevor Grace MD             Admission Information     Date & Time Provider Department Dept. Phone    11/11/2018 Edilberto Mack DO [facility] [phone]      Your Vitals Were     Blood Pressure Pulse Temperature Respirations Weight Pulse Oximetry    111/64 (BP Location: Right arm) 70 97.5  F (36.4  C) (Oral) 16 82.9 kg (182 lb 12.2 oz) 96%    BMI (Body Mass Index)                   32.37 kg/m2           MyChart Information     [employer] gives you secure access to your electronic health record. If you see a primary care provider, you can also send messages to your care team and make appointments. If you have questions, please call your primary care clinic.  If you do not have a primary care provider, please call [phone] and they will assist you.        Care EveryWhere ID     This is your Care EveryWhere ID. This could be used by other organizations to access your [place] medical records  [id number]        Equal Access to Services     TRISTAN SHEPARD : Hadii juan pablo Lainez, feng miller, qamisa hernandez, brooklyn santana . So [facility] [phone].    ATENCIÓN: Si habla español, tiene a villalba disposición servicios gratuitos de asistencia lingüística. [facility] [phone].    We comply with applicable federal civil rights laws and [place] laws. We do not discriminate on the basis of race, color, national origin, age, disability, sex, sexual orientation, or gender identity.               Review of your medicines      START taking        Dose / Directions    acetaminophen 650 MG Suppository   Commonly known as:  TYLENOL   Used for:  End of life care        Dose:  650 mg   Place 1 suppository (650  mg) rectally every 4 hours as needed for fever or mild pain (Do not exceed 4000 mg total acetaminophen per day.) Unwrap prior to insertion.   Quantity:  12 suppository   Refills:  1       bisacodyl 10 MG Suppository   Commonly known as:  DULCOLAX   Used for:  End of life care        Unwrap and insert 1 suppository rectally twice daily as needed for constipation.   Quantity:  12 suppository   Refills:  1       haloperidol 0.5 MG tablet   Commonly known as:  HALDOL   Used for:  End of life care        Dose:  0.5-1 mg   Take 1-2 tablets (0.5-1 mg) by mouth, place under tongue or insert rectally every 6 hours as needed for agitation (nausea)   Quantity:  30 tablet   Refills:  1       HYDROmorphone (HIGH CONC) 10 mg/mL Liqd oral   Commonly known as:  DILAUDID   Used for:  End of life care        Dose:  1-2 mg   Take 0.1-0.2 mLs (1-2 mg) by mouth or place under tongue every 2 hours as needed for moderate to severe pain (and/or??shortness of breath)   Quantity:  30 mL   Refills:  0       LORazepam 0.5 MG tablet   Commonly known as:  ATIVAN   Used for:  End of life care        Dose:  0.25-0.5 mg   Take 0.5-1 tablets (0.25-0.5 mg) by mouth, place under tongue or insert rectally every 4 hours as needed for anxiety (restlessness)   Quantity:  30 tablet   Refills:  1       MEDICATION INSTRUCTION   Used for:  End of life care        If care facility cannot accept or use ranges, facility is instructed to use lower end of dosing range   Refills:  0       sennosides 8.6 MG tablet   Commonly known as:  SENOKOT   Used for:  End of life care        Dose:  1-2 tablet   Take 1-2 tablets by mouth 2 times daily   Quantity:  30 tablet   Refills:  1         CONTINUE these medicines which have NOT CHANGED        Dose / Directions    prednisoLONE acetate 1 % ophthalmic susp   Commonly known as:  PRED FORTE        Dose:  1 drop   Place 1 drop into the right eye every evening   Quantity:  1 Bottle   Refills:  0         STOP taking     aspirin  81 MG tablet           calcium citrate 950 MG tablet   Commonly known as:  CALCITRATE           carvedilol 25 MG tablet   Commonly known as:  COREG           fluticasone 50 MCG/ACT spray   Commonly known as:  FLONASE           hydrALAZINE 25 MG tablet   Commonly known as:  APRESOLINE           isosorbide mononitrate 30 MG 24 hr tablet   Commonly known as:  IMDUR           levothyroxine 112 MCG tablet   Commonly known as:  SYNTHROID/LEVOTHROID           Lutein 20 MG Caps           MELATONIN PO           simvastatin 20 MG tablet   Commonly known as:  ZOCOR           torsemide 5 MG tablet   Commonly known as:  DEMADEX           traMADol 50 MG tablet   Commonly known as:  ULTRAM           traZODone 50 MG tablet   Commonly known as:  DESYREL           VITAMIN D (CHOLECALCIFEROL) PO                Where to get your medicines      Some of these will need a paper prescription and others can be bought over the counter. Ask your nurse if you have questions.     Bring a paper prescription for each of these medications     acetaminophen 650 MG Suppository    bisacodyl 10 MG Suppository    haloperidol 0.5 MG tablet    HYDROmorphone (HIGH CONC) 10 mg/mL Liqd oral    LORazepam 0.5 MG tablet    sennosides 8.6 MG tablet       You don't need a prescription for these medications     MEDICATION INSTRUCTION                Protect others around you: Learn how to safely use, store and throw away your medicines at www.disposemymeds.org.        Information about OPIOIDS     PRESCRIPTION OPIOIDS: WHAT YOU NEED TO KNOW   We gave you an opioid (narcotic) pain medicine. It is important to manage your pain, but opioids are not always the best choice. You should first try all the other options your care team gave you. Take this medicine for as short a time (and as few doses) as possible.    Some activities can increase your pain, such as bandage changes or therapy sessions. It may help to take your pain medicine 30 to 60 minutes before these  activities. Reduce your stress by getting enough sleep, working on hobbies you enjoy and practicing relaxation or meditation. Talk to your care team about ways to manage your pain beyond prescription opioids.    These medicines have risks:    DO NOT drive when on new or higher doses of pain medicine. These medicines can affect your alertness and reaction times, and you could be arrested for driving under the influence (DUI). If you need to use opioids long-term, talk to your care team about driving.    DO NOT operate heavy machinery    DO NOT do any other dangerous activities while taking these medicines.    DO NOT drink any alcohol while taking these medicines.     If the opioid prescribed includes acetaminophen, DO NOT take with any other medicines that contain acetaminophen. Read all labels carefully. Look for the word  acetaminophen  or  Tylenol.  Ask your pharmacist if you have questions or are unsure.    You can get addicted to pain medicines, especially if you have a history of addiction (chemical, alcohol or substance dependence). Talk to your care team about ways to reduce this risk.    All opioids tend to cause constipation. Drink plenty of water and eat foods that have a lot of fiber, such as fruits, vegetables, prune juice, apple juice and high-fiber cereal. Take a laxative (Miralax, milk of magnesia, Colace, Senna) if you don t move your bowels at least every other day. Other side effects include upset stomach, sleepiness, dizziness, throwing up, tolerance (needing more of the medicine to have the same effect), physical dependence and slowed breathing.    Store your pills in a secure place, locked if possible. We will not replace any lost or stolen medicine. If you don t finish your medicine, please throw away (dispose) as directed by your pharmacist. The [place] [employer] has more information about safe disposal: https://www.pca.[facility].[place].us/living-green/managing-unwanted-medications              Medication List: This is a list of all your medications and when to take them. Check marks below indicate your daily home schedule. Keep this list as a reference.      Medications           Morning Afternoon Evening Bedtime As Needed    acetaminophen 650 MG Suppository   Commonly known as:  TYLENOL   Place 1 suppository (650 mg) rectally every 4 hours as needed for fever or mild pain (Do not exceed 4000 mg total acetaminophen per day.) Unwrap prior to insertion.                                bisacodyl 10 MG Suppository   Commonly known as:  DULCOLAX   Unwrap and insert 1 suppository rectally twice daily as needed for constipation.                                haloperidol 0.5 MG tablet   Commonly known as:  HALDOL   Take 1-2 tablets (0.5-1 mg) by mouth, place under tongue or insert rectally every 6 hours as needed for agitation (nausea)                                HYDROmorphone (HIGH CONC) 10 mg/mL Liqd oral   Commonly known as:  DILAUDID   Take 0.1-0.2 mLs (1-2 mg) by mouth or place under tongue every 2 hours as needed for moderate to severe pain (and/or??shortness of breath)                                LORazepam 0.5 MG tablet   Commonly known as:  ATIVAN   Take 0.5-1 tablets (0.25-0.5 mg) by mouth, place under tongue or insert rectally every 4 hours as needed for anxiety (restlessness)                                MEDICATION INSTRUCTION   If care facility cannot accept or use ranges, facility is instructed to use lower end of dosing range                                prednisoLONE acetate 1 % ophthalmic susp   Commonly known as:  PRED FORTE   Place 1 drop into the right eye every evening   Last time this was given:  1 drop on 11/15/2018 10:31 PM                                sennosides 8.6 MG tablet   Commonly known as:  SENOKOT   Take 1-2 tablets by mouth 2 times daily

## 2018-11-11 NOTE — IP AVS SNAPSHOT
` `           [facility] CARDIAC SPECIALTY CARE: [phone]                 INTERAGENCY TRANSFER FORM - NOTES (H&P, Discharge Summary, Consults, Procedures, Therapies)   2018                    Hospital Admission Date: 2018  MCKAYLA OCONNELL   : 1927  Sex: Female        Patient PCP Information     Provider PCP Type    Ander Shrestha MD General         History & Physicals      H&P by Edilberto Mack DO at 2018  1:27 PM     Author:  Edilberto Mack DO Service:  Hospitalist Author Type:  Physician    Filed:  2018  1:57 PM Date of Service:  2018  1:27 PM Creation Time:  2018  1:27 PM    Status:  Signed :  Edilberto Mack DO (Physician)         [facility]    History and Physical  Hospitalist       Date of Admission:  2018    Assessment & Plan   Mckayla Oconnell is a 89 year old female who presents with nausea, found to have congestive heart failure exacerbation    Nonischemic cardiomyopathy with EF in between 10% and 45%, last documented in 2018 at 30%-35%.   Moderate aortic stenosis, moderate tricuspid regurgitation, moderate mitral regurgitation.   Biventricular pacemaker since 2017.   Long-standing patient of Dr. Scott in [place] [facility], recently saw Ms. Kraft last week with plan to increase diuretic for a few day  EKG shows a paced rhythm, troponin within normal limits.  Chest x-ray with likely pulmonary edema  Received 40 mg IV in the ED  Plan  - await medical reconciliation and her ability to swallow is confirmed  - Hold on additional diuretics today, monitor in the a.m.  - Echocardiogram  - Cardiology consult  - serial troponins given small concern her nausea could be anginal equivalent but strongly doubt    Altered mental status likely metabolic encephalopathy  Daughter reports normally she is quite awake and alert, but much more somnolent over the past few days  She is  moving all extremities on examination, with variable effort given her somnolence  No obvious signs of infection, no fever, no WBC elevation, denies respiratory complaints, UA is clean  ?wonder if this is from opiates  Plan  - CT head  - follow fever curve  - check procalcitonin  - obtain blood cultures  - will ask family to bring her hearing aids  - npo until passes slp evaluation      Neck pain  Chronic arthritis  Appears neurologically intact with movement of extremities, although her somnolence limits testng  Plan  - CT of the neck    Nausea:  Unclear if this is related to worsening pain versus swelling from CHF  LFT and Lipase wnl  Plan  - zofran prn  - consider imaging if she develops pain, etc    CKD with baseline creatinine 1.5-2.   - at baseline monitor    Hyperlipidemia.   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 Per polst and verified by daughter.  and wife are not capable of contributing to converstaion    Disposition: Expected discharge in 3-4 day days once.    Edilberto Mack,     Primary Care Physician   Ander Shrestha    Chief Complaint   nausea    History is obtained from the patient    History of Present Illness   Mckayla Oconnell is a 89 year old female who presents with nausea that began last night around 10 PM.  She reports that this has not happened before.  She denies abdominal pain.  She states that she was nauseous all through the night and unable to get any sleep the patient is obviously very somnolent, falling asleep while history is being obtained.  The patient states she has some pain in her left shoulder and neck but is unable to localize further.  She  denies any shortness of breath cough.  No fever.  She is accompanied by her .  There is some concern about safety of living situation per EMS as the phone was disconnected. The  cannot really tell me why he called 911 but he reportedly went to the neighbors to call.      at the bedside seems moderately confused about the situation, hard of hearing, praying to Pritesh for a miracle.     Upon calling daughter for collateral, the daughter states that the patient has deteriorated in the last week.  On 11-6 the patient presented to the office of Ms. Faith in the cardiology clinic for a follow-up appointment.  Per the daughter the patient was complaining of uncontrolled neck pain and so was sent to the emergency department.  She was prescribed tramadol for pain control and then discharged.  Daughter notes worsening somnolence in the setting of the last 2 or 3 days.  The patient suffers from long-standing pain from arthritis.  Daughter reports that she lives less than a mile away from the patient.  The patient has refused placement in a supervised facility in the past, although she has tolerated time-limited stays in acute rehab.  Daughter states her  noted that the outlet had failed and the patient's place likely accounting for the disconnect of the patient's phone.    In the near emergency department she was evaluated by Dr. Carrion.  Workup included basic labs, EKG, and a chest x-ray.  The patient was seen to be hypoxic into the mid 80s.  Because of concerns for congestive heart failure she received a dose of IV 40 mg Lasix.  She [place] [facility]     Past Medical History    I have reviewed this patient's medical history and updated it with pertinent information if needed.[ES1.1]   Past Medical History:   Diagnosis Date     Aortic stenosis      CAD (coronary artery disease)     angiogram 2016: non-obstructive, CT calcium cgpdm=862 May 2013     Cardiomyopathy, idiopathic (H)      cardiomyopathy HTN vs viral; EF as low as 15% in ; CRT-P implanted 17     CHF (NYHA class II, ACC/AHA stage C) (H) 2013     Glaucoma      H/O angiography 2016    No angiographic evidence of obstructive coronary artery disease.     Hypertension      Hypothyroidism 2013     Nonrheumatic mitral valve regurgitation      Nonrheumatic tricuspid valve regurgitation      Pulmonary hypertension (H)[ES1.2]        Past Surgical History   I have reviewed this patient's surgical history and updated it with pertinent information if needed.[ES1.1]  Past Surgical History:   Procedure Laterality Date     CARDIAC CATHERIZATION  16     GYN SURGERY      Hysterectomy      HYSTERECTOMY, PAP NO LONGER INDICATED       IMPLANT PACEMAKER  17    CRT-P     ORTHOPEDIC SURGERY      knee replacement      ORTHOPEDIC SURGERY      Knee replacement      ORTHOPEDIC SURGERY      knee surgery [place][ES1.2]       Prior to Admission Medications   Prior to Admission Medications   Prescriptions Last Dose Informant Patient Reported? Taking?   Iron TABS  Daughter Yes No   Sig: Take 324 mg by mouth daily Patient states she takes OTC iron furrous gluconate 324 mg tablets USP   Lutein 20 MG CAPS  Daughter Yes No   Sig: Take 20 mg by mouth daily   MELATONIN PO  Daughter Yes No   Sig: Take 3 mg by mouth nightly as needed   VITAMIN D, CHOLECALCIFEROL, PO  Daughter Yes No   Sig: Take 2,000 Units by mouth daily    aspirin 81 MG tablet  Daughter Yes No   Sig: Take 81 mg by mouth daily   calcium citrate (CALCITRATE) 950 MG tablet  Daughter Yes No   Sig: Take 2 tablets by mouth daily At Noon   carvedilol (COREG) 25 MG tablet   No Yes   Sig: TAKE 1 TABLET TWICE A DAY WITH MEALS   fluticasone (FLONASE) 50 MCG/ACT spray   No Yes   Si to 2 sprays in both nostrils once daily as needed for rhinitis or allergies   hydrALAZINE (APRESOLINE) 25 MG tablet   No Yes   Sig: Take 1 tablet (25 mg) by mouth 3 times daily   isosorbide  mononitrate (IMDUR) 30 MG 24 hr tablet   No Yes   Si/2 tablet daily AT NIGHT   levothyroxine (SYNTHROID/LEVOTHROID) 112 MCG tablet   No Yes   Sig: TAKE 1 TABLET DAILY   prednisoLONE acetate (PRED FORTE) 1 % ophthalmic suspension  Daughter Yes Yes   Sig: Place 1 drop into the right eye every evening    simvastatin (ZOCOR) 20 MG tablet   No Yes   Sig: Take 1 tablet (20 mg) by mouth At Bedtime   torsemide (DEMADEX) 5 MG tablet   No Yes   Sig: Take 2 tablets (10 mg) by mouth daily   traMADol (ULTRAM) 50 MG tablet   No No   Sig: Take 1 tablet (50 mg) by mouth every 6 hours as needed for severe pain   traZODone (DESYREL) 50 MG tablet   No Yes   Sig: Take 1/2-1 tablet by mouth nightly as needed for sleep      Facility-Administered Medications: None     Allergies   Allergies   Allergen Reactions     Atenolol Anaphylaxis     Hydrochlorothiazide      hyponatremia     Pollen Extract        Social History   I have reviewed this patient's social history and updated it with pertinent information if needed. Mckayla Swanson Anish[ES1.1]  reports that she has never smoked. She has never used smokeless tobacco. She reports that she does not drink alcohol or use illicit drugs.[ES1.2]    Family History   I have reviewed this patient's family history and updated it with pertinent information if needed.[ES1.1]   Family History   Problem Relation Age of Onset     Cancer Mother      Uterius     Breast Cancer Daughter[ES1.2]        Review of Systems   Review of systems not obtained due to patient factors - confusion    Physical Exam   Temp: 96.7  F (35.9  C) Temp src: Temporal BP: 125/61 Pulse: 70 Heart Rate: 70 Resp: 14 SpO2: 95 % O2 Device: Nasal cannula Oxygen Delivery: 2 LPM  Vital Signs with Ranges  Temp:  [96.7  F (35.9  C)] 96.7  F (35.9  C)  Pulse:  [70] 70  Heart Rate:  [70] 70  Resp:  [14-16] 14  BP: (113-141)/(55-74) 125/61  SpO2:  [91 %-98 %] 95 %  0 lbs 0 oz    Constitutional: somnolent, rouses to voice but quickly falls  back asleep. Speech is for the most part intelligible  HEENT: dry mucous membranes, normal dentition.  Respiratory: Clear to auscultation bilaterally, no crackles or wheezing.  Cardiovascular: Regular rate and rhythm, normal S1 and S2, and no murmur noted.  GI: Soft, non-distended, non-tender, normal bowel sounds.  Skin: yeast in the right groin  Musculoskeletal: swelling of the left shoulder  Neurologic: moving extremities bilaterally. Tongue ROM is intact on command and face is symmetric. Further testing not able to be completed due to somnolence  Psychiatric: Alert, oriented to person, month, and year.     Data   Data reviewed today:  I personally reviewed   EKG with paced rhythm  CXR with bilateral perihilar and basal opacities with small right pleural effusion, cephalization    Recent Labs  Lab 11/11/18  0955 11/06/18  1317   WBC 4.7  --    HGB 10.2* 9.7*   MCV 94  --    *  --    * 134*   POTASSIUM 5.3 5.8*   CHLORIDE 100 102   CO2 27 29   BUN 46* 35*   CR 1.46* 1.50*   ANIONGAP 5 8.8   SHOBHA 9.2 10.0   GLC 83 79   ALBUMIN 3.3*  --    PROTTOTAL 6.9  --    BILITOTAL 0.2  --    ALKPHOS 72  --    ALT 49  --    AST 34  --    LIPASE 112  --    TROPI 0.021  --        Imaging:  Recent Results (from the past 24 hour(s))   Chest XR,  PA & LAT    Narrative    CHEST TWO VIEWS   11/11/2018 10:15 AM     HISTORY: Nausea.     COMPARISON: 1/19/2017      Impression    IMPRESSION: Lungs are slightly hypoinflated. Patchy bilateral  perihilar and right greater than left basilar opacities are present  which may represent edema, aspiration, or infection. Probable trace  right pleural effusion. Cardiac silhouette is enlarged, unchanged.  Triple lead cardiac pacing device is in unchanged position. Upper  lumbar (approximate L1) compression fracture is present with  approximately 80% height loss, new compared to 1/19/2017.    BREE CORDERO MD[ES1.1]          Revision History        User Key Date/Time User Provider Type  Action    > ES1.2 11/11/2018  1:57 PM Edilberto Mack DO Physician Sign     ES1.1 11/11/2018  1:27 PM Edilberto Mack DO Physician                      Discharge Summaries      Discharge Summaries by Trevor Grace MD at 11/16/2018  9:31 AM     Author:  Trevor Grace MD Service:  Hospitalist Author Type:  Physician    Filed:  11/16/2018  9:34 AM Date of Service:  11/16/2018  9:31 AM Creation Time:  11/16/2018  9:31 AM    Status:  Signed :  Trevor Grace MD (Physician)         [facility]  Discharge Summary        Mckayla Oconnell MRN# [id number]   YOB: 1927 Age: 89 year old     Date of Admission:  11/11/2018  Date of Discharge:  11/16/2018  Admitting Physician:  Edilberto Mack DO  Discharge Physician:[PD1.1] Trevor Grace MD[PD1.2]  Discharging Service: Hospitalist     Primary Provider:  Ander Shrestha  Primary Care Physician Phone Number: [phone]         Discharge Diagnoses/Problem Oriented Hospital Course (Providers):    Mckayla Oconnell was admitted on 11/11/2018 by Edilberto Mack DO and I would refer you to their history and physical.  The following problems were addressed during her hospitalization:    Mckayla Oconnell is a 89 year old female with nonischemic cardiomyopathy with ejection fraction between 10-35%, CKD stage III-IV, dyslipidemia, chronic arthritis, peripheral arterial disease, who was admitted on 11/11/2018 for nausea, confusion and hypoxia and determined to have CHF exacerbation and acute hypercapnic respiratory failure, hyperkalemia.     Nonischemic cardiomyopathy with ejection fraction between 10-35%  Acute hypercapnic respiratory failure secondary to above  Lymphedema  Moderate aortic stenosis, moderate tricuspid regurgitation, moderate mitral regurgitation. Biventricular pacemaker since 01/2017. Long-standing patient of Dr. Scott in [place] [facility]. EKG showed  a paced rhythm, troponin within normal limits. Chest x-ray with pulmonary edema. Received 40 mg IV in the ED. Echocardiogram basically unchanged from previous, possible worse tricuspid regurgitation. Serial troponins negative. VBG with some respiratory acidosis, mild improvement on bipap  -Hypertensives have been on hold given hypotension  -Etiology consultation obtained.  Attempts made at adjusting medications but patient was unable to tolerate.  Progressively worse functional status.    -Patient and family elected to pursue palliative care and then hospice.  Discharge to [facility].  -Transitioned to comfort care on the evening of 11/14/18.  -Acetaminophen, Haldol, lorazepam, liquid hydromorphone available as needed for comfort  -Atropine as needed for secretions  -Bowel regimen to prevent constipation     Subacute metabolic encephalopathy  Subacute delirium  Elated to multiple factors including advanced age, probable underlying cerebrovascular disease, hospitalization, severe comorbidities of kidney disease, cardiomyopathy.     Chronic kidney disease stage III-IV  Reduced renal function with attempts at diuresis, prohibiting further treatment of heart failure.     Hyperkalemia  In the setting of acute kidney injury.  Shifted with insulin and glucose.  Some initial improvement with albumin/Lasix combination.  -No further treatment will be pursued     Dyslipidemia  No need to continue statin, aspirin     Hypothyroidism  No need to continue Synthroid     Cutaneous candidiasis  Does not appear to be bothered by this.  -May continue miconazole if it adds benefit to her care     Urinary obstruction  Goldstein catheter placed for obstruction.  Can maintain in place for comfort care.     Hypoglycemia  Occurred after aggressive insulin administration to treat hyperkalemia.  Initially needed D10 infusion.  Resolved after she began eating again.  We will not require any additional treatment and  hospice.     Active Diet Order      Combination Diet Dysphagia Diet Level 2: Mechan Altered; Nectar Thickened Liquids (pre-thickened or use instant food thickener) (No straws)     Code Status: DNR/DNI comfort care     Disposition: Expected discharge to hospice facility on Friday, 11/16/18.            Code Status:      DNR / DNI         Important Results:      See below         Pending Results:[PD1.1]        Unresulted Labs Ordered in the Past 30 Days of this Admission     Date and Time Order Name Status Description    11/11/2018 1343 Blood culture Preliminary     11/11/2018 1343 Blood culture Preliminary[PD1.2]                Discharge Instructions and Follow-Up:[PD1.1]      Follow-up Appointments     Follow-up and recommended labs and tests        [facility] enrollment on 11/16/18[PD1.2]                         Discharge Disposition:      Discharged to [facility].         Discharge Medications:[PD1.1]        Current Discharge Medication List      START taking these medications    Details   acetaminophen (TYLENOL) 650 MG Suppository Place 1 suppository (650 mg) rectally every 4 hours as needed for fever or mild pain (Do not exceed 4000 mg total acetaminophen per day.) Unwrap prior to insertion.  Qty: 12 suppository, Refills: 1    Associated Diagnoses: End of life care      bisacodyl (DULCOLAX) 10 MG Suppository Unwrap and insert 1 suppository rectally twice daily as needed for constipation.  Qty: 12 suppository, Refills: 1    Associated Diagnoses: End of life care      haloperidol (HALDOL) 0.5 MG tablet Take 1-2 tablets (0.5-1 mg) by mouth, place under tongue or insert rectally every 6 hours as needed for agitation (nausea)  Qty: 30 tablet, Refills: 1    Associated Diagnoses: End of life care      HYDROmorphone, HIGH CONC, (DILAUDID) 10 mg/mL LIQD oral Take 0.1-0.2 mLs (1-2 mg) by mouth or place under tongue every 2 hours as needed for moderate to severe pain (and/or  shortness of  breath)  Qty: 30 mL, Refills: 0    Associated Diagnoses: End of life care      LORazepam (ATIVAN) 0.5 MG tablet Take 0.5-1 tablets (0.25-0.5 mg) by mouth, place under tongue or insert rectally every 4 hours as needed for anxiety (restlessness)  Qty: 30 tablet, Refills: 1    Associated Diagnoses: End of life care      MEDICATION INSTRUCTION If care facility cannot accept or use ranges, facility is instructed to use lower end of dosing range    Associated Diagnoses: End of life care      sennosides (SENOKOT) 8.6 MG tablet Take 1-2 tablets by mouth 2 times daily  Qty: 30 tablet, Refills: 1    Associated Diagnoses: End of life care         CONTINUE these medications which have NOT CHANGED    Details   prednisoLONE acetate (PRED FORTE) 1 % ophthalmic suspension Place 1 drop into the right eye every evening   Qty: 1 Bottle         STOP taking these medications       aspirin 81 MG tablet Comments:   Reason for Stopping:         calcium citrate (CALCITRATE) 950 MG tablet Comments:   Reason for Stopping:         carvedilol (COREG) 25 MG tablet Comments:   Reason for Stopping:         fluticasone (FLONASE) 50 MCG/ACT spray Comments:   Reason for Stopping:         hydrALAZINE (APRESOLINE) 25 MG tablet Comments:   Reason for Stopping:         isosorbide mononitrate (IMDUR) 30 MG 24 hr tablet Comments:   Reason for Stopping:         levothyroxine (SYNTHROID/LEVOTHROID) 112 MCG tablet Comments:   Reason for Stopping:         Lutein 20 MG CAPS Comments:   Reason for Stopping:         MELATONIN PO Comments:   Reason for Stopping:         simvastatin (ZOCOR) 20 MG tablet Comments:   Reason for Stopping:         torsemide (DEMADEX) 5 MG tablet Comments:   Reason for Stopping:         traMADol (ULTRAM) 50 MG tablet Comments:   Reason for Stopping:         traZODone (DESYREL) 50 MG tablet Comments:   Reason for Stopping:         VITAMIN D, CHOLECALCIFEROL, PO Comments:   Reason for Stopping:[PD1.2]                    Allergies:[PD1.1]          Allergies   Allergen Reactions     Atenolol Anaphylaxis     Hydrochlorothiazide      hyponatremia     Pollen Extract[PD1.2]             Consultations This Hospital Stay:      Consultation during this admission received from cardiology, nephrology and palliative care.          Discharge Orders for Skilled Facility (from Discharge Orders):[PD1.1]        After Care Instructions     Activity       Your activity upon discharge: activity as tolerated            Diet       Follow this diet upon discharge: Combination Diet Dysphagia Diet Level 2: Mechan Altered; Nectar Thickened Liquids (pre-thickened or use instant food thickener) (No straws)[PD1.2]                           Rehab orders for Skilled Facility (from Discharge Orders):               Discharge Time:      Less than 30 minutes.        Image Results From This Hospital Stay (For Non-EPIC Providers):        Results for orders placed or performed during the hospital encounter of 11/11/18   Chest XR,  PA & LAT    Narrative    CHEST TWO VIEWS   11/11/2018 10:15 AM     HISTORY: Nausea.     COMPARISON: 1/19/2017      Impression    IMPRESSION: Lungs are slightly hypoinflated. Patchy bilateral  perihilar and right greater than left basilar opacities are present  which may represent edema, aspiration, or infection. Probable trace  right pleural effusion. Cardiac silhouette is enlarged, unchanged.  Triple lead cardiac pacing device is in unchanged position. Upper  lumbar (approximate L1) compression fracture is present with  approximately 80% height loss, new compared to 1/19/2017.    BREE CORDERO MD   CT Head w/o Contrast    Narrative    CT SCAN OF THE HEAD WITHOUT CONTRAST   11/11/2018 2:37 PM     HISTORY: Nausea, some confusion.     TECHNIQUE: Axial images of the head and coronal reformations without  IV contrast material. Radiation dose for this scan was reduced using  automated exposure control, adjustment of the mA and/or kV according  to patient size, or  iterative reconstruction technique.    COMPARISON: None.    FINDINGS: Mild volume loss is present. White matter hypoattenuation  likely represents chronic small vessel ischemic change. No evidence of  acute ischemia, hemorrhage, mass, mass effect, or hydrocephalus. The  visualized calvarium, skull base, and paranasal sinuses are  unremarkable. Right globe is small with peripheral choroidal layer and  lateral calcifications with scleral banding and calcification along  the expected location of the lens. Left lens replacement noted. There  is trace opacification of the bilateral mastoid cavities, likely  inflammatory.      Impression    IMPRESSION: No acute intracranial abnormality.    BREE CORDERO MD   CT Cervical Spine w/o Contrast    Narrative    CT CERVICAL SPINE WITHOUT CONTRAST   11/11/2018 2:37 PM     HISTORY: Neck pain.      TECHNIQUE: Axial images of the cervical spine were obtained without  intravenous contrast. Multiplanar reformations were performed.  Radiation dose for this scan was reduced using automated exposure  control, adjustment of the mA and/or kV according to patient size, or  iterative reconstruction technique.    COMPARISON: None.    FINDINGS: Alignment is significant for loss of the normal lordosis  with kyphosis centered at C4-5. There is grade 1 anterolisthesis of C3  on C4 and C4 on C5. Severe degenerative disc disease is present at  C5-6 and C6-7. Multilevel severe facet arthropathy is present most  severely affecting the upper cervical spine, particularly at C3-4.  There is fusion of the bilateral C4-5 facet joints. No evidence of  acute fracture or traumatic subluxation. No evidence of acute  traumatic disc herniation or epidural hematoma. There are severe  atherosclerotic calcifications of the bilateral carotid bifurcations.  Paraspinal soft tissues also demonstrate septal thickening and  scarring/interstitial/fibrotic opacities at the visualized lung  apices. No definite thyroid  gland is identified.      Impression    IMPRESSION: No evidence of acute fracture or subluxation in the  cervical spine. Severe degenerative change.    BREE CORDERO MD   XR Chest Port 1 View    Narrative    CHEST PORTABLE ONE VIEW   11/13/2018 9:25 AM     HISTORY: Hypoxia.     COMPARISON: Chest x-ray 11/11/2018.      Impression    IMPRESSION: Portable view of the chest is performed. Pulmonary  vascular congestion seen on prior exam appears mildly improved.  Airspace opacity retrocardiac region and right lung base could  indicate persistent mild edema. Heart remains enlarged. Implantable  cardiac device with 3 leads appears unchanged in position. No  pneumothorax. No obvious pleural effusions.    ELIO QUINONES MD           Most Recent Lab Results In EPIC (For Non-EPIC Providers):    Most Recent 3 CBC's:[PD1.1]  Recent Labs   Lab Test  11/14/18   0622  11/13/18   0602  11/12/18   0523   WBC  4.7  4.0  3.8*   HGB  9.4*  9.1*  9.5*   MCV  94  96  97   PLT  99*  110*  106*[PD1.2]      Most Recent 3 BMP's:[PD1.1]  Recent Labs   Lab Test  11/14/18   0622  11/13/18   1710  11/13/18   0955  11/13/18   0602   NA  127*  131*  131*  130*   POTASSIUM  5.7*   --   5.5*  5.5*   CHLORIDE  96   --   98  98   CO2  26   --   29  28   BUN  58*   --   54*  52*   CR  1.84*  1.81*  1.79*  1.78*   ANIONGAP  5   --   4  4   SHOBHA  8.0*   --   8.3*  8.1*   GLC  95   --   75  106*[PD1.2]     Most Recent 3 Troponin's:[PD1.1]  Recent Labs   Lab Test  11/11/18   2155  11/11/18   1819  11/11/18   1448   TROPI  0.031  0.031  0.030[PD1.2]     Most Recent 3 INR's:[PD1.1]  Recent Labs   Lab Test  09/01/16   0729   INR  1.12[PD1.2]     Most Recent 2 LFT's:[PD1.1]  Recent Labs   Lab Test  11/14/18   0622  11/13/18   0602   AST  23  27   ALT  34  34   ALKPHOS  61  51   BILITOTAL  0.3  0.3[PD1.2]     Most Recent Cholesterol Panel:[PD1.1]  Recent Labs   Lab Test  04/20/18   0815   CHOL  109   LDL  55   HDL  36*   TRIG  90[PD1.2]     Most Recent 6 Bacteria  Isolates From Any Culture (See EPIC Reports for Culture Details):[PD1.1]  Recent Labs   Lab Test  11/11/18   1450  11/11/18   1448  11/15/16   1605  06/22/16   1126  07/23/13   1005   CULT  No growth after 5 days  No growth after 5 days  Heavy growth Normal orly  <10,000 colonies/mL mixed urogenital orly  Susceptibility testing not routinely done    50,000 to 100,000 colonies/mL Proteus mirabilis[PD1.2]     Most Recent TSH, T4 and HgbA1c:[PD1.1]  Recent Labs   Lab Test  11/12/18   0920  11/12/18   0700   01/18/18   0932   TSH   --   3.32   < >  7.30*   T4   --    --    --   1.23   A1C  5.3   --    --    --     < > = values in this interval not displayed.[PD1.2]           Revision History        User Key Date/Time User Provider Type Action    > PD1.2 11/16/2018  9:34 AM Trevor Grace MD Physician Sign     PD1.1 11/16/2018  9:31 AM Trevor Grace MD Physician                      Consult Notes      Consults by Julita Pritchard APRN CNP at 11/14/2018  2:32 PM     Author:  Julita Pritchard APRN CNP Service:  Palliative Author Type:  Nurse Practitioner    Filed:  11/14/2018  9:12 PM Date of Service:  11/14/2018  2:32 PM Creation Time:  11/14/2018  2:19 PM    Status:  Signed :  Julita Pritchard APRN CNP (Nurse Practitioner)         [facility]    Palliative Care Consultation     Mckayla Oconnell  MRN# [id number]  Date of Admission:  11/11/2018  Date of Service (when I saw the patient): 11/14/18  Reason for consult: Consulted by Dr. Mack for Goals of care    Assessment & Plan   Mckayla Oconnell is a 89 year old female with PMH significant for CAD, ischemic cardiomyopathy with EF 30-35%, aortic stenosis, HTN, HLD, pulmonary hypertension, CKD, and arthritis, who presents with nausea and somnolence. She was found to have an acute on chronic HF exacerbation. Per daughter, pt has expressing desire to let go. We are consulted for goals of care.      Symptoms/Recommendations[AW1.1]   1. Left shoulder/neck pain. CT imaging did not demonstrate compression fracture. No XR available of left shoulder. Likely arthritic?   -Will schedule APAP 1g PO TID  -Trial lidoderm patch (3) topical to left shoulder/neck   -Dilaudid 1-2mg SL every 2hrs PRN pain     2. Goals of care. Pt and family unanimously in support of exploring comfort cares and hospice. Family trying to determine if they will discharge to home vs facility with hospice cares. I, along with Dr. Mack, am strongly recommending facility placement, and we are looking into [facility]. See further discussion below.   -Initiate comfort measures only, stop checking VS, labs, stop nonessential medications that do not contribute to comfort and well being   -SW consult for hospice. Pt and family open to [facility] and interested in looking at [facility]   -Dilaudid 1-2mg PO Q2hrs PRN pain, SOB as above   -Zyprexa 2.5-5mg ODT every 6hrs PRN agitation (avoid haldol with prolonged Qt)  -Atropine, robinul PRN secretions   -Bisacodyl supp, senna PRN constipation   -Prognosis discussed with family (not with pt). I think pt likely has days to weeks to live at this point[AW1.2]     Support/Coping  -Pt's first   in , there was no support of hospice at that time. Pt has one daughter, Irina, who is loving and supportive, lives <1 mile from [place]. Pt remarried Jaime, they have been together for 3[AW1.1]5[AW1.2]+ years. Jaime has children from a previous relationship, daughter Asia and RYLAND Calixto[AW1.1] supportive. Per Jaime, he does not have much to do with his 2 sons   -As suspected by Irina,  Jaime is struggling immensely with pt's deteriorating health. He requires frequent reminders that pt is not expected to improve. He clearly is struggling to process information that is being provided to him. He is very focused on himself and how he will carry on after she dies. He admits to looking at his wife as  "a mother figure, and has been cared for his whole life. He is incredibly saddened by her declining health and feels very alone. Family is very united in trying to support Jaime in this difficult time[AW1.2]   -Pt is Pentecostalism and very much values her nolberto. Per daughter, pt is \"ready to go home with God[AW1.1].\" Pt's  was present for today's discussion and was incredibly supportive of pt and  Jaime[AW1.2]   -Will involve Palliative LICSW, India Nolasco, and/or Palliative , Wilda Manrique for additional support     Decisional Support, Goals of Care, Counseling & Coordination  Decisional Capacity Intact?  -[AW1.1]No. Pt was very lethargic and somnolent during our discussion. It was reasonable to try to involve her in the discussion, yet I cannot say she is decisional independently. Important to involve family, particularly daughter, in complex decision making[AW1.2]   Health Care Directive on File?  -Yes, reviewed HCD and POLST in full[AW1.1]. Pt names daughter Irina as surrogate decision maker[AW1.2]   Code Status/Resuscitation Preferences?  -DNR/DNI     Discussion  Visited Mckayla this AM and briefly introduced myself and our service. I asked her if it would be ok to call her family to arrange a time to discuss treatment options, which she was ok with.     I spoke with pt's daughter Irina by phone. Irina was able to share with me some very helpful information. She makes note that her mother is declining of late, and being at home in her [facility] with her  Jaime is extremely important to her. It is a handicapped accessible home and they have good support from [place] [facility]. However, Mckayla is still declining and has low energy. Irina makes note that her mother has always been a fighter and survivor, but Irina is surprised at this point to see that her mother is still alive. Mckayla has been expressing to family that she is \"praying to die.\" She feels like her mother may be afraid " "about the dying process, what it may look like, and wondering about pain and discomfort with the process. She thinks her mother would ideally like to stay home for as long as possible, but go to [facility] in the final days if more help and support is needed. Per Irina, she thinks a conversation with me would be very helpful, but she feels it is most important for pt's  Jaime to hear this information, as she feels he will be very emotional and a \"basket case.\" Jaime is coming this afternoon with his RYLAND Calixto, and Irina feels it will be helpful for Calixto to be present to help Mckayla and Jaime understand that Jaime will be ok after Mckayla dies.    I thus returned for a visit this afternoon. Present was pt,  Jaime, RYLAND Chenge, and myself.[AW1.1] We were later joined by pt's .[AW1.2] I introduced the scope of our practice to pt and family. Discussed our potential roles for symptom management, support/coping, and decisional support (aka goals of care).[AW1.1]     Mckayla was very lethargic and somnolent throughout our discussion. She admitted to not feeling well, but was willing to have this discussion, if needed. I reviewed with her and family about her underlying heart and renal issues. I expressed worry and concern that this is a progressive condition that is expected to worsen with time. I did share with Mckayla that this process will take her life.[AW1.2] It was very evident that  Jaime had a very limited understanding of her health, and was clearly struggling to process information. I went into more detail about her heart and renal issues, and the concern for her overall decline being a sign of likely end of life.     I had to consistently coax Mckayla throughout the conversation, as she was clearly lethargic and struggling to be present for the discussion. We did ultimately discuss a comfort care approach vs restorative approach. Mckayla quickly made it clear that she would prefer to focus more of her " energy on comfort. We thus spent some time talking about hospice and shifting focus toward helping each day be as good as it can be, while allowing a natural dying process to occur. Pt and family were ultimately in support of focusing on comfort.     A lot of energy was then spent on focusing on  Jaime, who was very much struggling to process this information, and was very singularly focused on how he will feel and manage when Mckayla dies. He was very much in a repetitive loop, expressing worry about her dying process, while also not fully understanding her health, and feeling unable to manage when she dies. Pt's  was very instrumental in providing support to Jaime in this moment, and ensuring ongoing support as time progresses.     We spent a great amount of time talking about where hospice services can be received. Mckayla would ideally like to go home, and Jaime would like her to go home, but he admits to not being able to care for her at this point. It was very clear that we were all worried about Mckayla going home with hospice and being cared for Jaime. We thus spent a great amount of time talking about various facility options, including [facility] and [facility] and associated financial implications. Jaime again persisted in this circular loop, expressing worry for himself after Mckayla dies. He very much views Mckayla as a mother figure and struggles to see how he will manage after her death. Again, a lot of support and focus was centered around him.     We ultimately agreed to shift focus toward hospice and start a comfort care plan in the hospital. We reviewed pain management strategies and I did explain that medications for comfort may in fact make Mckayla feel more sleepy and less engaged. We agreed that we would continue the discussion of where to receive hospice within the next 1-2 days.     Jaime continued to require frequent reminders and explanations of Mckayla's health, that she is in fact  declining and not expected to improve. He continues to struggle with processing this information. I think this will be an ongoing issue and requires careful attention.     I then spent some time processing with pt's RYLAND De Luna and  in the parkinson. We all acknowledge that  Jaime is struggling immensely, and is likely to continue to struggle as time progresses. Family and spiritual health are very tuned into this. We talk about time for Mckayla likely to be limited to days to weeks at this point.     I then called pt's daughter and surrogate decision maker, Ilanahammadángel, to review today's discussion. She is in full support of comfort cares, hospice, and going to a facility for hospice instead of home. She is very aware of Jaime's struggle and will pay careful attention to his needs as time progresses.[AW1.3]     Case reviewed with Dr. Mack, bedside RN Asaf, and[AW1.1] unit AMARJIT lopez.[AW1.2]     Thank you for involving us in the care of this patient and family. We will continue to follow. Please do not hesitate to contact me with questions or concerns or the on-call provider for our team if evening or weekend.    Jayleen GOMEZ, [facility]  Palliative Medicine   Pager [phone]    Attestation:  Total time on the floor involved in the patient's care:[AW1.1] 180[AW1.3] minutes[AW1.1] (direct face to face from [phone]), additional time spent non face to face talking with daughter via phone ([phone] and [phone])[AW1.3]  Total time spent in counseling/care coordination: >50%    Chief Complaint[AW1.1]   Nausea and somnolence[AW1.3]     History is obtained from the patient, staff,[AW1.1] family[AW1.3] and extensive chart review.     Past Medical History    I have reviewed this patient's medical history and updated it with pertinent information if needed.   Past Medical History:   Diagnosis Date     Aortic stenosis      CAD (coronary artery disease)     angiogram 2016: non-obstructive, CT calcium nrxks=292 May 2013      Cardiomyopathy, idiopathic (H)     cardiomyopathy HTN vs viral; EF as low as 15% in ; CRT-P implanted 17     CHF (NYHA class II, ACC/AHA stage C) (H) 2013     Glaucoma      H/O angiography 2016    No angiographic evidence of obstructive coronary artery disease.     Hypertension      Hypothyroidism 2013     Nonrheumatic mitral valve regurgitation      Nonrheumatic tricuspid valve regurgitation      Pulmonary hypertension (H)        Past Surgical History   I have reviewed this patient's surgical history and updated it with pertinent information if needed.  Past Surgical History:   Procedure Laterality Date     CARDIAC CATHERIZATION  16     GYN SURGERY      Hysterectomy      HYSTERECTOMY, PAP NO LONGER INDICATED       IMPLANT PACEMAKER  17    CRT-P     ORTHOPEDIC SURGERY      knee replacement      ORTHOPEDIC SURGERY      Knee replacement      ORTHOPEDIC SURGERY      knee surgery [place]       Social History   Living situation:[AW1.1] [facility], handicapped accessible[AW1.3]     Family system:[AW1.1] First   in  from cancer. Pt and only daughter Irina were not present for his death, which was lasting for them. Pt remarried to Jaime of 35+ years. Jaime has 3 children from a previous relationship, but only keeps in touch with daughter Asia and her  Calixto. Irina's  and son also supportive[AW1.3]     Self-identified support system:[AW1.1] As above[AW1.3]     Employment/education:[AW1.1] [employer][AW1.3]    Activities/interests:[AW1.1] ND[AW1.3]    Use of community resources:[AW1.1] [place] [facility][AW1.3]     Hindu affiliation:[AW1.1] Rastafari[AW1.3]     Involvement in nolberto community:[AW1.1] Yes - [facility][AW1.3]     Impact of illness on patient:[AW1.1] Pt is feeling miserable; nausea and neck/shoulder pain. She is lethargic. She believes it is reasonable to shift focus toward being more comfortable at  this point in her life[AW1.3]     Family History   I have reviewed this patient's family history and updated it with pertinent information if needed.   Family History   Problem Relation Age of Onset     Cancer Mother      Uterius     Breast Cancer Daughter        Allergies   Allergies   Allergen Reactions     Atenolol Anaphylaxis     Hydrochlorothiazide      hyponatremia     Pollen Extract        Medications   Current Facility-Administered Medications Ordered in Epic   Medication Dose Route Frequency Last Rate Last Dose     acetaminophen (TYLENOL) tablet 650 mg  650 mg Oral Q4H PRN   650 mg at 11/14/18 1310     aspirin chewable tablet 81 mg  81 mg Oral Daily   81 mg at 11/14/18 0807     calcium citrate (CALCITRATE) tablet 1,900 mg  1,900 mg Oral Daily before lunch   1,900 mg at 11/14/18 1237     glucose gel 15-30 g  15-30 g Oral Q15 Min PRN        Or     dextrose 50 % injection 25-50 mL  25-50 mL Intravenous Q15 Min PRN   50 mL at 11/12/18 0851    Or     glucagon injection 1 mg  1 mg Subcutaneous Q15 Min PRN         fluticasone (FLONASE) 50 MCG/ACT spray 1 spray  1 spray Both Nostrils Daily   1 spray at 11/14/18 1108     hypromellose-dextran (ARTIFICAL TEARS) 0.1-0.3 % ophthalmic solution 1 drop  1 drop Both Eyes Q1H PRN         iron sucrose (VENOFER) 200 mg in sodium chloride 0.9 % 100 mL intermittent infusion  200 mg Intravenous Daily 220 mL/hr at 11/14/18 1251 200 mg at 11/14/18 1251     levothyroxine (SYNTHROID/LEVOTHROID) tablet 112 mcg  112 mcg Oral QAM AC   112 mcg at 11/14/18 1108     melatonin tablet 1 mg  1 mg Oral At Bedtime PRN         methyl salicylate-menthol (SAVAGE-EASTON) ointment   Topical Q6H PRN         miconazole (MICATIN) 2 % cream   Topical BID         naloxone (NARCAN) injection 0.1-0.4 mg  0.1-0.4 mg Intravenous Q2 Min PRN         No lozenges or gum should be given while patient on BIPAP/AVAPS/AVAPS AE   Does not apply Continuous PRN         ondansetron (ZOFRAN-ODT) ODT tab 4 mg  4 mg Oral Q6H PRN         Or     ondansetron (ZOFRAN) injection 4 mg  4 mg Intravenous Q6H PRN   4 mg at 11/14/18 0437     Patient may continue current oral medications   Does not apply Continuous PRN         prednisoLONE acetate (PRED FORTE) 1 % ophthalmic susp 1 drop  1 drop Right Eye QPM         sodium chloride 0.9% infusion   Intravenous Continuous 50 mL/hr at 11/14/18 0754       No current Epic-ordered outpatient prescriptions on file.       Review of Systems   The comprehensive review of systems is negative other than noted here and in the assessment/plan.    Palliative Symptom Review (0=no symptom/no concern, 1=mild, 2=moderate, 3=severe):  Pain:[AW1.1] 3[AW1.3]  Fatigue:[AW1.1] 3[AW1.3]  Nausea:[AW1.1] 3[AW1.3]    Physical Exam   Temp: 97.5  F (36.4  C) Temp src: Oral BP: 105/61 Pulse: 70 Heart Rate: 70 Resp: 16 SpO2: 98 % (attempted to try RA, pt desat <90) O2 Device: Nasal cannula Oxygen Delivery: 1 LPM  Vitals:    11/12/18 0620 11/13/18 0623 11/14/18 0446   Weight: 80.1 kg (176 lb 9.4 oz) 83.9 kg (184 lb 14.4 oz) 84.5 kg (186 lb 4.8 oz)     CONSTITUTIONAL:[AW1.1] Chronically ill elderly woman seen resting in bed in[AW1.3] NAD,[AW1.1] lethargic and somnolent, awakens to verbal cue and gentle physical stimulation, seemingly conversant and thought process intact. She is[AW1.3] overall[AW1.1]  c[AW1.3]matt and cooperative.  HEENT: NCAT  RESPIRATORY: NL respiratory effort on[AW1.1] NC[AW1.3]  NEUROLOGIC:[AW1.1] Lethargic, able to answer some questions a[AW1.3]ppropriately    Data   Results for orders placed or performed during the hospital encounter of 11/11/18 (from the past 24 hour(s))   Fractional excretion of sodium   Result Value Ref Range    Creatinine Urine 100 mg/dL    Sodium Urine mmol/L 8 mmol/L    %FENA 0.1 %   Creatinine   Result Value Ref Range    Creatinine 1.81 (H) 0.52 - 1.04 mg/dL    GFR Estimate 26 (L) >60 mL/min/1.7m2    GFR Estimate If Black 32 (L) >60 mL/min/1.7m2   Sodium   Result Value Ref Range    Sodium  131 (L) 133 - 144 mmol/L   Glucose by meter   Result Value Ref Range    Glucose 157 (H) 70 - 99 mg/dL   Glucose by meter   Result Value Ref Range    Glucose 90 70 - 99 mg/dL   Glucose by meter   Result Value Ref Range    Glucose 114 (H) 70 - 99 mg/dL   Comprehensive metabolic panel   Result Value Ref Range    Sodium 127 (L) 133 - 144 mmol/L    Potassium 5.7 (H) 3.4 - 5.3 mmol/L    Chloride 96 94 - 109 mmol/L    Carbon Dioxide 26 20 - 32 mmol/L    Anion Gap 5 3 - 14 mmol/L    Glucose 95 70 - 99 mg/dL    Urea Nitrogen 58 (H) 7 - 30 mg/dL    Creatinine 1.84 (H) 0.52 - 1.04 mg/dL    GFR Estimate 26 (L) >60 mL/min/1.7m2    GFR Estimate If Black 31 (L) >60 mL/min/1.7m2    Calcium 8.0 (L) 8.5 - 10.1 mg/dL    Bilirubin Total 0.3 0.2 - 1.3 mg/dL    Albumin 3.0 (L) 3.4 - 5.0 g/dL    Protein Total 6.0 (L) 6.8 - 8.8 g/dL    Alkaline Phosphatase 61 40 - 150 U/L    ALT 34 0 - 50 U/L    AST 23 0 - 45 U/L   CBC with platelets differential   Result Value Ref Range    WBC 4.7 4.0 - 11.0 10e9/L    RBC Count 3.08 (L) 3.8 - 5.2 10e12/L    Hemoglobin 9.4 (L) 11.7 - 15.7 g/dL    Hematocrit 29.0 (L) 35.0 - 47.0 %    MCV 94 78 - 100 fl    MCH 30.5 26.5 - 33.0 pg    MCHC 32.4 31.5 - 36.5 g/dL    RDW 15.2 (H) 10.0 - 15.0 %    Platelet Count 99 (L) 150 - 450 10e9/L    Diff Method Automated Method     % Neutrophils 83.5 %    % Lymphocytes 10.9 %    % Monocytes 4.1 %    % Eosinophils 1.3 %    % Basophils 0.0 %    % Immature Granulocytes 0.2 %    Nucleated RBCs 0 0 /100    Absolute Neutrophil 3.9 1.6 - 8.3 10e9/L    Absolute Lymphocytes 0.5 (L) 0.8 - 5.3 10e9/L    Absolute Monocytes 0.2 0.0 - 1.3 10e9/L    Absolute Eosinophils 0.1 0.0 - 0.7 10e9/L    Absolute Basophils 0.0 0.0 - 0.2 10e9/L    Abs Immature Granulocytes 0.0 0 - 0.4 10e9/L    Absolute Nucleated RBC 0.0    Phosphorus   Result Value Ref Range    Phosphorus 4.2 2.5 - 4.5 mg/dL   Magnesium   Result Value Ref Range    Magnesium 1.8 1.6 - 2.3 mg/dL   Uric acid   Result Value Ref Range     Uric Acid 7.5 (H) 2.6 - 6.0 mg/dL   Glucose by meter   Result Value Ref Range    Glucose 110 (H) 70 - 99 mg/dL   Glucose by meter   Result Value Ref Range    Glucose 92 70 - 99 mg/dL   Glucose by meter   Result Value Ref Range    Glucose 102 (H) 70 - 99 mg/dL[AW1.1]                    Revision History        User Key Date/Time User Provider Type Action    > AW1.3 11/14/2018  9:12 PM Julita Pritchard APRN CNP Nurse Practitioner Sign     AW1.2 11/14/2018  8:29 PM Julita Pritchard APRN CNP Nurse Practitioner      AW1.1 11/14/2018  2:19 PM Julita Pritchard APRN CNP Nurse Practitioner             Consults by Cherelle Holden LICSW at 11/14/2018  5:28 PM     Author:  Cherelle Holden LICSW Service:  Social Work Author Type:      Filed:  11/14/2018  5:29 PM Date of Service:  11/14/2018  5:28 PM Creation Time:  11/14/2018  5:09 PM    Status:  Signed :  Cherelle Holden LICSW ()     Consult Orders:    1. Social Work IP Consult [[id number]] ordered by Julita Pritchard APRN CNP at 11/14/18 1624     2. Care Transition RN/SW IP Consult [[id number]] ordered by Edilberto Mack DO at 11/12/18 1512                Care Transition Initial Assessment - SW  Reason For Consult: discharge planning  Met with: Family  (daughter Irina)  Active Problems:    Acute on chronic systolic (congestive) heart failure (H)       DATA  Lives With: spouse  Living Arrangements: house ([facility])  Description of Support System: Supportive, Involved  Who is your support system?: , Children  Support Assessment: Adequate family and caregiver support.   Identified issues/concerns regarding health management:       Quality Of Family Relationships: supportive, involved  Transportation Available: car (Pt.driving PTA)    Per care transitions and social work consult for discharge planning and a hospice consult.  Patient was admitted on 11-11-18 with CHF exacerbation.  The tentative date of discharge is yet to  be determined.  Reviewed chart and call placed to patient's daughter to discharge discharge plans as she contacted me first and I returned the call.  Per patient's daughter's report, patient lives in a 1 level townhouse with no steps.  Patient uses a rolling walker at baseline.  Patient has grab bars, a raised toilet seat, and a shower chair in the bathroom.  Patient's daughter states that patient ultimately feels like home is everything for her, but she can not care for herself and her  is not able to really assist either.  Patient's daughter states she is dealing with her own medical issues so she is not able to assist much either.  Patient and  have met with palliative care today and the plan is to transition to comfort care.  Explained the Medicare hospice benefit including home, SNF, and residential hospice facilities.  Explained that home is like homecare services meaning not 24 hour care.  Explained that patient would likely have to pay privately to supplement the care at home.  Explained that room & board is not covered under insurance and that SNF is approximately $350-$400 per day.  Explained that residential hospice facilities are approximately $500-$600 per day specifically discussing [facility] at $600 per day.  Patient's daughter states that patient would have an annuity that she could access.  Strongly encouraged her to begin that process as explained she will need the funds for whatever direction they choose to take.  Patient has used [facility] in the past therefore they would like to use [facility].  Patient's daughter states she would be available at 13:00 tomorrow.  Referral made to Keyanna from [employer].  Patient's daughter is asking for me to make a referral to [facility].  Call placed to [facility] to check bed availability.  They currently have no beds available.  They are asking for a referral to be sent as they are unsure when a bed may be available.  Referral  sent to [facility], via discharge on the double.  Updated patient's daughter as to this information.  She is considering other options just in case.    ASSESSMENT  Cognitive Status:  Did not meet patient, spoke with patient's daughter on the phone.  Concerns to be addressed: discharge planning, hospice     PLAN  Financial costs for the patient includes private pay costs for hospice, room & board, private pay nursing.  Patient given options and choices for discharge hospice agencies.  Patient/family is agreeable to the plan?  Yes  Patient Goals and Preferences: [facility] on [facility].  Patient anticipates discharging to: TBD.    Will continue to follow and assist with a safe discharge plan.[SJ1.1]    Cherelle Holden[SJ1.2], [facility], [facility]  [phone]    Will continue to follow and assist with a safe discharge plan.[SJ1.1]           Revision History        User Key Date/Time User Provider Type Action    > SJ1.2 11/14/2018  5:29 PM Cherelle Holden LICSW  Sign     SJ1.1 11/14/2018  5:09 PM Cherelle Holden LICSW              Consults signed by Cruz Blackwood MD at 11/13/2018  2:39 PM      Author:  Cruz Blackwood MD Service:  Nephrology Author Type:  Physician    Filed:  11/13/2018  2:39 PM Date of Service:  11/13/2018 11:06 AM Creation Time:  11/13/2018 11:42 AM    Status:  Signed :  Cruz Blackwood MD (Physician)         Consult Date:  11/13/2018      RENAL CONSULTATION      REQUESTING PHYSICIAN:  Edilberto Mack DO.      REASON FOR CONSULTATION:  Chronic kidney disease, stage III, and acute kidney injury.      HISTORY OF PRESENT ILLNESS:  This is a 89-year-old female I am asked to see for abnormalities in kidney function.  She has a history of chronic kidney disease, stage III.  On 11/6/18, the creatinine was 1.50 and estimated GFR 33.  On 11/11/18, the creatinine was 1.46.  On 11/12/18, it had gone up to 1.75, and today the creatinine is 1.79.  After  admission, she has some difficulties with hyperkalemia and was given acute treatment for this.  Today, on 11/13/18, her potassium is 5.5 and bicarbonate 29.  She was admitted with nausea and a question of congestive heart failure exacerbation.  She also had depressed mental status.  She complained of neck pain and was given tramadol last week in the emergency room.  Here yesterday, she was given IV fluids and albumin, but also Lasix as management of her high potassium.  She was placed on BiPAP and nasal cannula O2 and she was made n.p.o.  Prior to admission, she was on Coreg, hydralazine, Imdur, and torsemide.      PAST MEDICAL HISTORY:  Congestive heart failure.  Her most recent ejection fraction is 20-30%.  She also has valvular heart disease with aortic stenosis, tricuspid regurgitation, and mitral regurgitation.  She also has severe pulmonary hypertension and a pacemaker.  She has various joint and neck pains.  She has hypothyroidism.      PAST SURGICAL HISTORY:  Includes hysterectomy and total knee arthroplasty.  There is no history of urologic surgery.      CURRENT MEDICATIONS:  D10W at 100 mL an hour, thiamine, and aspirin.      SOCIAL HISTORY:  She is .  She is DNR/DNI.  She does not smoke or drink.      FAMILY HISTORY:  The mother had uterine cancer.  The daughter had breast cancer.      REVIEW OF SYSTEMS:  This morning she complains of neck pain.  She denies shortness of breath or chest pain.  She denies abdominal pain.  She tells me they are starting to allow her to take some clear liquids.  She denies nausea or vomiting.  She denies lower extremity edema or urinary symptoms.  A complete review of systems is done and is otherwise negative.      PHYSICAL EXAMINATION:   GENERAL:  She is a frail elderly female.  She is quite weak.  She is in a reclining chair.   VITAL SIGNS:  Blood pressure 107/63, heart rate 70, afebrile, respiratory rate 15.  Her weight has gone from 81.1 to 83.9 in the hospital  here.  She is on nasal cannula O2.   HEENT:  Head shows no trauma.  The eyes show pupils round and reactive to light.  Mouth shows good dentition.  Posterior pharynx is clear.   NECK:  Supple.   LUNGS:  Clear anterior breath sounds.  There are no wheezes appreciated.   CARDIAC:  Moderate jugular venous distention, regular paced rhythm, normal S1, S2, a 2/6 systolic murmur.   ABDOMEN:  Normal bowel sounds, soft and nontender, no hepatosplenomegaly.   GENITOURINARY:  Normal female pattern.   EXTREMITIES:  Upper extremities:  Normal nails, joints, and pulses.  Lower extremities:  Diminished pulses, no edema.   NEUROLOGIC:  She is alert and oriented.  She can answer questions reasonably well.  She appears to move all 4 extremities well.      LABORATORY DATA:  Most currently show sodium 131, potassium 5.5, bicarbonate 29, creatinine 1.79.  Her magnesium, phosphorus, and calcium were normal.  Albumin is decreased at 2.6.  Chest x-ray, by my reading, shows marked cardiomegaly, no pleural effusion, and some mild pulmonary vascular congestion.  Again, her recent cardiac echo showed markedly depressed LV function.      IMPRESSION:   1.  Chronic kidney disease, stage III, and acute kidney injury.  This is mild.  We will monitor her intake and output and daily weights.  I am going to continue her on IV fluids but using normal saline at 50 mL an hour.  We will monitor her status with this.  Her electrolytes can be checked on a daily basis.   2.  Respiratory failure and CO2 retention.  She has been on BiPAP overnight.  Based on her venous gases, she has a respiratory acidosis.  Management will be per Internal Medicine.   3.  Anemia.  Her hemoglobin is 9.1.  We will hold off on any transfusion.  We will check some laboratories to look at her iron, B12, and folate studies.   4.  Congestive heart failure.  She is currently n.p.o. and not on her usual medications.  Her blood pressure is quite low.  Cardiology and Internal Medicine  "will be assisting with this management.      Overall, this patient has multisystem organ failure and is most likely at the end of her life.  She is DNR/DNI, but I would suggest home hospice and no further hospitalizations.  She is obviously not a dialysis candidate.         YUE FLANAGAN MD             D: 2018   T: 2018   MT: RUFINO      Name:     VICKEY CHURCH   MRN:      [id number]        Account:       [id number]   :      1927           Consult Date:  2018      Document: [id number]       cc: Ander Mack DO   [JT1.1]   Revision History        User Key Date/Time User Provider Type Action    > JT1.1 2018  2:39 PM Yue Flanagan MD Physician Sign            Consults by Romana Long RD, LD at 2018  1:24 PM     Author:  Romana Long RD, LD Service:  Nutrition Author Type:  Registered Dietitian    Filed:  2018  1:24 PM Date of Service:  2018  1:24 PM Creation Time:  2018  1:20 PM    Status:  Signed :  Romana Long RD, LD (Registered Dietitian)         BRIEF NUTRITION ASSESSMENT      REASON FOR ASSESSMENT:  Admission Screen - Reduced oral intake over the last month    NUTRITION HISTORY:  Visited with family member as patient sleeping soundly.  He reports that patient's intake has been down for about one week but otherwise she eats well at baseline.  Many years ago she took Ensure but hasn't in awhile.     CURRENT DIET AND INTAKE:  Diet:  DD2 with nectar thick liquids              Noted diet just ordered this morning and patient took 100% of the oatmeal that was ordered.  She was NPO yesterday and therefore did not eat.     ANTHROPOMETRICS:  Height: 5'3\"  Weight: 83.9 kg (185#)()  BMI: 32.75 kg/m2  IBW:  52.3 kg   Weight Status: Obesity Grade I BMI 30-34.9  %IBW: 160%  Weight History:[KK1.1]   Wt Readings from Last 10 Encounters:   18 83.9 kg (184 lb 14.4 oz)   18 79.4 kg (175 " lb)   11/06/18 82 kg (180 lb 11.2 oz)   08/29/18 79.1 kg (174 lb 4.8 oz)   07/25/18 80.5 kg (177 lb 8 oz)   06/11/18 80.2 kg (176 lb 11.2 oz)   05/08/18 81.6 kg (179 lb 14.4 oz)   04/23/18 79.6 kg (175 lb 6.4 oz)   01/18/18 79.6 kg (175 lb 6.4 oz)   10/23/17 79.7 kg (175 lb 9.6 oz)[KK1.2]     No weight loss noted     LABS:  Labs noted    MALNUTRITION:  Patient does not meet two of the following criteria necessary for diagnosing malnutrition: significant weight loss, reduced intake, subcutaneous fat loss, muscle loss or fluid retention    NUTRITION INTERVENTION:  Nutrition Diagnosis:  No nutrition diagnosis at this time.    Implementation:  Nutrition Education:  Per Provider order if indicated  Medical food supplement therapy:  Magic Cup with meals     FOLLOW UP/MONITORING:   Will re-evaluate in 7 - 10 days, or sooner, if re-consulted.    Romana Long RD, LD, [facility]   Clinical Dietitian - [facility][KK1.1]                Revision History        User Key Date/Time User Provider Type Action    > KK1.2 11/13/2018  1:24 PM Romana Long RD, LD Registered Dietitian Sign     KK1.1 11/13/2018  1:20 PM Romana Long RD, LD Registered Dietitian             Consults by Cruz Blackwood MD at 11/13/2018 10:59 AM     Author:  Cruz Blackwood MD Service:  Nephrology Author Type:  Physician    Filed:  11/13/2018 11:06 AM Date of Service:  11/13/2018 10:59 AM Creation Time:  11/13/2018 10:59 AM    Status:  Signed :  Cruz Blackwood MD (Physician)     Consult Orders:    1. Nephrology IP Consult: Patient to be seen: Routine - within 24 hours; hyperkalemia; Consultant may enter orders: Yes [[id number]] ordered by Edilberto Mack DO at 11/12/18 1535                See dictation. Confirmation # [id number].[JT1.1]     Revision History        User Key Date/Time User Provider Type Action    > JT1.1 11/13/2018 11:06 AM Cruz Blackwood MD Physician Sign            Consults  by Debbie Jay APRN CNP at 11/12/2018 12:28 PM     Author:  Debbie Jay APRN CNP Service:  Neurology Author Type:  Nurse Practitioner    Filed:  11/12/2018  2:46 PM Date of Service:  11/12/2018 12:28 PM Creation Time:  11/12/2018 12:02 PM    Status:  Addendum :  Debbie Jay APRN CNP (Nurse Practitioner)     Consult Orders:    1. Neurology IP Consult: General (non-stroke/non-ICU); Patient to be seen: Routine - within 24 hours; altered mental status; Consultant may enter orders: Yes [[id number]] ordered by Edilberto Mack DO at 11/12/18 0800                  [facility]    Neurology Consultation Note     Mckayla Oconnell MRN# [id number]   YOB: 1927 Age: 89 year old    Code Status:DNR/DNI   Date of Admission: 11/11/2018  Date of Consult: 11/12/2018    _________________________________   Primary Care Physician   Ander Shrestha      ______________________________________________         Assessment & Plan     Reason for consult: I was asked by Dr. Mack to evaluate this patient for altered mental status        ______________________________________________  #.[MC1.1] (R41.82) Altered mental status, unspecified altered mental status type[MC1.2]  --no focal neurological deficit  --CT head without acute abnormalities on admission  --no signs of infection, normal procalc  --VBG with respiratory acidosis  ------hospitalist to manage[MC1.1]  -------some improvement in wakefulness with BiPAP  ---------largely suspect altered mental status due to respiratory dysfunction[MC1.3]  --[MC1.1]pacemaker is not MRI compatible  --will continue to monitor for improvement related to respiratory status - if not continuing to improve, may consider further diagnostic testing[MC1.3]  --check B1 level, start thiamine 100 mg daily for 3 days - deficiency can worsen encephalopathy and CHF[MC1.4]  #.[MC1.1] (I50.9) Acute on chronic congestive heart failure,  unspecified heart failure type (H)  (primary encounter diagnosis)[MC1.2]  --management per primary service & cardiology  --significantly decreased EF  #.[MC1.1] (J81.0) Acute pulmonary edema (H)[MC1.2]  --management per primary service[MC1.1]  -----with respiratory acidosis[MC1.3]  #. DVT Prophylaxis  --per primary service  #. PT/OT/Speech  --evaluations as able  #. Nutrition / GI Prophylaxis  --Per recommendations of speech therapy      #. Code Status: DNR / DNI      Chief Complaint   ______________________________________________  Altered mental status   History is obtained from the[MC1.1] patient, electronic health record and patient's family[MC1.3]    History of Present Illness   ______________________________________________  Mckayla Oconnell is a 89 year old female who presented with nausea that started the night before admission.  had neighbors call 911 as their phone had been disconnected. Patient's daughter reported that patient had been deteriorating in the last week. Had neck pain and was seen on 11/6, given tramadol for pain control and discharged. Daughter noted worsened somnolence in the last 2-3 days. Patient was hypoxic into the mid 80s in the ED. Received lasix due to concern for congestive heart failure. Patient was somnolent on admission. This was suspected due to respiratory acidosis and abnormal blood sugar readings. These have been corrected to some extent, yet patient continues to remain somnolent, so neurology was consulted for further evaluation. MRI brain pending.    On exam,[MC1.1] patient is on BiPAP. She does open eyes to voice and follows commands. She is disoriented to time and place, but no clear aphasia noted. She has had some improvement since starting BiPAP.[MC1.3]     Past Medical History    ______________________________________________  Past Medical History:   Diagnosis Date     Aortic stenosis      CAD (coronary artery disease)     angiogram 2016: non-obstructive,  CT calcium ddtxu=897 May 2013     Cardiomyopathy, idiopathic (H)     cardiomyopathy HTN vs viral; EF as low as 15% in ; CRT-P implanted 17     CHF (NYHA class II, ACC/AHA stage C) (H) 2013     Glaucoma      H/O angiography 2016    No angiographic evidence of obstructive coronary artery disease.     Hypertension      Hypothyroidism 2013     Nonrheumatic mitral valve regurgitation      Nonrheumatic tricuspid valve regurgitation      Pulmonary hypertension (H)      Past Surgical History   ______________________________________________  Past Surgical History:   Procedure Laterality Date     CARDIAC CATHERIZATION  16     GYN SURGERY      Hysterectomy      HYSTERECTOMY, PAP NO LONGER INDICATED       IMPLANT PACEMAKER  17    CRT-P     ORTHOPEDIC SURGERY      knee replacement      ORTHOPEDIC SURGERY      Knee replacement      ORTHOPEDIC SURGERY      knee surgery Page     Prior to Admission Medications   ______________________________________________  Prior to Admission Medications   Prescriptions Last Dose Informant Patient Reported? Taking?   Iron TABS  Daughter Yes No   Sig: Take 324 mg by mouth daily Patient states she takes OTC iron furrous gluconate 324 mg tablets USP   Lutein 20 MG CAPS  Daughter Yes No   Sig: Take 20 mg by mouth daily   MELATONIN PO  Daughter Yes No   Sig: Take 3 mg by mouth nightly as needed   VITAMIN D, CHOLECALCIFEROL, PO  Daughter Yes No   Sig: Take 2,000 Units by mouth daily    aspirin 81 MG tablet  Daughter Yes No   Sig: Take 81 mg by mouth daily   calcium citrate (CALCITRATE) 950 MG tablet  Daughter Yes No   Sig: Take 2 tablets by mouth daily At Noon   carvedilol (COREG) 25 MG tablet   No Yes   Sig: TAKE 1 TABLET TWICE A DAY WITH MEALS   fluticasone (FLONASE) 50 MCG/ACT spray   No Yes   Si to 2 sprays in both nostrils once daily as needed for rhinitis or allergies   hydrALAZINE (APRESOLINE) 25 MG tablet   No Yes   Sig: Take 1 tablet (25 mg) by  mouth 3 times daily   isosorbide mononitrate (IMDUR) 30 MG 24 hr tablet   No Yes   Si/2 tablet daily AT NIGHT   levothyroxine (SYNTHROID/LEVOTHROID) 112 MCG tablet   No Yes   Sig: TAKE 1 TABLET DAILY   prednisoLONE acetate (PRED FORTE) 1 % ophthalmic suspension  Daughter Yes Yes   Sig: Place 1 drop into the right eye every evening    simvastatin (ZOCOR) 20 MG tablet   No Yes   Sig: Take 1 tablet (20 mg) by mouth At Bedtime   torsemide (DEMADEX) 5 MG tablet   No Yes   Sig: Take 2 tablets (10 mg) by mouth daily   traMADol (ULTRAM) 50 MG tablet   No No   Sig: Take 1 tablet (50 mg) by mouth every 6 hours as needed for severe pain   traZODone (DESYREL) 50 MG tablet   No Yes   Sig: Take 1/2-1 tablet by mouth nightly as needed for sleep      Facility-Administered Medications: None     Allergies   Allergies   Allergen Reactions     Atenolol Anaphylaxis     Hydrochlorothiazide      hyponatremia     Pollen Extract        Social History   ______________________________________________  Social History     Social History     Marital status:      Spouse name: N/A     Number of children: N/A     Years of education: N/A     Social History Main Topics     Smoking status: Never Smoker     Smokeless tobacco: Never Used     Alcohol use No     Drug use: No     Sexual activity: No     Other Topics Concern     Parent/Sibling W/ Cabg, Mi Or Angioplasty Before 65f 55m? No     Caffeine Concern No     Sleep Concern No     Stress Concern No     Weight Concern No     Special Diet Yes     low sodium     Exercise Yes     therapy     Seat Belt Yes     Social History Narrative       Family History   ______________________________________________  Family History   Problem Relation Age of Onset     Cancer Mother      Uterius     Breast Cancer Daughter        Review of Systems   ______________________________________________[MC1.1]  Review of systems is limited by patient factors - mental status[MC1.3]      Physical Exam    ______________________________________________  Weight:176 lbs 9.42 oz; Height:Data Unavailable  Temp: 96.5  F (35.8  C) Temp src: Axillary BP: 109/55   Heart Rate: 71 Resp: 20 SpO2: 95 % O2 Device: Nasal cannula Oxygen Delivery: 2 LPM  General Appearance:  No acute distress[MC1.1]  Neuro:       Mental Status Exam:   Awake, alert, oriented X1. Speech and language are intact. Opens eyes to voice, follows commands. Mental status is slightly decreased       Cranial Nerves:  Pupils 3 mm, reactive. EOMI. Face sensation is normal. Face is symmetric. Tongue and uvula are midline. Other CN are normal           Motor:  4+/5 BUE, 4-/5 BLE. Tone and bulk are normal           Reflexes:  Normal DTR. Toes equivocal.        Sensory:  Normal to light touch               Coordination:   Intact finger-to-nose        Gait:  Unable to assess due to weakness[MC1.3]  Neck: no nuchal rigidity, normal thyroid. No carotid bruits.    Cardiovascular: Regular rate and rhythm, no m/r/g  Lungs: Clear to auscultation  Abdomen: Soft, not tender, not distended  Extremities: No clubbing, no cyanosis, no edema    Data   ______________________________________________  All Data personally reviewed:       Labs:   CBC RESULTS:     Recent Labs  Lab 11/12/18  0523 11/11/18  0955 11/06/18  1317   WBC 3.8* 4.7  --    RBC 3.14* 3.38*  --    HGB 9.5* 10.2* 9.7*   HCT 30.4* 31.7*  --    * 113*  --      Basic Metabolic Panel:   Recent Labs   Lab Test  11/12/18   0920  11/12/18   0700  11/12/18   0523  11/11/18   0955   NA  137   --   136  132*   POTASSIUM  5.2  5.1  5.9*  5.3   CHLORIDE  103   --   103  100   CO2  29   --   31  27   BUN  52*   --   51*  46*   CR  1.83*   --   1.75*  1.46*   GLC  115*   --   66*  83   SHOBHA  8.7   --   8.6  9.2     Liver panel:  Recent Labs   Lab Test  11/12/18   0523  11/11/18   0955  04/20/18   0815  11/14/16   1746 07/27/16 05/30/16   0805   PROTTOTAL  5.8*  6.9   --   6.2*  6.4  7.3   ALBUMIN  2.7*  3.3*   --   2.8*   3.8  3.3*   BILITOTAL  0.2  0.2   --   0.5  0.6  0.5   ALKPHOS  60  72   --   73  60  67   AST  28  34   --   50*  33  33  33   ALT  41  49  <5*  89*  42  45     INR:  Recent Labs   Lab Test  09/01/16   0729   INR  1.12      Lipid Profile:  Recent Labs   Lab Test  04/20/18   0815  03/22/16   1038  07/06/15   0736  03/20/15   0814  09/12/14   0835  03/07/13   0825   CHOL  109  113  124  111  111  174   HDL  36*  39*  35*  33*  36*  25*   LDL  55  54  70  54  53  118   TRIG  90  98  97  120  109  157*   CHOLHDLRATIO   --    --   3.5  3.4  3.1  7.0*     A1C:   Recent Labs   Lab Test  11/12/18   0920   A1C  5.3     Troponin I:   Recent Labs   Lab Test  11/11/18   2155  11/11/18   1819  11/11/18   1448  11/11/18   0955  11/14/16   1746  05/30/16   0805  08/18/13   1055   TROPI  0.031  0.031  0.030  0.021  0.018  <0.015  The 99th percentile for upper reference range is 0.045 ug/L.  Troponin values in   the range of 0.045 - 0.120 ug/L may be associated with risks of adverse   clinical events.    <0.012     Ammonia:   Recent Labs   Lab Test  11/12/18   0523   GISSELLE  19     CK:   Recent Labs   Lab Test  11/12/18   0700   CKT  69        UA Results:  Recent Labs   Lab Test  11/11/18   0955  08/05/16   1915   COLOR  Yellow  Yellow   APPEARANCE  Clear  Clear   URINEGLC  Negative  Negative   URINEBILI  Negative  Negative   URINEKETONE  Negative  Negative   SG  1.013  1.010   UBLD  Negative  Negative   URINEPH  5.0  5.0   PROTEIN  10*  Negative   UROBILINOGEN   --   0.2   NITRITE  Negative  Negative   LEUKEST  Negative  Negative   RBCU  0   --    WBCU  <1   --      Most Recent 6 Bacteria Isolates From Any Culture (See EPIC Reports for Culture Details):  Recent Labs   Lab Test  11/11/18   1450  11/11/18   1448  11/15/16   1605  06/22/16   1126  07/23/13   1005   CULT  No growth after 17 hours  No growth after 17 hours  Heavy growth Normal orly  <10,000 colonies/mL mixed urogenital orly  Susceptibility testing not routinely  done    50,000 to 100,000 colonies/mL Proteus mirabilis        Cardiac US:   The left ventricle is moderately dilated. There is mild concentric left ventricular hypertrophy. Left ventricular systolic function is severely reduced. The visual ejection fraction is estimated at 25-30%. Grade II or moderate diastolic dysfunction. There is severe global hypokinesis of the left ventricle with minor regional variation. There is no thrombus seen in the left ventricle. The right ventricle is normal size. There is a catheter/pacemaker lead seen in the right ventricle. Mildly decreased right ventricular systolic function. Moderate mitral regurgitation. There is mod-severe to severe (3-4+) tricuspid regurgitation. The right ventricular systolic pressure is approximated at 48.6 mmHg plus the right atrial pressure. Right ventricular systolic pressure is elevated, consistent with moderate to severe pulmonary hypertension. Moderate aortic stenosis.  No pericardial effusion. In comparison to the previous report dated 04/20/2018, there has been an interval worsening in the degree of tricuspid regurgitation. The other findings appear similar.       Neurophysiology:   --       Imaging:   All imaging studies were reviewed personally  CT head 11/11/18:   No acute intracranial abnormality.    CT cervical spine 11/11/18:  No evidence of acute fracture or subluxation in the cervical spine. Severe degenerative change.        Text Page    Debbie Jay, MSN, FNP-BC, RN CNRN SCRN[MC1.1]       Revision History        User Key Date/Time User Provider Type Action    > MC1.4 11/12/2018  2:46 PM Debbie Jay APRN CNP Nurse Practitioner Addend     MC1.3 11/12/2018  2:11 PM Debbie Jay APRN CNP Nurse Practitioner Sign     MC1.2 11/12/2018 12:19 PM Debbie Jay APRN CNP Nurse Practitioner      MC1.1 11/12/2018 12:02 PM Debbie Jay APRN CNP Nurse Practitioner             Consults by Kandace Reina MD at 11/11/2018   2:10 PM     Author:  Kandace Reina MD Service:  Cardiology Author Type:  Physician    Filed:  11/11/2018  3:08 PM Date of Service:  11/11/2018  2:10 PM Creation Time:  11/11/2018  2:10 PM    Status:  Signed :  Kandace Reina MD (Physician)     Consult Orders:    1. Cardiology IP Consult: Patient to be seen: Routine - within 24 hours; congestive heart failure; Consultant may enter orders: Yes [[id number]] ordered by Edilberto Mack DO at 11/11/18 1343                Cardiology Consultation      Mckayla Oconnell MRN# [id number]   YOB: 1927 Age: 89 year old   Date of Admission: 11/11/2018     Reason for consult: NICM           Assessment and Plan:[BA1.1]     1. NICM with LVEF 30-35%  2. Moderate MR/AS  3. Nausea with a possible component of CHF    PLAN  - Agree with IV lasix x 1 and checking echo  - hopefully her mental status will improve to the point where a more comprehensive history can be taken  - will follow[BA1.2]             Chief Complaint:   Nausea (since last night, pt has not had any episodes of vomiting, received 4mg of Zofran Sublingual via EMS.  )           History of Present Illness:   This patient is a 89 year old female who is followed by Dr. Scott in our cardiology clinic.  She has a history of a nonischemic cardiomyopathy (no CAD on cor angio 2016) with severely reduced left ventricular systolic function and an ejection fraction of 30-35% on her most recent echocardiogram from April of this year.  She also has a history of moderate mitral regurgitation and moderate aortic stenosis.  She has actually undergone a structural heart evaluation for potential TAVR.  This included a dobutamine stress echo that concluded that her aortic stenosis was most likely moderate.  She has undergone biventricular pacemaker implantation in 2017.    She was recently seen by Chayo Faith PA-C in our clinic on 11/6/2018.  At that time she appeared to be stable from a  "cardiac standpoint but was complaining of severe neck discomfort that prompted referral to the emergency department.  Her evaluation there was essentially unremarkable.    Today, however, she was admitted to [facility] with complaints of severe nausea.  In the emergency department she was noted to be hypoxic and therefore received intravenous Lasix x1.  Cardiac troponins were minimally elevated and cardiac consultation was requested.[BA1.1]    The patient at this time is unable to provide any meaningful history. She is somnolent and states that she \"feels lousy\". She denies any chest discomfort.[BA1.2]              Physical Exam:   Vitals were reviewed  Blood pressure 125/61, pulse 70, temperature 96.7  F (35.9  C), temperature source Temporal, resp. rate 14, SpO2 95 %, not currently breastfeeding.  Temperatures:  Current - Temp:  (unable to get thermometer to register orally or axillary); Max - Temp  Av.7  F (35.9  C)  Min: 96.7  F (35.9  C)  Max: 96.7  F (35.9  C)  Respiration range: Resp  Avg: 15  Min: 14  Max: 16  Pulse range: Pulse  Av  Min: 70  Max: 70  Blood pressure range: Systolic (24hrs), Av , Min:113 , Max:141   ; Diastolic (24hrs), Av, Min:55, Max:74    Pulse oximetry range: SpO2  Av.1 %  Min: 91 %  Max: 98 %  No intake or output data in the 24 hours ending 18 1410  Constitutional:[BA1.1]   Eyes closed, states feels \"lousy\"[BA1.2]     Eyes:   Lids and lashes normal, pupils equal, round and reactive to light, extra ocular muscles intact, sclera clear, conjunctiva normal     Neck:[BA1.1]   JVP elevated at 7 cm above sternal angle[BA1.2]     Back:   symmetric     Lungs:   No increased work of breathing, good air exchange, clear to auscultation bilaterally, no crackles or wheezing       Cardiovascular:   Normal apical impulse, regular rate and rhythm, normal S1 and S2, no S3 or S4, and no murmur noted.        Abdomen:   non-tender     Musculoskeletal:   motor strength is " 5 out of 5 all extremities bilaterally     Neurologic:   Grossly nonfocal     Skin:   no bruising or bleeding     Additional findings:[BA1.1]     +2 BLE[BA1.2] edema               Past Medical History:   I have reviewed this patient's past medical history  Past Medical History:   Diagnosis Date     Aortic stenosis      CAD (coronary artery disease)     angiogram 2016: non-obstructive, CT calcium ddvse=517 May 2013     Cardiomyopathy, idiopathic (H)     cardiomyopathy HTN vs viral; EF as low as 15% in 2009; CRT-P implanted 1/19/17     CHF (NYHA class II, ACC/AHA stage C) (H) 2/14/2013     Glaucoma      H/O angiography 9-1-2016    No angiographic evidence of obstructive coronary artery disease.     Hypertension      Hypothyroidism 2/14/2013     Nonrheumatic mitral valve regurgitation      Nonrheumatic tricuspid valve regurgitation      Pulmonary hypertension (H)              Past Surgical History:   I have reviewed this patient's past surgical history  Past Surgical History:   Procedure Laterality Date     CARDIAC CATHERIZATION  9/1/16     GYN SURGERY      Hysterectomy 1995     HYSTERECTOMY, PAP NO LONGER INDICATED       IMPLANT PACEMAKER  1/19/17    CRT-P     ORTHOPEDIC SURGERY      knee replacement 1998     ORTHOPEDIC SURGERY      Knee replacement 2008     ORTHOPEDIC SURGERY      knee surgery [place]               Social History:   I have reviewed this patient's social history  Social History   Substance Use Topics     Smoking status: Never Smoker     Smokeless tobacco: Never Used     Alcohol use No             Family History:   I have reviewed this patient's family history  Family History   Problem Relation Age of Onset     Cancer Mother      Uterius     Breast Cancer Daughter              Allergies:     Allergies   Allergen Reactions     Atenolol Anaphylaxis     Hydrochlorothiazide      hyponatremia     Pollen Extract              Medications:   I have reviewed this patient's current medications  Prescriptions Prior  to Admission   Medication Sig Dispense Refill Last Dose     carvedilol (COREG) 25 MG tablet TAKE 1 TABLET TWICE A DAY WITH MEALS 180 tablet 2 Taking     fluticasone (FLONASE) 50 MCG/ACT spray 1 to 2 sprays in both nostrils once daily as needed for rhinitis or allergies 16 g 10 Taking     hydrALAZINE (APRESOLINE) 25 MG tablet Take 1 tablet (25 mg) by mouth 3 times daily 270 tablet 3 Taking     isosorbide mononitrate (IMDUR) 30 MG 24 hr tablet 1/2 tablet daily AT NIGHT 45 tablet 3 Taking     levothyroxine (SYNTHROID/LEVOTHROID) 112 MCG tablet TAKE 1 TABLET DAILY 90 tablet 2 Taking     prednisoLONE acetate (PRED FORTE) 1 % ophthalmic suspension Place 1 drop into the right eye every evening  1 Bottle  Taking     simvastatin (ZOCOR) 20 MG tablet Take 1 tablet (20 mg) by mouth At Bedtime 90 tablet 3 Taking     torsemide (DEMADEX) 5 MG tablet Take 2 tablets (10 mg) by mouth daily 60 tablet 11      traZODone (DESYREL) 50 MG tablet Take 1/2-1 tablet by mouth nightly as needed for sleep 30 tablet 0 Taking     aspirin 81 MG tablet Take 81 mg by mouth daily   Taking     calcium citrate (CALCITRATE) 950 MG tablet Take 2 tablets by mouth daily At Noon   Taking     Iron TABS Take 324 mg by mouth daily Patient states she takes OTC iron furrous gluconate 324 mg tablets USP   Taking     Lutein 20 MG CAPS Take 20 mg by mouth daily   Taking     MELATONIN PO Take 3 mg by mouth nightly as needed   Taking     traMADol (ULTRAM) 50 MG tablet Take 1 tablet (50 mg) by mouth every 6 hours as needed for severe pain 12 tablet 0      VITAMIN D, CHOLECALCIFEROL, PO Take 2,000 Units by mouth daily    Taking     Current Facility-Administered Medications Ordered in Epic   Medication Dose Route Frequency Last Rate Last Dose     acetaminophen (TYLENOL) tablet 650 mg  650 mg Oral Q4H PRN         aspirin tablet 81 mg  81 mg Oral Daily         melatonin tablet 1 mg  1 mg Oral At Bedtime PRN         miconazole (MICATIN) 2 % cream   Topical BID          naloxone (NARCAN) injection 0.1-0.4 mg  0.1-0.4 mg Intravenous Q2 Min PRN         ondansetron (ZOFRAN-ODT) ODT tab 4 mg  4 mg Oral Q6H PRN        Or     ondansetron (ZOFRAN) injection 4 mg  4 mg Intravenous Q6H PRN         No current Epic-ordered outpatient prescriptions on file.             Review of Systems:   The 10 point Review of Systems is negative other than noted in the HPI            Data:   All laboratory data reviewed  Results for orders placed or performed during the hospital encounter of 11/11/18 (from the past 24 hour(s))   CBC with platelets + differential   Result Value Ref Range    WBC 4.7 4.0 - 11.0 10e9/L    RBC Count 3.38 (L) 3.8 - 5.2 10e12/L    Hemoglobin 10.2 (L) 11.7 - 15.7 g/dL    Hematocrit 31.7 (L) 35.0 - 47.0 %    MCV 94 78 - 100 fl    MCH 30.2 26.5 - 33.0 pg    MCHC 32.2 31.5 - 36.5 g/dL    RDW 15.7 (H) 10.0 - 15.0 %    Platelet Count 113 (L) 150 - 450 10e9/L    Diff Method Automated Method     % Neutrophils 83.1 %    % Lymphocytes 8.2 %    % Monocytes 7.0 %    % Eosinophils 1.3 %    % Basophils 0.2 %    % Immature Granulocytes 0.2 %    Nucleated RBCs 0 0 /100    Absolute Neutrophil 3.9 1.6 - 8.3 10e9/L    Absolute Lymphocytes 0.4 (L) 0.8 - 5.3 10e9/L    Absolute Monocytes 0.3 0.0 - 1.3 10e9/L    Absolute Eosinophils 0.1 0.0 - 0.7 10e9/L    Absolute Basophils 0.0 0.0 - 0.2 10e9/L    Abs Immature Granulocytes 0.0 0 - 0.4 10e9/L    Absolute Nucleated RBC 0.0    Troponin I   Result Value Ref Range    Troponin I ES 0.021 0.000 - 0.045 ug/L   Comprehensive metabolic panel   Result Value Ref Range    Sodium 132 (L) 133 - 144 mmol/L    Potassium 5.3 3.4 - 5.3 mmol/L    Chloride 100 94 - 109 mmol/L    Carbon Dioxide 27 20 - 32 mmol/L    Anion Gap 5 3 - 14 mmol/L    Glucose 83 70 - 99 mg/dL    Urea Nitrogen 46 (H) 7 - 30 mg/dL    Creatinine 1.46 (H) 0.52 - 1.04 mg/dL    GFR Estimate 34 (L) >60 mL/min/1.7m2    GFR Estimate If Black 41 (L) >60 mL/min/1.7m2    Calcium 9.2 8.5 - 10.1 mg/dL     Bilirubin Total 0.2 0.2 - 1.3 mg/dL    Albumin 3.3 (L) 3.4 - 5.0 g/dL    Protein Total 6.9 6.8 - 8.8 g/dL    Alkaline Phosphatase 72 40 - 150 U/L    ALT 49 0 - 50 U/L    AST 34 0 - 45 U/L   Lipase   Result Value Ref Range    Lipase 112 73 - 393 U/L   UA with Microscopic   Result Value Ref Range    Color Urine Yellow     Appearance Urine Clear     Glucose Urine Negative NEG^Negative mg/dL    Bilirubin Urine Negative NEG^Negative    Ketones Urine Negative NEG^Negative mg/dL    Specific Gravity Urine 1.013 1.003 - 1.035    Blood Urine Negative NEG^Negative    pH Urine 5.0 5.0 - 7.0 pH    Protein Albumin Urine 10 (A) NEG^Negative mg/dL    Urobilinogen mg/dL Normal 0.0 - 2.0 mg/dL    Nitrite Urine Negative NEG^Negative    Leukocyte Esterase Urine Negative NEG^Negative    Source Catheterized Urine     WBC Urine <1 0 - 5 /HPF    RBC Urine 0 0 - 2 /HPF    Hyaline Casts 2 0 - 2 /LPF   Nt probnp inpatient   Result Value Ref Range    N-Terminal Pro BNP Inpatient 6182 (H) 0 - 1800 pg/mL   Chest XR,  PA & LAT    Narrative    CHEST TWO VIEWS   11/11/2018 10:15 AM     HISTORY: Nausea.     COMPARISON: 1/19/2017      Impression    IMPRESSION: Lungs are slightly hypoinflated. Patchy bilateral  perihilar and right greater than left basilar opacities are present  which may represent edema, aspiration, or infection. Probable trace  right pleural effusion. Cardiac silhouette is enlarged, unchanged.  Triple lead cardiac pacing device is in unchanged position. Upper  lumbar (approximate L1) compression fracture is present with  approximately 80% height loss, new compared to 1/19/2017.    BREE CORDERO MD[BA1.1]            Revision History        User Key Date/Time User Provider Type Action    > BA1.2 11/11/2018  3:08 PM Kandace Reina MD Physician Sign     BA1.1 11/11/2018  2:10 PM Kandace Reina MD Physician                      Progress Notes - Physician (Notes from 11/13/18 through 11/16/18)      Progress Notes by Moira  Amparo Campos RN at 2018  8:47 AM     Author:  Amparo Pizarro RN Service:  (none) Author Type:  Registered Nurse    Filed:  2018  8:47 AM Encounter Date:  2018 Status:  Signed    :  Amparo Pizarro RN (Registered Nurse)           Pt disenrolled from TELEMANAGEMENT since plan is to be discharged to hospice today.    CARMELLA Miller 8:47 AM 2018[ML1.1]       Revision History        User Key Date/Time User Provider Type Action    > ML1.1 2018  8:47 AM Amparo Pizarro RN Registered Nurse Sign            Progress Notes by Divya Kraft PA-C at 11/15/2018  5:44 PM     Author:  Divay Kraft PA-C Service:  Cardiology Author Type:  Physician Assistant - C    Filed:  11/15/2018  5:52 PM Date of Service:  11/15/2018  5:44 PM Creation Time:  11/15/2018  5:44 PM    Status:  Signed :  Divya Kraft PA-C (Physician Assistant - C)         Brief Cardiology Note  Pt: Mckayla Oconnell    1927      Mckayla Oconnell is a 89 year old year old long standing pt of mine in [facility].  Seen at the request of her daughter as she was not sure she wanted to do hospice.  Discussed with pt,  Jaime, son in law Calixto, and  who was present that hospice is appropriate at this time.  Discussed possible life expectancy of days to months depending on whether or not to continue taking meds.  I expect it would be a week or so without meds and longer on meds.  At this time she wants to continue meds so that her passing occurred after Thanksgiving so that her kids wouldn't be inconvenienced during that week.      Also reviewed pacemaker.  Pt has a biventricular pacemaker (No ICD).  Her underlying rhythm is sinus irene in the 40s.  Turning off her pacemaker would not lead to immediate death as she has her own underlying heart rhythm.  But would allow for a sooner natural death.      We are happy to support the patient and  family as needed.  Please call with questions.     Divya Kraft PA-C  5:45 PM 11/15/2018   [facility]  Pager: [phone][SM1.1]               Revision History        User Key Date/Time User Provider Type Action    > SM1.1 11/15/2018  5:52 PM Divya Kraft PA-C Physician Assistant - C Sign            Progress Notes by Amy Curran MD at 11/15/2018  1:14 PM     Author:  Amy Curran MD Service:  Hospitalist Author Type:  Physician    Filed:  11/15/2018  5:14 PM Date of Service:  11/15/2018  1:14 PM Creation Time:  11/15/2018  1:14 PM    Status:  Signed :  Amy Curran MD (Physician)         [facility]  Hospitalist Progress Note    Assessment & Plan   Mckayla Oconnell is a 89 year old female[MW1.1] with nonischemic cardiomyopathy with ejection fraction between 10-35%, CKD stage III-IV, dyslipidemia, chronic arthritis, peripheral arterial disease,[MW1.2] who was admitted on 11/11/2018[MW1.1] for nausea, confusion and hypoxia and determined to have CHF exacerbation and acute hypercapnic respiratory failure, hyperkalemia.    Nonischemic cardiomyopathy with ejection fraction between 10-35%  Acute hypercapnic respiratory failure secondary to above  Lymphedema  Moderate aortic stenosis, moderate tricuspid regurgitation, moderate mitral regurgitation. Biventricular pacemaker since 01/2017. Long-standing patient of Dr. Scott in [place] heart. EKG showed a paced rhythm, troponin within normal limits. Chest x-ray with pulmonary edema. Received 40 mg IV in the ED. Echocardiogram basically unchanged from previous, possible worse tricuspid regurgitation. Serial troponins negative. VBG with some respiratory acidosis, mild improvement on bipap[MW1.2]  -[MW1.1]Hypertensives have been on hold given hypotension  -Etiology consultation obtained.  Attempts made at adjusting medications but patient was unable to tolerate.  Progressively worse functional  status.    -Patient and family elected to pursue palliative care and then hospice.  Discharge to [facility] facility.  -Transitioned to comfort care on the evening of 11/14/18.  -Acetaminophen, Haldol, lorazepam, liquid hydromorphone available as needed for comfort  -Atropine as needed for secretions  -Bowel regimen to prevent constipation    Subacute metabolic encephalopathy  Subacute delirium  Elated to multiple factors including advanced age, probable underlying cerebrovascular disease, hospitalization, severe comorbidities of kidney disease, cardiomyopathy.    Chronic kidney disease stage III-IV  Reduced renal function with attempts at diuresis, prohibiting further treatment of heart failure.    Hyperkalemia  In the setting of acute kidney injury.  Shifted with insulin and glucose.  Some initial improvement with albumin/Lasix combination.  -No further treatment will be pursued    Dyslipidemia  No need to continue statin, aspirin    Hypothyroidism  No need to continue Synthroid    Cutaneous candidiasis  Does not appear to be bothered by this.[MW1.2]  -[MW1.1]May continue miconazole if it adds benefit to her care    Urinary obstruction  Goldstein catheter placed for obstruction.  Can maintain in place for comfort care.    Hypoglycemia  Occurred after aggressive insulin administration to treat hyperkalemia.  Initially needed D10 infusion.  Resolved after she began eating again.  We will not require any additional treatment and hospice.[MW1.2]    Active Diet Order      Combination Diet Dysphagia Diet Level 2: Mechan Altered; Nectar Thickened Liquids (pre-thickened or use instant food thickener) (No straws)    Code Status: DNR/DNI[MW1.1] comfort care[MW1.2]    Disposition: Expected discharge to[MW1.1] hospice facility on Friday, 11/16/18.  Medication reconciliation complete.  Signed prescriptions for all opiates/benzodiazepines completed on 11/15/18.  They should be filled prior to discharge.[MW1.2]    Amy WRIGHT  MD [facility]  [phone] (7am - 6pm)  Text Page  ~~~~~~~~~~~~~~~~~~~~~~~~~~~~~~~~~~~~~~~~~~~~~~~  Interval History[MW1.1]   Patient new to me today.  Chart reviewed in detail including palliative care notes.  Officially converted to comfort care.  Family and patient have decided to get hospice care at [facility].  Denies pain, nausea.  And only get up with assist of 2.[MW1.2]    -Data reviewed today: I reviewed all new labs and imaging results over the last 24 hours.     Physical Exam   Temp: 97.5  F (36.4  C) Temp src: Oral BP: 108/54   Heart Rate: 70 Resp: 18 SpO2: 94 % O2 Device: Nasal cannula Oxygen Delivery: 1 LPM  Vitals:    11/13/18 0623 11/14/18 0446 11/15/18 0500   Weight: 83.9 kg (184 lb 14.4 oz) 84.5 kg (186 lb 4.8 oz) 83.9 kg (185 lb)     Vital Signs with Ranges  Temp:  [96.1  F (35.6  C)-97.5  F (36.4  C)] 97.5  F (36.4  C)  Heart Rate:  [70] 70  Resp:  [18] 18  BP: (105-118)/(54-73) 108/54  SpO2:  [94 %-98 %] 94 %  I/O last 3 completed shifts:  In: -   Out: 1025 [Urine:1025]    Constitutional:[MW1.1] Lethargic, responds to voice, no respiratory distress[MW1.2]  HEENT: mmm,[MW1.1] mattery discharge bilateral eyes[MW1.2]  Respiratory:[MW1.1] bilateral crackles, no wheezes[MW1.2]  Cardiovascular: RRR,[MW1.1] 2/6 systolic murmur  Boggy edema of bilateral arms[MW1.2]  GI: soft, non-tender, nondistended  Skin/Integument:[MW1.1] Numerous seborrheic keratoses,[MW1.2] warm, dry, no acute rashes  Musc: ELIZABETH,[MW1.1] diffusely weak[MW1.2]  Psych: not anxious,[MW1.1] no obvious thought disorder or hallucinations[MW1.2]    Medications     - MEDICATION INSTRUCTIONS -       - MEDICATION INSTRUCTIONS -         acetaminophen  975 mg Oral Q8H     fluticasone  1 spray Both Nostrils Daily     levothyroxine  112 mcg Oral QAM AC     lidocaine  3 patch Transdermal Q24H     lidocaine   Transdermal Q8H     lidocaine   Transdermal Q24h     menthol   Transdermal Q8H     miconazole   Topical BID     prednisoLONE acetate  1  drop Right Eye QPM       Data     Recent Labs  Lab 11/14/18  0622 11/13/18  1710 11/13/18  0955 11/13/18  0602  11/12/18  0523 11/11/18  2155 11/11/18  1819 11/11/18  1448 11/11/18  0955   WBC 4.7  --   --  4.0  --  3.8*  --   --   --  4.7   HGB 9.4*  --   --  9.1*  --  9.5*  --   --   --  10.2*   MCV 94  --   --  96  --  97  --   --   --  94   PLT 99*  --   --  110*  --  106*  --   --   --  113*   * 131* 131* 130*  < > 136  --   --   --  132*   POTASSIUM 5.7*  --  5.5* 5.5*  < > 5.9*  --   --   --  5.3   CHLORIDE 96  --  98 98  < > 103  --   --   --  100   CO2 26  --  29 28  < > 31  --   --   --  27   BUN 58*  --  54* 52*  < > 51*  --   --   --  46*   CR 1.84* 1.81* 1.79* 1.78*  < > 1.75*  --   --   --  1.46*   ANIONGAP 5  --  4 4  < > 2*  --   --   --  5   SHOBHA 8.0*  --  8.3* 8.1*  < > 8.6  --   --   --  9.2   GLC 95  --  75 106*  < > 66*  --   --   --  83   ALBUMIN 3.0*  --   --  2.6*  --  2.7*  --   --   --  3.3*   PROTTOTAL 6.0*  --   --  5.5*  --  5.8*  --   --   --  6.9   BILITOTAL 0.3  --   --  0.3  --  0.2  --   --   --  0.2   ALKPHOS 61  --   --  51  --  60  --   --   --  72   ALT 34  --   --  34  --  41  --   --   --  49   AST 23  --   --  27  --  28  --   --   --  34   LIPASE  --   --   --   --   --   --   --   --   --  112   TROPI  --   --   --   --   --   --  0.031 0.031 0.030 0.021   < > = values in this interval not displayed.    Imaging:  No results found for this or any previous visit (from the past 24 hour(s)).[MW1.1]       Revision History        User Key Date/Time User Provider Type Action    > MW1.2 11/15/2018  5:14 PM Amy Curran MD Physician Sign     MW1.1 11/15/2018  1:14 PM Amy Curran MD Physician             Progress Notes by Cherelle Holden LICSW at 11/15/2018  4:22 PM     Author:  Cherelle Holden LICSW Service:  Social Work Author Type:      Filed:  11/15/2018  4:25 PM Date of Service:  11/15/2018  4:22 PM Creation Time:  11/15/2018   4:22 PM    Status:  Signed :  Cherelle Holden LICSW ()         SW:  D:  Spoke with Keyanna from [facility].  She states that patient and family and in agreement with a discharge to [facility].  They have a bed available for tomorrow.   Hospice will meet patient and family to complete the intake process at 13:00 tomorrow.  Call placed to [facility] to arrange for stretcher transport at 12:00 as patient is on oxygen that she is unable to manage herself.  Patient is also hospice and she needs closer monitoring in the back of the rig.  Faxed the facesheet and PCS to [facility].    P:  Will continue to follow.[SJ1.1]     Revision History        User Key Date/Time User Provider Type Action    > SJ1.1 11/15/2018  4:25 PM Cherelle Holden LICSW  Sign            Progress Notes by Keyanna Upton, RN at 11/15/2018  2:12 PM     Author:  Keyanna Upton, RN Service:  Hospice Author Type:  Registered Nurse    Filed:  11/15/2018  2:27 PM Date of Service:  11/15/2018  2:12 PM Creation Time:  11/15/2018  2:12 PM    Status:  Signed :  Keyanna Upton, RN (Registered Nurse)          Hospice: I met with patient, her daughter Lorrie and son in in law Calixto to explain the hospice program. I provided the hospice information sheet and brochure. I explained the medicare hospice benefit, services for support, medication and equipment coverage.   Patients spouse Jaime had stepped out but joined the conversation at the very end.   I explained where the hospice services could take place, ie) home, nursing home and residential hospice homes and the costs for each. After much discussion it was decided that [facility] would be the best place to provide care and support for the patient and for her spouse Jaime who has as the daughter tells me has a lot of emotional needs.   Mckayla was able to tell Jaime this was the plan moving forward and he seemed to be ok with this but I am concerned he may not remember the  [place]ina   Mckayla gave permission for Lorrie who is also the [facility] to sign the hospice consent forms.   The plan is to discharge the patient tomorrow around noon to [facility] if possible if transportation can be arranged. The  hospice admission team will complete the admission at 1p.     POLST completed and placed on front of the chart for signature.   I will order from [facility] liquid oxygen to be delivered to the facility by noon tomorrow.   Please order the following hospice comfort meds for discharge:    Acetaminophen 650 mg supp AK every 4 hrs as needed for pain/fever #2  bisacodyl 10 mg supp AK daily as needed for constipation #2,   atropine sulfate 1% 2 to 4 drops SL as needed every 2 hrs for terminal congestion/secretions # 5 ml,   haloperidol 0.5 mg tab 1 to 2 tabs po/sl every 6 hrs nausea, agitation # 15,   lorazepam 0.5mg tab 1/2 to 1 tab po/sl every 4 hrs as needed for anxiety/restlessness #30,   hydromorphone 10 mg/ml give 1 to 2 mg or 0.1 to 0.2 ml SL every 2 hrs pain/dyspnea #30 ml,   senna 8.6 mg 1 to 2 tabs po 2x day as needed constipation #30.  Lorrie was given copies of her signed hospice consent forms along with the hospice handbook with the 24 hr number  Thank you for the referral.  Keyanna Upton RN, BSN   Hospice Admission nurse  [phone][HR1.1]     Revision History        User Key Date/Time User Provider Type Action    > HR1.1 11/15/2018  2:27 PM Keyanna Upton RN Registered Nurse Sign            Progress Notes by Cruz Blackwood MD at 11/15/2018 12:27 PM     Author:  Cruz Blackwood MD Service:  Nephrology Author Type:  Physician    Filed:  11/15/2018 12:39 PM Date of Service:  11/15/2018 12:27 PM Creation Time:  11/15/2018 12:27 PM    Status:  Signed :  Cruz Blackwood MD (Physician)                  Assessment and Plan:   CKD/KIARRA: in the setting of advanced age and heart and kidney failure. Agree this is a terminal event and that hospice is a  rational decision.             Interval History:   CHF  Anemia  Decreased MS:                  Review of Systems:[JT1.1]   Comfortable, no pain now. Not SOB. Many questions about hospice.[JT1.2]          Medications:[JT1.1]       acetaminophen  975 mg Oral Q8H     fluticasone  1 spray Both Nostrils Daily     levothyroxine  112 mcg Oral QAM AC     lidocaine  3 patch Transdermal Q24H     lidocaine   Transdermal Q8H     lidocaine   Transdermal Q24h     menthol   Transdermal Q8H     miconazole   Topical BID     prednisoLONE acetate  1 drop Right Eye QPM       - MEDICATION INSTRUCTIONS -       - MEDICATION INSTRUCTIONS -[JT1.3]       Current active medications and PTA medications reviewed, see medication list for details.            Physical Exam:   Vitals were reviewed  Patient Vitals for the past 24 hrs:   BP Temp Temp src Pulse Heart Rate Resp SpO2 Weight   11/15/18 0500 - - - - - - - 83.9 kg (185 lb)   11/15/18 0056 108/54 97.5  F (36.4  C) Oral - 70 18 94 % -   18 2245 - - - - - - 96 % -   18 2027 118/62 97.5  F (36.4  C) Oral - 70 18 96 % -   18 1627 118/73 96.1  F (35.6  C) Axillary - 70 - 98 % -   18 1315 105/61 97.5  F (36.4  C) Oral - 70 - - -   18 1300 101/54 - - - 70 - - -   18 1250 102/57 97.7  F (36.5  C) Oral 70 - - 98 % -       Temp:  [96.1  F (35.6  C)-97.7  F (36.5  C)] 97.5  F (36.4  C)  Pulse:  [70] 70  Heart Rate:  [70] 70  Resp:  [18] 18  BP: (101-118)/(54-73) 108/54  SpO2:  [94 %-98 %] 94 %    Temperatures:  Current - Temp: 97.5  F (36.4  C); Max - Temp  Av.3  F (36.3  C)  Min: 96.1  F (35.6  C)  Max: 97.7  F (36.5  C)  Respiration range: Resp  Av  Min: 18  Max: 18  Pulse range: Pulse  Av  Min: 70  Max: 70  Blood pressure range: Systolic (24hrs), Av , Min:101 , Max:118   ; Diastolic (24hrs), Av, Min:54, Max:73    Pulse oximetry range: SpO2  Av.4 %  Min: 94 %  Max: 98 %    I/O last 3 completed shifts:  In: -   Out: 1025  [Urine:1025]      Intake/Output Summary (Last 24 hours) at 11/15/18 1227  Last data filed at 11/15/18 1100   Gross per 24 hour   Intake              360 ml   Output             1475 ml   Net            -1115 ml[JT1.1]       Alert, resting in bed, on NC O2[JT1.2]     Wt Readings from Last 4 Encounters:   11/15/18 83.9 kg (185 lb)   11/06/18 79.4 kg (175 lb)   11/06/18 82 kg (180 lb 11.2 oz)   08/29/18 79.1 kg (174 lb 4.8 oz)          Data:          Lab Results   Component Value Date     11/14/2018     11/13/2018     11/13/2018    Lab Results   Component Value Date    CHLORIDE 96 11/14/2018    CHLORIDE 98 11/13/2018    CHLORIDE 98 11/13/2018    Lab Results   Component Value Date    BUN 58 11/14/2018    BUN 54 11/13/2018    BUN 52 11/13/2018      Lab Results   Component Value Date    POTASSIUM 5.7 11/14/2018    POTASSIUM 5.5 11/13/2018    POTASSIUM 5.5 11/13/2018    Lab Results   Component Value Date    CO2 26 11/14/2018    CO2 29 11/13/2018    CO2 28 11/13/2018    Lab Results   Component Value Date    CR 1.84 11/14/2018    CR 1.81 11/13/2018    CR 1.79 11/13/2018        Recent Labs   Lab Test  11/14/18 0622 11/13/18   0602  11/12/18   0523   WBC  4.7  4.0  3.8*   HGB  9.4*  9.1*  9.5*   HCT  29.0*  28.7*  30.4*   MCV  94  96  97   PLT  99*  110*  106*     Recent Labs   Lab Test  11/14/18 0622 11/13/18   0602  11/12/18   0523   AST  23  27  28   ALT  34  34  41   ALKPHOS  61  51  60   BILITOTAL  0.3  0.3  0.2   GISSELLE   --    --   19       Recent Labs   Lab Test  11/14/18 0622 11/13/18   0602  07/17/17   0821   MAG  1.8  1.9  2.3     Recent Labs   Lab Test  11/14/18 0622 11/13/18   0602  08/11/16   0757   PHOS  4.2  4.3  2.9     Recent Labs   Lab Test  11/14/18   0622  11/13/18   0955  11/13/18   0602   SHOBHA  8.0*  8.3*  8.1*       Lab Results   Component Value Date    SHOBHA 8.0 (L) 11/14/2018     Lab Results   Component Value Date    WBC 4.7 11/14/2018    HGB 9.4 (L) 11/14/2018    HCT 29.0  "(L) 11/14/2018    MCV 94 11/14/2018    PLT 99 (L) 11/14/2018     Lab Results   Component Value Date     (L) 11/14/2018    POTASSIUM 5.7 (H) 11/14/2018    CHLORIDE 96 11/14/2018    CO2 26 11/14/2018    GLC 95 11/14/2018     Lab Results   Component Value Date    BUN 58 (H) 11/14/2018    CR 1.84 (H) 11/14/2018     Lab Results   Component Value Date    MAG 1.8 11/14/2018     Lab Results   Component Value Date    PHOS 4.2 11/14/2018       Creatinine   Date Value Ref Range Status   11/14/2018 1.84 (H) 0.52 - 1.04 mg/dL Final   11/13/2018 1.81 (H) 0.52 - 1.04 mg/dL Final   11/13/2018 1.79 (H) 0.52 - 1.04 mg/dL Final   11/13/2018 1.78 (H) 0.52 - 1.04 mg/dL Final   11/13/2018 1.79 (H) 0.52 - 1.04 mg/dL Final   11/12/2018 1.79 (H) 0.52 - 1.04 mg/dL Final       Attestation:  I have reviewed today's vital signs, notes, medications, labs and imaging.  Given hospice decisions, I will sign off.      Cruz Blackowod MD[JT1.1]               Revision History        User Key Date/Time User Provider Type Action    > JT1.2 11/15/2018 12:39 PM Cruz Blackwood MD Physician Sign     JT1.3 11/15/2018 12:29 PM Cruz Blackwood MD Physician      JT1.1 11/15/2018 12:27 PM Cruz Blackwood MD Physician             Progress Notes by Cassie Manrique at 11/15/2018 10:36 AM     Author:  Cassie Manrique Service:  Spiritual Health Author Type:      Filed:  11/15/2018 10:43 AM Date of Service:  11/15/2018 10:36 AM Creation Time:  11/15/2018 10:36 AM    Status:  Signed :  Cassie Manrique ()         SPIRITUAL HEALTH SERVICES Progress Note  [facility]    Visit with pt, per palliative consult.    Pt shared with me the news that she has learned, specifically that her heart and kidneys are failing.  She said, \"They tell me I don't have much time left!\"  Pt then said, \"Well, that's OK.  I'll get to go and be with Pritesh.\"  As our conversation continued, however, pt voiced frustration that she'll have to pay " "$600 a day for her care.  Pt wondered aloud if she could perhaps go home and receive home hospice care \"because that would be free.\"  I talked with her about what her needs might be, and explored with her the idea that she likely needs more care than she could get if she returned home.  She knows that her  and daughter can't provide the level of care she needs.  I then offered the idea of getting hospice care in a long-term care facility/nursing home, which might be less expensive than a residential hospice.  Ultimately it seems that pt is frustrated that she may have to pay for her care, rather than leaving all of her savings to her daughter and grandchildren.    Provided reflective listening, support and prayer.  Pt's own  ([facility] in [place]) has also been visiting, which is a great help to pt and her .    Pt then asked for my help in calling a friend of hers.  She struggled to use the hospital phone, couldn't remember the number she was trying to call, called her friend Edward (which is her daughter's name--might or might not be her friend's name) and seemed generally frustrated.  Provided assistance and support as best I could.    SH team will continue to be available if needs arise.                                                                                                                                             Cassie Manrique M.A.  Staff   Pager [phone]  Phone [phone][PL1.1]         Revision History        User Key Date/Time User Provider Type Action    > PL1.1 11/15/2018 10:43 AM Cassie Manrique Sign            Progress Notes by Akash Arenas RN at 11/14/2018  9:10 PM     Author:  Akash Arenas RN Service:  (none) Author Type:  Registered Nurse    Filed:  11/15/2018  3:58 AM Date of Service:  11/14/2018  9:10 PM Creation Time:  11/14/2018  9:10 PM    Status:  Addendum :  Akash Arenas RN (Registered Nurse)         Patient " has expressed to family that she wants to go to rehab and they are concerned that patient is no longer wanting to be on comfort cares. Family would like palliative to come and talk with patient and family tomorrow.[SM1.1]  Discussed patients wishes with charge nurse and Dr. Wilkinson. Per Dr. Wilkinson will continue with POC for tonight.[SM1.2]      Revision History        User Key Date/Time User Provider Type Action    > SM1.2 11/15/2018  3:58 AM Akash Arenas RN Registered Nurse Addend     SM1.1 11/15/2018 12:46 AM Akash Arenas RN Registered Nurse Addend     [N/A] 11/14/2018  9:11 PM Akash Arenas RN Registered Nurse Sign            Progress Notes by Julita Pritchard APRN CNP at 11/14/2018  4:39 PM     Author:  Julita Pritchard APRN CNP Service:  Palliative Author Type:  Nurse Practitioner    Filed:  11/14/2018  4:46 PM Date of Service:  11/14/2018  4:39 PM Creation Time:  11/14/2018  4:39 PM    Status:  Signed :  Julita Pritchard APRN CNP (Nurse Practitioner)         Formal palliative consult note to follow. Pt and family unanimously in support of comfort cares in the hospital, followed by hospice discharge. There was some question about home vs facility, and pt's daughter/surrogate decision maker ultimately supports discharge to a facility. Their first choice is [facility] and they are open to [facility]. I have communicated this with Dr. Mack and unit [facility]. Thanks.    Jayleen GOMEZ, CNP  Palliative Medicine   Pager: [phone][AW1.1]         Revision History        User Key Date/Time User Provider Type Action    > AW1.1 11/14/2018  4:46 PM Julita Pritchard APRN CNP Nurse Practitioner Sign            Progress Notes by Edilberto Mack DO at 11/14/2018 11:35 AM     Author:  Edilberto Mack DO Service:  Hospitalist Author Type:  Physician    Filed:  11/14/2018  2:52 PM Date of Service:  11/14/2018 11:35 AM Creation Time:  11/14/2018 11:35 AM    Status:   Addendum :  Edilberto Mack DO (Physician)         [facility]    Hospitalist Progress Note    Assessment & Plan   Mckalya Oconnell is a 89 year old female who was admitted on 11/11/2018 with nausea, confusion, and hypoxia found to have CHF exacerbation.    Hyperkalemia likely from acute kidney injury  Shifted with insulin and glucose, repeat pending  Hypoglycemia after shifting with insulin despite 2 amps of D50  Some improvement with albumin/lasix combination  Plan  - daily bmp  - restart diuretics  - nephrology consulted    Hypoglycemia  Occurred after shifting with insulin despite D50 given, subsequently started on D10 infusion  Interestingly, became hypoglycemic again overnight about 20 hours after SA insulin administration  Plan  -resolved now that she is eating    Goals of care discussion  Discussed with Daughter rosaura, patient has been endorsing desire to pass now for awhile.   Ms. Kraft from the cardiology clinic encouraged patient and her family to consider palliative care and hospice  Pt goal is to remain at home  Given her multiorgan failure, poor cardiac reserve, and her age, agree with palliative care consult  Ideally would be able to be on hospice at home, but there are significant barriers given her home environment and currently level of debility.  Plan  - continue DNR/DNI  - palliaitve consult, discussed with Jayleen, would involve  as well as daughters. Rosaura is aware, reachable by cell phone today    Nonischemic cardiomyopathy 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   Acute hypercapnic respiratory failure  Moderate aortic stenosis, moderate tricuspid regurgitation, moderate mitral regurgitation.   Biventricular pacemaker since 01/2017.   Long-standing patient of Dr. Scott in [place] [facility], recently saw Ms. Kraft last week with plan to increase diuretic for a few day  EKG shows a paced rhythm, troponin within normal  limits.  Chest x-ray with likely pulmonary edema  Received 40 mg IV in the ED  Echocardiogram basically unchanged from previous, possible worse tricuspid regurgitation  Serial troponins negative  VBG with some respiratory acidosis, mild improvement on bipap  Plan  - hold antihypertensive agents given her borderline hypotension  - Cardiology consult appreciated     Altered mental status likely metabolic encephalopathy  Fluctuating mental status  Daughter reports normally she is quite awake and alert, but much more somnolent over the past few days  She is moving all extremities on examination, with variable effort given her somnolence  She reports that she is just very tired because she did not sleep well the night before because of somnolence  No obvious signs of infection, no fever, no WBC elevation, denies respiratory complaints, UA is clean  CT head without acute abnormality  procal WNL making bacterial infection less likely  Moving her extremities, with the exception of the left shoulder which she says is secondary to pain  Ammonia negative  vbg with respiratory acidosis  Neurology consulted, unable to get a brain MRI because of incompatable pacemaker  Rapid response called for neurological changes on 11/14, but she seems improved from previous  Plan  - much improved, passed slp evaluation  - rapid response, given her mental status continues to improve every day, no discernable focal deficits, unable to obtain an MRI        Neck pain about C8 level  Chronic arthritis   Appears neurologically intact with movement of extremities  Plan  - CT of the neck WNL    Urinary obstruction  -s/p placement of lopez        CKD with baseline creatinine 1.5-2.   - at baseline monitor      Hyperlipidemia.   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 -   Pain score (0-10) 5   Pain location -   Pain descriptors -   - Mckayla is experiencing pain due to arthritis. Pain management was discussed and the plan was created in a collaborative fashion.  Mckayla's response to the current recommendations: disinterested  - monitor for now           DVT Prophylaxis: Pneumatic Compression Devices.  Code Status: DNR/DNI    Disposition: Expected discharge in 2-3 days electrolytes improve. Disposition not clear, pending goals of care discussion[ES1.1] with palliative care[ES1.2]. Possible home with hospice versus TCU[ES1.1]. Main issues will be unifying plan of care between daughter, , and the patient. The patient is capable of making decisions at this juncture[ES1.2]    Edilberto Mack, DO  Text Page  (7am to 6pm)  Interval History   Had RRT called for possible respiratory status, but conversant currently without focal deficits      -Data reviewed today: I reviewed all new labs and imaging results over the last 24 hours. I personally reviewed the CT of the head and c spine    Physical Exam   Temp: 97.5  F (36.4  C) Temp src: Oral BP: 107/58 Pulse: 70 Heart Rate: 70 Resp: 16 SpO2: 98 % O2 Device: Nasal cannula Oxygen Delivery: 1 LPM  Vitals:    11/12/18 0620 11/13/18 0623 11/14/18 0446   Weight: 80.1 kg (176 lb 9.4 oz) 83.9 kg (184 lb 14.4 oz) 84.5 kg (186 lb 4.8 oz)     Vital Signs with Ranges  Temp:  [96  F (35.6  C)-97.5  F (36.4  C)] 97.5  F (36.4  C)  Pulse:  [70] 70  Heart Rate:  [70] 70  Resp:  [8-24] 16  BP: ()/(39-66) 107/58  SpO2:  [87 %-98 %] 98 %  I/O last 3 completed shifts:  In: 1566.67 [P.O.:860; I.V.:706.67]  Out: 700  [Urine:700]    Constitutional: Awakens easily to voice, conversant  Respiratory: Clear to auscultation bilaterally, no crackles or wheezing  Cardiovascular: Regular rate and rhythm, normal S1 and S2, and no murmur noted  GI: Normal bowel sounds, soft, non-distended, non-tender  Skin/Integumen: No rashes, no cyanosis, no edema  Other: Oriented to person, month, and year and place. Muscle strength intact. Moving all extremities with exception of the left shoulder because of pain. No facial assymmetry. Cranial nerves are intact    Medications     - MEDICATION INSTRUCTIONS -       - MEDICATION INSTRUCTIONS -       sodium chloride 50 mL/hr at 11/14/18 0754       aspirin  81 mg Oral Daily     calcium citrate  1,900 mg Oral Daily before lunch     fluticasone  1 spray Both Nostrils Daily     levothyroxine  112 mcg Oral QAM AC     miconazole   Topical BID     prednisoLONE acetate  1 drop Right Eye QPM     torsemide  10 mg Oral Daily       Data     Recent Labs  Lab 11/14/18  0622 11/13/18  1710 11/13/18  0955 11/13/18  0602  11/12/18  0523 11/11/18  2155 11/11/18  1819 11/11/18  1448 11/11/18  0955   WBC 4.7  --   --  4.0  --  3.8*  --   --   --  4.7   HGB 9.4*  --   --  9.1*  --  9.5*  --   --   --  10.2*   MCV 94  --   --  96  --  97  --   --   --  94   PLT 99*  --   --  110*  --  106*  --   --   --  113*   * 131* 131* 130*  < > 136  --   --   --  132*   POTASSIUM 5.7*  --  5.5* 5.5*  < > 5.9*  --   --   --  5.3   CHLORIDE 96  --  98 98  < > 103  --   --   --  100   CO2 26  --  29 28  < > 31  --   --   --  27   BUN 58*  --  54* 52*  < > 51*  --   --   --  46*   CR 1.84* 1.81* 1.79* 1.78*  < > 1.75*  --   --   --  1.46*   ANIONGAP 5  --  4 4  < > 2*  --   --   --  5   SHOBHA 8.0*  --  8.3* 8.1*  < > 8.6  --   --   --  9.2   GLC 95  --  75 106*  < > 66*  --   --   --  83   ALBUMIN 3.0*  --   --  2.6*  --  2.7*  --   --   --  3.3*   PROTTOTAL 6.0*  --   --  5.5*  --  5.8*  --   --   --  6.9   BILITOTAL 0.3  --   --  0.3  --  " 0.2  --   --   --  0.2   ALKPHOS 61  --   --  51  --  60  --   --   --  72   ALT 34  --   --  34  --  41  --   --   --  49   AST 23  --   --  27  --  28  --   --   --  34   LIPASE  --   --   --   --   --   --   --   --   --  112   TROPI  --   --   --   --   --   --  0.031 0.031 0.030 0.021   < > = values in this interval not displayed.    Imaging:   No results found for this or any previous visit (from the past 24 hour(s)).[ES1.1]       Revision History        User Key Date/Time User Provider Type Action    > ES1.2 11/14/2018  2:52 PM Edilberto Mack, DO Physician Addend     ES1.1 11/14/2018 11:44 AM Edilberto Mack, DO Physician Sign            Progress Notes by Thea Spence, RN at 11/14/2018  2:16 PM     Author:  Thea Spence, RN Service:  (none) Author Type:  Registered Nurse    Filed:  11/14/2018  2:27 PM Encounter Date:  11/14/2018 Status:  Signed    :  Thea Spence, RN (Registered Nurse)           Received call from pt's dtr Irina. Irina reviewed the event's of the last several days and Mckayla's admission. Stated plan is for palliative consult today--Mkcayla's  Jaime will be present.     Irina had questions re: palliative v hospice, home care options, planning etc, which we discussed. I told her more detailed info would be available from hospital palliative team and  and deferred specific questions to them. Discussed that should pt choose palliative, hospice, CORE team still available to assist. Irina reiterated that Mckayla's goal is to return home.     Irina said Mckayla's  Jaime \"is a basket-case\" and family is trying to assist him during this stressful time.    Reminder sent to CORE RN board for 11/19 to do chart check to assess if addnl CORE follow-up needed.     Thea Spence RN BSN   2:26 PM 11/14/18[AW1.1]               Revision History        User Key Date/Time User Provider Type Action    > AW1.1 11/14/2018  2:27 PM Thea Spence " Toan RN Registered Nurse Sign            Progress Notes by Cruz Blackwood MD at 11/14/2018 11:34 AM     Author:  Cruz Blackwood MD Service:  Nephrology Author Type:  Physician    Filed:  11/14/2018 11:55 AM Date of Service:  11/14/2018 11:34 AM Creation Time:  11/14/2018 11:34 AM    Status:  Signed :  Cruz Blackwood MD (Physician)                  Assessment and Plan:   CKD/KIARRA: Cr 1.79 > > 1.84 since adm. Lytes show K 5.7 and Na 127, HCO3 26.   She is on Ca Citrate po. She got a low dose of torsemide today, but remains on NS at 50 ml/h. Her FENa is 0.1% and Venu 8 C/W pre-renal azotemia.[JT1.1]    Se denies chest pain or SOB. She tells me she ate BF without N or V. Not ambulating.[JT1.2]      Cont IVF and monitor labs.                 Interval History:   CHF: BP is low.    Decreased MS:  Anemia: Hgb 9.4.  Will give 2 doses of IV venofer.              Medications:       aspirin  81 mg Oral Daily     calcium citrate  1,900 mg Oral Daily before lunch     fluticasone  1 spray Both Nostrils Daily     levothyroxine  112 mcg Oral QAM AC     miconazole   Topical BID     prednisoLONE acetate  1 drop Right Eye QPM     torsemide  10 mg Oral Daily       - MEDICATION INSTRUCTIONS -       - MEDICATION INSTRUCTIONS -       sodium chloride 50 mL/hr at 11/14/18 0754     Current active medications and PTA medications reviewed, see medication list for details.            Physical Exam:   Vitals were reviewed  Patient Vitals for the past 24 hrs:   BP Temp Temp src Pulse Heart Rate Resp SpO2 Weight   11/14/18 1045 - - - - - - 98 % -   11/14/18 0810 - - - - - - 95 % -   11/14/18 0800 107/58 97.5  F (36.4  C) Oral 70 - - (!) 87 % -   11/14/18 0446 - - - - - - - 84.5 kg (186 lb 4.8 oz)   11/14/18 0042 98/54 96.7  F (35.9  C) Axillary - 70 16 93 % -   11/13/18 2100 102/50 96  F (35.6  C) Axillary - 70 16 92 % -   11/13/18 1800 106/55 - - - 70 10 93 % -   11/13/18 1700 100/59 - - - 70 15 - -   11/13/18  1600 100/53 - - - 70 9 - -   18 1500 108/60 - - - 70 14 - -   18 1400 117/64 - - - 70 24 98 % -   18 1316 93/53 - - - 70 12 - -   18 1200 97/66 - - - 70 8 - -   18 1152 (!) 95/39 - - - 70 10 98 % -       Temp:  [96  F (35.6  C)-97.5  F (36.4  C)] 97.5  F (36.4  C)  Pulse:  [70] 70  Heart Rate:  [70] 70  Resp:  [8-24] 16  BP: ()/(39-66) 107/58  SpO2:  [87 %-98 %] 98 %    Temperatures:  Current - Temp: 97.5  F (36.4  C); Max - Temp  Av.7  F (35.9  C)  Min: 96  F (35.6  C)  Max: 97.5  F (36.4  C)  Respiration range: Resp  Av.4  Min: 8  Max: 24  Pulse range: Pulse  Av  Min: 70  Max: 70  Blood pressure range: Systolic (24hrs), Av , Min:93 , Max:117   ; Diastolic (24hrs), Av, Min:39, Max:66    Pulse oximetry range: SpO2  Av.3 %  Min: 87 %  Max: 98 %    I/O last 3 completed shifts:  In: 1566.67 [P.O.:860; I.V.:706.67]  Out: 700 [Urine:700]      Intake/Output Summary (Last 24 hours) at 18 1134  Last data filed at 18 0600   Gross per 24 hour   Intake          1566.67 ml   Output              700 ml   Net           866.67 ml[JT1.1]       Somnolent, can awaken to answer questions  Lungs with clear ant BS  Cor RRR nl S1 S2 2/6 short sys M, + JVD  LE no edema[JT1.2]    I/O 757/700  Weights (last 365 days)      Date/Time Weight Who     18 0446 84.5 kg (186 lb 4.8 oz) MA     18 0623 83.9 kg (184 lb 14.4 oz) NE     18 0620 80.1 kg (176 lb 9.4 oz) NE     18 1838 81.1 kg (178 lb 12.8 oz)      Wt up 3.4 kg since adm         Wt Readings from Last 4 Encounters:   18 84.5 kg (186 lb 4.8 oz)   18 79.4 kg (175 lb)   18 82 kg (180 lb 11.2 oz)   18 79.1 kg (174 lb 4.8 oz)          Data:          Lab Results   Component Value Date     2018     2018     2018    Lab Results   Component Value Date    CHLORIDE 96 2018    CHLORIDE 98 2018    CHLORIDE 98 2018    Lab  Results   Component Value Date    BUN 58 11/14/2018    BUN 54 11/13/2018    BUN 52 11/13/2018      Lab Results   Component Value Date    POTASSIUM 5.7 11/14/2018    POTASSIUM 5.5 11/13/2018    POTASSIUM 5.5 11/13/2018    Lab Results   Component Value Date    CO2 26 11/14/2018    CO2 29 11/13/2018    CO2 28 11/13/2018    Lab Results   Component Value Date    CR 1.84 11/14/2018    CR 1.81 11/13/2018    CR 1.79 11/13/2018        Recent Labs   Lab Test  11/14/18   0622  11/13/18   0602  11/12/18   0523   WBC  4.7  4.0  3.8*   HGB  9.4*  9.1*  9.5*   HCT  29.0*  28.7*  30.4*   MCV  94  96  97   PLT  99*  110*  106*     Recent Labs   Lab Test  11/14/18   0622  11/13/18   0602  11/12/18   0523   AST  23  27  28   ALT  34  34  41   ALKPHOS  61  51  60   BILITOTAL  0.3  0.3  0.2   GISSELLE   --    --   19       Recent Labs   Lab Test  11/14/18   0622 11/13/18   0602  07/17/17   0821   MAG  1.8  1.9  2.3     Recent Labs   Lab Test  11/14/18   0622  11/13/18   0602  08/11/16   0757   PHOS  4.2  4.3  2.9     Recent Labs   Lab Test  11/14/18   0622  11/13/18   0955  11/13/18   0602   SHOBHA  8.0*  8.3*  8.1*       Lab Results   Component Value Date    SHOBHA 8.0 (L) 11/14/2018     Lab Results   Component Value Date    WBC 4.7 11/14/2018    HGB 9.4 (L) 11/14/2018    HCT 29.0 (L) 11/14/2018    MCV 94 11/14/2018    PLT 99 (L) 11/14/2018     Lab Results   Component Value Date     (L) 11/14/2018    POTASSIUM 5.7 (H) 11/14/2018    CHLORIDE 96 11/14/2018    CO2 26 11/14/2018    GLC 95 11/14/2018     Lab Results   Component Value Date    BUN 58 (H) 11/14/2018    CR 1.84 (H) 11/14/2018     Lab Results   Component Value Date    MAG 1.8 11/14/2018     Lab Results   Component Value Date    PHOS 4.2 11/14/2018       Creatinine   Date Value Ref Range Status   11/14/2018 1.84 (H) 0.52 - 1.04 mg/dL Final   11/13/2018 1.81 (H) 0.52 - 1.04 mg/dL Final   11/13/2018 1.79 (H) 0.52 - 1.04 mg/dL Final   11/13/2018 1.78 (H) 0.52 - 1.04 mg/dL Final    11/13/2018 1.79 (H) 0.52 - 1.04 mg/dL Final   11/12/2018 1.79 (H) 0.52 - 1.04 mg/dL Final       Attestation:  I have reviewed today's vital signs, notes, medications, labs and imaging.     Cruz Blackwood MD[JT1.1]               Revision History        User Key Date/Time User Provider Type Action    > JT1.2 11/14/2018 11:55 AM Cruz Blackwood MD Physician Sign     JT1.1 11/14/2018 11:34 AM Cruz Blackwood MD Physician             Progress Notes by Jensen Nunez RN at 11/13/2018  7:30 PM     Author:  Jensen Nunez RN Service:  (none) Author Type:  Registered Nurse    Filed:  11/13/2018  8:03 PM Date of Service:  11/13/2018  7:30 PM Creation Time:  11/13/2018  8:01 PM    Status:  Signed :  Jensen Nunez RN (Registered Nurse)         Pt[BD1.1] transferred from[BD1.2] CCU approximately 1900. Pt settled into room 260-2. Denies pain. Bed alarm on.[BD1.1] Call light within reach.[BD1.2] Report given to oncoming RN. Will continue to monitor.[BD1.1]      Revision History        User Key Date/Time User Provider Type Action    > BD1.2 11/13/2018  8:03 PM Jensen Nunez RN Registered Nurse Sign     BD1.1 11/13/2018  8:01 PM Jensen Nunez RN Registered Nurse             Progress Notes by Debbie Jay APRN CNP at 11/13/2018  9:16 AM     Author:  Debbie Jay APRN CNP Service:  Neurology Author Type:  Nurse Practitioner    Filed:  11/13/2018  4:06 PM Date of Service:  11/13/2018  9:16 AM Creation Time:  11/13/2018  9:16 AM    Status:  Signed :  Debbie Jay APRN CNP (Nurse Practitioner)         [facility]  Neurology Daily Note      Admission Date:11/11/2018   Date of service:[MC1.1] 11/13/2018   Hospital Day: 3      Assessment & Plan[MC1.2]   _______________________________  #. (R41.82) Altered mental status, unspecified altered mental status type  --no focal neurological deficit  --CT head without acute abnormalities on  admission  --no signs of infection, normal procalc  --VBG with respiratory acidosis  ------hospitalist to manage  -------improvement in wakefulness with BiPAP  ---------largely suspect altered mental status due to respiratory dysfunction  --pacemaker is not MRI compatible  --will continue to monitor for improvement related to respiratory status -[MC1.1]----improved today, much more interactive[MC1.3]  --B1 level pending, started thiamine on 11/12 at 100 mg daily for 3 days - deficiency can worsen encephalopathy and CHF  #. (I50.9) Acute on chronic congestive heart failure, unspecified heart failure type (H)  (primary encounter diagnosis)  --management per primary service & cardiology  --significantly decreased EF  #. (J81.0) Acute pulmonary edema (H)  --management per primary service  -----with respiratory acidosis  #. DVT Prophylaxis  --per primary service  #. PT/OT/Speech  --[MC1.1]continue[MC1.3] evaluations as able  #. Nutrition / GI Prophylaxis  --Per recommendations of speech therapy    Code Status:[MC1.1] DNR/DNI[MC1.2]    Disposition: pending[MC1.1]     Interval History[MC1.2]   _______________________________  Patient presented with nausea, somnolent on admission. Concern for CHF, pulmonary edema, with hyperkalemia and decreased renal function. VBG identified respiratory acidosis and she was placed on BiPAP. She had some improvement in wakefulness after initiation of BiPAP. Neurology was consulted due to sustained somnolence. Patient has pacemaker that is not MRI compatible, and she is 100% paced at this time.[MC1.1]   Much more awake this morning, up in the chair, worked with therapies, then drowsy this afternoon again. Significant improvement with improved respiratory status. Will not pursue further brain imaging at this time.[MC1.3]     Review of Systems[MC1.2]   _______________________________  Review of systems is limited by patient factors - mental status[MC1.1]  Physical Exam[MC1.2]    _______________________________  Vitals:[MC1.1]     BP: 107/60   Heart Rate: 70 Resp: 11 SpO2: 97 % O2 Device: Nasal cannula Oxygen Delivery: 4 LPM[MC1.2]  Vital Signs with Ranges:[MC1.1] Heart Rate:  [70-71] 70  Resp:  [9-27] 11  BP: ()/(41-79) 107/60  FiO2 (%):  [50 %] 50 %  SpO2:  [96 %-100 %] 97 %[MC1.2]    General Appearance:  No acute distress  Neuro:       Mental Status Exam:[MC1.1]   drowsy[MC1.3],[MC1.1] arouses to voice[MC1.3], oriented X[MC1.1]3[MC1.3]. Speech and language are intact. Opens eyes to voice, follows commands. Mental status is[MC1.1] slightly confused at times[MC1.3]       Cranial Nerves:  Pupils 3 mm, reactive. EOMI. Face sensation is normal. Face is symmetric. Tongue and uvula are midline. Other CN are normal           Motor:  4+/5 BUE, 4-/5 BLE. Tone and bulk are normal           Reflexes:  Normal DTR. Toes equivocal.        Sensory:  Normal to light touch               Coordination:   Intact finger-to-nose        Gait:[MC1.1]  up with assistance[MC1.3]  Abdomen: Soft, not tender, not distended  Extremities: No clubbing, no cyanosis, no edema[MC1.1]    Medications[MC1.2]   _______________________________[MC1.1]    dextrose 100 mL/hr (11/12/18 2023)     - MEDICATION INSTRUCTIONS -       - MEDICATION INSTRUCTIONS -         aspirin  81 mg Oral Daily     miconazole   Topical BID     vitamin  B-1  100 mg Oral Daily       Data[MC1.2]   _______________________________      Lab Data:   All data was reviewed by me personally  CBC RESULTS:[MC1.1]  Recent Labs   Lab Test  11/13/18   0602  11/12/18   0523  11/11/18   0955   WBC  4.0  3.8*  4.7   RBC  2.99*  3.14*  3.38*   HGB  9.1*  9.5*  10.2*   HCT  28.7*  30.4*  31.7*   PLT  110*  106*  113*[MC1.2]     Basic Metabolic Panel:[MC1.1]  Recent Labs   Lab Test  11/13/18   0602  11/13/18   0240  11/12/18   2217   NA  130*  133  133   POTASSIUM  5.5*  5.4*  5.6*   CHLORIDE  98  100  100   CO2  28  29  27   BUN  52*  54*  52*   CR  1.78*  1.79*   1.79*   GLC  106*  60*  130*   SHOBHA  8.1*  8.2*  8.3*[MC1.2]     Liver panel:[MC1.1]  Recent Labs   Lab Test  11/13/18   0602  11/12/18   0523  11/11/18   0955  04/20/18   0815  11/14/16   1746 07/27/16   PROTTOTAL  5.5*  5.8*  6.9   --   6.2*  6.4   ALBUMIN  2.6*  2.7*  3.3*   --   2.8*  3.8   BILITOTAL  0.3  0.2  0.2   --   0.5  0.6   ALKPHOS  51  60  72   --   73  60   AST  27  28  34   --   50*  33  33   ALT  34  41  49  <5*  89*  42[MC1.2]     Coagulation[MC1.1]  Recent Labs   Lab Test  09/01/16   0729   INR  1.12   PTT  34[MC1.2]      Lipid Profile:[MC1.1]  Recent Labs   Lab Test  04/20/18   0815  03/22/16   1038  07/06/15   0736  03/20/15   0814   CHOL  109  113  124  111   HDL  36*  39*  35*  33*   LDL  55  54  70  54   TRIG  90  98  97  120   CHOLHDLRATIO   --    --   3.5  3.4[MC1.2]     Thyroid Panel:[MC1.1]  Recent Labs   Lab Test  11/12/18   0700  04/20/18   0815  01/18/18   0932  11/28/16   0904   03/17/14   1251   TSH  3.32  3.15  7.30*  14.36*   < >  0.81   T4   --    --   1.23  1.16   --   1.60    < > = values in this interval not displayed.[MC1.2]      Vitamin B12:[MC1.1]   Recent Labs   Lab Test  09/27/16   1025  04/22/16   1026   B12  774  974[MC1.2]      Vitamin D level:[MC1.1]   Recent Labs   Lab Test  08/27/13   1100   VITDT  35[MC1.2]     A1C:[MC1.1]   Recent Labs   Lab Test  11/12/18   0920   A1C  5.3[MC1.2]     Troponin I:[MC1.1]   Recent Labs   Lab Test  11/11/18   2155  11/11/18   1819  11/11/18   1448  11/11/18   0955  11/14/16   1746  05/30/16   0805  08/18/13   1055   TROPI  0.031  0.031  0.030  0.021  0.018  <0.015  The 99th percentile for upper reference range is 0.045 ug/L.  Troponin values in   the range of 0.045 - 0.120 ug/L may be associated with risks of adverse   clinical events.    <0.012[MC1.2]     CK:[MC1.1]   Recent Labs   Lab Test  11/12/18   0700   CKT  69[MC1.2]       Ammonia:[MC1.1]   Recent Labs   Lab Test  11/12/18   0523   GISSELLE  19[MC1.2]     UA  Results:[MC1.1]  Recent Labs   Lab Test  11/11/18   0955  08/05/16   1915   COLOR  Yellow  Yellow   APPEARANCE  Clear  Clear   URINEGLC  Negative  Negative   URINEBILI  Negative  Negative   URINEKETONE  Negative  Negative   SG  1.013  1.010   UBLD  Negative  Negative   URINEPH  5.0  5.0   PROTEIN  10*  Negative   UROBILINOGEN   --   0.2   NITRITE  Negative  Negative   LEUKEST  Negative  Negative   RBCU  0   --    WBCU  <1   --[MC1.2]         Cardiac US:   The left ventricle is moderately dilated. There is mild concentric left ventricular hypertrophy. Left ventricular systolic function is severely reduced. The visual ejection fraction is estimated at 25-30%. Grade II or moderate diastolic dysfunction. There is severe global hypokinesis of the left ventricle with minor regional variation. There is no thrombus seen in the left ventricle. The right ventricle is normal size. There is a catheter/pacemaker lead seen in the right ventricle. Mildly decreased right ventricular systolic function. Moderate mitral regurgitation. There is mod-severe to severe (3-4+) tricuspid regurgitation. The right ventricular systolic pressure is approximated at 48.6 mmHg plus the right atrial pressure. Right ventricular systolic pressure is elevated, consistent with moderate to severe pulmonary hypertension. Moderate aortic stenosis. No pericardial effusion. In comparison to the previous report dated 04/20/2018, there has been an interval worsening in the degree of tricuspid regurgitation. The other findings appear similar.       Neurophysiology:   --       Imaging:   All imaging studies were reviewed personally  CT head 11/11/18:   No acute intracranial abnormality.     CT cervical spine 11/11/18:  No evidence of acute fracture or subluxation in the cervical spine. Severe degenerative change.    Text Page    Debbie Jay, OILVERIO, FNP-BC, RN CNRN SCRN[MC1.1]       Revision History        User Key Date/Time User Provider Type Action    > MC1.3  11/13/2018  4:06 PM Debbie Jay APRN CNP Nurse Practitioner Sign     MC1.2 11/13/2018  9:17 AM Debbie Jay APRN CNP Nurse Practitioner      MC1.1 11/13/2018  9:16 AM Debbie Jay APRN CNP Nurse Practitioner             Progress Notes by Christina Estrella OT at 11/13/2018 12:37 PM     Author:  Christina Estrella OT Service:  (none) Author Type:  Occupational Therapist    Filed:  11/13/2018  1:16 PM Date of Service:  11/13/2018 12:37 PM Creation Time:  11/13/2018 12:37 PM    Status:  Addendum :  Christina Estrella OT (Occupational Therapist)          11/13/18 1000   Quick Adds   Type of Visit Initial Occupational Therapy Evaluation   Living Environment   Lives With spouse   Living Arrangements House[NS1.1]/townhouse[NS1.2]   Home Accessibility grab bars present (bathtub);grab bars present (toilet)   Number of Stairs to Enter Home 0   Number of Stairs Within Home 0   Stair Railings at Home none   Transportation Available car  (Pt.driving PTA)   Living Environment Comment 1 level home   Self-Care   Usual Activity Tolerance fair   Current Activity Tolerance poor   Regular Exercise no   Equipment Currently Used at Home grab bar;raised toilet;shower chair;walker, rolling  (4WW, RTS, shower chair, hand-held shower hose, grab bars)   Activity/Exercise/Self-Care Comment Pt. reports she is indep./mod. indep. w/ i/ADL's at home. Pt. reports spouse is in good healthlpt.'s dtr. is a PT at [facility]. [facility], per pt.;Pt. has a , spouse/pt. fam the laundry, pt. does the cooking;pt. reprots she manages own medications using a pillbox, drives.   Functional Level Prior   Ambulation 1-->assistive equipment   Transferring 1-->assistive equipment   Toileting 1-->assistive equipment   Bathing 1-->assistive equipment   Dressing 0-->independent   Eating 0-->independent   Communication 0-->understands/communicates without difficulty   Swallowing 0-->swallows foods/liquids without difficulty   Fall  history within last six months no   Which of the above functional risks had a recent onset or change? ambulation;transferring;toileting;bathing;dressing   Prior Functional Level Comment Pt. reports she is typically indep./mod. indep., no falls in the last year reported   General Information   Onset of Illness/Injury or Date of Surgery - Date 11/11/18   Referring Physician Dr. Mack   Additional Occupational Profile Info/Pertinent History of Current Problem OT:Mckayla Oconnell is a 89 year old female who was admitted on 11/11/2018 with nausea, confusion, and hypoxia found to have CHF exacerbation.   Precautions/Limitations fall precautions;oxygen therapy device and L/min  (3l)   General Observations Pt. up in chair, sleepy,agreeable to therapy, heat/ice on neck--has chronic neck pain  (needs repeating/[place])   Cognitive Status Examination   Orientation orientation to person, place and time   Level of Consciousness lethargic/somnolent  (initially sleepy;mild confusion)   Able to Follow Commands mild impairment   Personal Safety (Cognitive) mild impairment   Memory (STM recall 2/3 words after delay)   Cognitive Comment Will monitor, complete further testing as indicated   Visual Perception   Visual Perception Comments wears glasses;no vision problems noted/reported   Sensory Examination   Sensory Comments no numbness/tingling reported   Pain Assessment   Patient Currently in Pain Yes, see Vital Sign flowsheet  (chronic neck pain)   Integumentary/Edema   Integumentary/Edema Comments B LE edema   Posture   Posture forward head position;protracted shoulders   Range of Motion (ROM)   ROM Comment limited B shoulder ROM;pt. intially related was basleine, then related she is typically able to lift UE's w/ some limitations due to chronic neck/shoulder pain;elbow thru hand=WNL/WFL   Strength   Strength Comments below baseline strength/gen.weakness/decreased activity tolerance   Hand Strength   Hand Strength Comments  WFL   Mobility   Bed Mobility Comments NT   Transfer Skill: Sit to Stand   Level of [place]: Sit/Stand moderate assist (50% patients effort)   Physical Assist/Nonphysical Assist: Sit/Stand verbal cues;1 person assist   Assistive Device for Transfer: Sit/Stand rolling walker   Toilet Transfer   Toilet Transfer Comments RTS at home/grab bar support   Tub/Shower Transfer   Tub/Shower Transfer Comments walk-in shower/DME at home   Balance   Balance Comments impaired/weak   Lower Body Dressing   Level of [place]: Dress Lower Body dependent (less than 25% patients effort)   Toileting   Level of [place]: Toilet (has catheter)   Grooming   Level of [place]: Grooming minimum assist (75% patients effort)   Instrumental Activities of Daily Living (IADL)   Previous Responsibilities meal prep;laundry;shopping;medication management;driving   Activities of Daily Living Analysis   Impairments Contributing to Impaired Activities of Daily Living balance impaired;cognition impaired;pain;ROM decreased;strength decreased  (decreased activity tolerance;will monitor cognition)   General Therapy Interventions   Planned Therapy Interventions ADL retraining;cognition;ROM;strengthening;progressive activity/exercise  (will monitor cognition)   Clinical Impression   Criteria for Skilled Therapeutic Interventions Met yes, treatment indicated   OT Diagnosis Decline in ADL performance   Influenced by the following impairments weakness, impaired balance, decreased activity tolerance, decreased BUE ROM(/baseline)   Assessment of Occupational Performance 3-5 Performance Deficits   Identified Performance Deficits Currently below  baseline w/ grooming,bathing, dressing, toileting, fx. transfers, IADL's   Clinical Decision Making (Complexity) Low complexity   Therapy Frequency daily   Predicted Duration of Therapy Intervention (days/wks) 3-5 days   Anticipated Discharge Disposition Transitional Care Facility   Risks and Benefits of  "Treatment have been explained. Yes   Patient, Family & other staff in agreement with plan of care Yes   [facility] TM \"6 Clicks\"   2016, Trustees of [facility], under license to [employer].  All rights reserved.   6 Clicks Short Forms Daily Activity Inpatient Short Form   [facility] AM-PAC  \"6 Clicks\" Daily Activity Inpatient Short Form   1. Putting on and taking off regular lower body clothing? 2 - A Lot   2. Bathing (including washing, rinsing, drying)? 2 - A Lot   3. Toileting, which includes using toilet, bedpan or urinal? 2 - A Lot   4. Putting on and taking off regular upper body clothing? 2 - A Lot   5. Taking care of personal grooming such as brushing teeth? 2 - A Lot   6. Eating meals? 3 - A Little   Daily Activity Raw Score (Score out of 24.Lower scores equate to lower levels of function) 13   Total Evaluation Time   Total Evaluation Time (Minutes) 17[NS1.1]        Revision History        User Key Date/Time User Provider Type Action    > NS1.2 11/13/2018  1:16 PM Christina Estrella OT Occupational Therapist Addend     NS1.1 11/13/2018 12:37 PM Christina Estrella OT Occupational Therapist Sign            Progress Notes by Lakesha Lazo PT at 11/13/2018 11:35 AM     Author:  Lakesha Lazo PT Service:  (none) Author Type:  Physical Therapist    Filed:  11/13/2018 11:36 AM Date of Service:  11/13/2018 11:35 AM Creation Time:  11/13/2018 11:35 AM    Status:  Signed :  Lakesha Lazo PT (Physical Therapist)            11/13/18 0800   Quick Adds   Type of Visit Initial PT Evaluation   Living Environment   Lives With spouse   Living Arrangements house   Home Accessibility no concerns   Number of Stairs to Enter Home 0   Number of Stairs Within Home 0   Stair Railings at Home none   Transportation Available car   Living Environment Comment 1 level home, no stairs   Self-Care   Usual Activity Tolerance fair   Current Activity Tolerance poor   Regular Exercise no "   Equipment Currently Used at Home walker, rolling   Functional Level Prior   Ambulation 1-->assistive equipment  (uses a rolling walker)   Transferring 1-->assistive equipment   Toileting 0-->independent   Bathing 0-->independent   Dressing 0-->independent   Eating 0-->independent   Fall history within last six months no   Which of the above functional risks had a recent onset or change? ambulation;transferring;toileting;bathing;dressing   Prior Functional Level Comment patient stated has fallen in past year   General Information   Onset of Illness/Injury or Date of Surgery - Date 11/11/18   Referring Physician Edilberto Mack, DO   Pertinent History of Current Problem (include personal factors and/or comorbidities that impact the POC) Patient is a 89 year old female who presented to the hospital with nausea, found to have had a CHF exacerbation. Morris lives in a single story home with .   Precautions/Limitations fall precautions   Cognitive Status Examination   Orientation person;place   Level of Consciousness alert;lethargic/somnolent  (dosed off frequently between questions)   Follows Commands and Answers Questions able to follow single-step instructions;75% of the time   Pain Assessment   Patient Currently in Pain Yes, see Vital Sign flowsheet  (neck; asked to have rubbed by PT mult times)   Range of Motion (ROM)   ROM Comment Limited L knee flexion; R knee flexion WFL   Strength   Strength Comments unable to show bilateral LE antigravity strength   Bed Mobility   Bed Mobility Comments mod A of 2 supine > sit   Transfer Skills   Transfer Comments sit<>stand mod A of 2'; bed>chair mod A of 2   Balance   Balance Comments Patient unable to correct lean in chair by oneself   General Therapy Interventions   Planned Therapy Interventions balance training;bed mobility training;gait training;motor coordination training;neuromuscular re-education;strengthening;ROM;stretching;transfer training;risk  "factor education;home program guidelines;progressive activity/exercise   Clinical Impression   Criteria for Skilled Therapeutic Intervention yes, treatment indicated   PT Diagnosis decreased activity tolerance   Influenced by the following impairments impaired balance, decreased strength, drowziness   Functional limitations due to impairments need of assistance fo all levels of functional mobility   Clinical Presentation Stable/Uncomplicated   Clinical Presentation Rationale age, falls risk, eager to return home, prior level of independence   Clinical Decision Making (Complexity) Low complexity   Therapy Frequency` 5 times/week   Predicted Duration of Therapy Intervention (days/wks) 5   Anticipated Discharge Disposition Transitional Care Facility   Risk & Benefits of therapy have been explained Yes   Patient, Family & other staff in agreement with plan of care Yes   [facility] TM \"6 Clicks\"   2016, Trustees of [facility], under license to [employer].  All rights reserved.   6 Clicks Short Forms Basic Mobility Inpatient Short Form   [facility]  \"6 Clicks\" V.2 Basic Mobility Inpatient Short Form   1. Turning from your back to your side while in a flat bed without using bedrails? 2 - A Lot   2. Moving from lying on your back to sitting on the side of a flat bed without using bedrails? 2 - A Lot   3. Moving to and from a bed to a chair (including a wheelchair)? 2 - A Lot   4. Standing up from a chair using your arms (e.g., wheelchair, or bedside chair)? 2 - A Lot   5. To walk in hospital room? 2 - A Lot   6. Climbing 3-5 steps with a railing? 1 - Total   Basic Mobility Raw Score (Score out of 24.Lower scores equate to lower levels of function) 11   Total Evaluation Time   Total Evaluation Time (Minutes) 15[HV1.1]        Revision History        User Key Date/Time User Provider Type Action    > HV1.1 11/13/2018 11:36 AM Lakesha Lazo, PT Physical Therapist Sign            Progress " Notes by Livia Cleaning SLP at 11/13/2018 11:14 AM     Author:  Livia Cleaning SLP Service:  (none) Author Type:  Speech Language    Filed:  11/13/2018 11:14 AM Date of Service:  11/13/2018 11:14 AM Creation Time:  11/13/2018 11:14 AM    Status:  Signed :  Livia Cleaning SLP (Speech Language)          11/13/18 1048   General Information   Onset Date 11/11/18   Start of Care Date 11/13/18   Referring Physician Dr. Sangeeta Casanova   Patient Profile Review/OT: Additional Occupational Profile Info See Profile for full history and prior level of function   Patient/Family Goals Statement Patient is hungry   Swallowing Evaluation Bedside swallow evaluation   Behaviorial Observations Lethargic   Mode of current nutrition NPO  (Except medications and has had popicles per nurse. )   Respiratory Status O2 Supply   Type of O2 supply Nasal cannula   Comments Per MD note; Mckayla Oconnell is a 89 year old female who was admitted on 11/11/2018 with nausea, confusion, and hypoxia found to have CHF exacerbation. Off Bi-pap for 4 hours.    Clinical Swallow Evaluation   Oral Musculature generally intact   Structural Abnormalities none present   Dentition present and adequate   Secretion Management problems swallowing secretions;right corner drooling   Mucosal Quality adequate   Mandibular Strength and Mobility impaired   Oral Labial Strength and Mobility WFL   Lingual Strength and Mobility impaired anterior elevation   Velar Elevation intact   Buccal Strength and Mobility intact   Laryngeal Function Cough;Throat clear;Swallow;Voicing initiated;Dry swallow palpated  (Delayed swallow)   Additional Documentation Yes   Swallow Eval   Feeding Assistance frequent cues/help required   Clinical Swallow Eval: Thin Liquid Texture Trial   Mode of Presentation, Thin Liquids cup;self-fed   Volume of Liquid or Food Presented 5 sips of water via the cup   Oral Phase of Swallow Premature pharyngeal entry   Oral Residue  right lip drooling   Pharyngeal Phase of Swallow impaired;reduction in laryngeal movement;repeated swallows   Diagnostic Statement Delayed swallow response with premature spillage and suspect penetration.    Clinical Swallow Eval: Nectar Thick Liquid Texture Trial   Mode of Presentation, Nectar cup;self-fed   Volume of Nectar Presented 4 oz of apple juice   Oral Phase, Nectar Premature pharyngeal entry   Pharyngeal Phase, Nectar impaired;reduction in laryngeal movement;repeated swallows   Diagnostic Statement Improved swallow response time.    Clinical Swallow Eval: Puree Solid Texture Trial   Mode of Presentation, Puree spoon;fed by clinician   Volume of Puree Presented 5 teaspoons   Oral Phase, Puree Premature pharyngeal entry   Pharyngeal Phase, Puree impaired;reduction in laryngeal movement;repeated swallows   Diagnostic Statement No overt Sx of aspiration.   Clinical Swallow Eval: Semisolid Texture Trial   Mode of Presentation, Semisolid spoon;fed by clinician   Volume of Semisolid Food Presented 3 teaspoons   Oral Phase, Semisolid Premature pharyngeal entry   Pharyngeal Phase, Semisolid impaired;reduction in laryngeal movement;repeated swallows   Diagnostic Statement No overt Sx of aspiration.   Clinical Swallow Eval: Solid Food Texture Trial   Mode of Presentation, Solid self-fed   Volume of Solid Food Presented 1/2 avila cracker   Oral Phase, Solid Poor AP movement;Residue in oral cavity;Premature pharyngeal entry   Oral Residue, Solid mid posterior tongue;right anterior lateral sulci   Pharyngeal Phase, Solid impaired;reduction in laryngeal movement;repeated swallows;wet vocal quality after swallow   Diagnostic Statement Oral and suspect pharyngeal residue.    Swallow Compensations   Swallow Compensations Alternate viscosity of consistencies;Pacing;Reduce amounts   Results Suspect silent aspiration;Oral difficulties only   General Therapy Interventions   Planned Therapy Interventions Dysphagia Treatment    Dysphagia treatment Modified diet education;Instruction of safe swallow strategies   Swallow Eval: Clinical Impressions   Skilled Criteria for Therapy Intervention Skilled criteria met.  Treatment indicated.   Functional Assessment Scale (FAS) 4   Treatment Diagnosis Mild to moderate oral and pharyngeal dysphagia   Diet texture recommendations Dysphagia diet level 2;Nectar thick liquids   Recommended Feeding/Eating Techniques alternate between small bites and sips of food/liquid;check mouth frequently for oral residue/pocketing;hard swallow w/ each bite or sip;maintain upright posture during/after eating for 30 mins;no straws;small sips/bites   Therapy Frequency 5 times/wk   Predicted Duration of Therapy Intervention (days/wks) 1 week   Anticipated Discharge Disposition inpatient rehabilitation facility   Risks and Benefits of Treatment have been explained. Yes   Patient, family and/or staff in agreement with Plan of Care Yes   Clinical Impression Comments Patient presents with mild to moderate oral and pharyngeal dypshagia characterized by pooling of oral secretions in her oral cavity with intermittent drooling on the right side. She demonstrated premature entry of thin liquids and suspect penetration/silent aspiration due to slight vocal changes noted and delayed swallow response. Swallow response time and bolus control was improved with nectar thick liquids, no overt Sx of aspiration noted via small sinlge sips from the cup. Pureed textures were tolerated without difficulty. Prolonged mastication of a solid with reduced bolus control during AP transport. Mild amount of oral residue left on the mid tongue after the swallow. Improved oral clearing with a semi-solid. Patient is at a moderate risk for aspiration due to swallow delay and fatigue. Recommend: 1. Dysphagia Diet level 2 with nectar thick liquids. 2. Feed only when fully alert, up in a chair, no straws, small bites/sips, alternate liquids/solids. Hold  diet if changes in her respiratory status or Sx of aspiration present.    Total Evaluation Time   Total Evaluation Time (Minutes) 15[SM1.1]        Revision History        User Key Date/Time User Provider Type Action    > SM1.1 11/13/2018 11:14 AM Livia Cleaning, SLP Speech Language Sign            Progress Notes by Edilberto Mack DO at 11/13/2018  8:28 AM     Author:  Edilberto Mack DO Service:  Hospitalist Author Type:  Physician    Filed:  11/13/2018  9:21 AM Date of Service:  11/13/2018  8:28 AM Creation Time:  11/13/2018  8:28 AM    Status:  Signed :  Edilberto Mack DO (Physician)         [facility]    Assessment & Plan   Mckayla Oconnell is a 89 year old female who was admitted on 11/11/2018 with nausea, confusion, and hypoxia found to have CHF exacerbation.    Hyperkalemia likely from acute kidney injury  Shifted with insulin and glucose, repeat pending  Hypoglycemia after shifting with insulin despite 2 amps of D50[ES1.1]  Some improvement with albumin/lasix combination[ES1.2]  Plan  - q[ES1.1]8[ES1.2] hour bmp today  -[ES1.1] consider diuretics pending workup today  - nephrology consulted  - if tolerates slp eval, will give kayexalate    Hypoglycemia  Occurred after shifting with insulin despite D50 given, subsequently started on D10 infusion  Interestingly, became hypoglycemic again overnight about 20 hours after SA insulin administration  Plan  -continue D0 infusion  - mental status improving, so hoping to start orals[ES1.2]     Nonischemic cardiomyopathy 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[ES1.1]   Acute hypercapnic respiratory failure[ES1.2]  Moderate aortic stenosis, moderate tricuspid regurgitation, moderate mitral regurgitation.   Biventricular pacemaker since 01/2017.   Long-standing patient of Dr. Scott in [place] [facility], recently saw Ms. More last week with plan to  increase diuretic for a few day  EKG shows a paced rhythm, troponin within normal limits.  Chest x-ray with likely pulmonary edema  Received 40 mg IV in the ED  Echocardiogram basically unchanged from previous, possible worse tricuspid regurgitation  Serial troponins negative[ES1.1]  VBG with some respiratory acidosis, mild improvement on bipap[ES1.2]  Plan  - await medical reconciliation and her ability to swallow is confirmed. She has no high risk medications such as antiplatelet agents  - Cardiology consult appreciated     Altered mental status likely metabolic encephalopathy  Daughter reports normally she is quite awake and alert, but much more somnolent over the past few days  She is moving all extremities on examination, with variable effort given her somnolence  She reports that she is just very tired because she did not sleep well the night before because of somnolence  No obvious signs of infection, no fever, no WBC elevation, denies respiratory complaints, UA is clean  CT head without acute abnormality  procal WNL making bacterial infection less likely  Moving her extremities, with the exception of the left shoulder which she says is secondary to pain  Ammonia negative  vbg with respiratory acidosis[ES1.1]  Neurology consulted, unable to get a brain MRI[ES1.2]  Plan[ES1.1]  - appreciate neurology[ES1.2]   - follow fever curve   -[ES1.1] much improved[ES1.2]  - npo until passes slp evaluation[ES1.1]  - unable to obtain a brain MRI[ES1.2]        Neck pain[ES1.1] about C8 level[ES1.2]  Chronic arthritis   Appears neurologically intact with movement of extremities  Plan  - CT of the neck WNL    Urinary obstruction  -s/p placement of lopez        CKD with baseline creatinine 1.5-2.   - at baseline monitor      Hyperlipidemia.   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 sleeping: patient not able to self report   Pain score (0-10) -   Pain location -   Pain descriptors -   - Mckayla is experiencing pain due to arthritis. Pain management was discussed and the plan was created in a collaborative fashion.  Mckayla's response to the current recommendations: disinterested  - monitor for now           DVT Prophylaxis: Pneumatic Compression Devices.  Code Status: DNR/DNI    Disposition: Expected discharge in[ES1.1] 2-3[ES1.2] days once improvement noted. Left message with daughter for an update    Edilberto Mack DO  Text Page  (7am to 6pm)  Interval History[ES1.1]   Weaned off of bipap last night, now on 4 liters  Had a quick episode of shortness of breath, quickly resolved  k is stable in the mid 5's[ES1.2]  Patient awake today, hungry, has pain in the left shoulder, states it is chronic[ES1.3]      -Data reviewed today: I reviewed all new labs and imaging results over the last 24 hours. I personally reviewed the CT of the head and c spine    Physical Exam   Temp: 96.5  F (35.8  C) Temp src: Axillary BP: 107/60   Heart Rate: 70 Resp: 11 SpO2: 97 % O2 Device: Nasal cannula Oxygen Delivery: 4 LPM  Vitals:    11/11/18 1838 11/12/18 0620 11/13/18 0623   Weight: 81.1 kg (178 lb 12.8 oz) 80.1 kg (176 lb 9.4 oz) 83.9 kg (184 lb 14.4 oz)     Vital Signs with Ranges  Temp:  [96.5  F (35.8  C)] 96.5  F (35.8  C)  Heart Rate:  [70-71] 70  Resp:  [9-27] 11  BP: ()/(41-79) 107/60  FiO2 (%):  [50 %] 50 %  SpO2:  [95 %-100 %] 97 %  I/O last 3 completed shifts:  In: 890 [P.O.:90; I.V.:800]  Out: 900  [Urine:900]    Constitutional:[ES1.1] Awakens easily to voice, conversant[ES1.3]  Respiratory: Clear to auscultation bilaterally, no crackles or wheezing  Cardiovascular: Regular rate and rhythm, normal S1 and S2, and no murmur noted  GI: Normal bowel sounds, soft, non-distended, non-tender  Skin/Integumen: No rashes, no cyanosis, no edema  Other: Oriented to person, month, and year[ES1.1] and p[ES1.3]lace.[ES1.1] Muscle strength intact[ES1.3]    Medications     dextrose 100 mL/hr (11/12/18 2023)     - MEDICATION INSTRUCTIONS -       - MEDICATION INSTRUCTIONS -         albumin human  12.5 g Intravenous Q8H     aspirin  81 mg Oral Daily     miconazole   Topical BID     thiamine  100 mg Intravenous Daily       Data     Recent Labs  Lab 11/13/18  0602 11/13/18  0240 11/12/18  2217  11/12/18  0523 11/11/18  2155 11/11/18  1819 11/11/18  1448 11/11/18  0955   WBC 4.0  --   --   --  3.8*  --   --   --  4.7   HGB 9.1*  --   --   --  9.5*  --   --   --  10.2*   MCV 96  --   --   --  97  --   --   --  94   *  --   --   --  106*  --   --   --  113*   * 133 133  < > 136  --   --   --  132*   POTASSIUM 5.5* 5.4* 5.6*  < > 5.9*  --   --   --  5.3   CHLORIDE 98 100 100  < > 103  --   --   --  100   CO2 28 29 27  < > 31  --   --   --  27   BUN 52* 54* 52*  < > 51*  --   --   --  46*   CR 1.78* 1.79* 1.79*  < > 1.75*  --   --   --  1.46*   ANIONGAP 4 4 6  < > 2*  --   --   --  5   SHOBHA 8.1* 8.2* 8.3*  < > 8.6  --   --   --  9.2   * 60* 130*  < > 66*  --   --   --  83   ALBUMIN 2.6*  --   --   --  2.7*  --   --   --  3.3*   PROTTOTAL 5.5*  --   --   --  5.8*  --   --   --  6.9   BILITOTAL 0.3  --   --   --  0.2  --   --   --  0.2   ALKPHOS 51  --   --   --  60  --   --   --  72   ALT 34  --   --   --  41  --   --   --  49   AST 27  --   --   --  28  --   --   --  34   LIPASE  --   --   --   --   --   --   --   --  112   TROPI  --   --   --   --   --  0.031 0.031 0.030 0.021   < > = values in this interval not  displayed.    Imaging:   No results found for this or any previous visit (from the past 24 hour(s)).[ES1.1]       Revision History        User Key Date/Time User Provider Type Action    > ES1.3 11/13/2018  9:21 AM Edilberto Mack,  Physician Sign     ES1.2 11/13/2018  9:11 AM Edilberto Mack,  Physician      ES1.1 11/13/2018  8:28 AM Edilberto Mack,  Physician                   Procedure Notes     No notes of this type exist for this encounter.         Progress Notes - Therapies (Notes from 11/13/18 through 11/16/18)      Progress Notes by Christina Estrella OT at 11/13/2018 12:37 PM     Author:  Christina Estrella OT Service:  (none) Author Type:  Occupational Therapist    Filed:  11/13/2018  1:16 PM Date of Service:  11/13/2018 12:37 PM Creation Time:  11/13/2018 12:37 PM    Status:  Addendum :  Christina Estrella OT (Occupational Therapist)          11/13/18 1000   Quick Adds   Type of Visit Initial Occupational Therapy Evaluation   Living Environment   Lives With spouse   Living Arrangements House[NS1.1]/townhouse[NS1.2]   Home Accessibility grab bars present (bathtub);grab bars present (toilet)   Number of Stairs to Enter Home 0   Number of Stairs Within Home 0   Stair Railings at Home none   Transportation Available car  (Pt.driving PTA)   Living Environment Comment 1 level home   Self-Care   Usual Activity Tolerance fair   Current Activity Tolerance poor   Regular Exercise no   Equipment Currently Used at Home grab bar;raised toilet;shower chair;walker, rolling  (4WW, RTS, shower chair, hand-held shower hose, grab bars)   Activity/Exercise/Self-Care Comment Pt. reports she is indep./mod. indep. w/ i/ADL's at home. Pt. reports spouse is in good healthlpt.'s dtr. is a PT at [facility]. [facility], per pt.;Pt. has a , spouse/pt. fam the laundry, pt. does the cooking;pt. reprots she manages own medications using a pillbox, drives.   Functional Level Prior   Ambulation  1-->assistive equipment   Transferring 1-->assistive equipment   Toileting 1-->assistive equipment   Bathing 1-->assistive equipment   Dressing 0-->independent   Eating 0-->independent   Communication 0-->understands/communicates without difficulty   Swallowing 0-->swallows foods/liquids without difficulty   Fall history within last six months no   Which of the above functional risks had a recent onset or change? ambulation;transferring;toileting;bathing;dressing   Prior Functional Level Comment Pt. reports she is typically indep./mod. indep., no falls in the last year reported   General Information   Onset of Illness/Injury or Date of Surgery - Date 11/11/18   Referring Physician Dr. Mack   Additional Occupational Profile Info/Pertinent History of Current Problem OT:Mckayla Oconnell is a 89 year old female who was admitted on 11/11/2018 with nausea, confusion, and hypoxia found to have CHF exacerbation.   Precautions/Limitations fall precautions;oxygen therapy device and L/min  (3l)   General Observations Pt. up in chair, sleepy,agreeable to therapy, heat/ice on neck--has chronic neck pain  (needs repeating/[place])   Cognitive Status Examination   Orientation orientation to person, place and time   Level of Consciousness lethargic/somnolent  (initially sleepy;mild confusion)   Able to Follow Commands mild impairment   Personal Safety (Cognitive) mild impairment   Memory (STM recall 2/3 words after delay)   Cognitive Comment Will monitor, complete further testing as indicated   Visual Perception   Visual Perception Comments wears glasses;no vision problems noted/reported   Sensory Examination   Sensory Comments no numbness/tingling reported   Pain Assessment   Patient Currently in Pain Yes, see Vital Sign flowsheet  (chronic neck pain)   Integumentary/Edema   Integumentary/Edema Comments B LE edema   Posture   Posture forward head position;protracted shoulders   Range of Motion (ROM)   ROM Comment limited  B shoulder ROM;pt. intially related was nedne, then related she is typically able to lift UE's w/ some limitations due to chronic neck/shoulder pain;elbow thru hand=WNL/WFL   Strength   Strength Comments below baseline strength/gen.weakness/decreased activity tolerance   Hand Strength   Hand Strength Comments WFL   Mobility   Bed Mobility Comments NT   Transfer Skill: Sit to Stand   Level of [place]: Sit/Stand moderate assist (50% patients effort)   Physical Assist/Nonphysical Assist: Sit/Stand verbal cues;1 person assist   Assistive Device for Transfer: Sit/Stand rolling walker   Toilet Transfer   Toilet Transfer Comments RTS at home/grab bar support   Tub/Shower Transfer   Tub/Shower Transfer Comments walk-in shower/DME at home   Balance   Balance Comments impaired/weak   Lower Body Dressing   Level of [place]: Dress Lower Body dependent (less than 25% patients effort)   Toileting   Level of [place]: Toilet (has catheter)   Grooming   Level of [place]: Grooming minimum assist (75% patients effort)   Instrumental Activities of Daily Living (IADL)   Previous Responsibilities meal prep;laundry;shopping;medication management;driving   Activities of Daily Living Analysis   Impairments Contributing to Impaired Activities of Daily Living balance impaired;cognition impaired;pain;ROM decreased;strength decreased  (decreased activity tolerance;will monitor cognition)   General Therapy Interventions   Planned Therapy Interventions ADL retraining;cognition;ROM;strengthening;progressive activity/exercise  (will monitor cognition)   Clinical Impression   Criteria for Skilled Therapeutic Interventions Met yes, treatment indicated   OT Diagnosis Decline in ADL performance   Influenced by the following impairments weakness, impaired balance, decreased activity tolerance, decreased BUE ROM(/baseline)   Assessment of Occupational Performance 3-5 Performance Deficits   Identified Performance Deficits Currently  "below  baseline w/ grooming,bathing, dressing, toileting, fx. transfers, IADL's   Clinical Decision Making (Complexity) Low complexity   Therapy Frequency daily   Predicted Duration of Therapy Intervention (days/wks) 3-5 days   Anticipated Discharge Disposition Transitional Care Facility   Risks and Benefits of Treatment have been explained. Yes   Patient, Family & other staff in agreement with plan of care Yes   [facility] TM \"6 Clicks\"   2016, Trustees of [facility], under license to [employer].  All rights reserved.   6 Clicks Short Forms Daily Activity Inpatient Short Form   [facility] AM-PAC  \"6 Clicks\" Daily Activity Inpatient Short Form   1. Putting on and taking off regular lower body clothing? 2 - A Lot   2. Bathing (including washing, rinsing, drying)? 2 - A Lot   3. Toileting, which includes using toilet, bedpan or urinal? 2 - A Lot   4. Putting on and taking off regular upper body clothing? 2 - A Lot   5. Taking care of personal grooming such as brushing teeth? 2 - A Lot   6. Eating meals? 3 - A Little   Daily Activity Raw Score (Score out of 24.Lower scores equate to lower levels of function) 13   Total Evaluation Time   Total Evaluation Time (Minutes) 17[NS1.1]        Revision History        User Key Date/Time User Provider Type Action    > NS1.2 11/13/2018  1:16 PM Christina Estrella OT Occupational Therapist Addend     NS1.1 11/13/2018 12:37 PM Christina Estrella OT Occupational Therapist Sign            Progress Notes by Lakesha Lazo PT at 11/13/2018 11:35 AM     Author:  Lakesha Lazo PT Service:  (none) Author Type:  Physical Therapist    Filed:  11/13/2018 11:36 AM Date of Service:  11/13/2018 11:35 AM Creation Time:  11/13/2018 11:35 AM    Status:  Signed :  Lakesha Lazo PT (Physical Therapist)            11/13/18 0800   Quick Adds   Type of Visit Initial PT Evaluation   Living Environment   Lives With spouse   Living Arrangements house   Home " Accessibility no concerns   Number of Stairs to Enter Home 0   Number of Stairs Within Home 0   Stair Railings at Home none   Transportation Available car   Living Environment Comment 1 level home, no stairs   Self-Care   Usual Activity Tolerance fair   Current Activity Tolerance poor   Regular Exercise no   Equipment Currently Used at Home walker, rolling   Functional Level Prior   Ambulation 1-->assistive equipment  (uses a rolling walker)   Transferring 1-->assistive equipment   Toileting 0-->independent   Bathing 0-->independent   Dressing 0-->independent   Eating 0-->independent   Fall history within last six months no   Which of the above functional risks had a recent onset or change? ambulation;transferring;toileting;bathing;dressing   Prior Functional Level Comment patient stated has fallen in past year   General Information   Onset of Illness/Injury or Date of Surgery - Date 11/11/18   Referring Physician Edilberto Mack, DO   Pertinent History of Current Problem (include personal factors and/or comorbidities that impact the POC) Patient is a 89 year old female who presented to the hospital with nausea, found to have had a CHF exacerbation. Morris lives in a single story home with .   Precautions/Limitations fall precautions   Cognitive Status Examination   Orientation person;place   Level of Consciousness alert;lethargic/somnolent  (dosed off frequently between questions)   Follows Commands and Answers Questions able to follow single-step instructions;75% of the time   Pain Assessment   Patient Currently in Pain Yes, see Vital Sign flowsheet  (neck; asked to have rubbed by PT mult times)   Range of Motion (ROM)   ROM Comment Limited L knee flexion; R knee flexion WFL   Strength   Strength Comments unable to show bilateral LE antigravity strength   Bed Mobility   Bed Mobility Comments mod A of 2 supine > sit   Transfer Skills   Transfer Comments sit<>stand mod A of 2'; bed>chair mod A of 2  "  Balance   Balance Comments Patient unable to correct lean in chair by oneself   General Therapy Interventions   Planned Therapy Interventions balance training;bed mobility training;gait training;motor coordination training;neuromuscular re-education;strengthening;ROM;stretching;transfer training;risk factor education;home program guidelines;progressive activity/exercise   Clinical Impression   Criteria for Skilled Therapeutic Intervention yes, treatment indicated   PT Diagnosis decreased activity tolerance   Influenced by the following impairments impaired balance, decreased strength, drowziness   Functional limitations due to impairments need of assistance fo all levels of functional mobility   Clinical Presentation Stable/Uncomplicated   Clinical Presentation Rationale age, falls risk, eager to return home, prior level of independence   Clinical Decision Making (Complexity) Low complexity   Therapy Frequency` 5 times/week   Predicted Duration of Therapy Intervention (days/wks) 5   Anticipated Discharge Disposition Transitional Care Facility   Risk & Benefits of therapy have been explained Yes   Patient, Family & other staff in agreement with plan of care Yes   [facility] TM \"6 Clicks\"   2016, Trustees of [facility], under license to [employer].  All rights reserved.   6 Clicks Short Forms Basic Mobility Inpatient Short Form   [facility]  \"6 Clicks\" V.2 Basic Mobility Inpatient Short Form   1. Turning from your back to your side while in a flat bed without using bedrails? 2 - A Lot   2. Moving from lying on your back to sitting on the side of a flat bed without using bedrails? 2 - A Lot   3. Moving to and from a bed to a chair (including a wheelchair)? 2 - A Lot   4. Standing up from a chair using your arms (e.g., wheelchair, or bedside chair)? 2 - A Lot   5. To walk in hospital room? 2 - A Lot   6. Climbing 3-5 steps with a railing? 1 - Total   Basic Mobility Raw Score (Score " out of 24.Lower scores equate to lower levels of function) 11   Total Evaluation Time   Total Evaluation Time (Minutes) 15[HV1.1]        Revision History        User Key Date/Time User Provider Type Action    > HV1.1 11/13/2018 11:36 AM Lakesha Lazo, PT Physical Therapist Sign            Progress Notes by Livia Cleaning SLP at 11/13/2018 11:14 AM     Author:  Livia Cleaning SLP Service:  (none) Author Type:  Speech Language    Filed:  11/13/2018 11:14 AM Date of Service:  11/13/2018 11:14 AM Creation Time:  11/13/2018 11:14 AM    Status:  Signed :  Livia Cleaning SLP (Speech Language)          11/13/18 1048   General Information   Onset Date 11/11/18   Start of Care Date 11/13/18   Referring Physician Dr. Sangeeta Casanova   Patient Profile Review/OT: Additional Occupational Profile Info See Profile for full history and prior level of function   Patient/Family Goals Statement Patient is hungry   Swallowing Evaluation Bedside swallow evaluation   Behaviorial Observations Lethargic   Mode of current nutrition NPO  (Except medications and has had popicles per nurse. )   Respiratory Status O2 Supply   Type of O2 supply Nasal cannula   Comments Per MD note; Mckayla Oconnell is a 89 year old female who was admitted on 11/11/2018 with nausea, confusion, and hypoxia found to have CHF exacerbation. Off Bi-pap for 4 hours.    Clinical Swallow Evaluation   Oral Musculature generally intact   Structural Abnormalities none present   Dentition present and adequate   Secretion Management problems swallowing secretions;right corner drooling   Mucosal Quality adequate   Mandibular Strength and Mobility impaired   Oral Labial Strength and Mobility WFL   Lingual Strength and Mobility impaired anterior elevation   Velar Elevation intact   Buccal Strength and Mobility intact   Laryngeal Function Cough;Throat clear;Swallow;Voicing initiated;Dry swallow palpated  (Delayed swallow)   Additional Documentation  Yes   Swallow Eval   Feeding Assistance frequent cues/help required   Clinical Swallow Eval: Thin Liquid Texture Trial   Mode of Presentation, Thin Liquids cup;self-fed   Volume of Liquid or Food Presented 5 sips of water via the cup   Oral Phase of Swallow Premature pharyngeal entry   Oral Residue right lip drooling   Pharyngeal Phase of Swallow impaired;reduction in laryngeal movement;repeated swallows   Diagnostic Statement Delayed swallow response with premature spillage and suspect penetration.    Clinical Swallow Eval: Nectar Thick Liquid Texture Trial   Mode of Presentation, Nectar cup;self-fed   Volume of Nectar Presented 4 oz of apple juice   Oral Phase, Nectar Premature pharyngeal entry   Pharyngeal Phase, Nectar impaired;reduction in laryngeal movement;repeated swallows   Diagnostic Statement Improved swallow response time.    Clinical Swallow Eval: Puree Solid Texture Trial   Mode of Presentation, Puree spoon;fed by clinician   Volume of Puree Presented 5 teaspoons   Oral Phase, Puree Premature pharyngeal entry   Pharyngeal Phase, Puree impaired;reduction in laryngeal movement;repeated swallows   Diagnostic Statement No overt Sx of aspiration.   Clinical Swallow Eval: Semisolid Texture Trial   Mode of Presentation, Semisolid spoon;fed by clinician   Volume of Semisolid Food Presented 3 teaspoons   Oral Phase, Semisolid Premature pharyngeal entry   Pharyngeal Phase, Semisolid impaired;reduction in laryngeal movement;repeated swallows   Diagnostic Statement No overt Sx of aspiration.   Clinical Swallow Eval: Solid Food Texture Trial   Mode of Presentation, Solid self-fed   Volume of Solid Food Presented 1/2 avila cracker   Oral Phase, Solid Poor AP movement;Residue in oral cavity;Premature pharyngeal entry   Oral Residue, Solid mid posterior tongue;right anterior lateral sulci   Pharyngeal Phase, Solid impaired;reduction in laryngeal movement;repeated swallows;wet vocal quality after swallow   Diagnostic  Statement Oral and suspect pharyngeal residue.    Swallow Compensations   Swallow Compensations Alternate viscosity of consistencies;Pacing;Reduce amounts   Results Suspect silent aspiration;Oral difficulties only   General Therapy Interventions   Planned Therapy Interventions Dysphagia Treatment   Dysphagia treatment Modified diet education;Instruction of safe swallow strategies   Swallow Eval: Clinical Impressions   Skilled Criteria for Therapy Intervention Skilled criteria met.  Treatment indicated.   Functional Assessment Scale (FAS) 4   Treatment Diagnosis Mild to moderate oral and pharyngeal dysphagia   Diet texture recommendations Dysphagia diet level 2;Nectar thick liquids   Recommended Feeding/Eating Techniques alternate between small bites and sips of food/liquid;check mouth frequently for oral residue/pocketing;hard swallow w/ each bite or sip;maintain upright posture during/after eating for 30 mins;no straws;small sips/bites   Therapy Frequency 5 times/wk   Predicted Duration of Therapy Intervention (days/wks) 1 week   Anticipated Discharge Disposition inpatient rehabilitation facility   Risks and Benefits of Treatment have been explained. Yes   Patient, family and/or staff in agreement with Plan of Care Yes   Clinical Impression Comments Patient presents with mild to moderate oral and pharyngeal dypshagia characterized by pooling of oral secretions in her oral cavity with intermittent drooling on the right side. She demonstrated premature entry of thin liquids and suspect penetration/silent aspiration due to slight vocal changes noted and delayed swallow response. Swallow response time and bolus control was improved with nectar thick liquids, no overt Sx of aspiration noted via small sinlge sips from the cup. Pureed textures were tolerated without difficulty. Prolonged mastication of a solid with reduced bolus control during AP transport. Mild amount of oral residue left on the mid tongue after the  swallow. Improved oral clearing with a semi-solid. Patient is at a moderate risk for aspiration due to swallow delay and fatigue. Recommend: 1. Dysphagia Diet level 2 with nectar thick liquids. 2. Feed only when fully alert, up in a chair, no straws, small bites/sips, alternate liquids/solids. Hold diet if changes in her respiratory status or Sx of aspiration present.    Total Evaluation Time   Total Evaluation Time (Minutes) 15[SM1.1]        Revision History        User Key Date/Time User Provider Type Action    > SM1.1 11/13/2018 11:14 AM Livia Cleaning, SLP Speech Language Sign

## 2018-11-11 NOTE — SIGNIFICANT EVENT
Unable to void despite acknowledging need to urinate.  Bladder scan showed 900cc in bladder.  Straight-cathed for 700cc clear light yellow urine.  Still lethargic and breathing thru mouth.  VSS

## 2018-11-11 NOTE — IP AVS SNAPSHOT
` ` Patient Information     Patient Name Sex     Mckayla Oconnell ([id number]) Female 1927       Room Bed    [address] [place] [address]      Patient Demographics     Address Phone E-mail Address    [address] [facility] [address] [phone] (Home) *Preferred* [email or web address]      Patient Ethnicity & Race     Ethnic Group Patient Race    American White      Emergency Contact(s)     Name Relation Home Work Mobile    Irina Ackerman Daughter [phone]    Bala Ackerman (son in law) Relative [phone]    Edilberto Ackerman Grandchild   [phone]      Documents on File        Status Date Received Description       Documents for the Patient    Privacy Notice - [place] Received 10/17/11     Insurance Card Received () 10/17/11     External Medication Information Consent       Patient ID Received 10/17/11     Consent for Services - Hospital/Clinic Received () 10/17/11     Patient ID Received 10/19/12     Insurance Card Received 10/19/12 are    Consent for Services - Hospital/Clinic Received () 10/19/12     Insurance Card Received 13 are RX    External Medication Information Consent Accepted 13     HIM MARIE Authorization - File Only   MARIE: [facility] to to [facility] 1    Consent to Communicate Received 13     Consent for EHR Access  13 Copied from existing Consent for services - C/HOD collected on 10/19/2012    [facility] Specified Other       Consent for Services - Hospital/Clinic Received () 10/22/13     Patient ID Received 10/21/13     Advance Directives and Living Will Received 11/15/13 Health Care Directive 13    Advance Directives and Living Will Not Received  Validation of AD 13    Advance Directives and Living Will Received 13 POLST 13-     Insurance Card Received 14 [employer]    Patient ID Received 10/28/15 PT DID NOT HAVE.      Consent for Services - Hospital/Clinic Received () 10/23/14     Insurance Card Received 07/06/15 [facility]2015    Consent for Services - Hospital/Clinic Received () 10/28/15     Consent for Services/Privacy Notice - Hospital/Clinic Received () 16     Patient ID Received 16 DL -     Insurance Card Received 16 [facility]2015    Consent for Services - [facility]MP       Consent for Services/Privacy Notice - Hospital/Clinic-Esign       HIM MARIE Authorization - File Only  16 MYCHART ACCESS    HIM MARIE Authorization - File Only  16 MYCHART ACCESS    Business/Insurance/Care Coordination/Health Form - Patient   [facility]- [place] approval 9/10/16-9/10/17    Advance Directives and Living Will Received 16 POLST 2016    Insurance Card Received 17     Consent for Services/Privacy Notice - Hospital/Clinic Received () 17     HIM MARIE Authorization  () 17 Authorization for batch [employer]  COL [id number]    Care Everywhere Prospective Auth Received 10/23/17     Insurance Card Received 18 [facility]     Consent for Services/Privacy Notice - Hospital/Clinic Received 18     Consent for Services - Hospital and Clinic Received 18     HIE Auth Received 18     HIM MARIE Authorization  () 18 Authorization for batch [employer]     Insurance Card Received 18     HIM MARIE Authorization  18     Consent for Services - Hospital/Clinic Received (Deleted) 10/19/12     Advance Directives and Living Will Not Received (Deleted)  to be deleted    Advance Directives and Living Will Not Received (Deleted)  to be deleted       Documents for the Encounter    CMS IM for Patient Signature Received 18       Admission Information     Attending Provider Admitting Provider Admission Type Admission Date/Time    Edilberto Mack DO Steinbrueck, Eric Edward, DO Emergency 18  0916    Discharge Date Hospital Service Auth/Cert  Status Service Area     Hospitalist Incomplete [facility]    Unit Room/Bed Admission Status       SH CARDIAC SPEC CARE 0250/0250-01 Admission (Confirmed)       Admission     Complaint    Acute on chronic systolic (congestive) heart failure (H)      Hospital Account     Name Acct ID Class Status Primary Coverage    Mckayla Oconnell [id number] Inpatient Open UCARE - [employer] FOR S[employer]ORS            Guarantor Account (for Hospital Account #[id number])     Name Relation to Pt Service Area Active? Acct Type    Mckayla Oconnell Self FCS Yes Personal/Family    Address Phone          [address] [place] [address] [phone](H)  none(O)              Coverage Information (for Hospital Account #[id number])     F/O Payor/Plan Precert #    UCARE/UCARE FOR SENIORS     Subscriber Subscriber #    Mckayla Oconnell [id number]    Address Phone    [address]  [place] [address] [phone]

## 2018-11-11 NOTE — PROGRESS NOTES
SPIRITUAL HEALTH SERVICES Progress Note  [facility]    Visited per request.     Pt was not able to speak and busy with staff. SH visited with spouse. Spouse shared his grief of his wife's illness and reflected on his nolberto. Pt shared some stories of his life and the hard times he has endured that showed his deep nolberto in God and Pritesh Neftaly. Pt  requested prayer. Spouse asked for SH to check in if available.     SH provided a kind and caring presence, listening, acknowledged spouse's nolberto and difficult times, prayer.       SH will follow in the next day or so.     Jaskaran Cardozo  Chaplain Resident

## 2018-11-11 NOTE — IP AVS SNAPSHOT
"[facility] CARDIAC SPECIALTY CARE: [phone]                                              INTERAGENCY TRANSFER FORM - LAB / IMAGING / EKG / EMG RESULTS   2018                    Hospital Admission Date: 2018  VICKEY CHURCH   : 1927  Sex: Female        Attending Provider: Edilberto Mack DO     Allergies:  Atenolol, Hydrochlorothiazide, Pollen Extract    Infection:  None   Service:  HOSPITALIST    Ht:  1.6 m (5' 3\")   Wt:  82.9 kg (182 lb 12.2 oz)   Admission Wt:  81.1 kg (178 lb 12.8 oz)    BMI:  32.37 kg/m 2   BSA:  1.92 m 2            Patient PCP Information     Provider PCP Type    Ander Shrestha MD General         Lab Results - 3 Days      Blood culture [[id number]]  Resulted: 18 0654, Result status: Preliminary result    Ordering provider: Edilberto Mack,   18 1343 Resulting lab: [facility]AT[facility]    Specimen Information    Type Source Collected On   Blood  18 1450   Comment:  Right Hand          Components       Value Reference Range Flag Lab   Specimen Description Blood Right Hand      Special Requests Aerobic and anaerobic bottles received   FrStHsLb   Culture Micro No growth after 5 days   225            Blood culture [[id number]]  Resulted: 18 0654, Result status: Preliminary result    Ordering provider: Edilberto Mack,   18 1343 Resulting lab: INFECT[facility]EASE [facility]NOST[facility] LABORAT[facility]    Specimen Information    Type Source Collected On   Blood  18 1448   Comment:  Left Arm          Components       Value Reference Range Flag Lab   Specimen Description Blood Left Arm      Special Requests Aerobic and anaerobic bottles received   FrStHsLb   Culture Micro No growth after 5 days   225            Glucose by meter [[id number]]  Resulted: 18 0222, Result status: Final result    Ordering provider: Edilberto Mack,   18 0210 Resulting lab: POINT " OF CARE TEST, GLUCOSE    Specimen Information    Type Source Collected On     11/16/18 0210          Components       Value Reference Range Flag Lab   Glucose 77 70 - 99 mg/dL  170            Glucose by meter [[id number]]  Resulted: 11/15/18 2211, Result status: Final result    Ordering provider: Edilberto Mack, DO  11/15/18 2159 Resulting lab: POINT OF CARE TEST, GLUCOSE    Specimen Information    Type Source Collected On     11/15/18 2159          Components       Value Reference Range Flag Lab   Glucose 82 70 - 99 mg/dL  170            Glucose by meter [[id number]]  Resulted: 11/15/18 1712, Result status: Final result    Ordering provider: Edilberto Mack, DO  11/15/18 1701 Resulting lab: POINT OF CARE TEST, GLUCOSE    Specimen Information    Type Source Collected On     11/15/18 1701          Components       Value Reference Range Flag Lab   Glucose 86 70 - 99 mg/dL  170            Vitamin B1 whole blood [[id number]]  Resulted: 11/15/18 1429, Result status: Final result    Ordering provider: Debbie Jay APRN CNP  11/12/18 1445 Resulting lab: [facility]    Specimen Information    Type Source Collected On   Blood  11/12/18 1749          Components       Value Reference Range Flag Lab   Vitamin B1 Whole Blood Level 105 70 - 180 nmol/L  [facility]   Comment:         (Note)  INTERPRETIVE INFORMATION: Vitamin B1, Whole Blood  This assay measures the concentration of thiamine   diphosphate (TDP), the primary active form of vitamin B1.   Approximately 90 percent of vitamin B1 present in whole   blood is TDP. Thiamine and thiamine monophosphate, which   comprise the remaining 10 percent, are not measured.  Test developed and characteristics determined by [facility]. See Compliance Statement B: [employer]  Performed by [facility],  [address] [place] [address] [phone]  [email or web address], Javan Ralph MD, Lab. Director              Glucose by meter  [[id number]] (Abnormal)  Resulted: 11/15/18 1234, Result status: Final result    Ordering provider: Edilberto Mack, DO  11/15/18 1222 Resulting lab: POINT OF CARE TEST, GLUCOSE    Specimen Information    Type Source Collected On     11/15/18 1222          Components       Value Reference Range Flag Lab   Glucose 101 70 - 99 mg/dL H 170            Glucose by meter [[id number]]  Resulted: 11/15/18 0629, Result status: Final result    Ordering provider: Edilberto Mack, DO  11/15/18 0615 Resulting lab: POINT OF CARE TEST, GLUCOSE    Specimen Information    Type Source Collected On     11/15/18 0615          Components       Value Reference Range Flag Lab   Glucose 91 70 - 99 mg/dL  170            Glucose by meter [[id number]]  Resulted: 11/15/18 0117, Result status: Final result    Ordering provider: Edilberto Mack, DO  11/15/18 0103 Resulting lab: POINT OF CARE TEST, GLUCOSE    Specimen Information    Type Source Collected On     11/15/18 0103          Components       Value Reference Range Flag Lab   Glucose 77 70 - 99 mg/dL  170            Glucose by meter [[id number]] (Abnormal)  Resulted: 11/14/18 2118, Result status: Final result    Ordering provider: Edilberto Mack, DO  11/14/18 2106 Resulting lab: POINT OF CARE TEST, GLUCOSE    Specimen Information    Type Source Collected On     11/14/18 2106          Components       Value Reference Range Flag Lab   Glucose 118 70 - 99 mg/dL H 170            Glucose by meter [[id number]]  Resulted: 11/14/18 1715, Result status: Final result    Ordering provider: Edilberto Mack, DO  11/14/18 1702 Resulting lab: POINT OF CARE TEST, GLUCOSE    Specimen Information    Type Source Collected On     11/14/18 1702          Components       Value Reference Range Flag Lab   Glucose 99 70 - 99 mg/dL  170            Glucose by meter [[id number]] (Abnormal)  Resulted: 11/14/18 1232, Result status: Final result    Ordering provider: Martina  Edilberto Mcqueen, DO  11/14/18 1219 Resulting lab: POINT OF CARE TEST, GLUCOSE    Specimen Information    Type Source Collected On     11/14/18 1219          Components       Value Reference Range Flag Lab   Glucose 102 70 - 99 mg/dL H 170            Glucose by meter [[id number]]  Resulted: 11/14/18 0902, Result status: Final result    Ordering provider: Edilberto Mack, DO  11/14/18 0850 Resulting lab: POINT OF CARE TEST, GLUCOSE    Specimen Information    Type Source Collected On     11/14/18 0850          Components       Value Reference Range Flag Lab   Glucose 92 70 - 99 mg/dL  170            Glucose by meter [[id number]] (Abnormal)  Resulted: 11/14/18 0827, Result status: Final result    Ordering provider: Edilberto Mack, DO  11/14/18 0816 Resulting lab: POINT OF CARE TEST, GLUCOSE    Specimen Information    Type Source Collected On     11/14/18 0816          Components       Value Reference Range Flag Lab   Glucose 110 70 - 99 mg/dL H 170            Comprehensive metabolic panel [[id number]] (Abnormal)  Resulted: 11/14/18 0648, Result status: Final result    Ordering provider: Edilberto Mack, DO  11/14/18 0000 Resulting lab: [facility]    Specimen Information    Type Source Collected On   Blood  11/14/18 0622          Components       Value Reference Range Flag Lab   Sodium 127 133 - 144 mmol/L L FrStHsLb   Potassium 5.7 3.4 - 5.3 mmol/L H FrStHsLb   Chloride 96 94 - 109 mmol/L  FrStHsLb   Carbon Dioxide 26 20 - 32 mmol/L  FrStHsLb   Anion Gap 5 3 - 14 mmol/L  FrStHsLb   Glucose 95 70 - 99 mg/dL  FrStHsLb   Urea Nitrogen 58 7 - 30 mg/dL H FrStHsLb   Creatinine 1.84 0.52 - 1.04 mg/dL H FrStHsLb   GFR Estimate 26 >60 mL/min/1.7m2 L FrStHsLb   Comment:  Non  GFR Calc   GFR Estimate If Black 31 >60 mL/min/1.7m2 L FrStHsLb   Comment:  African American GFR Calc   Calcium 8.0 8.5 - 10.1 mg/dL L FrStHsLb   Bilirubin Total 0.3 0.2 - 1.3 mg/dL  FrStHsLb   Albumin  3.0 3.4 - 5.0 g/dL L FrStHsLb   Protein Total 6.0 6.8 - 8.8 g/dL L FrStHsLb   Alkaline Phosphatase 61 40 - 150 U/L  FrStHsLb   ALT 34 0 - 50 U/L  FrStHsLb   AST 23 0 - 45 U/L  FrStHsLb            Phosphorus [[id number]]  Resulted: 11/14/18 0648, Result status: Final result    Ordering provider: Cruz Blackwood MD  11/14/18 0000 Resulting lab: [facility]    Specimen Information    Type Source Collected On   Blood  11/14/18 0622          Components       Value Reference Range Flag Lab   Phosphorus 4.2 2.5 - 4.5 mg/dL  FrStHsLb            Magnesium [[id number]]  Resulted: 11/14/18 0648, Result status: Final result    Ordering provider: Cruz Blackwood MD  11/14/18 0000 Resulting lab: [facility]    Specimen Information    Type Source Collected On   Blood  11/14/18 0622          Components       Value Reference Range Flag Lab   Magnesium 1.8 1.6 - 2.3 mg/dL  FrStHsLb            Uric acid [[id number]] (Abnormal)  Resulted: 11/14/18 0648, Result status: Final result    Ordering provider: Cruz Blackwood MD  11/14/18 0000 Resulting lab: [facility]    Specimen Information    Type Source Collected On   Blood  11/14/18 0622          Components       Value Reference Range Flag Lab   Uric Acid 7.5 2.6 - 6.0 mg/dL H FrStHsLb            CBC with platelets differential [[id number]] (Abnormal)  Resulted: 11/14/18 0630, Result status: Final result    Ordering provider: Edilberto Mack DO  11/14/18 0000 Resulting lab: [facility]    Specimen Information    Type Source Collected On   Blood  11/14/18 0622          Components       Value Reference Range Flag Lab   WBC 4.7 4.0 - 11.0 10e9/L  FrStHsLb   RBC Count 3.08 3.8 - 5.2 10e12/L L FrStHsLb   Hemoglobin 9.4 11.7 - 15.7 g/dL L FrStHsLb   Hematocrit 29.0 35.0 - 47.0 % L FrStHsLb   MCV 94 78 - 100 fl  FrStHsLb   MCH 30.5 26.5 - 33.0 pg  FrStHsLb   MCHC 32.4 31.5 - 36.5 g/dL  FrStHsLb   RDW  15.2 10.0 - 15.0 % H FrStHsLb   Platelet Count 99 150 - 450 10e9/L L FrStHsLb   Diff Method Automated Method   FrStHsLb   % Neutrophils 83.5 %  FrStHsLb   % Lymphocytes 10.9 %  FrStHsLb   % Monocytes 4.1 %  FrStHsLb   % Eosinophils 1.3 %  FrStHsLb   % Basophils 0.0 %  FrStHsLb   % Immature Granulocytes 0.2 %  FrStHsLb   Nucleated RBCs 0 0 /100  FrStHsLb   Absolute Neutrophil 3.9 1.6 - 8.3 10e9/L  FrStHsLb   Absolute Lymphocytes 0.5 0.8 - 5.3 10e9/L L FrStHsLb   Absolute Monocytes 0.2 0.0 - 1.3 10e9/L  FrStHsLb   Absolute Eosinophils 0.1 0.0 - 0.7 10e9/L  FrStHsLb   Absolute Basophils 0.0 0.0 - 0.2 10e9/L  FrStHsLb   Abs Immature Granulocytes 0.0 0 - 0.4 10e9/L  FrStHsLb   Absolute Nucleated RBC 0.0   FrStHsLb            Glucose by meter [[id number]] (Abnormal)  Resulted: 11/14/18 0210, Result status: Final result    Ordering provider: Edilberto Mack DO  11/14/18 0158 Resulting lab: POINT OF CARE TEST, GLUCOSE    Specimen Information    Type Source Collected On     11/14/18 0158          Components       Value Reference Range Flag Lab   Glucose 114 70 - 99 mg/dL H 170            Glucose by meter [[id number]]  Resulted: 11/13/18 2319, Result status: Final result    Ordering provider: Edilberto Mack DO  11/13/18 2306 Resulting lab: POINT OF CARE TEST, GLUCOSE    Specimen Information    Type Source Collected On     11/13/18 2306          Components       Value Reference Range Flag Lab   Glucose 90 70 - 99 mg/dL  170            Fractional excretion of sodium [[id number]]  Resulted: 11/13/18 1828, Result status: Final result    Ordering provider: Cruz Blackwood MD  11/13/18 1059 Resulting lab: [facility]    Specimen Information    Type Source Collected On   Urine Urine catheter 11/13/18 1602          Components       Value Reference Range Flag Lab   Creatinine Urine 100 mg/dL  FrStHsLb   Sodium Urine mmol/L 8 mmol/L  FrStHsLb   %FENA 0.1 %  FrStHsLb   Comment:         Adult:     <1 percent            Indicates prerenal azotemia            >3 percent            Suggests acute tubular            necrosis  Neonates: <2.5 percent            Suggest prerenal azotemia            >2.5 percent            Suggest acute tubular necrosis              Sodium [[id number]] (Abnormal)  Resulted: 11/13/18 1810, Result status: Final result    Ordering provider: Debbie Jay APRN CNP  11/13/18 1611 Resulting lab: [facility]    Specimen Information    Type Source Collected On   Blood  11/13/18 1710          Components       Value Reference Range Flag Lab   Sodium 131 133 - 144 mmol/L L FrStHsLb            Creatinine [[id number]] (Abnormal)  Resulted: 11/13/18 1810, Result status: Final result    Ordering provider: Debbie Jay APRN CNP  11/13/18 1611 Resulting lab: [facility]    Specimen Information    Type Source Collected On   Blood  11/13/18 1710          Components       Value Reference Range Flag Lab   Creatinine 1.81 0.52 - 1.04 mg/dL H FrStHsLb   GFR Estimate 26 >60 mL/min/1.7m2 L FrStHsLb   Comment:  Non  GFR Calc   GFR Estimate If Black 32 >60 mL/min/1.7m2 L FrStHsLb   Comment:   GFR Calc            Glucose by meter [[id number]] (Abnormal)  Resulted: 11/13/18 1802, Result status: Final result    Ordering provider: Edilberto Mack, DO  11/13/18 1751 Resulting lab: POINT OF CARE TEST, GLUCOSE    Specimen Information    Type Source Collected On     11/13/18 1751          Components       Value Reference Range Flag Lab   Glucose 157 70 - 99 mg/dL H 170            Glucose by meter [[id number]] (Abnormal)  Resulted: 11/13/18 1236, Result status: Final result    Ordering provider: Edilberto Mack, DO  11/13/18 1224 Resulting lab: POINT OF CARE TEST, GLUCOSE    Specimen Information    Type Source Collected On     11/13/18 1224          Components       Value Reference Range Flag Lab   Glucose 105 70 - 99 mg/dL H  170            Basic metabolic panel [[id number]] (Abnormal)  Resulted: 11/13/18 1034, Result status: Final result    Ordering provider: Edilberto Mack, DO  11/13/18 0400 Resulting lab: [facility]    Specimen Information    Type Source Collected On   Blood  11/13/18 0955          Components       Value Reference Range Flag Lab   Sodium 131 133 - 144 mmol/L L FrStHsLb   Potassium 5.5 3.4 - 5.3 mmol/L H FrStHsLb   Chloride 98 94 - 109 mmol/L  FrStHsLb   Carbon Dioxide 29 20 - 32 mmol/L  FrStHsLb   Anion Gap 4 3 - 14 mmol/L  FrStHsLb   Glucose 75 70 - 99 mg/dL  FrStHsLb   Urea Nitrogen 54 7 - 30 mg/dL H FrStHsLb   Creatinine 1.79 0.52 - 1.04 mg/dL H FrStHsLb   GFR Estimate 27 >60 mL/min/1.7m2 L FrStHsLb   Comment:  Non  GFR Calc   GFR Estimate If Black 32 >60 mL/min/1.7m2 L FrStHsLb   Comment:  African American GFR Calc   Calcium 8.3 8.5 - 10.1 mg/dL L FrStHsLb            Nt probnp inpatient [[id number]] (Abnormal)  Resulted: 11/13/18 1034, Result status: Final result    Ordering provider: Edilberto Mack,   11/13/18 0955 Resulting lab: [facility]    Specimen Information    Type Source Collected On     11/13/18 0955          Components       Value Reference Range Flag Lab   N-Terminal Pro BNP Inpatient 6997 0 - 1800 pg/mL H FrStHsLb   Comment:            Reference range shown and results flagged as abnormal are suggested inpatient   cut points for confirming diagnosis if CHF in an acute setting. Establishing a   baseline value for each individual patient is useful for follow-up. An   inpatient or emergency department NT-proPBNP <300 pg/mL effectively rules out   acute CHF, with 99% negative predictive value.  The outpatient non-acute reference range for ruling out CHF is:   0-125 pg/mL (age 18 to less than 75)   0-450 pg/mL (age 75 yrs and older)              Glucose by meter [[id number]] (Abnormal)  Resulted: 11/13/18 0837, Result status: Final result     Ordering provider: Edilberto Mack, DO  11/13/18 0823 Resulting lab: POINT OF CARE TEST, GLUCOSE    Specimen Information    Type Source Collected On     11/13/18 0823          Components       Value Reference Range Flag Lab   Glucose 125 70 - 99 mg/dL H 170            Comprehensive metabolic panel [[id number]] (Abnormal)  Resulted: 11/13/18 0652, Result status: Final result    Ordering provider: Edilberto Mack, DO  11/13/18 0000 Resulting lab: [facility]    Specimen Information    Type Source Collected On   Blood  11/13/18 0602          Components       Value Reference Range Flag Lab   Sodium 130 133 - 144 mmol/L L FrStHsLb   Potassium 5.5 3.4 - 5.3 mmol/L H FrStHsLb   Chloride 98 94 - 109 mmol/L  FrStHsLb   Carbon Dioxide 28 20 - 32 mmol/L  FrStHsLb   Anion Gap 4 3 - 14 mmol/L  FrStHsLb   Glucose 106 70 - 99 mg/dL H FrStHsLb   Urea Nitrogen 52 7 - 30 mg/dL H FrStHsLb   Creatinine 1.78 0.52 - 1.04 mg/dL H FrStHsLb   GFR Estimate 27 >60 mL/min/1.7m2 L FrStHsLb   Comment:  Non  GFR Calc   GFR Estimate If Black 32 >60 mL/min/1.7m2 L FrStHsLb   Comment:  African American GFR Calc   Calcium 8.1 8.5 - 10.1 mg/dL L FrStHsLb   Bilirubin Total 0.3 0.2 - 1.3 mg/dL  FrStHsLb   Albumin 2.6 3.4 - 5.0 g/dL L FrStHsLb   Protein Total 5.5 6.8 - 8.8 g/dL L FrStHsLb   Alkaline Phosphatase 51 40 - 150 U/L  FrStHsLb   ALT 34 0 - 50 U/L  FrStHsLb   AST 27 0 - 45 U/L  FrStHsLb            Phosphorus [[id number]]  Resulted: 11/13/18 0646, Result status: Final result    Ordering provider: Edilberto Mack, DO  11/13/18 0602 Resulting lab: [facility]    Specimen Information    Type Source Collected On     11/13/18 0602          Components       Value Reference Range Flag Lab   Phosphorus 4.3 2.5 - 4.5 mg/dL  FrStHsLb            Magnesium [[id number]]  Resulted: 11/13/18 0646, Result status: Final result    Ordering provider: Edilberto Mack,   11/13/18  0602 Resulting lab: [facility]    Specimen Information    Type Source Collected On     11/13/18 0602          Components       Value Reference Range Flag Lab   Magnesium 1.9 1.6 - 2.3 mg/dL  FrStHsLb            CBC with platelets differential [[id number]] (Abnormal)  Resulted: 11/13/18 0621, Result status: Final result    Ordering provider: Edilberto Mack,   11/13/18 0000 Resulting lab: [facility]    Specimen Information    Type Source Collected On   Blood  11/13/18 0602          Components       Value Reference Range Flag Lab   WBC 4.0 4.0 - 11.0 10e9/L  FrStHsLb   RBC Count 2.99 3.8 - 5.2 10e12/L L FrStHsLb   Hemoglobin 9.1 11.7 - 15.7 g/dL L FrStHsLb   Hematocrit 28.7 35.0 - 47.0 % L FrStHsLb   MCV 96 78 - 100 fl  FrStHsLb   MCH 30.4 26.5 - 33.0 pg  FrStHsLb   MCHC 31.7 31.5 - 36.5 g/dL  FrStHsLb   RDW 15.2 10.0 - 15.0 % H FrStHsLb   Platelet Count 110 150 - 450 10e9/L L FrStHsLb   Diff Method Automated Method   FrStHsLb   % Neutrophils 73.1 %  FrStHsLb   % Lymphocytes 14.1 %  FrStHsLb   % Monocytes 10.9 %  FrStHsLb   % Eosinophils 1.7 %  FrStHsLb   % Basophils 0.2 %  FrStHsLb   % Immature Granulocytes 0.0 %  FrStHsLb   Nucleated RBCs 0 0 /100  FrStHsLb   Absolute Neutrophil 3.0 1.6 - 8.3 10e9/L  FrStHsLb   Absolute Lymphocytes 0.6 0.8 - 5.3 10e9/L L FrStHsLb   Absolute Monocytes 0.4 0.0 - 1.3 10e9/L  FrStHsLb   Absolute Eosinophils 0.1 0.0 - 0.7 10e9/L  FrStHsLb   Absolute Basophils 0.0 0.0 - 0.2 10e9/L  FrStHsLb   Abs Immature Granulocytes 0.0 0 - 0.4 10e9/L  FrStHsLb   Absolute Nucleated RBC 0.0   FrStHsLb            Blood gas venous with oxyhemoglobin [[id number]] (Abnormal)  Resulted: 11/13/18 0621, Result status: Final result    Ordering provider: Edilberto Mack,   11/13/18 0000 Resulting lab: [facility]    Specimen Information    Type Source Collected On   Blood  11/13/18 0602          Components       Value Reference Range Flag Lab    Ph Venous 7.31 7.32 - 7.43 pH L FrStHsLb   PCO2 Venous 59 40 - 50 mm Hg H FrStHsLb   PO2 Venous 97 25 - 47 mm Hg H FrStHsLb   Bicarbonate Venous 30 21 - 28 mmol/L H FrStHsLb   FIO2 100   FrStHsLb   Oxyhemoglobin Venous 98 %  FrStHsLb   Base Excess Venous 2.7 mmol/L  FrStHsLb   Comment:  Abnormal Result, Ref range: -7.7 to 1.9            Basic metabolic panel [[id number]] (Abnormal)  Resulted: 11/13/18 0316, Result status: Final result    Ordering provider: Edilberto Mack, DO  11/12/18 2000 Resulting lab: [facility]    Specimen Information    Type Source Collected On   Blood  11/13/18 0240          Components       Value Reference Range Flag Lab   Sodium 133 133 - 144 mmol/L  FrStHsLb   Potassium 5.4 3.4 - 5.3 mmol/L H FrStHsLb   Chloride 100 94 - 109 mmol/L  FrStHsLb   Carbon Dioxide 29 20 - 32 mmol/L  FrStHsLb   Anion Gap 4 3 - 14 mmol/L  FrStHsLb   Glucose 60 70 - 99 mg/dL L FrStHsLb   Urea Nitrogen 54 7 - 30 mg/dL H FrStHsLb   Creatinine 1.79 0.52 - 1.04 mg/dL H FrStHsLb   GFR Estimate 27 >60 mL/min/1.7m2 L FrStHsLb   Comment:  Non  GFR Calc   GFR Estimate If Black 32 >60 mL/min/1.7m2 L FrStHsLb   Comment:  African American GFR Calc   Calcium 8.2 8.5 - 10.1 mg/dL L FrStHsLb            Blood gas venous with oxyhemoglobin [[id number]] (Abnormal)  Resulted: 11/13/18 0023, Result status: Final result    Ordering provider: Edilberto Mack, DO  11/12/18 1912 Resulting lab: [facility]    Specimen Information    Type Source Collected On   Blood  11/13/18 0010          Components       Value Reference Range Flag Lab   Ph Venous 7.27 7.32 - 7.43 pH L FrStHsLb   PCO2 Venous 65 40 - 50 mm Hg H FrStHsLb   PO2 Venous 62 25 - 47 mm Hg H FrStHsLb   Bicarbonate Venous 30 21 - 28 mmol/L H FrStHsLb   Oxyhemoglobin Venous 90 %  FrStHsLb   Base Excess Venous 2.2 mmol/L  FrStHsLb   Comment:  Abnormal Result, Ref range: -7.7 to 1.9            Basic metabolic panel  [[id number]] (Abnormal)  Resulted: 11/12/18 2252, Result status: Final result    Ordering provider: Edilberto Mack,   11/12/18 1600 Resulting lab: [facility]    Specimen Information    Type Source Collected On   Blood  11/12/18 2217          Components       Value Reference Range Flag Lab   Sodium 133 133 - 144 mmol/L  FrStHsLb   Potassium 5.6 3.4 - 5.3 mmol/L H FrStHsLb   Chloride 100 94 - 109 mmol/L  FrStHsLb   Carbon Dioxide 27 20 - 32 mmol/L  FrStHsLb   Anion Gap 6 3 - 14 mmol/L  FrStHsLb   Glucose 130 70 - 99 mg/dL H FrStHsLb   Urea Nitrogen 52 7 - 30 mg/dL H FrStHsLb   Creatinine 1.79 0.52 - 1.04 mg/dL H FrStHsLb   GFR Estimate 27 >60 mL/min/1.7m2 L FrStHsLb   Comment:  Non  GFR Calc   GFR Estimate If Black 32 >60 mL/min/1.7m2 L FrStHsLb   Comment:  African American GFR Calc   Calcium 8.3 8.5 - 10.1 mg/dL L FrStHsLb            Testing Performed By     Lab - Abbreviation Name Director Address Valid Date Range    14 - FrStHsLb [facility] Unknown [address] [place] [address] 05/08/15 1057 - Present    170 - Unknown POINT OF CARE TEST, GLUCOSE Unknown Unknown 10/31/11 1114 - Present    225 - Unknown INFECTIOUS DISEASE DIAGNOSTIC LABORATORY Unknown [address] [facility] [address] 12/19/14 0954 - Present            Unresulted Labs     None         Imaging Results - 3 Days      XR Chest Port 1 View [[id number]]  Resulted: 11/13/18 1041, Result status: Final result    Ordering provider: Edilberto Mack,   11/13/18 0824 Resulted by: Gael Pepper MD    Performed: 11/13/18 0911 - 11/13/18 0925 Resulting lab: RADIOLOGY RESULTS    Narrative:       CHEST PORTABLE ONE VIEW   11/13/2018 9:25 AM     HISTORY: Hypoxia.     COMPARISON: Chest x-ray 11/11/2018.      Impression:       IMPRESSION: Portable view of the chest is performed. Pulmonary  vascular congestion seen on prior exam appears mildly improved.  Airspace opacity retrocardiac  region and right lung base could  indicate persistent mild edema. Heart remains enlarged. Implantable  cardiac device with 3 leads appears unchanged in position. No  pneumothorax. No obvious pleural effusions.    ELIO QUINONES MD      Testing Performed By     Lab - Abbreviation Name Director Address Valid Date Range    104 - Rad Rslts RADIOLOGY RESULTS Unknown Unknown 05 1553 - Present               ECG/EMG Results      ECHO COMPLETE WITH OPTISON [[id number]]  Resulted: 18 1458, Result status: Edited Result - FINAL    Ordering provider: Bree Pop DO  18 1343 Resulted by: Kandace Reina MD    Performed: 18 1528 - 18 1529 Resulting lab: RADIOLOGY RESULTS    Narrative:       [id number]  [id number]  [id number]  [id number]^DONN^BREE^BEN           [facility]  Echocardiography Laboratory  [address] [place] [address]        Name: VICKEY CHURCH  MRN: [id number]  : 1927  Study Date: 2018 02:58 PM  Age: 89 yrs  Gender: Female  Patient Location: [facility]  Reason For Study: CHF  Ordering Physician: BREE POP  Referring Physician: BREE POP  Performed By: AIDAN Montero     BSA: 1.8 m2  Height: 61 in  Weight: 175 lb  HR: 70  BP: 125/61 mmHg  _____________________________________________________________________________  __        Procedure  Complete Portable Echo Adult. Contrast Optison.  _____________________________________________________________________________  __        Interpretation Summary     The left ventricle is moderately dilated. There is mild concentric left  ventricular hypertrophy. Left ventricular systolic function is severely  reduced. The visual ejection fraction is estimated at 25-30%. Grade II or  moderate diastolic dysfunction. There is severe global hypokinesis of the left  ventricle with minor regional variation. There is no thrombus seen in the left  ventricle.  The right ventricle is  normal size. There is a catheter/pacemaker lead seen in  the right ventricle. Mildly decreased right ventricular systolic function.  Moderate mitral regurgitation.  There is mod-severe to severe (3-4+) tricuspid regurgitation. The right  ventricular systolic pressure is approximated at 48.6 mmHg plus the right  atrial pressure. Right ventricular systolic pressure is elevated, consistent  with moderate to severe pulmonary hypertension.  Moderate aortic stenosis.  No pericardial effusion.  In comparison to the previous report dated 04/20/2018, there has been an  interval worsening in the degree of tricuspid regurgitation. The other  findings appear similar.  _____________________________________________________________________________  __        Left Ventricle  The left ventricle is moderately dilated. There is mild concentric left  ventricular hypertrophy. Left ventricular systolic function is severely  reduced. The visual ejection fraction is estimated at 25-30%. Grade II or  moderate diastolic dysfunction. There is severe global hypokinesis of the left  ventricle with minor regional variation. There is no thrombus seen in the left  ventricle.     Right Ventricle  The right ventricle is normal size. There is a catheter/pacemaker lead seen in  the right ventricle. Mildly decreased right ventricular systolic function.     Atria  There is mod-severe biatrial enlargement. There is no color Doppler evidence  of an atrial shunt.     Mitral Valve  The mitral valve leaflets are mildly thickened. There is moderate (2+) mitral  regurgitation.        Tricuspid Valve  There is mod-severe to severe (3-4+) tricuspid regurgitation. The right  ventricular systolic pressure is approximated at 48.6 mmHg plus the right  atrial pressure. Right ventricular systolic pressure is elevated, consistent  with moderate to severe pulmonary hypertension.     Aortic Valve  No aortic regurgitation is present. Moderate valvular aortic stenosis.  The  peak AoV pressure gradient is 30.0 mmHg. The mean AoV pressure gradient is  17.5 mmHg. The calculated aortic valve are is 1.2 cm^2.     Pulmonic Valve  There is mild (1+) pulmonic valvular regurgitation. There is no pulmonic  valvular stenosis.     Vessels  The aortic root is normal size. The ascending aorta is Borderline dilated.     Pericardium  There is no pericardial effusion.        Rhythm  The rhythm was sinus with paced rhythm.  _____________________________________________________________________________  __  MMode/2D Measurements & Calculations  IVSd: 1.2 cm     LVIDd: 6.5 cm  LVIDs: 5.1 cm  LVPWd: 1.1 cm  FS: 22.2 %  LV mass(C)d: 336.6 grams  LV mass(C)dI: 188.6 grams/m2  Ao root diam: 3.1 cm  LA dimension: 5.0 cm  asc Aorta Diam: 3.7 cm  LA/Ao: 1.6  LVOT diam: 2.2 cm  LVOT area: 3.9 cm2     EF(MOD-bp): 20.6 %  RWT: 0.33  TAPSE: 1.9 cm        Doppler Measurements & Calculations  MV E max wilver: 61.4 cm/sec  MV A max wilver: 62.0 cm/sec  MV E/A: 0.99  MV dec time: 0.18 sec  Ao V2 max: 272.5 cm/sec  Ao max P.0 mmHg  Ao V2 mean: 199.7 cm/sec  Ao mean P.5 mmHg  Ao V2 VTI: 64.7 cm  ANDREY(I,D): 1.2 cm2  ANDREY(V,D): 1.1 cm2  LV V1 max P.6 mmHg  LV V1 max: 79.9 cm/sec  LV V1 VTI: 20.5 cm  SV(LVOT): 79.6 ml  SI(LVOT): 44.6 ml/m2     TR max wilver: 348.7 cm/sec  TR max P.6 mmHg  AV Wilver Ratio (DI): 0.29  ANDREY Index (cm2/m2): 0.69  E/E' av.4  Lateral E/e': 13.0  Medial E/e': 13.9           _____________________________________________________________________________  __           Report approved by: Cornelius Díaz 2018 04:04 PM       1    Type Source Collected On     18 1458          View Image (below)        Echo complete [[id number]]  Resulted: 18 152, Result status: In process    Ordering provider: Edilberto Mack DO  18 1343 Performed: 18 1528 - 18 1528    Resulting lab: [facility]                   Encounter-Level Documents:     There are no  encounter-level documents.      Order-Level Documents:     There are no order-level documents.

## 2018-11-11 NOTE — PLAN OF CARE
Problem: Patient Care Overview  Goal: Plan of Care/Patient Progress Review  SLP attempted to complete bedside swallow evaluation this PM. Pt currently having blood drawn, followed by initiation of ECHO. Pt appeared lethargic w/ minimal arousal observed during blood draw. No dysphagia screen completed per RN. SLP will check back later as schedule allows, or reschedule for tomorrow. If SLP unable to see this date, consider completion of swallow screen via RN for potential diet initiation.

## 2018-11-11 NOTE — LETTER
Transition Communication Hand-off for Care Transitions to Next Level of Care Provider    Name: Mckayla Oconnell  : 1927  MRN #: [id number]  Primary Care Provider: Ander Shrestha     Primary Clinic: [address] [facility] [address]     Reason for Hospitalization:  Nausea [R11.0]  Acute on chronic congestive heart failure, unspecified heart failure type (H) [I50.9]  Heart failure (H) [I50.9]  Admit Date/Time: 2018  9:16 AM  Discharge Date: 18  Payor Source: Payor: [facility] / Plan: ARE FOR SENIORS / Product Type: HMO /     FYI pt has discharge to [facility] today.     Cherelle Greer RN

## 2018-11-11 NOTE — ED PROVIDER NOTES
"  History     Chief Complaint:  Nausea    HPI   Mckayla Oconnell is a 89 year old female with a history of CHF, HTN, and CAD who presents to the emergency department for evaluation of nausea. Her symptoms began suddenly last night around 2200. Because the nausea has persisted, she called EMS to present her to the ED this morning. En route, EMS provided sublingual Zofran which she reports provided no relief. Here in the ED, she only reports nausea. She denies any vomiting, abdominal pain, abdominal distention, chest pain, shortness of breath, fever, cough, or increased lower extremity edema from baseline. She denies any urinary or bowel symptoms; she reports a normal bowel movement last night, and is still able to pass gas. She does state that her appetite has decreased significantly in the last month, and last night she was hardly able to eat anything for dinner. Additionally, she uses a walker to ambulate and has been able to use it per her baseline. She also has chronic neck pain for which she has been treated multiple times. Of note, her pacemaker was checked last week and she states it is in \"excellent condition.\"  Patient wears oxygen at night only.  Notes a several pound weight gain over the last several days.    Allergies:  Atenolol  Hydrochlorothiazide     Medications:    Aspirin  Carvedilol  Hydralazine  Imdur  Levothyroxine  Lutein  Melatonin  Simvastatin  Torsemide  Tramadol  Trazodone    Past Medical History:    Aortic stenosis  CAD  CHF  Cardiomyopathy  Glaucoma  HTN  Hypercholesterolemia   Hypothyroidism    Past Surgical History:    Cardiac catherization  Hysterectomy  Pacemaker implanted  Bilateral knee replacements  Orthopedic surgery    Family History:    Cancer    Social History:  Marital Status:   [2]  Lives at home with .   Presents via EMS.  at bedside.   Negative for tobacco use.  Negative for alcohol use.     Review of Systems   Constitutional: Negative for fever. "   Respiratory: Negative for cough and shortness of breath.    Cardiovascular: Negative for chest pain and leg swelling.   Gastrointestinal: Positive for nausea. Negative for abdominal distention, abdominal pain, constipation and vomiting.   Genitourinary: Negative.    All other systems reviewed and are negative.      Physical Exam     Patient Vitals for the past 24 hrs:   BP Temp Temp src Pulse Heart Rate Resp SpO2   11/11/18 1734 101/58 - - - 70 14 98 %   11/11/18 1250 125/61 - - - 70 - -   11/11/18 1247 122/67 - - - 70 14 95 %   11/11/18 1200 113/55 - - - - - 95 %   11/11/18 1139 118/61 - - - - - 96 %   11/11/18 1100 - - - - - - 97 %   11/11/18 1000 - - - - - - 94 %   11/11/18 0930 - - - - - - 98 %   11/11/18 0920 141/74 96.7  F (35.9  C) Temporal 70 - 16 91 %     Physical Exam  General: Alert and cooperative with exam. Patient in mild distress. Normal mentation.  Head:  Scalp is NC/AT  Eyes:  No scleral icterus, right eye blindness  ENT:  The external nose and ears are normal. The oropharynx is normal and without erythema; mucus membranes are moist. Uvula midline, no evidence of deep space infection.  Neck:  Normal range of motion without rigidity.  CV:  Regular rate and rhythm.  Pacemaker in place.  Resp:  Mild diffuse fine crackles.  Nasal cannula in place.    Non-labored, no retractions or accessory muscle use  GI:  Abdomen is soft, no distension, no tenderness. No peritoneal signs.  Obese.  MS:  +1 pitting edema to lower extremities  Skin:  Warm and dry, No rash or lesions noted.  Neuro: Oriented x 3. No gross motor deficits.        Emergency Department Course   ECG:  Indication: Nausea  Time: 0944  Vent. Rate 70 bpm. VT interval 184. QRS duration 184. QT/QTc 491/531. P-R-T axis 77 -53 101.  AV dual paced rhythm. Biventricular pacemaker detected. Abnormal ECG. Read time: 0950.    Imaging:  Radiographic findings were communicated with the patient who voiced understanding of the findings.    XR Chest PA & LAT:    Lungs are slightly hypoinflated. Patchy bilateral  perihilar and right greater than left basilar opacities are present  which may represent edema, aspiration, or infection. Probable trace  right pleural effusion. Cardiac silhouette is enlarged, unchanged.  Triple lead cardiac pacing device is in unchanged position. Upper  lumbar (approximate L1) compression fracture is present with  approximately 80% height loss, new compared to 1/19/2017, as per radiology.    Laboratory:  CBC: WBC: 4.7, HGB: 10.2 (L), PLT: 113 (L)    CMP: BUN: 46 (H), Na: 132 (L), GFR: 34 (L), Albumin: 3.3 (L), o/w WNL (Creatinine: 1.46 (H))    0955 Troponin: 0.021    Lipase: 112    BNP: 6182 (H)    UA with Microscopic: Albumin: 10 (A), o/w WNL    Interventions:  1101 Phenergan, 25 mg, PO  1136 Lasix, 40 mg, IV    Emergency Department Course:  Nursing notes and vitals reviewed. (1610) I performed an exam of the patient as documented above.      IV inserted. Medicine administered as documented above. Blood drawn. This was sent to the lab for further testing, results above.     The patient was sent for a chest x-ray while in the emergency department, findings above.      EKG obtained in the ED, see results above.     The patient provided a urine sample here in the emergency department. This was sent for laboratory testing, findings above.     (1615) I rechecked the patient and discussed the results of her workup thus far.      (1582) I consulted with Dr. Mack of the hospitalist services. They are in agreement to accept the patient for admission.    Findings and plan explained to the Patient and  who consents to admission. Discussed the patient with Dr. Mack, who will admit the patient to a [facility] bed for further monitoring, evaluation, and treatment.     I personally reviewed the laboratory and imaging results with the Patient and answered all related questions prior to admission.     Impression & Plan    Medical Decision  Making:  Patient is a 89-year-old female who presents with nausea, and was noted to be hypoxic on room air on initial presentation; she has a history of CHF. Patient's medical history and records reviewed.  Initial consideration for, but not limited to, atypical ACS/MI, electrolyte abnormality, metabolic disturbance, CHF exacerbation, infectious process, gastritis, among others. Labs, EKG, and imaging was obtained. EKG demonstrates paced rhythm. Patient's troponin was detectable, but not significantly elevated and she denies chest pain; atypical ACS is unlikely. Labs are also notable for elevated BNP (6182), chronic renal insufficiency (creatinine 1.46), chronic anemia (hemoglobin 10.2), and UA without evidence of infection. Chest x-ray shows patchy bilateral perihilar and right/left basilar opacities concerning for possible edema versus aspiration/infection. Patient denies cough or fever or aspiration. She is noted to have pitting edema in her lower extremities and notes recent weight gain. Imaging findings most consistent with pulmonary edema. She was noted to be mildly hypoxic on room air which corrected with supplemental oxygen. Additionally, I provided Phenergan for nausea relief; no episode of emesis in the ED. Given hypoxia and increased weight gain, as well as pulmonary edema on chest x-ray, there is concern for CHF exacerbation which may be contributing to patient's feeling of nausea. She was provided 40 mg IV Lasix and will be [place] [facility] with the hospital service for further evaluation and care.    Diagnosis:    ICD-10-CM    1. Acute on chronic congestive heart failure, unspecified heart failure type (H) I50.9 Blood culture     Blood culture     Procalcitonin     Troponin I   2. Nausea R11.0    3. Acute pulmonary edema (H) J81.0        Disposition:  Admitted to a [facility] bed under the care of Dr. Martina Maheribe Disclosure:  Chayo RAMSAY, am serving as a scribe on 11/11/2018 at 9:31 AM to  personally document services performed by Erasmo Carrion DO based on my observations and the provider's statements to me.     Chayo Galdamez  11/11/2018    EMERGENCY DEPARTMENT       Erasmo Carrion DO  11/11/18 [id number]

## 2018-11-11 NOTE — ED NOTES
[facility]  ED Nurse Handoff Report    ED Chief complaint: Nausea (since last night, pt has not had any episodes of vomiting, received 4mg of Zofran Sublingual via EMS.  )      ED Diagnosis:   Final diagnoses:   Acute on chronic congestive heart failure, unspecified heart failure type (H)   Nausea       Code Status: DNR / DNI    Allergies:   Allergies   Allergen Reactions     Atenolol Anaphylaxis     Hydrochlorothiazide      hyponatremia     Pollen Extract        Activity level - Baseline/Home:  Stand with Assist    Activity Level - Current:   Stand with Assist     Needed?: No    Isolation: No  Infection: Not Applicable  Bariatric?: No    Vital Signs:   Vitals:    11/11/18 0920 11/11/18 0930   BP: 141/74    Pulse: 70    Resp: 16    Temp: 96.7  F (35.9  C)    TempSrc: Temporal    SpO2: 91% 98%       Cardiac Rhythm: ,        Pain level:      Is this patient confused?: No   [place] - Suicide Severity Rating Scale Completed?  Yes  If yes, what color did the patient score?  White    Patient Report: Initial Complaint: nausea  Focused Assessment: Patient lives at home with , has been nauseous since last night, has not had any vomiting.  Patient has CHF, O2 sats are around 88% on room air.  Admitting for CHF exacerbation  Tests Performed: labs and xray  Abnormal Results: BNP elevated, chest xray  Treatments provided: IV lasix and pherengan     Family Comments:  at bedside    OBS brochure/video discussed/provided to patient/family: N/A              Name of person given brochure if not patient:               Relationship to patient:     ED Medications:   Medications   promethazine (PHENERGAN) tablet 25 mg (25 mg Oral Given 11/11/18 1101)   furosemide (LASIX) injection 40 mg (40 mg Intravenous Given 11/11/18 1136)       Drips infusing?:  No    For the majority of the shift this patient was Green.   Interventions performed were IV lasix.    Severe Sepsis OR Septic Shock Diagnosis  Present: No    To be done/followed up on inpatient unit:  none    ED NURSE PHONE NUMBER: [phone]       RECEIVING UNIT ED HANDOFF REVIEW    ED Nurse Handoff Report was reviewed by: Elenita Shukla on November 11, 2018 at 12:09 PM

## 2018-11-11 NOTE — ED NOTES
Patient arrives by EMS.  Reports nausea since last evening, denies vomiting.  Last BM was last night, pt denies ABD pain.  Patient lives at home with her 91 year old , EMS reports that social work should be consulted due to the lack of self care that they evidenced.   Upon arrival, Patient has food all over her shirt and her face, Patient said she might have ate last night but can't remember and that she just forgot to clean herself up.  Patient reports that her daughter is going through cancer treatments so they don't want to bother her with any of their problems or health issues.   reports that he wants to have his daughter be involved but the Patient (wife) is very adamant about not bothering their daughter and they can manage without having to get anyone else involved. Patient has CHF, reports going to her PCP for mgmt of this,  Patient has pitting edema to bilateral legs,  Patient's arms are swollen as well.   Patient reports wearing O2 at home but only at night, Pt's O2 sats were 88% on room air upon arrival.

## 2018-11-11 NOTE — H&P
[facility]    History and Physical  Hospitalist       Date of Admission:  11/11/2018    Assessment & Plan   Mckayla Oconnell is a 89 year old female who presents with nausea, found to have congestive heart failure exacerbation    Nonischemic cardiomyopathy 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   Moderate aortic stenosis, moderate tricuspid regurgitation, moderate mitral regurgitation.   Biventricular pacemaker since 01/2017.   Long-standing patient of Dr. Scott in [facility], recently saw MsJake Kraft last week with plan to increase diuretic for a few day  EKG shows a paced rhythm, troponin within normal limits.  Chest x-ray with likely pulmonary edema  Received 40 mg IV in the ED  Plan  - await medical reconciliation and her ability to swallow is confirmed  - Hold on additional diuretics today, monitor in the a.m.  - Echocardiogram  - Cardiology consult  - serial troponins given small concern her nausea could be anginal equivalent but strongly doubt    Altered mental status likely metabolic encephalopathy  Daughter reports normally she is quite awake and alert, but much more somnolent over the past few days  She is moving all extremities on examination, with variable effort given her somnolence  No obvious signs of infection, no fever, no WBC elevation, denies respiratory complaints, UA is clean  ?wonder if this is from opiates  Plan  - CT head  - follow fever curve  - check procalcitonin  - obtain blood cultures  - will ask family to bring her hearing aids  - npo until passes slp evaluation      Neck pain  Chronic arthritis  Appears neurologically intact with movement of extremities, although her somnolence limits testng  Plan  - CT of the neck    Nausea:  Unclear if this is related to worsening pain versus swelling from CHF  LFT and Lipase wnl  Plan  - zofran prn  - consider imaging if she develops pain, etc    CKD with baseline creatinine 1.5-2.   - at  baseline monitor    Hyperlipidemia.   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 Per polst and verified by daughter.  and wife are not capable of contributing to converstaion    Disposition: Expected discharge in 3-4 day days once.    Edilberto Mack DO    Primary Care Physician   Ander Shrestha    Chief Complaint   nausea    History is obtained from the patient    History of Present Illness   Mckayla Oconnell is a 89 year old female who presents with nausea that began last night around 10 PM.  She reports that this has not happened before.  She denies abdominal pain.  She states that she was nauseous all through the night and unable to get any sleep the patient is obviously very somnolent, falling asleep while history is being obtained.  The patient states she has some pain in her left shoulder and neck but is unable to localize further.  She denies any shortness of breath cough.  No fever.  She is accompanied by her .  There is some concern about safety of living situation per EMS as the phone was disconnected. The  cannot really tell me why he called 911 but he reportedly went to the neighbors to call.      at the bedside seems moderately confused about the situation, hard of hearing, praying to Pritesh for a miracle.     Upon calling daughter for collateral, the daughter states that the patient has deteriorated in the last week.  On 11-6 the patient presented to the office of Ms. Faith in the cardiology clinic for a follow-up appointment.  Per the daughter the patient was complaining of uncontrolled neck pain and so was sent to the emergency department.  She was  prescribed tramadol for pain control and then discharged.  Daughter notes worsening somnolence in the setting of the last 2 or 3 days.  The patient suffers from long-standing pain from arthritis.  Daughter reports that she lives less than a mile away from the patient.  The patient has refused placement in a supervised facility in the past, although she has tolerated time-limited stays in acute rehab.  Daughter states her  noted that the outlet had failed and the patient's place likely accounting for the disconnect of the patient's phone.    In the near emergency department she was evaluated by Dr. Carrion.  Workup included basic labs, EKG, and a chest x-ray.  The patient was seen to be hypoxic into the mid 80s.  Because of concerns for congestive heart failure she received a dose of IV 40 mg Lasix.  She was admitted to the [facility] floor.     Past Medical History    I have reviewed this patient's medical history and updated it with pertinent information if needed.   Past Medical History:   Diagnosis Date     Aortic stenosis      CAD (coronary artery disease)     angiogram 2016: non-obstructive, CT calcium tcvql=566 May 2013     Cardiomyopathy, idiopathic (H)     cardiomyopathy HTN vs viral; EF as low as 15% in 2009; CRT-P implanted 1/19/17     CHF (NYHA class II, ACC/AHA stage C) (H) 2/14/2013     Glaucoma      H/O angiography 9-1-2016    No angiographic evidence of obstructive coronary artery disease.     Hypertension      Hypothyroidism 2/14/2013     Nonrheumatic mitral valve regurgitation      Nonrheumatic tricuspid valve regurgitation      Pulmonary hypertension (H)        Past Surgical History   I have reviewed this patient's surgical history and updated it with pertinent information if needed.  Past Surgical History:   Procedure Laterality Date     CARDIAC CATHERIZATION  9/1/16     GYN SURGERY      Hysterectomy 1995     HYSTERECTOMY, PAP NO LONGER INDICATED       IMPLANT PACEMAKER  1/19/17    CRT-P      ORTHOPEDIC SURGERY      knee replacement      ORTHOPEDIC SURGERY      Knee replacement      ORTHOPEDIC SURGERY      knee surgery [place]       Prior to Admission Medications   Prior to Admission Medications   Prescriptions Last Dose Informant Patient Reported? Taking?   Iron TABS  Daughter Yes No   Sig: Take 324 mg by mouth daily Patient states she takes OTC iron furrous gluconate 324 mg tablets USP   Lutein 20 MG CAPS  Daughter Yes No   Sig: Take 20 mg by mouth daily   MELATONIN PO  Daughter Yes No   Sig: Take 3 mg by mouth nightly as needed   VITAMIN D, CHOLECALCIFEROL, PO  Daughter Yes No   Sig: Take 2,000 Units by mouth daily    aspirin 81 MG tablet  Daughter Yes No   Sig: Take 81 mg by mouth daily   calcium citrate (CALCITRATE) 950 MG tablet  Daughter Yes No   Sig: Take 2 tablets by mouth daily At Noon   carvedilol (COREG) 25 MG tablet   No Yes   Sig: TAKE 1 TABLET TWICE A DAY WITH MEALS   fluticasone (FLONASE) 50 MCG/ACT spray   No Yes   Si to 2 sprays in both nostrils once daily as needed for rhinitis or allergies   hydrALAZINE (APRESOLINE) 25 MG tablet   No Yes   Sig: Take 1 tablet (25 mg) by mouth 3 times daily   isosorbide mononitrate (IMDUR) 30 MG 24 hr tablet   No Yes   Si/2 tablet daily AT NIGHT   levothyroxine (SYNTHROID/LEVOTHROID) 112 MCG tablet   No Yes   Sig: TAKE 1 TABLET DAILY   prednisoLONE acetate (PRED FORTE) 1 % ophthalmic suspension  Daughter Yes Yes   Sig: Place 1 drop into the right eye every evening    simvastatin (ZOCOR) 20 MG tablet   No Yes   Sig: Take 1 tablet (20 mg) by mouth At Bedtime   torsemide (DEMADEX) 5 MG tablet   No Yes   Sig: Take 2 tablets (10 mg) by mouth daily   traMADol (ULTRAM) 50 MG tablet   No No   Sig: Take 1 tablet (50 mg) by mouth every 6 hours as needed for severe pain   traZODone (DESYREL) 50 MG tablet   No Yes   Sig: Take 1/2-1 tablet by mouth nightly as needed for sleep      Facility-Administered Medications: None     Allergies   Allergies    Allergen Reactions     Atenolol Anaphylaxis     Hydrochlorothiazide      hyponatremia     Pollen Extract        Social History   I have reviewed this patient's social history and updated it with pertinent information if needed. Mckayla Oconnell  reports that she has never smoked. She has never used smokeless tobacco. She reports that she does not drink alcohol or use illicit drugs.    Family History   I have reviewed this patient's family history and updated it with pertinent information if needed.   Family History   Problem Relation Age of Onset     Cancer Mother      Uterius     Breast Cancer Daughter        Review of Systems   Review of systems not obtained due to patient factors - confusion    Physical Exam   Temp: 96.7  F (35.9  C) Temp src: Temporal BP: 125/61 Pulse: 70 Heart Rate: 70 Resp: 14 SpO2: 95 % O2 Device: Nasal cannula Oxygen Delivery: 2 LPM  Vital Signs with Ranges  Temp:  [96.7  F (35.9  C)] 96.7  F (35.9  C)  Pulse:  [70] 70  Heart Rate:  [70] 70  Resp:  [14-16] 14  BP: (113-141)/(55-74) 125/61  SpO2:  [91 %-98 %] 95 %  0 lbs 0 oz    Constitutional: somnolent, rouses to voice but quickly falls back asleep. Speech is for the most part intelligible  HEENT: dry mucous membranes, normal dentition.  Respiratory: Clear to auscultation bilaterally, no crackles or wheezing.  Cardiovascular: Regular rate and rhythm, normal S1 and S2, and no murmur noted.  GI: Soft, non-distended, non-tender, normal bowel sounds.  Skin: yeast in the right groin  Musculoskeletal: swelling of the left shoulder  Neurologic: moving extremities bilaterally. Tongue ROM is intact on command and face is symmetric. Further testing not able to be completed due to somnolence  Psychiatric: Alert, oriented to person, month, and year.     Data   Data reviewed today:  I personally reviewed   EKG with paced rhythm  CXR with bilateral perihilar and basal opacities with small right pleural effusion, cephalization    Recent Labs  Lab  11/11/18  0955 11/06/18  1317   WBC 4.7  --    HGB 10.2* 9.7*   MCV 94  --    *  --    * 134*   POTASSIUM 5.3 5.8*   CHLORIDE 100 102   CO2 27 29   BUN 46* 35*   CR 1.46* 1.50*   ANIONGAP 5 8.8   SHOBHA 9.2 10.0   GLC 83 79   ALBUMIN 3.3*  --    PROTTOTAL 6.9  --    BILITOTAL 0.2  --    ALKPHOS 72  --    ALT 49  --    AST 34  --    LIPASE 112  --    TROPI 0.021  --        Imaging:  Recent Results (from the past 24 hour(s))   Chest XR,  PA & LAT    Narrative    CHEST TWO VIEWS   11/11/2018 10:15 AM     HISTORY: Nausea.     COMPARISON: 1/19/2017      Impression    IMPRESSION: Lungs are slightly hypoinflated. Patchy bilateral  perihilar and right greater than left basilar opacities are present  which may represent edema, aspiration, or infection. Probable trace  right pleural effusion. Cardiac silhouette is enlarged, unchanged.  Triple lead cardiac pacing device is in unchanged position. Upper  lumbar (approximate L1) compression fracture is present with  approximately 80% height loss, new compared to 1/19/2017.    BREE CORDERO MD

## 2018-11-12 NOTE — PLAN OF CARE
Problem: Patient Care Overview  Goal: Plan of Care/Patient Progress Review    SLP - Attempted to see patient this am; however, patient was lethargic despite max cues to maintain alertness.  Plan to re-attempt this pm/tomorrow as able.

## 2018-11-12 NOTE — PROGRESS NOTES
Note pt's admit to [facility]. HF Telemanagement placed on hold. Reminder sent to [facility] RN board to do chart check 11/14/18, to review follow-up plan. She's currently scheduled to see Dr. Echols 11/28/18 w/ echo and lab prior.     Thea Spence RN BSN   1:00 PM 11/12/18

## 2018-11-12 NOTE — PLAN OF CARE
Problem: Patient Care Overview  Goal: Plan of Care/Patient Progress Review  Physical Therapy: Orders received and chart reviewed. Per chart review, patient is quite lethargic and has been unable to participate in therapy. Pt with blood sugar of 17, which is currently being addressed. Will await for further medical management and for patient to become medically appropriate for therapy and able to participate prior to evaluating.

## 2018-11-12 NOTE — PROVIDER NOTIFICATION
MD Notification    Notified Person: Dr. Mack    Notified Person Name:     Notification Date/Time: 11/12 0900     Notification Interaction: Phone    Purpose of Notification: Pt blood sugar 17, IV dextrose 50% IVP STAT     Orders Received: Start D10 at 75mL, q15min blood sugars     Comments:

## 2018-11-12 NOTE — PROGRESS NOTES
Dr. Martin notified of K at 5.9.  Insulin, dextrose, neb given as ordered.  EKG done but pt AV paced.  Rechecking potassium about 30 minutes after medications given per Dr. Martin.  Continuing to monitor.

## 2018-11-12 NOTE — CONSULTS
[facility]    Neurology Consultation Note     Mckayla Oconnell MRN# [id number]   YOB: 1927 Age: 89 year old    Code Status:DNR/DNI   Date of Admission: 11/11/2018  Date of Consult: 11/12/2018    _________________________________   Primary Care Physician   Ander Shrestha      ______________________________________________         Assessment & Plan     Reason for consult: I was asked by Dr. Mack to evaluate this patient for altered mental status        ______________________________________________  #. (R41.82) Altered mental status, unspecified altered mental status type  --no focal neurological deficit  --CT head without acute abnormalities on admission  --no signs of infection, normal procalc  --VBG with respiratory acidosis  ------hospitalist to manage  -------some improvement in wakefulness with BiPAP  ---------largely suspect altered mental status due to respiratory dysfunction  --pacemaker is not MRI compatible  --will continue to monitor for improvement related to respiratory status - if not continuing to improve, may consider further diagnostic testing  --check B1 level, start thiamine 100 mg daily for 3 days - deficiency can worsen encephalopathy and CHF  #. (I50.9) Acute on chronic congestive heart failure, unspecified heart failure type (H)  (primary encounter diagnosis)  --management per primary service & cardiology  --significantly decreased EF  #. (J81.0) Acute pulmonary edema (H)  --management per primary service  -----with respiratory acidosis  #. DVT Prophylaxis  --per primary service  #. PT/OT/Speech  --evaluations as able  #. Nutrition / GI Prophylaxis  --Per recommendations of speech therapy      #. Code Status: DNR / DNI      Chief Complaint   ______________________________________________  Altered mental status   History is obtained from the patient, electronic health record and patient's family    History of Present Illness    ______________________________________________  Mckaylaoralia Oconnell is a 89 year old female who presented with nausea that started the night before admission.  had neighbors call 911 as their phone had been disconnected. Patient's daughter reported that patient had been deteriorating in the last week. Had neck pain and was seen on 11/6, given tramadol for pain control and discharged. Daughter noted worsened somnolence in the last 2-3 days. Patient was hypoxic into the mid 80s in the ED. Received lasix due to concern for congestive heart failure. Patient was somnolent on admission. This was suspected due to respiratory acidosis and abnormal blood sugar readings. These have been corrected to some extent, yet patient continues to remain somnolent, so neurology was consulted for further evaluation. MRI brain pending.    On exam, patient is on BiPAP. She does open eyes to voice and follows commands. She is disoriented to time and place, but no clear aphasia noted. She has had some improvement since starting BiPAP.     Past Medical History    ______________________________________________  Past Medical History:   Diagnosis Date     Aortic stenosis      CAD (coronary artery disease)     angiogram 2016: non-obstructive, CT calcium vkyxb=724 May 2013     Cardiomyopathy, idiopathic (H)     cardiomyopathy HTN vs viral; EF as low as 15% in 2009; CRT-P implanted 1/19/17     CHF (NYHA class II, ACC/AHA stage C) (H) 2/14/2013     Glaucoma      H/O angiography 9-1-2016    No angiographic evidence of obstructive coronary artery disease.     Hypertension      Hypothyroidism 2/14/2013     Nonrheumatic mitral valve regurgitation      Nonrheumatic tricuspid valve regurgitation      Pulmonary hypertension (H)      Past Surgical History   ______________________________________________  Past Surgical History:   Procedure Laterality Date     CARDIAC CATHERIZATION  9/1/16     GYN SURGERY      Hysterectomy 1995     HYSTERECTOMY,  PAP NO LONGER INDICATED       IMPLANT PACEMAKER  17    CRT-P     ORTHOPEDIC SURGERY      knee replacement      ORTHOPEDIC SURGERY      Knee replacement      ORTHOPEDIC SURGERY      knee surgery [place]     Prior to Admission Medications   ______________________________________________  Prior to Admission Medications   Prescriptions Last Dose Informant Patient Reported? Taking?   Iron TABS  Daughter Yes No   Sig: Take 324 mg by mouth daily Patient states she takes OTC iron furrous gluconate 324 mg tablets USP   Lutein 20 MG CAPS  Daughter Yes No   Sig: Take 20 mg by mouth daily   MELATONIN PO  Daughter Yes No   Sig: Take 3 mg by mouth nightly as needed   VITAMIN D, CHOLECALCIFEROL, PO  Daughter Yes No   Sig: Take 2,000 Units by mouth daily    aspirin 81 MG tablet  Daughter Yes No   Sig: Take 81 mg by mouth daily   calcium citrate (CALCITRATE) 950 MG tablet  Daughter Yes No   Sig: Take 2 tablets by mouth daily At Noon   carvedilol (COREG) 25 MG tablet   No Yes   Sig: TAKE 1 TABLET TWICE A DAY WITH MEALS   fluticasone (FLONASE) 50 MCG/ACT spray   No Yes   Si to 2 sprays in both nostrils once daily as needed for rhinitis or allergies   hydrALAZINE (APRESOLINE) 25 MG tablet   No Yes   Sig: Take 1 tablet (25 mg) by mouth 3 times daily   isosorbide mononitrate (IMDUR) 30 MG 24 hr tablet   No Yes   Si/2 tablet daily AT NIGHT   levothyroxine (SYNTHROID/LEVOTHROID) 112 MCG tablet   No Yes   Sig: TAKE 1 TABLET DAILY   prednisoLONE acetate (PRED FORTE) 1 % ophthalmic suspension  Daughter Yes Yes   Sig: Place 1 drop into the right eye every evening    simvastatin (ZOCOR) 20 MG tablet   No Yes   Sig: Take 1 tablet (20 mg) by mouth At Bedtime   torsemide (DEMADEX) 5 MG tablet   No Yes   Sig: Take 2 tablets (10 mg) by mouth daily   traMADol (ULTRAM) 50 MG tablet   No No   Sig: Take 1 tablet (50 mg) by mouth every 6 hours as needed for severe pain   traZODone (DESYREL) 50 MG tablet   No Yes   Sig: Take 1/2-1 tablet  by mouth nightly as needed for sleep      Facility-Administered Medications: None     Allergies   Allergies   Allergen Reactions     Atenolol Anaphylaxis     Hydrochlorothiazide      hyponatremia     Pollen Extract        Social History   ______________________________________________  Social History     Social History     Marital status:      Spouse name: N/A     Number of children: N/A     Years of education: N/A     Social History Main Topics     Smoking status: Never Smoker     Smokeless tobacco: Never Used     Alcohol use No     Drug use: No     Sexual activity: No     Other Topics Concern     Parent/Sibling W/ Cabg, Mi Or Angioplasty Before 65f 55m? No     Caffeine Concern No     Sleep Concern No     Stress Concern No     Weight Concern No     Special Diet Yes     low sodium     Exercise Yes     therapy     Seat Belt Yes     Social History Narrative       Family History   ______________________________________________  Family History   Problem Relation Age of Onset     Cancer Mother      Uterius     Breast Cancer Daughter        Review of Systems   ______________________________________________  Review of systems is limited by patient factors - mental status      Physical Exam   ______________________________________________  Weight:176 lbs 9.42 oz; Height:Data Unavailable  Temp: 96.5  F (35.8  C) Temp src: Axillary BP: 109/55   Heart Rate: 71 Resp: 20 SpO2: 95 % O2 Device: Nasal cannula Oxygen Delivery: 2 LPM  General Appearance:  No acute distress  Neuro:       Mental Status Exam:   Awake, alert, oriented X1. Speech and language are intact. Opens eyes to voice, follows commands. Mental status is slightly decreased       Cranial Nerves:  Pupils 3 mm, reactive. EOMI. Face sensation is normal. Face is symmetric. Tongue and uvula are midline. Other CN are normal           Motor:  4+/5 BUE, 4-/5 BLE. Tone and bulk are normal           Reflexes:  Normal DTR. Toes equivocal.        Sensory:  Normal to light  touch               Coordination:   Intact finger-to-nose        Gait:  Unable to assess due to weakness  Neck: no nuchal rigidity, normal thyroid. No carotid bruits.    Cardiovascular: Regular rate and rhythm, no m/r/g  Lungs: Clear to auscultation  Abdomen: Soft, not tender, not distended  Extremities: No clubbing, no cyanosis, no edema    Data   ______________________________________________  All Data personally reviewed:       Labs:   CBC RESULTS:     Recent Labs  Lab 11/12/18  0523 11/11/18  0955 11/06/18  1317   WBC 3.8* 4.7  --    RBC 3.14* 3.38*  --    HGB 9.5* 10.2* 9.7*   HCT 30.4* 31.7*  --    * 113*  --      Basic Metabolic Panel:   Recent Labs   Lab Test  11/12/18   0920  11/12/18   0700  11/12/18   0523  11/11/18   0955   NA  137   --   136  132*   POTASSIUM  5.2  5.1  5.9*  5.3   CHLORIDE  103   --   103  100   CO2  29   --   31  27   BUN  52*   --   51*  46*   CR  1.83*   --   1.75*  1.46*   GLC  115*   --   66*  83   SHOBHA  8.7   --   8.6  9.2     Liver panel:  Recent Labs   Lab Test  11/12/18   0523  11/11/18   0955  04/20/18   0815  11/14/16   1746 07/27/16 05/30/16   0805   PROTTOTAL  5.8*  6.9   --   6.2*  6.4  7.3   ALBUMIN  2.7*  3.3*   --   2.8*  3.8  3.3*   BILITOTAL  0.2  0.2   --   0.5  0.6  0.5   ALKPHOS  60  72   --   73  60  67   AST  28  34   --   50*  33  33  33   ALT  41  49  <5*  89*  42  45     INR:  Recent Labs   Lab Test  09/01/16   0729   INR  1.12      Lipid Profile:  Recent Labs   Lab Test  04/20/18   0815  03/22/16   1038  07/06/15   0736  03/20/15   0814  09/12/14   0835  03/07/13   0825   CHOL  109  113  124  111  111  174   HDL  36*  39*  35*  33*  36*  25*   LDL  55  54  70  54  53  118   TRIG  90  98  97  120  109  157*   CHOLHDLRATIO   --    --   3.5  3.4  3.1  7.0*     A1C:   Recent Labs   Lab Test  11/12/18   0920   A1C  5.3     Troponin I:   Recent Labs   Lab Test  11/11/18   2155  11/11/18   1819  11/11/18   1448  11/11/18   0955  11/14/16   1746  05/30/16    0805  08/18/13   1055   TROPI  0.031  0.031  0.030  0.021  0.018  <0.015  The 99th percentile for upper reference range is 0.045 ug/L.  Troponin values in   the range of 0.045 - 0.120 ug/L may be associated with risks of adverse   clinical events.    <0.012     Ammonia:   Recent Labs   Lab Test  11/12/18   0523   GISSELLE  19     CK:   Recent Labs   Lab Test  11/12/18   0700   CKT  69        UA Results:  Recent Labs   Lab Test  11/11/18   0955  08/05/16   1915   COLOR  Yellow  Yellow   APPEARANCE  Clear  Clear   URINEGLC  Negative  Negative   URINEBILI  Negative  Negative   URINEKETONE  Negative  Negative   SG  1.013  1.010   UBLD  Negative  Negative   URINEPH  5.0  5.0   PROTEIN  10*  Negative   UROBILINOGEN   --   0.2   NITRITE  Negative  Negative   LEUKEST  Negative  Negative   RBCU  0   --    WBCU  <1   --      Most Recent 6 Bacteria Isolates From Any Culture (See EPIC Reports for Culture Details):  Recent Labs   Lab Test  11/11/18   1450  11/11/18   1448  11/15/16   1605  06/22/16   1126  07/23/13   1005   CULT  No growth after 17 hours  No growth after 17 hours  Heavy growth Normal orly  <10,000 colonies/mL mixed urogenital orly  Susceptibility testing not routinely done    50,000 to 100,000 colonies/mL Proteus mirabilis        Cardiac US:   The left ventricle is moderately dilated. There is mild concentric left ventricular hypertrophy. Left ventricular systolic function is severely reduced. The visual ejection fraction is estimated at 25-30%. Grade II or moderate diastolic dysfunction. There is severe global hypokinesis of the left ventricle with minor regional variation. There is no thrombus seen in the left ventricle. The right ventricle is normal size. There is a catheter/pacemaker lead seen in the right ventricle. Mildly decreased right ventricular systolic function. Moderate mitral regurgitation. There is mod-severe to severe (3-4+) tricuspid regurgitation. The right ventricular systolic pressure is  approximated at 48.6 mmHg plus the right atrial pressure. Right ventricular systolic pressure is elevated, consistent with moderate to severe pulmonary hypertension. Moderate aortic stenosis.  No pericardial effusion. In comparison to the previous report dated 04/20/2018, there has been an interval worsening in the degree of tricuspid regurgitation. The other findings appear similar.       Neurophysiology:   --       Imaging:   All imaging studies were reviewed personally  CT head 11/11/18:   No acute intracranial abnormality.    CT cervical spine 11/11/18:  No evidence of acute fracture or subluxation in the cervical spine. Severe degenerative change.        Text Page    Debbie Jay, MSN, FNP-BC, RN CNRN SCRN

## 2018-11-12 NOTE — PROGRESS NOTES
Paged for K 5.9 this AM. EKG, Albuterol, 10 units regular insulin and D50 amp ordered with STAT repeat K.

## 2018-11-12 NOTE — SIGNIFICANT EVENT
Rebound hyperkalemia seen. 600 ml of urine so far today  Discussed with nephrology Dr. Elias and consult placed  He advised albumin x3 and lasix, which was ordered  Continue q4 hour potassium, continue D10 infusion  She continues on bipap

## 2018-11-12 NOTE — PROVIDER NOTIFICATION
MD Notification    Notified Person: MD    Notified Person Name: Corbin     Notification Date/Time: 11/12 1000     Notification Interaction: Phone     Purpose of Notification: Blood sugars trending down     Orders Received: Give 25mL dextrose 50%, increase dextrose 10 to 100mL/hour     Comments:

## 2018-11-12 NOTE — PLAN OF CARE
Problem: Patient Care Overview  Goal: Plan of Care/Patient Progress Review  Outcome: Declining  Pt minimally responsive this AM, very lethargic. VSS, on 2L O2. Tele AV paced with BBB. Pt blood glucose at 0800 = 17. MD notified and 50mL of dextrose 5% given IVP. Pt then started on dextrose 10% drip. Blood sugars stablized around 1130 and pt transferred to CCU room 272. Report given to Aby. All belongings sent with patient.

## 2018-11-12 NOTE — PLAN OF CARE
Problem: Confusion, Acute (Adult)  Goal: Identify Related Risk Factors and Signs and Symptoms  Related risk factors and signs and symptoms are identified upon initiation of Human Response Clinical Practice Guideline (CPG).  Outcome: No Change  Pt lethargic but arousable.  Pt able to answer questions in one word answers but can be difficult to understand if attempting a full sentence.  Pt able to minimally participate in neuro checks but is too lethargic.  Pt is blind in right eye and right eye glossy, discolored and pupil dilated.  Per pt, this is normal.  Left pupil equal and reactive.  Pt very weak but can follow commands/direction, but she immediately falls back asleep.  Vitals remain stable and pt 100% AV paced.  Continuing to reposition pt q 2 hours.  Cream applied to right groin, skin break down.  Pt at times refuses oral cares but will try again.  BS done and pt unable to void.  Pt also so lethargic that she was unable to attempt.  D/t two prior straight caths for bladder scans greater than 300, lopez placed. Pt denies nausea.  Pt on 2 L/NC and lungs sound diminished.  Pt denies pain.  Plan for PT/OT and speech but pt continues to be lethargic.

## 2018-11-12 NOTE — PLAN OF CARE
VSS. Alert, disoriented to place and situation intermittently. Assist of 2, repositioned q2h, IV infusing with D10 at 100 mL/hr. 250 mL bolus given this shift. Tele 100% paced. Plan: BMP check q4h, BG q4h, nephrology consult, no brain MRI at this point due to PPM.

## 2018-11-12 NOTE — PROGRESS NOTES
Still very lethargic but responds with a single word answers to questions.  Breathing thru mouth.  Will remain NPO until more alert to participate in bedside swallow test.  Daughter, Shankar, updated by phone.

## 2018-11-12 NOTE — PROGRESS NOTES
[facility]    Hospitalist Progress Note    Assessment & Plan   Mckayla Oconnell is a 89 year old female who was admitted on 11/11/2018 with nausea, confusion, and hypoxia found to have CHF exacerbation.    Hyperkalemia likely from acute kidney injury  Shifted with insulin and glucose, repeat pending  Hypoglycemia after shifting with insulin despite 2 amps of D50  Plan  - repeat k pending   - q4 hour bmp today  - hold on additional diuretics, start cautious low dose fluids, she is not awake or eating  - obtain CK   - start D10 and q15 glucose checks until glucose normalizes    Nonischemic cardiomyopathy 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   Moderate aortic stenosis, moderate tricuspid regurgitation, moderate mitral regurgitation.   Biventricular pacemaker since 01/2017.   Long-standing patient of Dr. Scott in [facility], recently saw Ms. Kraft last week with plan to increase diuretic for a few day  EKG shows a paced rhythm, troponin within normal limits.  Chest x-ray with likely pulmonary edema  Received 40 mg IV in the ED  Echocardiogram basically unchanged from previous, possible worse tricuspid regurgitation  Serial troponins negative  Plan  - await medical reconciliation and her ability to swallow is confirmed. She has no high risk medications such as antiplatelet agents  - Hold on additional diuretics pending the repeat bmp   - Cardiology consult appreciated     Altered mental status likely metabolic encephalopathy  Daughter reports normally she is quite awake and alert, but much more somnolent over the past few days  She is moving all extremities on examination, with variable effort given her somnolence  She reports that she is just very tired because she did not sleep well the night before because of somnolence  No obvious signs of infection, no fever, no WBC elevation, denies respiratory complaints, UA is clean  CT head without acute  abnormality  procal WNL making bacterial infection less likely  Moving her extremities, with the exception of the left shoulder which she says is secondary to pain  Ammonia negative  vbg with respiratory acidosis  Plan  - consult neurology given minimal improvement for the past 18 hours  - follow fever curve   - will ask family to bring her hearing aids  - npo until passes slp evaluation  - MRI of her brain once her sugars stabilize  - transfer to stepdown for bipap trial given respiratory acidosis on the VBG. Poor candidate but she is DNR/DNI    Update: patient         Neck pain  Chronic arthritis   Appears neurologically intact with movement of extremities, although her somnolence limits testing  Plan  - CT of the neck WNL    Urinary obstruction  -s/p placement of lopez        CKD with baseline creatinine 1.5-2.   - at baseline monitor      Hyperlipidemia.   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 denies   Pain score (0-10) -   Pain location -   Pain descriptors -   - Mckayla is experiencing pain due to arthritis. Pain management was discussed and the plan was created in a collaborative fashion.  Mckayla's response to the current recommendations: disinterested  - monitor for now           DVT Prophylaxis: Pneumatic Compression Devices.  Code Status: DNR/DNI    Disposition: Expected discharge in 3-5 days once improvement noted. Left message with daughter for an update    Edilberto Mack, DO  Text Page  (7am to  6pm)  Interval History   Remains very somnolent today, arouses to voice still, states nausea has resolved.  States she has been very tired for a long time  Denies shortness of breath  Quickly falls back asleep  Hyperkalemia noted, with shifting early this. Repeat potassium is pending      -Data reviewed today: I reviewed all new labs and imaging results over the last 24 hours. I personally reviewed the CT of the head and c spine    Physical Exam   Temp: 96.1  F (35.6  C) Temp src: Oral BP: 104/73 Pulse: 70 Heart Rate: 70 Resp: 20 SpO2: 94 % O2 Device: Nasal cannula Oxygen Delivery: 2 LPM  Vitals:    11/11/18 1838 11/12/18 0620   Weight: 81.1 kg (178 lb 12.8 oz) 80.1 kg (176 lb 9.4 oz)     Vital Signs with Ranges  Temp:  [96.1  F (35.6  C)-96.7  F (35.9  C)] 96.1  F (35.6  C)  Pulse:  [70] 70  Heart Rate:  [70] 70  Resp:  [14-20] 20  BP: (101-141)/(55-74) 104/73  SpO2:  [91 %-99 %] 94 %  I/O last 3 completed shifts:  In: 0   Out: 575 [Urine:575]    Constitutional: Somnoelnt, awakens somewhat to voice. no apparent distress  Respiratory: Clear to auscultation bilaterally, no crackles or wheezing  Cardiovascular: Regular rate and rhythm, normal S1 and S2, and no murmur noted  GI: Normal bowel sounds, soft, non-distended, non-tender  Skin/Integumen: No rashes, no cyanosis, no edema  Other: Oriented to person, month, and year but not place. Speech is intelligible. 5/5 upper extremity  strength. Moves her lower extremities as well, but declines to further move upper extremities because of pain, localizes to the left shoulder    Medications       aspirin  81 mg Oral Daily     miconazole   Topical BID       Data     Recent Labs  Lab 11/12/18  0700 11/12/18  0523 11/11/18  2155 11/11/18  1819 11/11/18  1448 11/11/18  0955  11/06/18  1317   WBC  --  3.8*  --   --   --  4.7  --   --    HGB  --  9.5*  --   --   --  10.2*  --  9.7*   MCV  --  97  --   --   --  94  --   --    PLT  --  106*  --   --   --  113*  --   --    NA   --  136  --   --   --  132*  --  134*   POTASSIUM 5.1 5.9*  --   --   --  5.3  --  5.8*   CHLORIDE  --  103  --   --   --  100  --  102   CO2  --  31  --   --   --  27  --  29   BUN  --  51*  --   --   --  46*  --  35*   CR  --  1.75*  --   --   --  1.46*  --  1.50*   ANIONGAP  --  2*  --   --   --  5  --  8.8   SHOBHA  --  8.6  --   --   --  9.2  --  10.0   GLC  --  66*  --   --   --  83  --  79   ALBUMIN  --  2.7*  --   --   --  3.3*  --   --    PROTTOTAL  --  5.8*  --   --   --  6.9  --   --    BILITOTAL  --  0.2  --   --   --  0.2  --   --    ALKPHOS  --  60  --   --   --  72  --   --    ALT  --  41  --   --   --  49  --   --    AST  --  28  --   --   --  34  --   --    LIPASE  --   --   --   --   --  112  --   --    TROPI  --   --  0.031 0.031 0.030 0.021  < >  --    < > = values in this interval not displayed.    Imaging:   Recent Results (from the past 24 hour(s))   Chest XR,  PA & LAT    Narrative    CHEST TWO VIEWS   11/11/2018 10:15 AM     HISTORY: Nausea.     COMPARISON: 1/19/2017      Impression    IMPRESSION: Lungs are slightly hypoinflated. Patchy bilateral  perihilar and right greater than left basilar opacities are present  which may represent edema, aspiration, or infection. Probable trace  right pleural effusion. Cardiac silhouette is enlarged, unchanged.  Triple lead cardiac pacing device is in unchanged position. Upper  lumbar (approximate L1) compression fracture is present with  approximately 80% height loss, new compared to 1/19/2017.    BREE CORDERO MD   CT Cervical Spine w/o Contrast    Narrative    CT CERVICAL SPINE WITHOUT CONTRAST   11/11/2018 2:37 PM     HISTORY: Neck pain.      TECHNIQUE: Axial images of the cervical spine were obtained without  intravenous contrast. Multiplanar reformations were performed.  Radiation dose for this scan was reduced using automated exposure  control, adjustment of the mA and/or kV according to patient size, or  iterative reconstruction  technique.    COMPARISON: None.    FINDINGS: Alignment is significant for loss of the normal lordosis  with kyphosis centered at C4-5. There is grade 1 anterolisthesis of C3  on C4 and C4 on C5. Severe degenerative disc disease is present at  C5-6 and C6-7. Multilevel severe facet arthropathy is present most  severely affecting the upper cervical spine, particularly at C3-4.  There is fusion of the bilateral C4-5 facet joints. No evidence of  acute fracture or traumatic subluxation. No evidence of acute  traumatic disc herniation or epidural hematoma. There are severe  atherosclerotic calcifications of the bilateral carotid bifurcations.  Paraspinal soft tissues also demonstrate septal thickening and  scarring/interstitial/fibrotic opacities at the visualized lung  apices. No definite thyroid gland is identified.      Impression    IMPRESSION: No evidence of acute fracture or subluxation in the  cervical spine. Severe degenerative change.    BREE CORDERO MD   CT Head w/o Contrast    Narrative    CT SCAN OF THE HEAD WITHOUT CONTRAST   11/11/2018 2:37 PM     HISTORY: Nausea, some confusion.     TECHNIQUE: Axial images of the head and coronal reformations without  IV contrast material. Radiation dose for this scan was reduced using  automated exposure control, adjustment of the mA and/or kV according  to patient size, or iterative reconstruction technique.    COMPARISON: None.    FINDINGS: Mild volume loss is present. White matter hypoattenuation  likely represents chronic small vessel ischemic change. No evidence of  acute ischemia, hemorrhage, mass, mass effect, or hydrocephalus. The  visualized calvarium, skull base, and paranasal sinuses are  unremarkable. Right globe is small with peripheral choroidal layer and  lateral calcifications with scleral banding and calcification along  the expected location of the lens. Left lens replacement noted. There  is trace opacification of the bilateral mastoid cavities,  likely  inflammatory.      Impression    IMPRESSION: No acute intracranial abnormality.    BREE CORDERO MD

## 2018-11-13 NOTE — PROGRESS NOTES
11/13/18 1000   Quick Adds   Type of Visit Initial Occupational Therapy Evaluation   Living Environment   Lives With spouse   Living Arrangements House/townhouse   Home Accessibility grab bars present (bathtub);grab bars present (toilet)   Number of Stairs to Enter Home 0   Number of Stairs Within Home 0   Stair Railings at Home none   Transportation Available car  (Pt.driving PTA)   Living Environment Comment 1 level home   Self-Care   Usual Activity Tolerance fair   Current Activity Tolerance poor   Regular Exercise no   Equipment Currently Used at Home grab bar;raised toilet;shower chair;walker, rolling  (4WW, RTS, shower chair, hand-held shower hose, grab bars)   Activity/Exercise/Self-Care Comment Pt. reports she is indep./mod. indep. w/ i/ADL's at home. Pt. reports spouse is in good healthlpt.'s dtr. is a PT at [facility]. [facility], per pt.;Pt. has a , spouse/pt. fam the laundry, pt. does the cooking;pt. reprots she manages own medications using a pillbox, drives.   Functional Level Prior   Ambulation 1-->assistive equipment   Transferring 1-->assistive equipment   Toileting 1-->assistive equipment   Bathing 1-->assistive equipment   Dressing 0-->independent   Eating 0-->independent   Communication 0-->understands/communicates without difficulty   Swallowing 0-->swallows foods/liquids without difficulty   Fall history within last six months no   Which of the above functional risks had a recent onset or change? ambulation;transferring;toileting;bathing;dressing   Prior Functional Level Comment Pt. reports she is typically indep./mod. indep., no falls in the last year reported   General Information   Onset of Illness/Injury or Date of Surgery - Date 11/11/18   Referring Physician Dr. Mack   Additional Occupational Profile Info/Pertinent History of Current Problem OT:Mckayla Oconnell is a 89 year old female who was admitted on 11/11/2018 with nausea, confusion, and hypoxia found to  have CHF exacerbation.   Precautions/Limitations fall precautions;oxygen therapy device and L/min  (3l)   General Observations Pt. up in chair, sleepy,agreeable to therapy, heat/ice on neck--has chronic neck pain  (needs repeating/[place])   Cognitive Status Examination   Orientation orientation to person, place and time   Level of Consciousness lethargic/somnolent  (initially sleepy;mild confusion)   Able to Follow Commands mild impairment   Personal Safety (Cognitive) mild impairment   Memory (STM recall 2/3 words after delay)   Cognitive Comment Will monitor, complete further testing as indicated   Visual Perception   Visual Perception Comments wears glasses;no vision problems noted/reported   Sensory Examination   Sensory Comments no numbness/tingling reported   Pain Assessment   Patient Currently in Pain Yes, see Vital Sign flowsheet  (chronic neck pain)   Integumentary/Edema   Integumentary/Edema Comments B LE edema   Posture   Posture forward head position;protracted shoulders   Range of Motion (ROM)   ROM Comment limited B shoulder ROM;pt. intially related was basleine, then related she is typically able to lift UE's w/ some limitations due to chronic neck/shoulder pain;elbow thru hand=WNL/WFL   Strength   Strength Comments below baseline strength/gen.weakness/decreased activity tolerance   Hand Strength   Hand Strength Comments WFL   Mobility   Bed Mobility Comments NT   Transfer Skill: Sit to Stand   Level of [place]: Sit/Stand moderate assist (50% patients effort)   Physical Assist/Nonphysical Assist: Sit/Stand verbal cues;1 person assist   Assistive Device for Transfer: Sit/Stand rolling walker   Toilet Transfer   Toilet Transfer Comments RTS at home/grab bar support   Tub/Shower Transfer   Tub/Shower Transfer Comments walk-in shower/DME at home   Balance   Balance Comments impaired/weak   Lower Body Dressing   Level of [place]: Dress Lower Body dependent (less than 25% patients effort)   Toileting  "  Level of [place]: Toilet (has catheter)   Grooming   Level of [place]: Grooming minimum assist (75% patients effort)   Instrumental Activities of Daily Living (IADL)   Previous Responsibilities meal prep;laundry;shopping;medication management;driving   Activities of Daily Living Analysis   Impairments Contributing to Impaired Activities of Daily Living balance impaired;cognition impaired;pain;ROM decreased;strength decreased  (decreased activity tolerance;will monitor cognition)   General Therapy Interventions   Planned Therapy Interventions ADL retraining;cognition;ROM;strengthening;progressive activity/exercise  (will monitor cognition)   Clinical Impression   Criteria for Skilled Therapeutic Interventions Met yes, treatment indicated   OT Diagnosis Decline in ADL performance   Influenced by the following impairments weakness, impaired balance, decreased activity tolerance, decreased BUE ROM(/baseline)   Assessment of Occupational Performance 3-5 Performance Deficits   Identified Performance Deficits Currently below  baseline w/ grooming,bathing, dressing, toileting, fx. transfers, IADL's   Clinical Decision Making (Complexity) Low complexity   Therapy Frequency daily   Predicted Duration of Therapy Intervention (days/wks) 3-5 days   Anticipated Discharge Disposition Transitional Care Facility   Risks and Benefits of Treatment have been explained. Yes   Patient, Family & other staff in agreement with plan of care Yes   [facility] TM \"6 Clicks\"   2016, Trustees of [facility], under license to [employer].  All rights reserved.   6 Clicks Short Forms Daily Activity Inpatient Short Form   [facility]  \"6 Clicks\" Daily Activity Inpatient Short Form   1. Putting on and taking off regular lower body clothing? 2 - A Lot   2. Bathing (including washing, rinsing, drying)? 2 - A Lot   3. Toileting, which includes using toilet, bedpan or urinal? 2 - A Lot   4. Putting on and " taking off regular upper body clothing? 2 - A Lot   5. Taking care of personal grooming such as brushing teeth? 2 - A Lot   6. Eating meals? 3 - A Little   Daily Activity Raw Score (Score out of 24.Lower scores equate to lower levels of function) 13   Total Evaluation Time   Total Evaluation Time (Minutes) 17

## 2018-11-13 NOTE — PLAN OF CARE
Problem: Patient Care Overview  Goal: Plan of Care/Patient Progress Review  Discharge Planner PT   Patient plan for discharge: Home  Current status: Evaluation completed. Treatment initiated. Patient is a 89 year old female who presented to the hospital with nausea, found to have had a CHF exacerbation.Prior to hospitalization, patient was modified independent at home with a rolling walker. She lives in a single level home with her . Patient is currently Mod A of 2 with supine>sit. Patient able to sit EOB with min A of 1. Sit to stand with mod A of 2. Patient able to take transfer from bed to chair with FWW with mod A of 2.  Barriers to return to prior living situation: falls risk, level of assist, below PLOF  Recommendations for discharge: TCU  Rationale for recommendations: Patient would benefit from continued PT prior to discharging home for improving activity tolerance and increasing strength and balance.        Entered by: Rossy Miller 11/13/2018 9:21 AM

## 2018-11-13 NOTE — PROVIDER NOTIFICATION
MD Notification    Notified Person: MD    Notified Person Name:  Sangeeta    Notification Date/Time: 11/13, 0945    Notification Interaction: direct conversation     Purpose of Notification:transfer orders to [facility]      Orders Received:  Ok to transfer to [facility] [place]     Comments:

## 2018-11-13 NOTE — PROGRESS NOTES
[facility]  Neurology Daily Note      Admission Date:11/11/2018   Date of service: 11/13/2018   Hospital Day: 3      Assessment & Plan   _______________________________  #. (R41.82) Altered mental status, unspecified altered mental status type  --no focal neurological deficit  --CT head without acute abnormalities on admission  --no signs of infection, normal procalc  --VBG with respiratory acidosis  ------hospitalist to manage  -------improvement in wakefulness with BiPAP  ---------largely suspect altered mental status due to respiratory dysfunction  --pacemaker is not MRI compatible  --will continue to monitor for improvement related to respiratory status -----improved today, much more interactive  --B1 level pending, started thiamine on 11/12 at 100 mg daily for 3 days - deficiency can worsen encephalopathy and CHF  #. (I50.9) Acute on chronic congestive heart failure, unspecified heart failure type (H)  (primary encounter diagnosis)  --management per primary service & cardiology  --significantly decreased EF  #. (J81.0) Acute pulmonary edema (H)  --management per primary service  -----with respiratory acidosis  #. DVT Prophylaxis  --per primary service  #. PT/OT/Speech  --continue evaluations as able  #. Nutrition / GI Prophylaxis  --Per recommendations of speech therapy    Code Status: DNR/DNI    Disposition: pending     Interval History   _______________________________  Patient presented with nausea, somnolent on admission. Concern for CHF, pulmonary edema, with hyperkalemia and decreased renal function. VBG identified respiratory acidosis and she was placed on BiPAP. She had some improvement in wakefulness after initiation of BiPAP. Neurology was consulted due to sustained somnolence. Patient has pacemaker that is not MRI compatible, and she is 100% paced at this time.   Much more awake this morning, up in the chair, worked with therapies, then drowsy this afternoon again. Significant  improvement with improved respiratory status. Will not pursue further brain imaging at this time.     Review of Systems   _______________________________  Review of systems is limited by patient factors - mental status  Physical Exam   _______________________________  Vitals:     BP: 107/60   Heart Rate: 70 Resp: 11 SpO2: 97 % O2 Device: Nasal cannula Oxygen Delivery: 4 LPM  Vital Signs with Ranges: Heart Rate:  [70-71] 70  Resp:  [9-27] 11  BP: ()/(41-79) 107/60  FiO2 (%):  [50 %] 50 %  SpO2:  [96 %-100 %] 97 %    General Appearance:  No acute distress  Neuro:       Mental Status Exam:   drowsy, arouses to voice, oriented X3. Speech and language are intact. Opens eyes to voice, follows commands. Mental status is slightly confused at times       Cranial Nerves:  Pupils 3 mm, reactive. EOMI. Face sensation is normal. Face is symmetric. Tongue and uvula are midline. Other CN are normal           Motor:  4+/5 BUE, 4-/5 BLE. Tone and bulk are normal           Reflexes:  Normal DTR. Toes equivocal.        Sensory:  Normal to light touch               Coordination:   Intact finger-to-nose        Gait:  up with assistance  Abdomen: Soft, not tender, not distended  Extremities: No clubbing, no cyanosis, no edema    Medications   _______________________________    dextrose 100 mL/hr (11/12/18 2023)     - MEDICATION INSTRUCTIONS -       - MEDICATION INSTRUCTIONS -         aspirin  81 mg Oral Daily     miconazole   Topical BID     vitamin  B-1  100 mg Oral Daily       Data   _______________________________      Lab Data:   All data was reviewed by me personally  CBC RESULTS:  Recent Labs   Lab Test  11/13/18   0602  11/12/18   0523  11/11/18   0955   WBC  4.0  3.8*  4.7   RBC  2.99*  3.14*  3.38*   HGB  9.1*  9.5*  10.2*   HCT  28.7*  30.4*  31.7*   PLT  110*  106*  113*     Basic Metabolic Panel:  Recent Labs   Lab Test  11/13/18   0602  11/13/18   0240  11/12/18   2217   NA  130*  133  133   POTASSIUM  5.5*  5.4*  5.6*    CHLORIDE  98  100  100   CO2  28  29  27   BUN  52*  54*  52*   CR  1.78*  1.79*  1.79*   GLC  106*  60*  130*   SHOBHA  8.1*  8.2*  8.3*     Liver panel:  Recent Labs   Lab Test  11/13/18   0602  11/12/18   0523  11/11/18   0955  04/20/18   0815  11/14/16   1746 07/27/16   PROTTOTAL  5.5*  5.8*  6.9   --   6.2*  6.4   ALBUMIN  2.6*  2.7*  3.3*   --   2.8*  3.8   BILITOTAL  0.3  0.2  0.2   --   0.5  0.6   ALKPHOS  51  60  72   --   73  60   AST  27  28  34   --   50*  33  33   ALT  34  41  49  <5*  89*  42     Coagulation  Recent Labs   Lab Test  09/01/16   0729   INR  1.12   PTT  34      Lipid Profile:  Recent Labs   Lab Test  04/20/18   0815  03/22/16   1038  07/06/15   0736  03/20/15   0814   CHOL  109  113  124  111   HDL  36*  39*  35*  33*   LDL  55  54  70  54   TRIG  90  98  97  120   CHOLHDLRATIO   --    --   3.5  3.4     Thyroid Panel:  Recent Labs   Lab Test  11/12/18   0700  04/20/18   0815  01/18/18   0932  11/28/16   0904   03/17/14   1251   TSH  3.32  3.15  7.30*  14.36*   < >  0.81   T4   --    --   1.23  1.16   --   1.60    < > = values in this interval not displayed.      Vitamin B12:   Recent Labs   Lab Test  09/27/16   1025  04/22/16   1026   B12  774  974      Vitamin D level:   Recent Labs   Lab Test  08/27/13   1100   VITDT  35     A1C:   Recent Labs   Lab Test  11/12/18   0920   A1C  5.3     Troponin I:   Recent Labs   Lab Test  11/11/18   2155  11/11/18   1819  11/11/18   1448  11/11/18   0955  11/14/16   1746  05/30/16   0805  08/18/13   1055   TROPI  0.031  0.031  0.030  0.021  0.018  <0.015  The 99th percentile for upper reference range is 0.045 ug/L.  Troponin values in   the range of 0.045 - 0.120 ug/L may be associated with risks of adverse   clinical events.    <0.012     CK:   Recent Labs   Lab Test  11/12/18   0700   CKT  69       Ammonia:   Recent Labs   Lab Test  11/12/18   0523   GISSELLE  19     UA Results:  Recent Labs   Lab Test  11/11/18   0955  08/05/16   1915   COLOR  Yellow   Yellow   APPEARANCE  Clear  Clear   URINEGLC  Negative  Negative   URINEBILI  Negative  Negative   URINEKETONE  Negative  Negative   SG  1.013  1.010   UBLD  Negative  Negative   URINEPH  5.0  5.0   PROTEIN  10*  Negative   UROBILINOGEN   --   0.2   NITRITE  Negative  Negative   LEUKEST  Negative  Negative   RBCU  0   --    WBCU  <1   --         Cardiac US:   The left ventricle is moderately dilated. There is mild concentric left ventricular hypertrophy. Left ventricular systolic function is severely reduced. The visual ejection fraction is estimated at 25-30%. Grade II or moderate diastolic dysfunction. There is severe global hypokinesis of the left ventricle with minor regional variation. There is no thrombus seen in the left ventricle. The right ventricle is normal size. There is a catheter/pacemaker lead seen in the right ventricle. Mildly decreased right ventricular systolic function. Moderate mitral regurgitation. There is mod-severe to severe (3-4+) tricuspid regurgitation. The right ventricular systolic pressure is approximated at 48.6 mmHg plus the right atrial pressure. Right ventricular systolic pressure is elevated, consistent with moderate to severe pulmonary hypertension. Moderate aortic stenosis. No pericardial effusion. In comparison to the previous report dated 04/20/2018, there has been an interval worsening in the degree of tricuspid regurgitation. The other findings appear similar.       Neurophysiology:   --       Imaging:   All imaging studies were reviewed personally  CT head 11/11/18:   No acute intracranial abnormality.     CT cervical spine 11/11/18:  No evidence of acute fracture or subluxation in the cervical spine. Severe degenerative change.    Text Page    Debbie Jay, OLIVERIO, FNP-BC, RN CNRN SCRN

## 2018-11-13 NOTE — PROGRESS NOTES
11/13/18 1048   General Information   Onset Date 11/11/18   Start of Care Date 11/13/18   Referring Physician Dr. Sangeeta Casanova   Patient Profile Review/OT: Additional Occupational Profile Info See Profile for full history and prior level of function   Patient/Family Goals Statement Patient is hungry   Swallowing Evaluation Bedside swallow evaluation   Behaviorial Observations Lethargic   Mode of current nutrition NPO  (Except medications and has had popicles per nurse. )   Respiratory Status O2 Supply   Type of O2 supply Nasal cannula   Comments Per MD note; Mckayla Oconnell is a 89 year old female who was admitted on 11/11/2018 with nausea, confusion, and hypoxia found to have CHF exacerbation. Off Bi-pap for 4 hours.    Clinical Swallow Evaluation   Oral Musculature generally intact   Structural Abnormalities none present   Dentition present and adequate   Secretion Management problems swallowing secretions;right corner drooling   Mucosal Quality adequate   Mandibular Strength and Mobility impaired   Oral Labial Strength and Mobility WFL   Lingual Strength and Mobility impaired anterior elevation   Velar Elevation intact   Buccal Strength and Mobility intact   Laryngeal Function Cough;Throat clear;Swallow;Voicing initiated;Dry swallow palpated  (Delayed swallow)   Additional Documentation Yes   Swallow Eval   Feeding Assistance frequent cues/help required   Clinical Swallow Eval: Thin Liquid Texture Trial   Mode of Presentation, Thin Liquids cup;self-fed   Volume of Liquid or Food Presented 5 sips of water via the cup   Oral Phase of Swallow Premature pharyngeal entry   Oral Residue right lip drooling   Pharyngeal Phase of Swallow impaired;reduction in laryngeal movement;repeated swallows   Diagnostic Statement Delayed swallow response with premature spillage and suspect penetration.    Clinical Swallow Eval: Nectar Thick Liquid Texture Trial   Mode of Presentation, Nectar cup;self-fed   Volume of  Nectar Presented 4 oz of apple juice   Oral Phase, Nectar Premature pharyngeal entry   Pharyngeal Phase, Nectar impaired;reduction in laryngeal movement;repeated swallows   Diagnostic Statement Improved swallow response time.    Clinical Swallow Eval: Puree Solid Texture Trial   Mode of Presentation, Puree spoon;fed by clinician   Volume of Puree Presented 5 teaspoons   Oral Phase, Puree Premature pharyngeal entry   Pharyngeal Phase, Puree impaired;reduction in laryngeal movement;repeated swallows   Diagnostic Statement No overt Sx of aspiration.   Clinical Swallow Eval: Semisolid Texture Trial   Mode of Presentation, Semisolid spoon;fed by clinician   Volume of Semisolid Food Presented 3 teaspoons   Oral Phase, Semisolid Premature pharyngeal entry   Pharyngeal Phase, Semisolid impaired;reduction in laryngeal movement;repeated swallows   Diagnostic Statement No overt Sx of aspiration.   Clinical Swallow Eval: Solid Food Texture Trial   Mode of Presentation, Solid self-fed   Volume of Solid Food Presented 1/2 avila cracker   Oral Phase, Solid Poor AP movement;Residue in oral cavity;Premature pharyngeal entry   Oral Residue, Solid mid posterior tongue;right anterior lateral sulci   Pharyngeal Phase, Solid impaired;reduction in laryngeal movement;repeated swallows;wet vocal quality after swallow   Diagnostic Statement Oral and suspect pharyngeal residue.    Swallow Compensations   Swallow Compensations Alternate viscosity of consistencies;Pacing;Reduce amounts   Results Suspect silent aspiration;Oral difficulties only   General Therapy Interventions   Planned Therapy Interventions Dysphagia Treatment   Dysphagia treatment Modified diet education;Instruction of safe swallow strategies   Swallow Eval: Clinical Impressions   Skilled Criteria for Therapy Intervention Skilled criteria met.  Treatment indicated.   Functional Assessment Scale (FAS) 4   Treatment Diagnosis Mild to moderate oral and pharyngeal dysphagia   Diet  texture recommendations Dysphagia diet level 2;Nectar thick liquids   Recommended Feeding/Eating Techniques alternate between small bites and sips of food/liquid;check mouth frequently for oral residue/pocketing;hard swallow w/ each bite or sip;maintain upright posture during/after eating for 30 mins;no straws;small sips/bites   Therapy Frequency 5 times/wk   Predicted Duration of Therapy Intervention (days/wks) 1 week   Anticipated Discharge Disposition inpatient rehabilitation facility   Risks and Benefits of Treatment have been explained. Yes   Patient, family and/or staff in agreement with Plan of Care Yes   Clinical Impression Comments Patient presents with mild to moderate oral and pharyngeal dypshagia characterized by pooling of oral secretions in her oral cavity with intermittent drooling on the right side. She demonstrated premature entry of thin liquids and suspect penetration/silent aspiration due to slight vocal changes noted and delayed swallow response. Swallow response time and bolus control was improved with nectar thick liquids, no overt Sx of aspiration noted via small sinlge sips from the cup. Pureed textures were tolerated without difficulty. Prolonged mastication of a solid with reduced bolus control during AP transport. Mild amount of oral residue left on the mid tongue after the swallow. Improved oral clearing with a semi-solid. Patient is at a moderate risk for aspiration due to swallow delay and fatigue. Recommend: 1. Dysphagia Diet level 2 with nectar thick liquids. 2. Feed only when fully alert, up in a chair, no straws, small bites/sips, alternate liquids/solids. Hold diet if changes in her respiratory status or Sx of aspiration present.    Total Evaluation Time   Total Evaluation Time (Minutes) 15

## 2018-11-13 NOTE — PLAN OF CARE
"Problem: Nausea/Vomiting (Adult)  Goal: Identify Related Risk Factors and Signs and Symptoms  Related risk factors and signs and symptoms are identified upon initiation of Human Response Clinical Practice Guideline (CPG).   Outcome: No Change  Pt. Very lethargic/obtunded most of shift - on Bipap.  Would arouse and squeeze hands and move LE.    At 2215, patient off Bipap for oral care and she was A/O x3 and stated she was hungry and knows \"she lost 2 days\"  On 4 liters NC-sats in the %. Pt. Dozed back asleep but awoke, calling out that \"felt like my heart stopped and I can't breathe\"   Pt. Reassured and went back to sleep.   Continue to monitor VS and Resp. Status.        "

## 2018-11-13 NOTE — PROGRESS NOTES
[facility]    Hospitalist Progress Note    Assessment & Plan   Mckayla Oconnell is a 89 year old female who was admitted on 11/11/2018 with nausea, confusion, and hypoxia found to have CHF exacerbation.    Hyperkalemia likely from acute kidney injury  Shifted with insulin and glucose, repeat pending  Hypoglycemia after shifting with insulin despite 2 amps of D50  Some improvement with albumin/lasix combination  Plan  - q8 hour bmp today  - consider diuretics pending workup today  - nephrology consulted  - if tolerates slp eval, will give kayexalate    Hypoglycemia  Occurred after shifting with insulin despite D50 given, subsequently started on D10 infusion  Interestingly, became hypoglycemic again overnight about 20 hours after SA insulin administration  Plan  -continue D0 infusion  - mental status improving, so hoping to start orals     Nonischemic cardiomyopathy 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   Acute hypercapnic respiratory failure  Moderate aortic stenosis, moderate tricuspid regurgitation, moderate mitral regurgitation.   Biventricular pacemaker since 01/2017.   Long-standing patient of Dr. Scott in [place] [facility], recently saw MsJake Kraft last week with plan to increase diuretic for a few day  EKG shows a paced rhythm, troponin within normal limits.  Chest x-ray with likely pulmonary edema  Received 40 mg IV in the ED  Echocardiogram basically unchanged from previous, possible worse tricuspid regurgitation  Serial troponins negative  VBG with some respiratory acidosis, mild improvement on bipap  Plan  - await medical reconciliation and her ability to swallow is confirmed. She has no high risk medications such as antiplatelet agents  - Cardiology consult appreciated     Altered mental status likely metabolic encephalopathy  Daughter reports normally she is quite awake and alert, but much more somnolent over the past few days  She is moving all  extremities on examination, with variable effort given her somnolence  She reports that she is just very tired because she did not sleep well the night before because of somnolence  No obvious signs of infection, no fever, no WBC elevation, denies respiratory complaints, UA is clean  CT head without acute abnormality  procal WNL making bacterial infection less likely  Moving her extremities, with the exception of the left shoulder which she says is secondary to pain  Ammonia negative  vbg with respiratory acidosis  Neurology consulted, unable to get a brain MRI  Plan  - appreciate neurology   - follow fever curve   - much improved  - npo until passes slp evaluation  - unable to obtain a brain MRI        Neck pain about C8 level  Chronic arthritis   Appears neurologically intact with movement of extremities  Plan  - CT of the neck WNL    Urinary obstruction  -s/p placement of lopez        CKD with baseline creatinine 1.5-2.   - at baseline monitor      Hyperlipidemia.   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 sleeping: patient not able to self report   Pain score (0-10) -   Pain location -   Pain descriptors -   - Mckayla is experiencing pain due to arthritis. Pain management was discussed and the plan was created in a collaborative fashion.  Mckayla's response to the current recommendations: disinterested  - monitor for now           DVT Prophylaxis: Pneumatic Compression Devices.  Code Status:  DNR/DNI    Disposition: Expected discharge in 2-3 days once improvement noted. Left message with daughter for an update    Edilberto VELARDEJake Mack, DO  Text Page  (7am to 6pm)  Interval History   Weaned off of bipap last night, now on 4 liters  Had a quick episode of shortness of breath, quickly resolved  k is stable in the mid 5's  Patient awake today, hungry, has pain in the left shoulder, states it is chronic      -Data reviewed today: I reviewed all new labs and imaging results over the last 24 hours. I personally reviewed the CT of the head and c spine    Physical Exam   Temp: 96.5  F (35.8  C) Temp src: Axillary BP: 107/60   Heart Rate: 70 Resp: 11 SpO2: 97 % O2 Device: Nasal cannula Oxygen Delivery: 4 LPM  Vitals:    11/11/18 1838 11/12/18 0620 11/13/18 0623   Weight: 81.1 kg (178 lb 12.8 oz) 80.1 kg (176 lb 9.4 oz) 83.9 kg (184 lb 14.4 oz)     Vital Signs with Ranges  Temp:  [96.5  F (35.8  C)] 96.5  F (35.8  C)  Heart Rate:  [70-71] 70  Resp:  [9-27] 11  BP: ()/(41-79) 107/60  FiO2 (%):  [50 %] 50 %  SpO2:  [95 %-100 %] 97 %  I/O last 3 completed shifts:  In: 890 [P.O.:90; I.V.:800]  Out: 900 [Urine:900]    Constitutional: Awakens easily to voice, conversant  Respiratory: Clear to auscultation bilaterally, no crackles or wheezing  Cardiovascular: Regular rate and rhythm, normal S1 and S2, and no murmur noted  GI: Normal bowel sounds, soft, non-distended, non-tender  Skin/Integumen: No rashes, no cyanosis, no edema  Other: Oriented to person, month, and year and place. Muscle strength intact    Medications     dextrose 100 mL/hr (11/12/18 2023)     - MEDICATION INSTRUCTIONS -       - MEDICATION INSTRUCTIONS -         albumin human  12.5 g Intravenous Q8H     aspirin  81 mg Oral Daily     miconazole   Topical BID     thiamine  100 mg Intravenous Daily       Data     Recent Labs  Lab 11/13/18  0602 11/13/18  0240 11/12/18  2217  11/12/18  0523 11/11/18  2155 11/11/18  1819 11/11/18  1448 11/11/18  0955   WBC  4.0  --   --   --  3.8*  --   --   --  4.7   HGB 9.1*  --   --   --  9.5*  --   --   --  10.2*   MCV 96  --   --   --  97  --   --   --  94   *  --   --   --  106*  --   --   --  113*   * 133 133  < > 136  --   --   --  132*   POTASSIUM 5.5* 5.4* 5.6*  < > 5.9*  --   --   --  5.3   CHLORIDE 98 100 100  < > 103  --   --   --  100   CO2 28 29 27  < > 31  --   --   --  27   BUN 52* 54* 52*  < > 51*  --   --   --  46*   CR 1.78* 1.79* 1.79*  < > 1.75*  --   --   --  1.46*   ANIONGAP 4 4 6  < > 2*  --   --   --  5   SHOBHA 8.1* 8.2* 8.3*  < > 8.6  --   --   --  9.2   * 60* 130*  < > 66*  --   --   --  83   ALBUMIN 2.6*  --   --   --  2.7*  --   --   --  3.3*   PROTTOTAL 5.5*  --   --   --  5.8*  --   --   --  6.9   BILITOTAL 0.3  --   --   --  0.2  --   --   --  0.2   ALKPHOS 51  --   --   --  60  --   --   --  72   ALT 34  --   --   --  41  --   --   --  49   AST 27  --   --   --  28  --   --   --  34   LIPASE  --   --   --   --   --   --   --   --  112   TROPI  --   --   --   --   --  0.031 0.031 0.030 0.021   < > = values in this interval not displayed.    Imaging:   No results found for this or any previous visit (from the past 24 hour(s)).

## 2018-11-13 NOTE — PROGRESS NOTES
11/13/18 0800   Quick Adds   Type of Visit Initial PT Evaluation   Living Environment   Lives With spouse   Living Arrangements house   Home Accessibility no concerns   Number of Stairs to Enter Home 0   Number of Stairs Within Home 0   Stair Railings at Home none   Transportation Available car   Living Environment Comment 1 level home, no stairs   Self-Care   Usual Activity Tolerance fair   Current Activity Tolerance poor   Regular Exercise no   Equipment Currently Used at Home walker, rolling   Functional Level Prior   Ambulation 1-->assistive equipment  (uses a rolling walker)   Transferring 1-->assistive equipment   Toileting 0-->independent   Bathing 0-->independent   Dressing 0-->independent   Eating 0-->independent   Fall history within last six months no   Which of the above functional risks had a recent onset or change? ambulation;transferring;toileting;bathing;dressing   Prior Functional Level Comment patient stated has fallen in past year   General Information   Onset of Illness/Injury or Date of Surgery - Date 11/11/18   Referring Physician Edilberto Mack, DO   Pertinent History of Current Problem (include personal factors and/or comorbidities that impact the POC) Patient is a 89 year old female who presented to the hospital with nausea, found to have had a CHF exacerbation. Morris lives in a single story home with .   Precautions/Limitations fall precautions   Cognitive Status Examination   Orientation person;place   Level of Consciousness alert;lethargic/somnolent  (dosed off frequently between questions)   Follows Commands and Answers Questions able to follow single-step instructions;75% of the time   Pain Assessment   Patient Currently in Pain Yes, see Vital Sign flowsheet  (neck; asked to have rubbed by PT mult times)   Range of Motion (ROM)   ROM Comment Limited L knee flexion; R knee flexion WFL   Strength   Strength Comments unable to show bilateral LE antigravity strength  "  Bed Mobility   Bed Mobility Comments mod A of 2 supine > sit   Transfer Skills   Transfer Comments sit<>stand mod A of 2'; bed>chair mod A of 2   Balance   Balance Comments Patient unable to correct lean in chair by oneself   General Therapy Interventions   Planned Therapy Interventions balance training;bed mobility training;gait training;motor coordination training;neuromuscular re-education;strengthening;ROM;stretching;transfer training;risk factor education;home program guidelines;progressive activity/exercise   Clinical Impression   Criteria for Skilled Therapeutic Intervention yes, treatment indicated   PT Diagnosis decreased activity tolerance   Influenced by the following impairments impaired balance, decreased strength, drowziness   Functional limitations due to impairments need of assistance fo all levels of functional mobility   Clinical Presentation Stable/Uncomplicated   Clinical Presentation Rationale age, falls risk, eager to return home, prior level of independence   Clinical Decision Making (Complexity) Low complexity   Therapy Frequency` 5 times/week   Predicted Duration of Therapy Intervention (days/wks) 5   Anticipated Discharge Disposition Transitional Care Facility   Risk & Benefits of therapy have been explained Yes   Patient, Family & other staff in agreement with plan of care Yes   [facility] [employer] TM \"6 Clicks\"   2016, Trustees of [facility], under license to [employer].  All rights reserved.   6 Clicks Short Forms Basic Mobility Inpatient Short Form   [facility] [employer]  \"6 Clicks\" V.2 Basic Mobility Inpatient Short Form   1. Turning from your back to your side while in a flat bed without using bedrails? 2 - A Lot   2. Moving from lying on your back to sitting on the side of a flat bed without using bedrails? 2 - A Lot   3. Moving to and from a bed to a chair (including a wheelchair)? 2 - A Lot   4. Standing up from a chair using your arms (e.g., wheelchair, or " bedside chair)? 2 - A Lot   5. To walk in hospital room? 2 - A Lot   6. Climbing 3-5 steps with a railing? 1 - Total   Basic Mobility Raw Score (Score out of 24.Lower scores equate to lower levels of function) 11   Total Evaluation Time   Total Evaluation Time (Minutes) 15

## 2018-11-13 NOTE — PLAN OF CARE
Problem: Patient Care Overview  Goal: Plan of Care/Patient Progress Review  Discharge Planner OT   OT:Evaluation completed and treatment intiated. Pt. admitted w/nausea,confusion, hypoxia, CHF exacerbation. Pt. resides w/ her spouse in a townhouse. At baseline, pt. uses a 4WW, indep./mod.indep. W/ ADL's, drives, manages medications, does the cooking;pt./spouse both do the laudnry, they do have a . Pt. reports her daughter is a PT.    Patient plan for discharge: Not stated  Current status: Pt.up in chair, A & O X 3, mild confusion noted;Pt. sleepy at start of session, able to stay awake participate in session. Pt. would doze off unless engaged in activity/conversation.Pt. able to come sit-stand w/ mod. A/vc's/WW;Pt. stood X 1 1/2  min. O2 sats on 3L, dropped to 78%, returned to sitting, quickly returned to above 90%(? reliability of oximeter/reading);Pt. sat for rest break for a few minutes;completed 2nd trial sit-stand, able to stand X 2 min., demo.,posterior lean w/increased standing, overall min. A for static standing balance,tolerated well, VSS throughout, EJ=084/60. Pt. completed 2 grooming tasks, seated(due to fatigue) w/set-up. Pt. able to assist w/repositioning self in chair w/ min.-mod. A, used pillows to assist w/upright posture(leaning R). Pt. set-up w/ breakfast tray,  needed assist to open packages, able to feed self--nursing aware/will assist w/ feeding(per speech--DD2 w/nectarthick liquids, alternate solids/liquid).  Barriers to return to prior living situation: Current level of assist/weakness, impaired cognition  Recommendations for discharge: TCU  Rationale for recommendations: Pt. would benefit from continued skilled OT intervention to assist pt. In achieving maximal safety /indep. W/ ADL's/fx.transfers       Entered by: Christina Estrella 11/13/2018 1:13 PM

## 2018-11-13 NOTE — PHARMACY-ADMISSION MEDICATION HISTORY
Admission medication history interview status for the 11/11/2018  admission is complete. See EPIC admission navigator for prior to admission medications     Medication history source reliability:Good    Actions taken by pharmacist (provider contacted, etc): Interviewed patient.      Additional medication history information not noted on PTA med list :None    Medication reconciliation/reorder completed by provider prior to medication history? No    Time spent in this activity: 25 minutes    Prior to Admission medications    Medication Sig Last Dose Taking? Auth Provider   aspirin 81 MG tablet Take 81 mg by mouth daily 11/11/2018 at AM Yes Reported, Patient   calcium citrate (CALCITRATE) 950 MG tablet Take 2 tablets by mouth daily At Noon 11/11/2018 at 1200 Yes Reported, Patient   carvedilol (COREG) 25 MG tablet TAKE 1 TABLET TWICE A DAY WITH MEALS 11/11/2018 at pm Yes Ander Shrestha MD   fluticasone (FLONASE) 50 MCG/ACT spray 1 to 2 sprays in both nostrils once daily as needed for rhinitis or allergies Past Week at Unknown time Yes Ander Shrestha MD   hydrALAZINE (APRESOLINE) 25 MG tablet Take 1 tablet (25 mg) by mouth 3 times daily 11/11/2018 at pm Yes Abena, Divya Mejia PA-C   isosorbide mononitrate (IMDUR) 30 MG 24 hr tablet 1/2 tablet daily AT NIGHT 11/11/2018 Yes Divya Kraft PA-C   levothyroxine (SYNTHROID/LEVOTHROID) 112 MCG tablet TAKE 1 TABLET DAILY 11/11/2018 Yes Ander Shrestha MD   Lutein 20 MG CAPS Take 20 mg by mouth daily 11/11/2018 Yes Unknown, Entered By History   MELATONIN PO Take 3 mg by mouth nightly as needed 11/11/2018 Yes Unknown, Entered By History   prednisoLONE acetate (PRED FORTE) 1 % ophthalmic suspension Place 1 drop into the right eye every evening  11/11/2018 Yes Kj Eagle MD   simvastatin (ZOCOR) 20 MG tablet Take 1 tablet (20 mg) by mouth At Bedtime 11/11/2018 Yes Divya Krfat PA-C   torsemide (DEMADEX) 5 MG tablet Take 2  tablets (10 mg) by mouth daily 11/11/2018 Yes More, Divya Mejia PA-C   traZODone (DESYREL) 50 MG tablet Take 1/2-1 tablet by mouth nightly as needed for sleep 11/11/2018 at  pm Yes Ander Shrestha MD   VITAMIN D, CHOLECALCIFEROL, PO Take 2,000 Units by mouth daily  11/11/2018 Yes Unknown, Entered By History   traMADol (ULTRAM) 50 MG tablet Take 1 tablet (50 mg) by mouth every 6 hours as needed for severe pain More than a month at Unknown time  Brtit Sánchez MD Sharon Chandler, PharmD, BCPS

## 2018-11-13 NOTE — PLAN OF CARE
Problem: Patient Care Overview  Goal: Plan of Care/Patient Progress Review  Outcome: Improving  Pt reported neck pain, chronic and relieved with Heat & tylenol. Vitals stable, 1L NC O2. Up w/ 2 and walker. Tele shows 100% AV paced.  NS on 50 ml. FENA sent to lab. Transferred to [facility], All belongings sent with pt. Report given to [facility] Nurse. Continue to monitor.

## 2018-11-13 NOTE — PLAN OF CARE
Problem: Patient Care Overview  Goal: Plan of Care/Patient Progress Review  Outcome: Improving  Pt VSS on 4 L NC, has been stable off bipap all night Tele 100% AV paced. A&Ox4. Pt lopez intact and patent. C/o neck pain, given tylenol, heat packs and massage with relief. Slept. Plan to continue to monitor potassium, nephrology consult, PT/OT. Continue to monitor.

## 2018-11-13 NOTE — CONSULTS
Consult Date:  11/13/2018      RENAL CONSULTATION      REQUESTING PHYSICIAN:  Edilberto Mack DO.      REASON FOR CONSULTATION:  Chronic kidney disease, stage III, and acute kidney injury.      HISTORY OF PRESENT ILLNESS:  This is a 89-year-old female I am asked to see for abnormalities in kidney function.  She has a history of chronic kidney disease, stage III.  On 11/6/18, the creatinine was 1.50 and estimated GFR 33.  On 11/11/18, the creatinine was 1.46.  On 11/12/18, it had gone up to 1.75, and today the creatinine is 1.79.  After admission, she has some difficulties with hyperkalemia and was given acute treatment for this.  Today, on 11/13/18, her potassium is 5.5 and bicarbonate 29.  She was admitted with nausea and a question of congestive heart failure exacerbation.  She also had depressed mental status.  She complained of neck pain and was given tramadol last week in the emergency room.  Here yesterday, she was given IV fluids and albumin, but also Lasix as management of her high potassium.  She was placed on BiPAP and nasal cannula O2 and she was made n.p.o.  Prior to admission, she was on Coreg, hydralazine, Imdur, and torsemide.      PAST MEDICAL HISTORY:  Congestive heart failure.  Her most recent ejection fraction is 20-30%.  She also has valvular heart disease with aortic stenosis, tricuspid regurgitation, and mitral regurgitation.  She also has severe pulmonary hypertension and a pacemaker.  She has various joint and neck pains.  She has hypothyroidism.      PAST SURGICAL HISTORY:  Includes hysterectomy and total knee arthroplasty.  There is no history of urologic surgery.      CURRENT MEDICATIONS:  D10W at 100 mL an hour, thiamine, and aspirin.      SOCIAL HISTORY:  She is .  She is DNR/DNI.  She does not smoke or drink.      FAMILY HISTORY:  The mother had uterine cancer.  The daughter had breast cancer.      REVIEW OF SYSTEMS:  This morning she complains of neck pain.  She denies  shortness of breath or chest pain.  She denies abdominal pain.  She tells me they are starting to allow her to take some clear liquids.  She denies nausea or vomiting.  She denies lower extremity edema or urinary symptoms.  A complete review of systems is done and is otherwise negative.      PHYSICAL EXAMINATION:   GENERAL:  She is a frail elderly female.  She is quite weak.  She is in a reclining chair.   VITAL SIGNS:  Blood pressure 107/63, heart rate 70, afebrile, respiratory rate 15.  Her weight has gone from 81.1 to 83.9 in the hospital here.  She is on nasal cannula O2.   HEENT:  Head shows no trauma.  The eyes show pupils round and reactive to light.  Mouth shows good dentition.  Posterior pharynx is clear.   NECK:  Supple.   LUNGS:  Clear anterior breath sounds.  There are no wheezes appreciated.   CARDIAC:  Moderate jugular venous distention, regular paced rhythm, normal S1, S2, a 2/6 systolic murmur.   ABDOMEN:  Normal bowel sounds, soft and nontender, no hepatosplenomegaly.   GENITOURINARY:  Normal female pattern.   EXTREMITIES:  Upper extremities:  Normal nails, joints, and pulses.  Lower extremities:  Diminished pulses, no edema.   NEUROLOGIC:  She is alert and oriented.  She can answer questions reasonably well.  She appears to move all 4 extremities well.      LABORATORY DATA:  Most currently show sodium 131, potassium 5.5, bicarbonate 29, creatinine 1.79.  Her magnesium, phosphorus, and calcium were normal.  Albumin is decreased at 2.6.  Chest x-ray, by my reading, shows marked cardiomegaly, no pleural effusion, and some mild pulmonary vascular congestion.  Again, her recent cardiac echo showed markedly depressed LV function.      IMPRESSION:   1.  Chronic kidney disease, stage III, and acute kidney injury.  This is mild.  We will monitor her intake and output and daily weights.  I am going to continue her on IV fluids but using normal saline at 50 mL an hour.  We will monitor her status with this.   Her electrolytes can be checked on a daily basis.   2.  Respiratory failure and CO2 retention.  She has been on BiPAP overnight.  Based on her venous gases, she has a respiratory acidosis.  Management will be per Internal Medicine.   3.  Anemia.  Her hemoglobin is 9.1.  We will hold off on any transfusion.  We will check some laboratories to look at her iron, B12, and folate studies.   4.  Congestive heart failure.  She is currently n.p.o. and not on her usual medications.  Her blood pressure is quite low.  Cardiology and Internal Medicine will be assisting with this management.      Overall, this patient has multisystem organ failure and is most likely at the end of her life.  She is DNR/DNI, but I would suggest home hospice and no further hospitalizations.  She is obviously not a dialysis candidate.         YUE FLANAGAN MD             D: 2018   T: 2018   MT: RUFINO      Name:     VICKEY CHURCH   MRN:      [id number]        Account:       [id number]   :      1927           Consult Date:  2018      Document: [id number]       cc: Ander Mack DO

## 2018-11-13 NOTE — CONSULTS
"BRIEF NUTRITION ASSESSMENT      REASON FOR ASSESSMENT:  Admission Screen - Reduced oral intake over the last month    NUTRITION HISTORY:  Visited with family member as patient sleeping soundly.  He reports that patient's intake has been down for about one week but otherwise she eats well at baseline.  Many years ago she took Ensure but hasn't in awhile.     CURRENT DIET AND INTAKE:  Diet:  DD2 with nectar thick liquids              Noted diet just ordered this morning and patient took 100% of the oatmeal that was ordered.  She was NPO yesterday and therefore did not eat.     ANTHROPOMETRICS:  Height: 5'3\"  Weight: 83.9 kg (185#)(11/13)  BMI: 32.75 kg/m2  IBW:  52.3 kg   Weight Status: Obesity Grade I BMI 30-34.9  %IBW: 160%  Weight History:   Wt Readings from Last 10 Encounters:   11/13/18 83.9 kg (184 lb 14.4 oz)   11/06/18 79.4 kg (175 lb)   11/06/18 82 kg (180 lb 11.2 oz)   08/29/18 79.1 kg (174 lb 4.8 oz)   07/25/18 80.5 kg (177 lb 8 oz)   06/11/18 80.2 kg (176 lb 11.2 oz)   05/08/18 81.6 kg (179 lb 14.4 oz)   04/23/18 79.6 kg (175 lb 6.4 oz)   01/18/18 79.6 kg (175 lb 6.4 oz)   10/23/17 79.7 kg (175 lb 9.6 oz)     No weight loss noted     LABS:  Labs noted    MALNUTRITION:  Patient does not meet two of the following criteria necessary for diagnosing malnutrition: significant weight loss, reduced intake, subcutaneous fat loss, muscle loss or fluid retention    NUTRITION INTERVENTION:  Nutrition Diagnosis:  No nutrition diagnosis at this time.    Implementation:  Nutrition Education:  Per Provider order if indicated  Medical food supplement therapy:  Magic Cup with meals     FOLLOW UP/MONITORING:   Will re-evaluate in 7 - 10 days, or sooner, if re-consulted.    Romana Long RD, LD, CNSC   Clinical Dietitian - [facility]             "

## 2018-11-13 NOTE — PLAN OF CARE
Problem: Patient Care Overview  Goal: Plan of Care/Patient Progress Review  Discharge Planner SLP   Patient plan for discharge: Did not state.  Current status: Bedside swallow evaluation completed. Patient presents with mild to moderate oral and pharyngeal dypshagia characterized by pooling of oral secretions in her oral cavity with intermittent drooling on the right side. She demonstrated premature entry of thin liquids and suspect penetration/silent aspiration due to slight vocal changes noted and delayed swallow response. Swallow response time and bolus control was improved with nectar thick liquids, no overt Sx of aspiration noted via small sinlge sips from the cup. Pureed textures were tolerated without difficulty. Prolonged mastication of a solid with reduced bolus control during AP transport. Mild amount of oral residue left on the mid tongue after the swallow. Improved oral clearing with a semi-solid. Patient is at a moderate risk for aspiration due to swallow delay and fatigue. Recommend: 1. Dysphagia Diet level 2 with nectar thick liquids. 2. Feed only when fully alert, up in a chair, no straws, small bites/sips, alternate liquids/solids. Hold diet if changes in her respiratory status or Sx of aspiration present. 3. SLP will f/u for diet tolerance on 11/14/18.   Barriers to return to prior living situation: Deconditioning  Recommendations for discharge: TCU  Rationale for recommendations: May need short term ST needs if swallow goals are not met as an IP. Patient is below her baseline diet.        Entered by: Livia Cleaning 11/13/2018 11:14 AM

## 2018-11-14 NOTE — PLAN OF CARE
Problem: Patient Care Overview  Goal: Plan of Care/Patient Progress Review  OT/PT: Per RN, patient having new increased weakness, facial droop, increased confusion. RN paging MD to see patient. Will hold therapies today and resume tomorrow if appropriate.

## 2018-11-14 NOTE — CONSULTS
[facility]    Palliative Care Consultation     Mckayla Oconnell  MRN# [id number]  Date of Admission:  2018  Date of Service (when I saw the patient): 18  Reason for consult: Consulted by Dr. Mack for Goals of care    Assessment & Plan   Mckayla Oconnell is a 89 year old female with PMH significant for CAD, ischemic cardiomyopathy with EF 30-35%, aortic stenosis, HTN, HLD, pulmonary hypertension, CKD, and arthritis, who presents with nausea and somnolence. She was found to have an acute on chronic HF exacerbation. Per daughter, pt has expressing desire to let go. We are consulted for goals of care.     Symptoms/Recommendations   1. Left shoulder/neck pain. CT imaging did not demonstrate compression fracture. No XR available of left shoulder. Likely arthritic?   -Will schedule APAP 1g PO TID  -Trial lidoderm patch (3) topical to left shoulder/neck   -Dilaudid 1-2mg SL every 2hrs PRN pain     2. Goals of care. Pt and family unanimously in support of exploring comfort cares and hospice. Family trying to determine if they will discharge to home vs facility with hospice cares. I, along with Dr. Mack, am strongly recommending facility placement, and we are looking into [facility]. See further discussion below.   -Initiate comfort measures only, stop checking VS, labs, stop nonessential medications that do not contribute to comfort and well being   -SW consult for hospice. Pt and family open to [facility] and interested in looking at [facility]   -Dilaudid 1-2mg PO Q2hrs PRN pain, SOB as above   -Zyprexa 2.5-5mg ODT every 6hrs PRN agitation (avoid haldol with prolonged Qt)  -Atropine, robinul PRN secretions   -Bisacodyl supp, senna PRN constipation   -Prognosis discussed with family (not with pt). I think pt likely has days to weeks to live at this point     Support/Coping  -Pt's first   in , there was no support of hospice at that time. Pt has one  "daughter, Irina, who is loving and supportive, lives <1 mile from Mckayla. Pt remarried Jaime, they have been together for 35+ years. Jaime has children from a previous relationship, daughter Asia and RYLAND Calixto supportive. Per Jaime, he does not have much to do with his 2 sons   -As suspected by Irina,  Jaime is struggling immensely with pt's deteriorating health. He requires frequent reminders that pt is not expected to improve. He clearly is struggling to process information that is being provided to him. He is very focused on himself and how he will carry on after she dies. He admits to looking at his wife as a mother figure, and has been cared for his whole life. He is incredibly saddened by her declining health and feels very alone. Family is very united in trying to support Jaime in this difficult time   -Pt is Orthodoxy and very much values her nolberto. Per daughter, pt is \"ready to go home with God.\" Pt's  was present for today's discussion and was incredibly supportive of pt and  Jaime   -Will involve Palliative LICSW, India Nolasco, and/or Palliative , Wilda Manrique for additional support     Decisional Support, Goals of Care, Counseling & Coordination  Decisional Capacity Intact?  -No. Pt was very lethargic and somnolent during our discussion. It was reasonable to try to involve her in the discussion, yet I cannot say she is decisional independently. Important to involve family, particularly daughter, in complex decision making   Health Care Directive on File?  -Yes, reviewed HCD and POLST in full. Pt names daughter Irina as surrogate decision maker   Code Status/Resuscitation Preferences?  -DNR/DNI     Discussion  Visited Mckayla this AM and briefly introduced myself and our service. I asked her if it would be ok to call her family to arrange a time to discuss treatment options, which she was ok with.     I spoke with pt's daughter Irina by phone. Irina was able to share with me some " "very helpful information. She makes note that her mother is declining of late, and being at home in her [facility] with her  Jaime is extremely important to her. It is a handicapped accessible home and they have good support from [place] [facility] services. However, Mckayla is still declining and has low energy. Irina makes note that her mother has always been a fighter and survivor, but Ilanashena is surprised at this point to see that her mother is still alive. Mckayla has been expressing to family that she is \"praying to die.\" She feels like her mother may be afraid about the dying process, what it may look like, and wondering about pain and discomfort with the process. She thinks her mother would ideally like to stay home for as long as possible, but go to [facility] in the final days if more help and support is needed. Per Irina, she thinks a conversation with me would be very helpful, but she feels it is most important for pt's  Jaime to hear this information, as she feels he will be very emotional and a \"basket case.\" Jaime is coming this afternoon with his RYLAND Calixto, and Irina feels it will be helpful for Calixto to be present to help Mckayla and Jaime understand that Jaime will be ok after Mckayla dies.    I thus returned for a visit this afternoon. Present was pt,  Jaime, RYLAND De Luna, and myself. We were later joined by pt's . I introduced the scope of our practice to pt and family. Discussed our potential roles for symptom management, support/coping, and decisional support (aka goals of care).     Mckayla was very lethargic and somnolent throughout our discussion. She admitted to not feeling well, but was willing to have this discussion, if needed. I reviewed with her and family about her underlying heart and renal issues. I expressed worry and concern that this is a progressive condition that is expected to worsen with time. I did share with Mckayla that this process will take her life. It was very " evident that  Jaime had a very limited understanding of her health, and was clearly struggling to process information. I went into more detail about her heart and renal issues, and the concern for her overall decline being a sign of likely end of life.     I had to consistently coax Mckayla throughout the conversation, as she was clearly lethargic and struggling to be present for the discussion. We did ultimately discuss a comfort care approach vs restorative approach. Mckayla quickly made it clear that she would prefer to focus more of her energy on comfort. We thus spent some time talking about hospice and shifting focus toward helping each day be as good as it can be, while allowing a natural dying process to occur. Pt and family were ultimately in support of focusing on comfort.     A lot of energy was then spent on focusing on  Jaime, who was very much struggling to process this information, and was very singularly focused on how he will feel and manage when Mckayla dies. He was very much in a repetitive loop, expressing worry about her dying process, while also not fully understanding her health, and feeling unable to manage when she dies. Pt's  was very instrumental in providing support to Jaime in this moment, and ensuring ongoing support as time progresses.     We spent a great amount of time talking about where hospice services can be received. Mckayla would ideally like to go home, and Jaime would like her to go home, but he admits to not being able to care for her at this point. It was very clear that we were all worried about Mckayla going home with hospice and being cared for Jaime. We thus spent a great amount of time talking about various facility options, including [facility] and [facility] and associated financial implications. Jaime again persisted in this circular loop, expressing worry for himself after Mckayla dies. He very much views Mckayla as a mother figure and struggles to see how  he will manage after her death. Again, a lot of support and focus was centered around him.     We ultimately agreed to shift focus toward hospice and start a comfort care plan in the hospital. We reviewed pain management strategies and I did explain that medications for comfort may in fact make Mckayla feel more sleepy and less engaged. We agreed that we would continue the discussion of where to receive hospice within the next 1-2 days.     Jaime continued to require frequent reminders and explanations of Mckayla's health, that she is in fact declining and not expected to improve. He continues to struggle with processing this information. I think this will be an ongoing issue and requires careful attention.     I then spent some time processing with pt's RYLAND De Luna and  in the [place]. We all acknowledge that  Jaime is struggling immensely, and is likely to continue to struggle as time progresses. Family and spiritual health are very tuned into this. We talk about time for Mckayla likely to be limited to days to weeks at this point.     I then called pt's daughter and surrogate decision maker, Irina, to review today's discussion. She is in full support of comfort cares, hospice, and going to a facility for hospice instead of home. She is very aware of Jaime's struggle and will pay careful attention to his needs as time progresses.     Case reviewed with Dr. Mack, bedside RN Asaf, and unit AMARJIT lopez.     Thank you for involving us in the care of this patient and family. We will continue to follow. Please do not hesitate to contact me with questions or concerns or the on-call provider for our team if evening or weekend.    Jayleen GOMEZ, [facility]  Palliative Medicine   Pager [phone]    Attestation:  Total time on the floor involved in the patient's care: 180 minutes (direct face to face from [id number]), additional time spent non face to face talking with daughter via phone ([phone] and  [id number])  Total time spent in counseling/care coordination: >50%    Chief Complaint   Nausea and somnolence     History is obtained from the patient, staff, family and extensive chart review.     Past Medical History    I have reviewed this patient's medical history and updated it with pertinent information if needed.   Past Medical History:   Diagnosis Date     Aortic stenosis      CAD (coronary artery disease)     angiogram 2016: non-obstructive, CT calcium ovntw=272 May 2013     Cardiomyopathy, idiopathic (H)     cardiomyopathy HTN vs viral; EF as low as 15% in ; CRT-P implanted 17     CHF (NYHA class II, ACC/AHA stage C) (H) 2013     Glaucoma      H/O angiography 2016    No angiographic evidence of obstructive coronary artery disease.     Hypertension      Hypothyroidism 2013     Nonrheumatic mitral valve regurgitation      Nonrheumatic tricuspid valve regurgitation      Pulmonary hypertension (H)        Past Surgical History   I have reviewed this patient's surgical history and updated it with pertinent information if needed.  Past Surgical History:   Procedure Laterality Date     CARDIAC CATHERIZATION  16     GYN SURGERY      Hysterectomy      HYSTERECTOMY, PAP NO LONGER INDICATED       IMPLANT PACEMAKER  17    CRT-P     ORTHOPEDIC SURGERY      knee replacement      ORTHOPEDIC SURGERY      Knee replacement      ORTHOPEDIC SURGERY      knee surgery Mena       Social History   Living situation: With  in a [facility], 1 level, handicapped accessible     Family system: First   in  from cancer. Pt and only daughter Irina were not present for his death, which was lasting for them. Pt remarried to Jaime of 35+ years. Jaime has 3 children from a previous relationship, but only keeps in touch with daughter Asia and her  Calixto. Irina's  and son also supportive     Self-identified support system: As above     Employment/education:  ND    Activities/interests: ND    Use of community resources: [place] [facility] services     Gnosticism affiliation: Congregation     Involvement in nolberto community: Yes - [facility]     Impact of illness on patient: Pt is feeling miserable; nausea and neck/shoulder pain. She is lethargic. She believes it is reasonable to shift focus toward being more comfortable at this point in her life     Family History   I have reviewed this patient's family history and updated it with pertinent information if needed.   Family History   Problem Relation Age of Onset     Cancer Mother      Uterius     Breast Cancer Daughter        Allergies   Allergies   Allergen Reactions     Atenolol Anaphylaxis     Hydrochlorothiazide      hyponatremia     Pollen Extract        Medications   Current Facility-Administered Medications Ordered in Epic   Medication Dose Route Frequency Last Rate Last Dose     acetaminophen (TYLENOL) tablet 650 mg  650 mg Oral Q4H PRN   650 mg at 11/14/18 1310     aspirin chewable tablet 81 mg  81 mg Oral Daily   81 mg at 11/14/18 0807     calcium citrate (CALCITRATE) tablet 1,900 mg  1,900 mg Oral Daily before lunch   1,900 mg at 11/14/18 1237     glucose gel 15-30 g  15-30 g Oral Q15 Min PRN        Or     dextrose 50 % injection 25-50 mL  25-50 mL Intravenous Q15 Min PRN   50 mL at 11/12/18 0851    Or     glucagon injection 1 mg  1 mg Subcutaneous Q15 Min PRN         fluticasone (FLONASE) 50 MCG/ACT spray 1 spray  1 spray Both Nostrils Daily   1 spray at 11/14/18 1108     hypromellose-dextran (ARTIFICAL TEARS) 0.1-0.3 % ophthalmic solution 1 drop  1 drop Both Eyes Q1H PRN         iron sucrose (VENOFER) 200 mg in sodium chloride 0.9 % 100 mL intermittent infusion  200 mg Intravenous Daily 220 mL/hr at 11/14/18 1251 200 mg at 11/14/18 1251     levothyroxine (SYNTHROID/LEVOTHROID) tablet 112 mcg  112 mcg Oral QAM AC   112 mcg at 11/14/18 1108     melatonin tablet 1 mg  1 mg Oral At Bedtime PRN          methyl salicylate-menthol (SAVAGE-EASTON) ointment   Topical Q6H PRN         miconazole (MICATIN) 2 % cream   Topical BID         naloxone (NARCAN) injection 0.1-0.4 mg  0.1-0.4 mg Intravenous Q2 Min PRN         No lozenges or gum should be given while patient on BIPAP/AVAPS/AVAPS AE   Does not apply Continuous PRN         ondansetron (ZOFRAN-ODT) ODT tab 4 mg  4 mg Oral Q6H PRN        Or     ondansetron (ZOFRAN) injection 4 mg  4 mg Intravenous Q6H PRN   4 mg at 11/14/18 0437     Patient may continue current oral medications   Does not apply Continuous PRN         prednisoLONE acetate (PRED FORTE) 1 % ophthalmic susp 1 drop  1 drop Right Eye QPM         sodium chloride 0.9% infusion   Intravenous Continuous 50 mL/hr at 11/14/18 0759       No current Epic-ordered outpatient prescriptions on file.       Review of Systems   The comprehensive review of systems is negative other than noted here and in the assessment/plan.    Palliative Symptom Review (0=no symptom/no concern, 1=mild, 2=moderate, 3=severe):  Pain: 3  Fatigue: 3  Nausea: 3    Physical Exam   Temp: 97.5  F (36.4  C) Temp src: Oral BP: 105/61 Pulse: 70 Heart Rate: 70 Resp: 16 SpO2: 98 % (attempted to try RA, pt desat <90) O2 Device: Nasal cannula Oxygen Delivery: 1 LPM  Vitals:    11/12/18 0620 11/13/18 0623 11/14/18 0446   Weight: 80.1 kg (176 lb 9.4 oz) 83.9 kg (184 lb 14.4 oz) 84.5 kg (186 lb 4.8 oz)     CONSTITUTIONAL: Chronically ill elderly woman seen resting in bed in NAD, lethargic and somnolent, awakens to verbal cue and gentle physical stimulation, seemingly conversant and thought process intact. She is overall  calm and cooperative.  HEENT: NCAT  RESPIRATORY: NL respiratory effort on NC  NEUROLOGIC: Lethargic, able to answer some questions appropriately    Data   Results for orders placed or performed during the hospital encounter of 11/11/18 (from the past 24 hour(s))   Fractional excretion of sodium   Result Value Ref Range    Creatinine Urine  100 mg/dL    Sodium Urine mmol/L 8 mmol/L    %FENA 0.1 %   Creatinine   Result Value Ref Range    Creatinine 1.81 (H) 0.52 - 1.04 mg/dL    GFR Estimate 26 (L) >60 mL/min/1.7m2    GFR Estimate If Black 32 (L) >60 mL/min/1.7m2   Sodium   Result Value Ref Range    Sodium 131 (L) 133 - 144 mmol/L   Glucose by meter   Result Value Ref Range    Glucose 157 (H) 70 - 99 mg/dL   Glucose by meter   Result Value Ref Range    Glucose 90 70 - 99 mg/dL   Glucose by meter   Result Value Ref Range    Glucose 114 (H) 70 - 99 mg/dL   Comprehensive metabolic panel   Result Value Ref Range    Sodium 127 (L) 133 - 144 mmol/L    Potassium 5.7 (H) 3.4 - 5.3 mmol/L    Chloride 96 94 - 109 mmol/L    Carbon Dioxide 26 20 - 32 mmol/L    Anion Gap 5 3 - 14 mmol/L    Glucose 95 70 - 99 mg/dL    Urea Nitrogen 58 (H) 7 - 30 mg/dL    Creatinine 1.84 (H) 0.52 - 1.04 mg/dL    GFR Estimate 26 (L) >60 mL/min/1.7m2    GFR Estimate If Black 31 (L) >60 mL/min/1.7m2    Calcium 8.0 (L) 8.5 - 10.1 mg/dL    Bilirubin Total 0.3 0.2 - 1.3 mg/dL    Albumin 3.0 (L) 3.4 - 5.0 g/dL    Protein Total 6.0 (L) 6.8 - 8.8 g/dL    Alkaline Phosphatase 61 40 - 150 U/L    ALT 34 0 - 50 U/L    AST 23 0 - 45 U/L   CBC with platelets differential   Result Value Ref Range    WBC 4.7 4.0 - 11.0 10e9/L    RBC Count 3.08 (L) 3.8 - 5.2 10e12/L    Hemoglobin 9.4 (L) 11.7 - 15.7 g/dL    Hematocrit 29.0 (L) 35.0 - 47.0 %    MCV 94 78 - 100 fl    MCH 30.5 26.5 - 33.0 pg    MCHC 32.4 31.5 - 36.5 g/dL    RDW 15.2 (H) 10.0 - 15.0 %    Platelet Count 99 (L) 150 - 450 10e9/L    Diff Method Automated Method     % Neutrophils 83.5 %    % Lymphocytes 10.9 %    % Monocytes 4.1 %    % Eosinophils 1.3 %    % Basophils 0.0 %    % Immature Granulocytes 0.2 %    Nucleated RBCs 0 0 /100    Absolute Neutrophil 3.9 1.6 - 8.3 10e9/L    Absolute Lymphocytes 0.5 (L) 0.8 - 5.3 10e9/L    Absolute Monocytes 0.2 0.0 - 1.3 10e9/L    Absolute Eosinophils 0.1 0.0 - 0.7 10e9/L    Absolute Basophils 0.0 0.0 - 0.2  10e9/L    Abs Immature Granulocytes 0.0 0 - 0.4 10e9/L    Absolute Nucleated RBC 0.0    Phosphorus   Result Value Ref Range    Phosphorus 4.2 2.5 - 4.5 mg/dL   Magnesium   Result Value Ref Range    Magnesium 1.8 1.6 - 2.3 mg/dL   Uric acid   Result Value Ref Range    Uric Acid 7.5 (H) 2.6 - 6.0 mg/dL   Glucose by meter   Result Value Ref Range    Glucose 110 (H) 70 - 99 mg/dL   Glucose by meter   Result Value Ref Range    Glucose 92 70 - 99 mg/dL   Glucose by meter   Result Value Ref Range    Glucose 102 (H) 70 - 99 mg/dL

## 2018-11-14 NOTE — CONSULTS
Care Transition Initial Assessment -   Reason For Consult: discharge planning  Met with: Family  (daughter Irina)  Active Problems:    Acute on chronic systolic (congestive) heart failure (H)       DATA  Lives With: spouse  Living Arrangements: house (townhouse)  Description of Support System: Supportive, Involved  Who is your support system?: , Children  Support Assessment: Adequate family and caregiver support.   Identified issues/concerns regarding health management:       Quality Of Family Relationships: supportive, involved  Transportation Available: car (Pt.driving PTA)    Per care transitions and social work consult for discharge planning and a hospice consult.  Patient was admitted on 11-11-18 with CHF exacerbation.  The tentative date of discharge is yet to be determined.  Reviewed chart and call placed to patient's daughter to discharge discharge plans as she contacted me first and I returned the call.  Per patient's daughter's report, patient lives in a 1 level townhouse with no steps.  Patient uses a rolling walker at baseline.  Patient has grab bars, a raised toilet seat, and a shower chair in the bathroom.  Patient's daughter states that patient ultimately feels like home is everything for her, but she can not care for herself and her  is not able to really assist either.  Patient's daughter states she is dealing with her own medical issues so she is not able to assist much either.  Patient and  have met with palliative care today and the plan is to transition to comfort care.  Explained the Medicare hospice benefit including home, SNF, and residential hospice facilities.  Explained that home is like homecare services meaning not 24 hour care.  Explained that patient would likely have to pay privately to supplement the care at home.  Explained that room & board is not covered under insurance and that SNF is approximately $350-$400 per day.  Explained that residential hospice  facilities are approximately $500-$600 per day specifically discussing [facility] at $600 per day.  Patient's daughter states that patient would have an annuity that she could access.  Strongly encouraged her to begin that process as explained she will need the funds for whatever direction they choose to take.  Patient has used [facility] in the past therefore they would like to use [facility].  Patient's daughter states she would be available at 13:00 tomorrow.  Referral made to Keyanna from [facility].  Patient's daughter is asking for me to make a referral to [facility].  Call placed to [facility] to check bed availability.  They currently have no beds available.  They are asking for a referral to be sent as they are unsure when a bed may be available.  Referral sent to [facility], via discharge on the double.  Updated patient's daughter as to this information.  She is considering other options just in case.    ASSESSMENT  Cognitive Status:  Did not meet patient, spoke with patient's daughter on the phone.  Concerns to be addressed: discharge planning, hospice     PLAN  Financial costs for the patient includes private pay costs for hospice, room & board, private pay nursing.  Patient given options and choices for discharge hospice agencies.  Patient/family is agreeable to the plan?  Yes  Patient Goals and Preferences: [facility] on discharge.  Patient anticipates discharging to: TBD.    Will continue to follow and assist with a safe discharge plan.    ASHLEY Pitt, [facility]  [phone]    Will continue to follow and assist with a safe discharge plan.

## 2018-11-14 NOTE — PLAN OF CARE
Problem: Patient Care Overview  Goal: Plan of Care/Patient Progress Review  Outcome: Declining  No changed in neuros,VSS, tele 100% AV paced, 02 sat 92 to 93% on RA, GRAY, denies SOB at rest, sodium 127 this am, yesterday 131, on NACL 0.9% at 50cc/hr. Adequate UOP via lopez cath. Up with two assist, walker to BSC, soft large stool x1. Denies pain. Plans for hospice and no further hospitalizations per MD suggestion. Will contimnue to monitor.

## 2018-11-14 NOTE — CODE/RAPID RESPONSE
"[facility]    RRT Note  11/14/2018   Time Called: 0840    RRT called for: Concern for neuro changes    Assessment & Plan   IMPRESSION & PLAN:    Patient was handed off this morning as having \"normal neuro exam\" staff found her a little bit disoriented this morning and was concerned that this was a change to RRT was called.  Meds were reviewed she not recently received any sedative or analgesic medications    INTERVENTIONS:  -check gluc 92 mg/dL  -check /58  -reviewed, labs, meds  -TCP <100K would be exclusive for tPA  -multiple metabolic etiologies (AK I versus CKD, hyponatremia, hyperkalemia) environmental (recent room change, hospitalization, shades down) to exaplain any change in mentation, possible delirium  -Discussed with patient's rounding physician who saw her yesterday and could better comment on any dated a change in her neurologic findings to he will examining her ASAP.    [employer] Stroke Scale  Exam Interval: Baseline   Score    Level of consciousness: (0)   Alert, keenly responsive    LOC questions: (0)   Answers both questions correctly    LOC commands: (0)   Performs both tasks correctly    Best gaze: (0)   Normal     Visual: (0)   No visual loss blind in right eye pre-existing    Facial palsy: (0)   Normal symmetrical movements, initially there is concern for right facial droop this was not evident when patient showed all of her teeth    Motor arm (left): (0)   No drift, left shoulder discomfort limits range of motion    Motor arm (right): (0)   No drift    Motor leg (left): (0)   No drift    Motor leg (right): (0)   No drift    Limb ataxia: (0)   Absent equally wobbly in bilateral upper extremities on finger to nose testing but she did not have her glasses on for part of the exam    Sensory: (0)   Normal- no sensory loss    Best language: (0)   Normal- no aphasia    Dysarthria: (0)   Normal    Extinction and inattention: (0)   No abnormality        Total " Score:  0     She is already on aspirin, she has a pacemaker and so could not get an MRI, her creatinine would likely be prohibitive safely obtaining a CT angios.  Furthermore even if there were definitive clinical findings stroke which I do not appreciate at this point her thrombus cytopenia would exclude her from getting TPA and as such a no code stroke was called.    Discussed with and defer further cares to Dr. Mack, [facility] hospitalist    Interval History     Mckayla Oconnell is a 89 year old female who was admitted on 11/11/2018 for nausea, confusion, and hypoxia found to have CHF exacerbation.    Medical history significant for:   Past Medical History:   Diagnosis Date     Aortic stenosis      CAD (coronary artery disease)     angiogram 2016: non-obstructive, CT calcium [id number]=224 May 2013     Cardiomyopathy, idiopathic (H)     cardiomyopathy HTN vs viral; EF as low as 15% in 2009; CRT-P implanted 1/19/17     CHF (NYHA class II, ACC/AHA stage C) (H) 2/14/2013     Glaucoma      H/O angiography 9-1-2016    No angiographic evidence of obstructive coronary artery disease.     Hypertension      Hypothyroidism 2/14/2013     Nonrheumatic mitral valve regurgitation      Nonrheumatic tricuspid valve regurgitation      Pulmonary hypertension (H)      Code Status: DNR/DNI    Allergies   Allergies   Allergen Reactions     Atenolol Anaphylaxis     Hydrochlorothiazide      hyponatremia     Pollen Extract      Physical Exam   Vital Signs with Ranges:  Temp:  [96  F (35.6  C)-97.5  F (36.4  C)] 97.5  F (36.4  C)  Pulse:  [70] 70  Heart Rate:  [70] 70  Resp:  [8-24] 16  BP: ()/(39-72) 107/58  SpO2:  [87 %-100 %] 95 %  I/O last 3 completed shifts:  In: 1566.67 [P.O.:860; I.V.:706.67]  Out: 700 [Urine:700]    Constitutional:no acute distress  ENT: Dry oral mucosa  Neck: supple  Pulmonary: Defer  Cardiovascular: Defer, blood pressure as above  GI: Defer  Skin/Integumen: Defer  Neuro: As above  Psych: Patient  is appropriate to situation  Extremities: Defer    Troponin:    Recent Labs   Lab Test  11/11/18   2155   TROPI  0.031       IMAGING: (X-ray/CT/MRI)   Recent Results (from the past 24 hour(s))   XR Chest Port 1 View    Narrative    CHEST PORTABLE ONE VIEW   11/13/2018 9:25 AM     HISTORY: Hypoxia.     COMPARISON: Chest x-ray 11/11/2018.      Impression    IMPRESSION: Portable view of the chest is performed. Pulmonary  vascular congestion seen on prior exam appears mildly improved.  Airspace opacity retrocardiac region and right lung base could  indicate persistent mild edema. Heart remains enlarged. Implantable  cardiac device with 3 leads appears unchanged in position. No  pneumothorax. No obvious pleural effusions.    ELIO QUINONES MD       CBC with Diff:  Recent Labs   Lab Test  11/14/18   0622   09/01/16   0729   WBC  4.7   < >  6.2   HGB  9.4*   < >  8.4*   MCV  94   < >  89   PLT  99*   < >  233   INR   --    --   1.12    < > = values in this interval not displayed.        Comprehensive Metabolic Panel:    Recent Labs  Lab 11/14/18  0622   *   POTASSIUM 5.7*   CHLORIDE 96   CO2 26   ANIONGAP 5   GLC 95   BUN 58*   CR 1.84*   GFRESTIMATED 26*   GFRESTBLACK 31*   SHOBHA 8.0*   MAG 1.8   PHOS 4.2   PROTTOTAL 6.0*   ALBUMIN 3.0*   BILITOTAL 0.3   ALKPHOS 61   AST 23   ALT 34     UA:    Recent Labs  Lab 11/11/18  0955   COLOR Yellow   APPEARANCE Clear   URINEGLC Negative   URINEBILI Negative   URINEKETONE Negative   SG 1.013   UBLD Negative   URINEPH 5.0   PROTEIN 10*   NITRITE Negative   LEUKEST Negative   RBCU 0   WBCU <1       Time Spent on this Encounter   I spent 20 minutes from 840-9am minutes on the unit/floor managing the care of Mckayla Oconnell. Over 50% of my time was spent counseling the patient and/or coordinating care regarding services listed in this note.    Myah Xiao, [facility]  Hospitalist house NP  [phone]

## 2018-11-14 NOTE — PROGRESS NOTES
Assessment and Plan:   CKD/KIARRA: Cr 1.79 > > 1.84 since adm. Lytes show K 5.7 and Na 127, HCO3 26.   She is on Ca Citrate po. She got a low dose of torsemide today, but remains on NS at 50 ml/h. Her FENa is 0.1% and Venu 8 C/W pre-renal azotemia.    Se denies chest pain or SOB. She tells me she ate BF without N or V. Not ambulating.      Cont IVF and monitor labs.                 Interval History:   CHF: BP is low.    Decreased MS:  Anemia: Hgb 9.4.  Will give 2 doses of IV venofer.              Medications:       aspirin  81 mg Oral Daily     calcium citrate  1,900 mg Oral Daily before lunch     fluticasone  1 spray Both Nostrils Daily     levothyroxine  112 mcg Oral QAM AC     miconazole   Topical BID     prednisoLONE acetate  1 drop Right Eye QPM     torsemide  10 mg Oral Daily       - MEDICATION INSTRUCTIONS -       - MEDICATION INSTRUCTIONS -       sodium chloride 50 mL/hr at 11/14/18 0754     Current active medications and PTA medications reviewed, see medication list for details.            Physical Exam:   Vitals were reviewed  Patient Vitals for the past 24 hrs:   BP Temp Temp src Pulse Heart Rate Resp SpO2 Weight   11/14/18 1045 - - - - - - 98 % -   11/14/18 0810 - - - - - - 95 % -   11/14/18 0800 107/58 97.5  F (36.4  C) Oral 70 - - (!) 87 % -   11/14/18 0446 - - - - - - - 84.5 kg (186 lb 4.8 oz)   11/14/18 0042 98/54 96.7  F (35.9  C) Axillary - 70 16 93 % -   11/13/18 2100 102/50 96  F (35.6  C) Axillary - 70 16 92 % -   11/13/18 1800 106/55 - - - 70 10 93 % -   11/13/18 1700 100/59 - - - 70 15 - -   11/13/18 1600 100/53 - - - 70 9 - -   11/13/18 1500 108/60 - - - 70 14 - -   11/13/18 1400 117/64 - - - 70 24 98 % -   11/13/18 1316 93/53 - - - 70 12 - -   11/13/18 1200 97/66 - - - 70 8 - -   11/13/18 1152 (!) 95/39 - - - 70 10 98 % -       Temp:  [96  F (35.6  C)-97.5  F (36.4  C)] 97.5  F (36.4  C)  Pulse:  [70] 70  Heart Rate:  [70] 70  Resp:  [8-24] 16  BP: ()/(39-66) 107/58  SpO2:  [87  %-98 %] 98 %    Temperatures:  Current - Temp: 97.5  F (36.4  C); Max - Temp  Av.7  F (35.9  C)  Min: 96  F (35.6  C)  Max: 97.5  F (36.4  C)  Respiration range: Resp  Av.4  Min: 8  Max: 24  Pulse range: Pulse  Av  Min: 70  Max: 70  Blood pressure range: Systolic (24hrs), Av , Min:93 , Max:117   ; Diastolic (24hrs), Av, Min:39, Max:66    Pulse oximetry range: SpO2  Av.3 %  Min: 87 %  Max: 98 %    I/O last 3 completed shifts:  In: 1566.67 [P.O.:860; I.V.:706.67]  Out: 700 [Urine:700]      Intake/Output Summary (Last 24 hours) at 18 1134  Last data filed at 18 0600   Gross per 24 hour   Intake          1566.67 ml   Output              700 ml   Net           866.67 ml       Somnolent, can awaken to answer questions  Lungs with clear ant BS  Cor RRR nl S1 S2 2/6 short sys M, + JVD  LE no edema    I/O 757/700  Weights (last 365 days)      Date/Time Weight Who     18 0446 84.5 kg (186 lb 4.8 oz) MA     18 0623 83.9 kg (184 lb 14.4 oz) NE     18 0620 80.1 kg (176 lb 9.4 oz) NE     18 1838 81.1 kg (178 lb 12.8 oz)      Wt up 3.4 kg since adm         Wt Readings from Last 4 Encounters:   18 84.5 kg (186 lb 4.8 oz)   18 79.4 kg (175 lb)   18 82 kg (180 lb 11.2 oz)   18 79.1 kg (174 lb 4.8 oz)          Data:          Lab Results   Component Value Date     2018     2018     2018    Lab Results   Component Value Date    CHLORIDE 96 2018    CHLORIDE 98 2018    CHLORIDE 98 2018    Lab Results   Component Value Date    BUN 58 2018    BUN 54 2018    BUN 52 2018      Lab Results   Component Value Date    POTASSIUM 5.7 2018    POTASSIUM 5.5 2018    POTASSIUM 5.5 2018    Lab Results   Component Value Date    CO2 26 2018    CO2 29 2018    CO2 28 2018    Lab Results   Component Value Date    CR 1.84 2018    CR 1.81 2018    CR 1.79  11/13/2018        Recent Labs   Lab Test  11/14/18   0622  11/13/18   0602  11/12/18   0523   WBC  4.7  4.0  3.8*   HGB  9.4*  9.1*  9.5*   HCT  29.0*  28.7*  30.4*   MCV  94  96  97   PLT  99*  110*  106*     Recent Labs   Lab Test  11/14/18   0622  11/13/18   0602  11/12/18   0523   AST  23  27  28   ALT  34  34  41   ALKPHOS  61  51  60   BILITOTAL  0.3  0.3  0.2   IGSSELLE   --    --   19       Recent Labs   Lab Test  11/14/18   0622 11/13/18   0602  07/17/17   0821   MAG  1.8  1.9  2.3     Recent Labs   Lab Test  11/14/18   0622 11/13/18   0602  08/11/16   0757   PHOS  4.2  4.3  2.9     Recent Labs   Lab Test  11/14/18   0622  11/13/18   0955  11/13/18   0602   SHOBHA  8.0*  8.3*  8.1*       Lab Results   Component Value Date    SHOBHA 8.0 (L) 11/14/2018     Lab Results   Component Value Date    WBC 4.7 11/14/2018    HGB 9.4 (L) 11/14/2018    HCT 29.0 (L) 11/14/2018    MCV 94 11/14/2018    PLT 99 (L) 11/14/2018     Lab Results   Component Value Date     (L) 11/14/2018    POTASSIUM 5.7 (H) 11/14/2018    CHLORIDE 96 11/14/2018    CO2 26 11/14/2018    GLC 95 11/14/2018     Lab Results   Component Value Date    BUN 58 (H) 11/14/2018    CR 1.84 (H) 11/14/2018     Lab Results   Component Value Date    MAG 1.8 11/14/2018     Lab Results   Component Value Date    PHOS 4.2 11/14/2018       Creatinine   Date Value Ref Range Status   11/14/2018 1.84 (H) 0.52 - 1.04 mg/dL Final   11/13/2018 1.81 (H) 0.52 - 1.04 mg/dL Final   11/13/2018 1.79 (H) 0.52 - 1.04 mg/dL Final   11/13/2018 1.78 (H) 0.52 - 1.04 mg/dL Final   11/13/2018 1.79 (H) 0.52 - 1.04 mg/dL Final   11/12/2018 1.79 (H) 0.52 - 1.04 mg/dL Final       Attestation:  I have reviewed today's vital signs, notes, medications, labs and imaging.     Cruz Blackwood MD

## 2018-11-14 NOTE — PROGRESS NOTES
[facility]    Hospitalist Progress Note    Assessment & Plan   Mckayla Oconnell is a 89 year old female who was admitted on 11/11/2018 with nausea, confusion, and hypoxia found to have CHF exacerbation.    Hyperkalemia likely from acute kidney injury  Shifted with insulin and glucose, repeat pending  Hypoglycemia after shifting with insulin despite 2 amps of D50  Some improvement with albumin/lasix combination  Plan  - daily bmp  - restart diuretics  - nephrology consulted    Hypoglycemia  Occurred after shifting with insulin despite D50 given, subsequently started on D10 infusion  Interestingly, became hypoglycemic again overnight about 20 hours after SA insulin administration  Plan  -resolved now that she is eating    Goals of care discussion  Discussed with Daughter rosaura, patient has been endorsing desire to pass now for awhile.   Ms. Kraft from the cardiology clinic encouraged patient and her family to consider palliative care and hospice  Pt goal is to remain at home  Given her multiorgan failure, poor cardiac reserve, and her age, agree with palliative care consult  Ideally would be able to be on hospice at home, but there are significant barriers given her home environment and currently level of debility.  Plan  - continue DNR/DNI  - palliaitve consult, discussed with Jayleen, would involve  as well as daughters. Rosaura is aware, reachable by cell phone today    Nonischemic cardiomyopathy 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   Acute hypercapnic respiratory failure  Moderate aortic stenosis, moderate tricuspid regurgitation, moderate mitral regurgitation.   Biventricular pacemaker since 01/2017.   Long-standing patient of Dr. Scott in [place] [facility], recently saw Ms. Kraft last week with plan to increase diuretic for a few day  EKG shows a paced rhythm, troponin within normal limits.  Chest x-ray with likely pulmonary edema  Received 40 mg IV  in the ED  Echocardiogram basically unchanged from previous, possible worse tricuspid regurgitation  Serial troponins negative  VBG with some respiratory acidosis, mild improvement on bipap  Plan  - hold antihypertensive agents given her borderline hypotension  - Cardiology consult appreciated     Altered mental status likely metabolic encephalopathy  Fluctuating mental status  Daughter reports normally she is quite awake and alert, but much more somnolent over the past few days  She is moving all extremities on examination, with variable effort given her somnolence  She reports that she is just very tired because she did not sleep well the night before because of somnolence  No obvious signs of infection, no fever, no WBC elevation, denies respiratory complaints, UA is clean  CT head without acute abnormality  procal WNL making bacterial infection less likely  Moving her extremities, with the exception of the left shoulder which she says is secondary to pain  Ammonia negative  vbg with respiratory acidosis  Neurology consulted, unable to get a brain MRI because of incompatable pacemaker  Rapid response called for neurological changes on 11/14, but she seems improved from previous  Plan  - much improved, passed slp evaluation  - rapid response, given her mental status continues to improve every day, no discernable focal deficits, unable to obtain an MRI        Neck pain about C8 level  Chronic arthritis   Appears neurologically intact with movement of extremities  Plan  - CT of the neck WNL    Urinary obstruction  -s/p placement of lopez        CKD with baseline creatinine 1.5-2.   - at baseline monitor      Hyperlipidemia.   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 -   Pain score (0-10) 5   Pain location -   Pain descriptors -   - Mckayla is experiencing pain due to arthritis. Pain management was discussed and the plan was created in a collaborative fashion.  Mckayla's response to the current recommendations: disinterested  - monitor for now           DVT Prophylaxis: Pneumatic Compression Devices.  Code Status: DNR/DNI    Disposition: Expected discharge in 2-3 days electrolytes improve. Disposition not clear, pending goals of care discussion with palliative care. Possible home with hospice versus TCU. Main issues will be unifying plan of care between daughter, , and the patient. The patient is capable of making decisions at this juncture    Edilberto Mack, DO  Text Page  (7am to 6pm)  Interval History   Had RRT called for possible respiratory status, but conversant currently without focal deficits      -Data reviewed today: I reviewed all new labs and imaging results over the last 24 hours. I personally reviewed the CT of the head and c spine    Physical Exam   Temp: 97.5  F (36.4  C) Temp src: Oral BP: 107/58 Pulse: 70 Heart Rate: 70 Resp: 16 SpO2: 98 % O2 Device: Nasal cannula Oxygen Delivery: 1 LPM  Vitals:    11/12/18 0620 11/13/18 0623 11/14/18 0446   Weight: 80.1 kg (176 lb 9.4 oz) 83.9 kg (184 lb 14.4 oz) 84.5 kg (186 lb 4.8 oz)     Vital Signs with Ranges  Temp:  [96  F (35.6  C)-97.5  F (36.4  C)] 97.5  F (36.4  C)  Pulse:  [70] 70  Heart Rate:  [70] 70  Resp:  [8-24] 16  BP: ()/(39-66) 107/58  SpO2:  [87 %-98 %] 98 %  I/O last 3 completed shifts:  In: 1566.67 [P.O.:860; I.V.:706.67]  Out: 700 [Urine:700]    Constitutional: Awakens easily to voice, conversant  Respiratory: Clear  to auscultation bilaterally, no crackles or wheezing  Cardiovascular: Regular rate and rhythm, normal S1 and S2, and no murmur noted  GI: Normal bowel sounds, soft, non-distended, non-tender  Skin/Integumen: No rashes, no cyanosis, no edema  Other: Oriented to person, month, and year and place. Muscle strength intact. Moving all extremities with exception of the left shoulder because of pain. No facial assymmetry. Cranial nerves are intact    Medications     - MEDICATION INSTRUCTIONS -       - MEDICATION INSTRUCTIONS -       sodium chloride 50 mL/hr at 11/14/18 0754       aspirin  81 mg Oral Daily     calcium citrate  1,900 mg Oral Daily before lunch     fluticasone  1 spray Both Nostrils Daily     levothyroxine  112 mcg Oral QAM AC     miconazole   Topical BID     prednisoLONE acetate  1 drop Right Eye QPM     torsemide  10 mg Oral Daily       Data     Recent Labs  Lab 11/14/18  0622 11/13/18  1710 11/13/18  0955 11/13/18  0602  11/12/18  0523 11/11/18  2155 11/11/18  1819 11/11/18  1448 11/11/18  0955   WBC 4.7  --   --  4.0  --  3.8*  --   --   --  4.7   HGB 9.4*  --   --  9.1*  --  9.5*  --   --   --  10.2*   MCV 94  --   --  96  --  97  --   --   --  94   PLT 99*  --   --  110*  --  106*  --   --   --  113*   * 131* 131* 130*  < > 136  --   --   --  132*   POTASSIUM 5.7*  --  5.5* 5.5*  < > 5.9*  --   --   --  5.3   CHLORIDE 96  --  98 98  < > 103  --   --   --  100   CO2 26  --  29 28  < > 31  --   --   --  27   BUN 58*  --  54* 52*  < > 51*  --   --   --  46*   CR 1.84* 1.81* 1.79* 1.78*  < > 1.75*  --   --   --  1.46*   ANIONGAP 5  --  4 4  < > 2*  --   --   --  5   SHOBHA 8.0*  --  8.3* 8.1*  < > 8.6  --   --   --  9.2   GLC 95  --  75 106*  < > 66*  --   --   --  83   ALBUMIN 3.0*  --   --  2.6*  --  2.7*  --   --   --  3.3*   PROTTOTAL 6.0*  --   --  5.5*  --  5.8*  --   --   --  6.9   BILITOTAL 0.3  --   --  0.3  --  0.2  --   --   --  0.2   ALKPHOS 61  --   --  51  --  60  --   --   --  72   ALT 34   --   --  34  --  41  --   --   --  49   AST 23  --   --  27  --  28  --   --   --  34   LIPASE  --   --   --   --   --   --   --   --   --  112   TROPI  --   --   --   --   --   --  0.031 0.031 0.030 0.021   < > = values in this interval not displayed.    Imaging:   No results found for this or any previous visit (from the past 24 hour(s)).

## 2018-11-14 NOTE — PROGRESS NOTES
"Received call from pt's dtr Irina. Irina reviewed the event's of the last several days and Mckayla's admission. Stated plan is for palliative consult today--Mckayla's  Jaime will be present.     Irina had questions re: palliative v hospice, home care options, planning etc, which we discussed. I told her more detailed info would be available from hospital palliative team and  and deferred specific questions to them. Discussed that should pt choose palliative, hospice, CORE team still available to assist. Irina reiterated that Mckayla's goal is to return home.     Irina said Mckayla's  Jaime \"is a basket-case\" and family is trying to assist him during this stressful time.    Reminder sent to CORE RN board for 11/19 to do chart check to assess if addnl CORE follow-up needed.     Thea Spence RN BSN   2:26 PM 11/14/18            "

## 2018-11-14 NOTE — PLAN OF CARE
Problem: Patient Care Overview  Goal: Plan of Care/Patient Progress Review  Discharge Planner SLP   Patient plan for discharge: Not stated.   Current status: Patient seen for swallowing treatment.  RRT called this am for neuro changes, however, largely resolved per nursing at this time.  Patient reports she was confused about where she was as she moved rooms recently.  Patient not feeling well and accepting small amounts of PO only at this time.  She reports tolerating puree hot cereal this morning after nausea/vomiting event.  She tolerated sips of thin liquids without overt Sx of aspiration, however, there was some oral-labial leakage and mild delay.  Recommend continue dysphagia diet level 2 with nectar thick liquids with meals (when alert and upright), sips of thin WATER only between meals for comfort.    Barriers to return to prior living situation: Weakness, acute illness  Recommendations for discharge: Home per SLP need.   Rationale for recommendations: Likely to meet swallowing goals during inpatient hospitalization with plans for return home with hospice care.        Entered by: Amita Aguilar 11/14/2018 10:48 AM

## 2018-11-14 NOTE — PROGRESS NOTES
Pt transferred from CCU approximately 1900. Pt settled into room 260-2. Denies pain. Bed alarm on. Call light within reach. Report given to oncoming RN. Will continue to monitor.

## 2018-11-14 NOTE — PROGRESS NOTES
Formal palliative consult note to follow. Pt and family unanimously in support of comfort cares in the hospital, followed by hospice discharge. There was some question about home vs facility, and pt's daughter/surrogate decision maker ultimately supports discharge to a facility. [facility] first choice is [facility] and they are open to [facility]. I have communicated this with Dr. Mack and unit AMARJIT Villarreal. Thanks.    Jayleen GOMEZ, [facility]  Palliative Medicine   Pager: [phone]

## 2018-11-15 NOTE — TELEPHONE ENCOUNTER
Gave information to  [facility]. They will fax orders when able.    Med's will be handled by facility at that time for controlled med needs. Onsite Medical Director will be available for needs as well but patient will still need Primary.    Christina GUILLEN RN, BSN, PHN

## 2018-11-15 NOTE — PROGRESS NOTES
"SPIRITUAL HEALTH SERVICES Progress Note  [facility]    Visit with pt, per palliative consult.    Pt shared with me the news that she has learned, specifically that her heart and kidneys are failing.  She said, \"They tell me I don't have much time left!\"  Pt then said, \"Well, that's OK.  I'll get to go and be with Pritesh.\"  As our conversation continued, however, pt voiced frustration that she'll have to pay $600 a day for her care.  Pt wondered aloud if she could perhaps go home and receive home hospice care \"because that would be free.\"  I talked with her about what her needs might be, and explored with her the idea that she likely needs more care than she could get if she returned home.  She knows that her  and daughter can't provide the level of care she needs.  I then offered the idea of getting hospice care in a long-term care facility/nursing home, which might be less expensive than a residential hospice.  Ultimately it seems that pt is frustrated that she may have to pay for her care, rather than leaving all of her savings to her daughter and grandchildren.    Provided reflective listening, support and prayer.  Pt's own  ([facility] in [place]) has also been visiting, which is a great help to pt and her .    Pt then asked for my help in calling a friend of hers.  She struggled to use the hospital phone, couldn't remember the number she was trying to call, called her friend Edward (which is her daughter's name--might or might not be her friend's name) and seemed generally frustrated.  Provided assistance and support as best I could.    SH team will continue to be available if needs arise.                                                                                                                                             Cassie Manrique M.A.  Staff   Pager [phone]  Phone [phone]      "

## 2018-11-15 NOTE — PROGRESS NOTES
Hospice: I met with patient, her daughter Lorrie and son in in law Calixto to explain the hospice program. I provided the hospice information sheet and brochure. I explained the medicare hospice benefit, services for support, medication and equipment coverage.   Patients spouse Jaime had stepped out but joined the conversation at the very end.   I explained where the hospice services could take place, ie) home, nursing home and residential hospice homes and the costs for each. After much discussion it was decided that [facility] would be the best place to provide care and support for the patient and for her spouse Jaime who has as the daughter tells me has a lot of emotional needs.   Mckayla was able to tell Jaime this was the plan moving forward and he seemed to be ok with this but I am concerned he may not remember the conversation.   Mckayla gave permission for Lorrie who is also the HCA to sign the hospice consent forms.   The plan is to discharge the patient tomorrow around noon to [facility] if possible if transportation can be arranged. The  hospice admission team will complete the admission at 1p.     POLST completed and placed on front of the chart for signature.   I will order from [facility] liquid oxygen to be delivered to the facility by noon tomorrow.   Please order the following hospice comfort meds for discharge:    Acetaminophen 650 mg supp GA every 4 hrs as needed for pain/fever #2  bisacodyl 10 mg supp GA daily as needed for constipation #2,   atropine sulfate 1% 2 to 4 drops SL as needed every 2 hrs for terminal congestion/secretions # 5 ml,   haloperidol 0.5 mg tab 1 to 2 tabs po/sl every 6 hrs nausea, agitation # 15,   lorazepam 0.5mg tab 1/2 to 1 tab po/sl every 4 hrs as needed for anxiety/restlessness #30,   hydromorphone 10 mg/ml give 1 to 2 mg or 0.1 to 0.2 ml SL every 2 hrs pain/dyspnea #30 ml,   senna 8.6 mg 1 to 2 tabs po 2x day as needed constipation #30.  Lorrie was given copies of her  signed hospice consent forms along with the hospice handbook with the 24 hr number  Thank you for the referral.  Keyanna Upton RN, BSN  [facility] Admission nurse  [phone]

## 2018-11-15 NOTE — PLAN OF CARE
Problem: Patient Care Overview  Goal: Plan of Care/Patient Progress Review  Occupational Therapy Discharge Summary    Reason for therapy discharge:    Change in medical status.    Progress towards therapy goal(s). See goals on Care Plan in [facility] electronic health record for goal details.  Goals not met.  Barriers to achieving goals:   Patient has transitioned to comfort care..    Therapy recommendation(s):    No further therapy is recommended.

## 2018-11-15 NOTE — PROGRESS NOTES
[facility]  Hospitalist Progress Note    Assessment & Plan   Mckayla Oconnell is a 89 year old female with nonischemic cardiomyopathy with ejection fraction between 10-35%, CKD stage III-IV, dyslipidemia, chronic arthritis, peripheral arterial disease, who was admitted on 11/11/2018 for nausea, confusion and hypoxia and determined to have CHF exacerbation and acute hypercapnic respiratory failure, hyperkalemia.    Nonischemic cardiomyopathy with ejection fraction between 10-35%  Acute hypercapnic respiratory failure secondary to above  Lymphedema  Moderate aortic stenosis, moderate tricuspid regurgitation, moderate mitral regurgitation. Biventricular pacemaker since 01/2017. Long-standing patient of Dr. Scott in [facility]. EKG showed a paced rhythm, troponin within normal limits. Chest x-ray with pulmonary edema. Received 40 mg IV in the ED. Echocardiogram basically unchanged from previous, possible worse tricuspid regurgitation. Serial troponins negative. VBG with some respiratory acidosis, mild improvement on bipap  -Hypertensives have been on hold given hypotension  -Etiology consultation obtained.  Attempts made at adjusting medications but patient was unable to tolerate.  Progressively worse functional status.    -Patient and family elected to pursue palliative care and then hospice.  Discharge to [facility].  -Transitioned to comfort care on the evening of 11/14/18.  -Acetaminophen, Haldol, lorazepam, liquid hydromorphone available as needed for comfort  -Atropine as needed for secretions  -Bowel regimen to prevent constipation    Subacute metabolic encephalopathy  Subacute delirium  Elated to multiple factors including advanced age, probable underlying cerebrovascular disease, hospitalization, severe comorbidities of kidney disease, cardiomyopathy.    Chronic kidney disease stage III-IV  Reduced renal function with attempts at diuresis, prohibiting further  treatment of heart failure.    Hyperkalemia  In the setting of acute kidney injury.  Shifted with insulin and glucose.  Some initial improvement with albumin/Lasix combination.  -No further treatment will be pursued    Dyslipidemia  No need to continue statin, aspirin    Hypothyroidism  No need to continue Synthroid    Cutaneous candidiasis  Does not appear to be bothered by this.  -May continue miconazole if it adds benefit to her care    Urinary obstruction  Goldstein catheter placed for obstruction.  Can maintain in place for comfort care.    Hypoglycemia  Occurred after aggressive insulin administration to treat hyperkalemia.  Initially needed D10 infusion.  Resolved after she began eating again.  We will not require any additional treatment and hospice.    Active Diet Order      Combination Diet Dysphagia Diet Level 2: Mechan Altered; Nectar Thickened Liquids (pre-thickened or use instant food thickener) (No straws)    Code Status: DNR/DNI comfort care    Disposition: Expected discharge to hospice facility on Friday, 11/16/18.  Medication reconciliation complete.  Signed prescriptions for all opiates/benzodiazepines completed on 11/15/18.  They should be filled prior to discharge.    Amy Curran MD  [phone] (7am - 6pm)  Text Page  ~~~~~~~~~~~~~~~~~~~~~~~~~~~~~~~~~~~~~~~~~~~~~~~  Interval History   Patient new to me today.  Chart reviewed in detail including palliative care notes.  Officially converted to comfort care.  Family and patient have decided to get hosp[facility] care at [facility].  Denies pain, nausea.  And only get up with assist of 2.    -Data reviewed today: I reviewed all new labs and imaging results over the last 24 hours.     Physical Exam   Temp: 97.5  F (36.4  C) Temp src: Oral BP: 108/54   Heart Rate: 70 Resp: 18 SpO2: 94 % O2 Device: Nasal cannula Oxygen Delivery: 1 LPM  Vitals:    11/13/18 0623 11/14/18 0446 11/15/18 0500   Weight: 83.9 kg (184 lb 14.4 oz) 84.5 kg (186 lb 4.8 oz)  83.9 kg (185 lb)     Vital Signs with Ranges  Temp:  [96.1  F (35.6  C)-97.5  F (36.4  C)] 97.5  F (36.4  C)  Heart Rate:  [70] 70  Resp:  [18] 18  BP: (105-118)/(54-73) 108/54  SpO2:  [94 %-98 %] 94 %  I/O last 3 completed shifts:  In: -   Out: 1025 [Urine:1025]    Constitutional: Lethargic, responds to voice, no respiratory distress  HEENT: mmm, mattery discharge bilateral eyes  Respiratory: bilateral crackles, no wheezes  Cardiovascular: RRR, 2/6 systolic murmur  Boggy edema of bilateral arms  GI: soft, non-tender, nondistended  Skin/Integument: Numerous seborrheic keratoses, warm, dry, no acute rashes  Musc: ELIZABETH, diffusely weak  Psych: not anxious, no obvious thought disorder or hallucinations    Medications     - MEDICATION INSTRUCTIONS -       - MEDICATION INSTRUCTIONS -         acetaminophen  975 mg Oral Q8H     fluticasone  1 spray Both Nostrils Daily     levothyroxine  112 mcg Oral QAM AC     lidocaine  3 patch Transdermal Q24H     lidocaine   Transdermal Q8H     lidocaine   Transdermal Q24h     menthol   Transdermal Q8H     miconazole   Topical BID     prednisoLONE acetate  1 drop Right Eye QPM       Data     Recent Labs  Lab 11/14/18  0622 11/13/18  1710 11/13/18  0955 11/13/18  0602  11/12/18  0523 11/11/18  2155 11/11/18  1819 11/11/18  1448 11/11/18  0955   WBC 4.7  --   --  4.0  --  3.8*  --   --   --  4.7   HGB 9.4*  --   --  9.1*  --  9.5*  --   --   --  10.2*   MCV 94  --   --  96  --  97  --   --   --  94   PLT 99*  --   --  110*  --  106*  --   --   --  113*   * 131* 131* 130*  < > 136  --   --   --  132*   POTASSIUM 5.7*  --  5.5* 5.5*  < > 5.9*  --   --   --  5.3   CHLORIDE 96  --  98 98  < > 103  --   --   --  100   CO2 26  --  29 28  < > 31  --   --   --  27   BUN 58*  --  54* 52*  < > 51*  --   --   --  46*   CR 1.84* 1.81* 1.79* 1.78*  < > 1.75*  --   --   --  1.46*   ANIONGAP 5  --  4 4  < > 2*  --   --   --  5   SHOBHA 8.0*  --  8.3* 8.1*  < > 8.6  --   --   --  9.2   GLC 95  --  75  106*  < > 66*  --   --   --  83   ALBUMIN 3.0*  --   --  2.6*  --  2.7*  --   --   --  3.3*   PROTTOTAL 6.0*  --   --  5.5*  --  5.8*  --   --   --  6.9   BILITOTAL 0.3  --   --  0.3  --  0.2  --   --   --  0.2   ALKPHOS 61  --   --  51  --  60  --   --   --  72   ALT 34  --   --  34  --  41  --   --   --  49   AST 23  --   --  27  --  28  --   --   --  34   LIPASE  --   --   --   --   --   --   --   --   --  112   TROPI  --   --   --   --   --   --  0.031 0.031 0.030 0.021   < > = values in this interval not displayed.    Imaging:  No results found for this or any previous visit (from the past 24 hour(s)).

## 2018-11-15 NOTE — PROGRESS NOTES
Patient has expressed to family that she wants to go to rehab and they are concerned that patient is no longer wanting to be on comfort cares. Family would like palliative to come and talk with patient and family tomorrow.  Discussed patients wishes with charge nurse and Dr. Wilkinson. Per Dr. Wilkinson will continue with POC for tonight.

## 2018-11-15 NOTE — PROGRESS NOTES
Brief Cardiology Note  Pt: Mckayla Oconnell    1927      Mckayla Oconnell is a 89 year old year old long standing pt of mine in [facility].  Seen at the request of her daughter as she was not sure she wanted to do hospice.  Discussed with pt,  Jaime, son in law Calixto, and  who was present that hospice is appropriate at this time.  Discussed possible life expectancy of days to months depending on whether or not to continue taking meds.  I expect it would be a week or so without meds and longer on meds.  At this time she wants to continue meds so that her passing occurred after Thanksgi so that her kids wouldn't be inconvenienced during that week.      Also reviewed pacemaker.  Pt has a biventricular pacemaker (No ICD).  Her underlying rhythm is sinus irene in the 40s.  Turning off her pacemaker would not lead to immediate death as she has her own underlying heart rhythm.  But would allow for a sooner natural death.      We are happy to support the patient and family as needed.  Please call with questions.     Divya Kraft PA-C  5:45 PM 11/15/2018   [facility]  Pager: [phone]

## 2018-11-15 NOTE — PROGRESS NOTES
Assessment and Plan:   CKD/KIARRA: in the setting of advanced age and heart and kidney failure. Agree this is a terminal event and that hospice is a rational decision.             Interval History:   CHF  Anemia  Decreased MS:                  Review of Systems:   Comfortable, no pain now. Not SOB. Many questions about hospice.          Medications:       acetaminophen  975 mg Oral Q8H     fluticasone  1 spray Both Nostrils Daily     levothyroxine  112 mcg Oral QAM AC     lidocaine  3 patch Transdermal Q24H     lidocaine   Transdermal Q8H     lidocaine   Transdermal Q24h     menthol   Transdermal Q8H     miconazole   Topical BID     prednisoLONE acetate  1 drop Right Eye QPM       - MEDICATION INSTRUCTIONS -       - MEDICATION INSTRUCTIONS -       Current active medications and PTA medications reviewed, see medication list for details.            Physical Exam:   Vitals were reviewed  Patient Vitals for the past 24 hrs:   BP Temp Temp src Pulse Heart Rate Resp SpO2 Weight   11/15/18 0500 - - - - - - - 83.9 kg (185 lb)   11/15/18 0056 108/54 97.5  F (36.4  C) Oral - 70 18 94 % -   18 2245 - - - - - - 96 % -   18 2027 118/62 97.5  F (36.4  C) Oral - 70 18 96 % -   18 1627 118/73 96.1  F (35.6  C) Axillary - 70 - 98 % -   18 1315 105/61 97.5  F (36.4  C) Oral - 70 - - -   18 1300 101/54 - - - 70 - - -   18 1250 102/57 97.7  F (36.5  C) Oral 70 - - 98 % -       Temp:  [96.1  F (35.6  C)-97.7  F (36.5  C)] 97.5  F (36.4  C)  Pulse:  [70] 70  Heart Rate:  [70] 70  Resp:  [18] 18  BP: (101-118)/(54-73) 108/54  SpO2:  [94 %-98 %] 94 %    Temperatures:  Current - Temp: 97.5  F (36.4  C); Max - Temp  Av.3  F (36.3  C)  Min: 96.1  F (35.6  C)  Max: 97.7  F (36.5  C)  Respiration range: Resp  Av  Min: 18  Max: 18  Pulse range: Pulse  Av  Min: 70  Max: 70  Blood pressure range: Systolic (24hrs), Av , Min:101 , Max:118   ; Diastolic (24hrs), Av, Min:54,  Max:73    Pulse oximetry range: SpO2  Av.4 %  Min: 94 %  Max: 98 %    I/O last 3 completed shifts:  In: -   Out: 1025 [Urine:1025]      Intake/Output Summary (Last 24 hours) at 11/15/18 1227  Last data filed at 11/15/18 1100   Gross per 24 hour   Intake              360 ml   Output             1475 ml   Net            -1115 ml       Alert, resting in bed, on NC O2     Wt Readings from Last 4 Encounters:   11/15/18 83.9 kg (185 lb)   18 79.4 kg (175 lb)   18 82 kg (180 lb 11.2 oz)   18 79.1 kg (174 lb 4.8 oz)          Data:          Lab Results   Component Value Date     2018     2018     2018    Lab Results   Component Value Date    CHLORIDE 96 2018    CHLORIDE 98 2018    CHLORIDE 98 2018    Lab Results   Component Value Date    BUN 58 2018    BUN 54 2018    BUN 52 2018      Lab Results   Component Value Date    POTASSIUM 5.7 2018    POTASSIUM 5.5 2018    POTASSIUM 5.5 2018    Lab Results   Component Value Date    CO2 26 2018    CO2 29 2018    CO2 28 2018    Lab Results   Component Value Date    CR 1.84 2018    CR 1.81 2018    CR 1.79 2018        Recent Labs   Lab Test  18   0523   WBC  4.7  4.0  3.8*   HGB  9.4*  9.1*  9.5*   HCT  29.0*  28.7*  30.4*   MCV  94  96  97   PLT  99*  110*  106*     Recent Labs   Lab Test  18   0602  18   0523   AST  23  27  28   ALT  34  34  41   ALKPHOS  61  51  60   BILITOTAL  0.3  0.3  0.2   GISSELLE   --    --   19       Recent Labs   Lab Test  18   0618   0602  17   0821   MAG  1.8  1.9  2.3     Recent Labs   Lab Test  18/13/18   0602  16   0757   PHOS  4.2  4.3  2.9     Recent Labs   Lab Test  18   0618   0955  18   0602   SHOBHA  8.0*  8.3*  8.1*       Lab Results   Component Value Date    SHOBHA 8.0 (L)  11/14/2018     Lab Results   Component Value Date    WBC 4.7 11/14/2018    HGB 9.4 (L) 11/14/2018    HCT 29.0 (L) 11/14/2018    MCV 94 11/14/2018    PLT 99 (L) 11/14/2018     Lab Results   Component Value Date     (L) 11/14/2018    POTASSIUM 5.7 (H) 11/14/2018    CHLORIDE 96 11/14/2018    CO2 26 11/14/2018    GLC 95 11/14/2018     Lab Results   Component Value Date    BUN 58 (H) 11/14/2018    CR 1.84 (H) 11/14/2018     Lab Results   Component Value Date    MAG 1.8 11/14/2018     Lab Results   Component Value Date    PHOS 4.2 11/14/2018       Creatinine   Date Value Ref Range Status   11/14/2018 1.84 (H) 0.52 - 1.04 mg/dL Final   11/13/2018 1.81 (H) 0.52 - 1.04 mg/dL Final   11/13/2018 1.79 (H) 0.52 - 1.04 mg/dL Final   11/13/2018 1.78 (H) 0.52 - 1.04 mg/dL Final   11/13/2018 1.79 (H) 0.52 - 1.04 mg/dL Final   11/12/2018 1.79 (H) 0.52 - 1.04 mg/dL Final       Attestation:  I have reviewed today's vital signs, notes, medications, labs and imaging.  Given hospice decisions, I will sign off.      Cruz Blackwood MD

## 2018-11-15 NOTE — PLAN OF CARE
Problem: Patient Care Overview  Goal: Plan of Care/Patient Progress Review    Speech Language Therapy Discharge Summary    Reason for therapy discharge:    Change in medical status.   Change to comfort care    Progress towards therapy goal(s). See goals on Care Plan in [facility] electronic health record for goal details.  Goals not met.  Barriers to achieving goals:   change of status.    Therapy recommendation(s):    No further therapy is recommended.

## 2018-11-15 NOTE — PLAN OF CARE
Problem: Patient Care Overview  Goal: Plan of Care/Patient Progress Review  Outcome: No Change  Heart Failure Care Pathway  GOALS TO BE MET BEFORE DISCHARGE:    1. Decrease congestion and/or edema with diuretic therapy to achieve near      optimal volume status.            Dyspnea improved:  No, requiring 1L NC            Edema improved:     Yes        Net I/O and Weights since admission:          10/15 2300 - 11/14 2259  In: 2456.67 [P.O.:950; I.V.:1506.67]  Out: 2425 [Urine:2425]  Net: 31.67            Vitals:    11/11/18 1838 11/12/18 0620 11/13/18 0623 11/14/18 0446   Weight: 81.1 kg (178 lb 12.8 oz) 80.1 kg (176 lb 9.4 oz) 83.9 kg (184 lb 14.4 oz) 84.5 kg (186 lb 4.8 oz)       2.  O2 sats > 92% on RA or at prior home O2 therapy level.          Current oxygenation status:       SpO2: 98 %         O2 Device: Nasal cannula,  Oxygen Delivery: 1 LPM         Able to wean O2 this shift to keep sats > 92%:  No, please explain: Only able to downtitrate NC to 1L       Does patient use Home O2? No    3.  Tolerates ambulation and mobility near baseline: No, please explain: Not OOB this shift, pt not feeling well and anticipating hospice        How many times did the patient ambulate with nursing staff this shift? 0    Confusion this morning and assessment neuros significantly different from out-going RN report; RRT called. Neuros Q4 Hrs unchanged throughout shift. Palliative consult, pt anticipate to start comfort cares and hospice, plans pending. Lidocaine patch x3 to L neck and shoulder, pt request icy hot patch to L arm when available from pharmacy from next shift. Q2 Repo, pt declining turns this afternoon.  and Son-in-law bedside. Goldstein quantity sufficient. QID sugars. Monitoring.    Please review the Heart Failure Care Pathway for additional HF goal outcomes.    Asaf Shrestha RN  11/14/2018

## 2018-11-15 NOTE — TELEPHONE ENCOUNTER
I can follow her as needed while at the hospice care center unless the [facility] service MDs usually follow the patient.      OK for any and all hospice ordered, send me anything to sign.   The discharging MD from [facility] should be signing orders to get her into Hospice.     I am not in the clinic today, but can sign anything and fax tomorrow (friday) morning.   Let me know how they want to handle Class II comfort meds like Roxinol (morphine) which might require a hand signed written RX.   I can do whatever they desire. The discharging MD from [facility] can hopefully send her with this written order as part of her discharge.      Is she going to be going to be taken off all of her medications?    The discharge med orders will probably specify this.

## 2018-11-15 NOTE — PLAN OF CARE
"Problem: Patient Care Overview  Goal: Plan of Care/Patient Progress Review  Outcome: Declining  A/Ox4. C/o left arm pain, PRN icy hot patch and tylenol given with relief. Lung sounds are diminished. Patient refused intermittent turns. Pt expressed disbelief at hearing about declining health today. Stating, \"Just last week I was hosting a tea with my friends and today I was told I'm dying.\" Tele 100% AV paced and removed per comfort orders. Will continue with POC.       "

## 2018-11-15 NOTE — TELEPHONE ENCOUNTER
Dr. Shrestha,     Keyanna Upton, [facility] Nurse, is calling regarding Mckayla. Pt is currently at [facility], and tomorrow she will be transferring to [facility] in [place]. And Keyanna is calling wondering if you will follow pt for hospice while she is at hospice facility in [place]. Nurse will need a verbal okay for the hospice standing orders, please call her back at [phone] asap. And then she will fax form over for signature.

## 2018-11-15 NOTE — PROGRESS NOTES
SW:  D:  Spoke with Keyanna from [facility].  She states that patient and family and in agreement with a discharge to [facility].  They have a bed available for tomorrow.   Hospice will meet patient and family to complete the intake process at 13:00 tomorrow.  Call placed to [facility] to arrange for stretcher transport at 12:00 as patient is on oxygen that she is unable to manage herself.  Patient is also hospice and she needs closer monitoring in the back of the rig.  Faxed the facesheet and PCS to [facility].    P:  Will continue to follow.

## 2018-11-16 NOTE — PLAN OF CARE
Problem: Patient Care Overview  Goal: Plan of Care/Patient Progress Review  Outcome: Change based on patient need/priority Date Met: 11/15/18  A&O. DNR DNI. Comfort cares initiated overnight. Hospice consult today, decision to transfer pt to hospice facility at 1200 tomorrow. Comfort cares, pt assessed at least Q2 for comfort needs. Still collecting QID sugars and administering medications per order. No VS or routine RN assessments. Q2 hr repositioning however pt frequently refusing. Goldstein. Attempted to get pt up to chair asst 2 however assessed to be unsafe to complete transfer; instead pt stood at bedside asst 2 for several minutes and then returned to bed. Denies pain. Lidocaine patch scheduled with icy hot available. Declined tylenol. 1 LPM O2 for comfort. PIV removed. Family bedside for most of today. Continue to monitor for comfort needs, discharge tomorrow.

## 2018-11-16 NOTE — DISCHARGE SUMMARY
[facility]  Discharge Summary        Mckayla Oconnell MRN# [id number]   YOB: 1927 Age: 89 year old     Date of Admission:  11/11/2018  Date of Discharge:  11/16/2018  Admitting Physician:  Edilberto Mack DO  Discharge Physician: Trevor Grace MD  Discharging Service: Hospitalist     Primary Provider:  Ander Shrestha  Primary Care Physician Phone Number: [phone]         Discharge Diagnoses/Problem Oriented Hospital Course (Providers):    Mckayla Oconnell was admitted on 11/11/2018 by Edilberto Mack DO and I would refer you to their history and physical.  The following problems were addressed during her hospitalization:    Mckayla Oconnell is a 89 year old female with nonischemic cardiomyopathy with ejection fraction between 10-35%, CKD stage III-IV, dyslipidemia, chronic arthritis, peripheral arterial disease, who was admitted on 11/11/2018 for nausea, confusion and hypoxia and determined to have CHF exacerbation and acute hypercapnic respiratory failure, hyperkalemia.     Nonischemic cardiomyopathy with ejection fraction between 10-35%  Acute hypercapnic respiratory failure secondary to above  Lymphedema  Moderate aortic stenosis, moderate tricuspid regurgitation, moderate mitral regurgitation. Biventricular pacemaker since 01/2017. Long-standing patient of Dr. Scott in [facility]. EKG showed a paced rhythm, troponin within normal limits. Chest x-ray with pulmonary edema. Received 40 mg IV in the ED. Echocardiogram basically unchanged from previous, possible worse tricuspid regurgitation. Serial troponins negative. VBG with some respiratory acidosis, mild improvement on bipap  -Hypertensives have been on hold given hypotension  -Etiology consultation obtained.  Attempts made at adjusting medications but patient was unable to tolerate.  Progressively worse functional status.    -Patient and family elected to pursue  palliative care and then hospice.  Discharge to [facility].  -Transitioned to comfort care on the evening of 11/14/18.  -Acetaminophen, Haldol, lorazepam, liquid hydromorphone available as needed for comfort  -Atropine as needed for secretions  -Bowel regimen to prevent constipation     Subacute metabolic encephalopathy  Subacute delirium  Elated to multiple factors including advanced age, probable underlying cerebrovascular disease, hospitalization, severe comorbidities of kidney disease, cardiomyopathy.     Chronic kidney disease stage III-IV  Reduced renal function with attempts at diuresis, prohibiting further treatment of heart failure.     Hyperkalemia  In the setting of acute kidney injury.  Shifted with insulin and glucose.  Some initial improvement with albumin/Lasix combination.  -No further treatment will be pursued     Dyslipidemia  No need to continue statin, aspirin     Hypothyroidism  No need to continue Synthroid     Cutaneous candidiasis  Does not appear to be bothered by this.  -May continue miconazole if it adds benefit to her care     Urinary obstruction  Goldstein catheter placed for obstruction.  Can maintain in place for comfort care.     Hypoglycemia  Occurred after aggressive insulin administration to treat hyperkalemia.  Initially needed D10 infusion.  Resolved after she began eating again.  We will not require any additional treatment and hospice.     Active Diet Order      Combination Diet Dysphagia Diet Level 2: Mechan Altered; Nectar Thickened Liquids (pre-thickened or use instant food thickener) (No straws)     Code Status: DNR/DNI comfort care     Disposition: Expected discharge to hospice facility on Friday, 11/16/18.            Code Status:      DNR / DNI         Important Results:      See below         Pending Results:        Unresulted Labs Ordered in the Past 30 Days of this Admission     Date and Time Order Name Status Description    11/11/2018 7452 Blood culture  Preliminary     11/11/2018 1343 Blood culture Preliminary                Discharge Instructions and Follow-Up:      Follow-up Appointments     Follow-up and recommended labs and tests        [place] hospice enrollment on 11/16/18                         Discharge Disposition:      Discharged to [facility].         Discharge Medications:        Current Discharge Medication List      START taking these medications    Details   acetaminophen (TYLENOL) 650 MG Suppository Place 1 suppository (650 mg) rectally every 4 hours as needed for fever or mild pain (Do not exceed 4000 mg total acetaminophen per day.) Unwrap prior to insertion.  Qty: 12 suppository, Refills: 1    Associated Diagnoses: End of life care      bisacodyl (DULCOLAX) 10 MG Suppository Unwrap and insert 1 suppository rectally twice daily as needed for constipation.  Qty: 12 suppository, Refills: 1    Associated Diagnoses: End of life care      haloperidol (HALDOL) 0.5 MG tablet Take 1-2 tablets (0.5-1 mg) by mouth, place under tongue or insert rectally every 6 hours as needed for agitation (nausea)  Qty: 30 tablet, Refills: 1    Associated Diagnoses: End of life care      HYDROmorphone, HIGH CONC, (DILAUDID) 10 mg/mL LIQD oral Take 0.1-0.2 mLs (1-2 mg) by mouth or place under tongue every 2 hours as needed for moderate to severe pain (and/or  shortness of breath)  Qty: 30 mL, Refills: 0    Associated Diagnoses: End of life care      LORazepam (ATIVAN) 0.5 MG tablet Take 0.5-1 tablets (0.25-0.5 mg) by mouth, place under tongue or insert rectally every 4 hours as needed for anxiety (restlessness)  Qty: 30 tablet, Refills: 1    Associated Diagnoses: End of life care      MEDICATION INSTRUCTION If care facility cannot accept or use ranges, facility is instructed to use lower end of dosing range    Associated Diagnoses: End of life care      sennosides (SENOKOT) 8.6 MG tablet Take 1-2 tablets by mouth 2 times daily  Qty: 30 tablet, Refills:  1    Associated Diagnoses: End of life care         CONTINUE these medications which have NOT CHANGED    Details   prednisoLONE acetate (PRED FORTE) 1 % ophthalmic suspension Place 1 drop into the right eye every evening   Qty: 1 Bottle         STOP taking these medications       aspirin 81 MG tablet Comments:   Reason for Stopping:         calcium citrate (CALCITRATE) 950 MG tablet Comments:   Reason for Stopping:         carvedilol (COREG) 25 MG tablet Comments:   Reason for Stopping:         fluticasone (FLONASE) 50 MCG/ACT spray Comments:   Reason for Stopping:         hydrALAZINE (APRESOLINE) 25 MG tablet Comments:   Reason for Stopping:         isosorbide mononitrate (IMDUR) 30 MG 24 hr tablet Comments:   Reason for Stopping:         levothyroxine (SYNTHROID/LEVOTHROID) 112 MCG tablet Comments:   Reason for Stopping:         Lutein 20 MG CAPS Comments:   Reason for Stopping:         MELATONIN PO Comments:   Reason for Stopping:         simvastatin (ZOCOR) 20 MG tablet Comments:   Reason for Stopping:         torsemide (DEMADEX) 5 MG tablet Comments:   Reason for Stopping:         traMADol (ULTRAM) 50 MG tablet Comments:   Reason for Stopping:         traZODone (DESYREL) 50 MG tablet Comments:   Reason for Stopping:         VITAMIN D, CHOLECALCIFEROL, PO Comments:   Reason for Stopping:                    Allergies:         Allergies   Allergen Reactions     Atenolol Anaphylaxis     Hydrochlorothiazide      hyponatremia     Pollen Extract             Consultations This Hospital Stay:      Consultation during this admission received from cardiology, nephrology and palliative care.          Discharge Orders for Skilled Facility (from Discharge Orders):        After Care Instructions     Activity       Your activity upon discharge: activity as tolerated            Diet       Follow this diet upon discharge: Combination Diet Dysphagia Diet Level 2: Mechan Altered; Nectar Thickened Liquids (pre-thickened or use  instant food thickener) (No straws)                           Rehab orders for Skilled Facility (from Discharge Orders):               Discharge Time:      Less than 30 minutes.        Image Results From This Hospital Stay (For Non-EPIC Providers):        Results for orders placed or performed during the hospital encounter of 11/11/18   Chest XR,  PA & LAT    Narrative    CHEST TWO VIEWS   11/11/2018 10:15 AM     HISTORY: Nausea.     COMPARISON: 1/19/2017      Impression    IMPRESSION: Lungs are slightly hypoinflated. Patchy bilateral  perihilar and right greater than left basilar opacities are present  which may represent edema, aspiration, or infection. Probable trace  right pleural effusion. Cardiac silhouette is enlarged, unchanged.  Triple lead cardiac pacing device is in unchanged position. Upper  lumbar (approximate L1) compression fracture is present with  approximately 80% height loss, new compared to 1/19/2017.    BREE CORDERO MD   CT Head w/o Contrast    Narrative    CT SCAN OF THE HEAD WITHOUT CONTRAST   11/11/2018 2:37 PM     HISTORY: Nausea, some confusion.     TECHNIQUE: Axial images of the head and coronal reformations without  IV contrast material. Radiation dose for this scan was reduced using  automated exposure control, adjustment of the mA and/or kV according  to patient size, or iterative reconstruction technique.    COMPARISON: None.    FINDINGS: Mild volume loss is present. White matter hypoattenuation  likely represents chronic small vessel ischemic change. No evidence of  acute ischemia, hemorrhage, mass, mass effect, or hydrocephalus. The  visualized calvarium, skull base, and paranasal sinuses are  unremarkable. Right globe is small with peripheral choroidal layer and  lateral calcifications with scleral banding and calcification along  the expected location of the lens. Left lens replacement noted. There  is trace opacification of the bilateral mastoid cavities,  likely  inflammatory.      Impression    IMPRESSION: No acute intracranial abnormality.    BREE CORDERO MD   CT Cervical Spine w/o Contrast    Narrative    CT CERVICAL SPINE WITHOUT CONTRAST   11/11/2018 2:37 PM     HISTORY: Neck pain.      TECHNIQUE: Axial images of the cervical spine were obtained without  intravenous contrast. Multiplanar reformations were performed.  Radiation dose for this scan was reduced using automated exposure  control, adjustment of the mA and/or kV according to patient size, or  iterative reconstruction technique.    COMPARISON: None.    FINDINGS: Alignment is significant for loss of the normal lordosis  with kyphosis centered at C4-5. There is grade 1 anterolisthesis of C3  on C4 and C4 on C5. Severe degenerative disc disease is present at  C5-6 and C6-7. Multilevel severe facet arthropathy is present most  severely affecting the upper cervical spine, particularly at C3-4.  There is fusion of the bilateral C4-5 facet joints. No evidence of  acute fracture or traumatic subluxation. No evidence of acute  traumatic disc herniation or epidural hematoma. There are severe  atherosclerotic calcifications of the bilateral carotid bifurcations.  Paraspinal soft tissues also demonstrate septal thickening and  scarring/interstitial/fibrotic opacities at the visualized lung  apices. No definite thyroid gland is identified.      Impression    IMPRESSION: No evidence of acute fracture or subluxation in the  cervical spine. Severe degenerative change.    BREE CORDERO MD   XR Chest Port 1 View    Narrative    CHEST PORTABLE ONE VIEW   11/13/2018 9:25 AM     HISTORY: Hypoxia.     COMPARISON: Chest x-ray 11/11/2018.      Impression    IMPRESSION: Portable view of the chest is performed. Pulmonary  vascular congestion seen on prior exam appears mildly improved.  Airspace opacity retrocardiac region and right lung base could  indicate persistent mild edema. Heart remains enlarged. Implantable  cardiac  device with 3 leads appears unchanged in position. No  pneumothorax. No obvious pleural effusions.    ELIO QUINONES MD           Most Recent Lab Results In EPIC (For Non-EPIC Providers):    Most Recent 3 CBC's:  Recent Labs   Lab Test  11/14/18   0622  11/13/18   0602  11/12/18   0523   WBC  4.7  4.0  3.8*   HGB  9.4*  9.1*  9.5*   MCV  94  96  97   PLT  99*  110*  106*      Most Recent 3 BMP's:  Recent Labs   Lab Test  11/14/18   0622  11/13/18   1710  11/13/18   0955  11/13/18   0602   NA  127*  131*  131*  130*   POTASSIUM  5.7*   --   5.5*  5.5*   CHLORIDE  96   --   98  98   CO2  26   --   29  28   BUN  58*   --   54*  52*   CR  1.84*  1.81*  1.79*  1.78*   ANIONGAP  5   --   4  4   SHOBHA  8.0*   --   8.3*  8.1*   GLC  95   --   75  106*     Most Recent 3 Troponin's:  Recent Labs   Lab Test  11/11/18   2155  11/11/18   1819  11/11/18   1448   TROPI  0.031  0.031  0.030     Most Recent 3 INR's:  Recent Labs   Lab Test  09/01/16   0729   INR  1.12     Most Recent 2 LFT's:  Recent Labs   Lab Test  11/14/18   0622  11/13/18   0602   AST  23  27   ALT  34  34   ALKPHOS  61  51   BILITOTAL  0.3  0.3     Most Recent Cholesterol Panel:  Recent Labs   Lab Test  04/20/18   0815   CHOL  109   LDL  55   HDL  36*   TRIG  90     Most Recent 6 Bacteria Isolates From Any Culture (See EPIC Reports for Culture Details):  Recent Labs   Lab Test  11/11/18   1450  11/11/18   1448  11/15/16   1605  06/22/16   1126  07/23/13   1005   CULT  No growth after 5 days  No growth after 5 days  Heavy growth Normal orly  <10,000 colonies/mL mixed urogenital orly  Susceptibility testing not routinely done    50,000 to 100,000 colonies/mL Proteus mirabilis     Most Recent TSH, T4 and HgbA1c:  Recent Labs   Lab Test  11/12/18   0920  11/12/18   0700   01/18/18   0932   TSH   --   3.32   < >  7.30*   T4   --    --    --   1.23   A1C  5.3   --    --    --     < > = values in this interval not displayed.

## 2018-11-16 NOTE — PROGRESS NOTES
SW:  D:  Received discharge orders for patient.  Patient will discharge to [facility] today via [facility] stretcher at 13:00.  Call placed to update [facility] and faxed the orders.  Patient and family informed of the plan and in agreement to the plan.

## 2018-11-16 NOTE — PLAN OF CARE
Problem: Patient Care Overview  Goal: Plan of Care/Patient Progress Review  Outcome: Declining  Pt is comfort cares. Turn and repo q2, pt refused at times. Politely declined tylenol. No icyhot patch in place. Lidocaine patches in place on back/shoulder. BG 82 and 77 over night. Goldstein catheter in place. Plan to transfer to [facility] today at noon via [facility]. Daughter updated with plan of care. Will continue to monitor.

## 2018-11-16 NOTE — PROGRESS NOTES
Pt disenrolled from TELEMANAGEMENT since plan is to be discharged to hospice today.    CARMELLA Miller 8:47 AM 11/16/2018

## 2018-11-16 NOTE — PROGRESS NOTES
Pt discharged to Hospice today via stretcher. Pt belongings including blue pillow, glasses, and walker sent with patient. Medications accounted for and also sent. Daughter updated this morning and planning to meet pt at facility. Goldstein remains in place, on 1L O2, no pain at this time.

## 2018-11-19 ENCOUNTER — PATIENT OUTREACH (OUTPATIENT)
Dept: CARE COORDINATION | Facility: CLINIC | Age: 83
End: 2018-11-19

## 2018-11-19 NOTE — PROGRESS NOTES
Clinic Care Coordination Contact    Situation: Patient chart reviewed by care coordinator.    Background:Pt was in [facility] from 11/11/2018 to 11/16/2018 with diagnoses of  Acute on Chronic CHF, Heart Failure.      Assessment: Pt was admitted to [facility].  PCP agreed to continue to follow.  [facility] clinic involvement .       Plan/Recommendations: Due to the involvement of a number of providers and [facility] staff, pt is not an appropriate candidate for primary clinic care coordination.  No further outreaches will be made at this time unless a new referral is made or a change in the pt's status occurs.       Irvin Estrada [facility]  Clinic Care Coordinator  [facility]  [phone]  [email or web address]

## 2018-11-20 ENCOUNTER — CARE COORDINATION (OUTPATIENT)
Dept: CARDIOLOGY | Facility: CLINIC | Age: 83
End: 2018-11-20

## 2018-11-20 NOTE — PROGRESS NOTES
Late entry: Spoke with pt's son-in-law, Calixto, yesterday, 11/19/18, and was informed that pt passed away on 11/18/18.   Message sent to [facility] notifying of pt's death on 11/19/18.    CARMELLA Miller 2:01 PM 11/20/2018

## 2018-11-23 ENCOUNTER — MEDICAL CORRESPONDENCE (OUTPATIENT)
Dept: HEALTH INFORMATION MANAGEMENT | Facility: CLINIC | Age: 83
End: 2018-11-23

## 2018-11-23 ENCOUNTER — TELEPHONE (OUTPATIENT)
Dept: INTERNAL MEDICINE | Facility: CLINIC | Age: 83
End: 2018-11-23

## 2018-11-23 NOTE — TELEPHONE ENCOUNTER
Dr. Shrestha,     Pt passed away on Sunday, 11/18. And [facility] in [place] is calling requesting the 'cause of death' completed online. It needs to be done today, because services are tomorrow.

## 2018-11-23 NOTE — TELEPHONE ENCOUNTER
I completed the death certificate online.      Please let them know.     The web site said the [place] Dept of Health Records office will be closed on 11/22 and 11/23 so it may not be formally processed until Monday.      If they need some sort of other documentation or temporary paperwork, then send it to me, ( if applicable).     Sympathy card sent to family.     Close encounter if done.

## 2018-12-03 ENCOUNTER — MEDICAL CORRESPONDENCE (OUTPATIENT)
Dept: HEALTH INFORMATION MANAGEMENT | Facility: CLINIC | Age: 83
End: 2018-12-03

## 2018-12-10 ENCOUNTER — CARE COORDINATION (OUTPATIENT)
Dept: CARDIOLOGY | Facility: CLINIC | Age: 83
End: 2018-12-10

## 2018-12-10 NOTE — PROGRESS NOTES
Pt's daughter, Irina, requested JOI Little complete Health Care Provider portion of FMLA forms .  JOI Little completed and signed Health Care Provider portion of FMLA form for Irina.  Called and spoke with Irina, reviewed that will fax completed Health Care Provider portion of FMLA form and discharge summary from hospital stay 11/11/18-11/16/18 to fax # provided ([phone], Attn: Yue Hays).  Reviewed that will also mail copy of completed form.  Irina requested form be mailed to: [id number] [place] [address].  Copy of complted form sent to guanaco Miller RN 2:24 PM 12/10/2018

## 2021-04-20 NOTE — ED AVS SNAPSHOT
Emergency Department    [address] [facility] [address]    Phone:  [phone]    Fax:  [phone]                                       Mckayla Oconnell   MRN: [id number]    Department:   Emergency Department   Date of Visit:  11/6/2018           After Visit Summary Signature Page     I have received my discharge instructions, and my questions have been answered. I have discussed any challenges I see with this plan with the nurse or doctor.    ..........................................................................................................................................  Patient/Patient Representative Signature      ..........................................................................................................................................  Patient Representative Print Name and Relationship to Patient    ..................................................               ................................................  Date                                   Time    ..........................................................................................................................................  Reviewed by Signature/Title    ...................................................              ..............................................  Date                                               Time          22EPIC Rev 08/18         Use Enhanced Medication Counseling?: No

## 2022-12-24 NOTE — TELEPHONE ENCOUNTER
Yes, she is still driving. Usually drives alone. No current issues or concerns about her driving. Please complete with recommendations.     Ok to mail form back to home address so that she may finish and sign it.     Christina GUILLEN RN, BSN, PHN           Yes

## 2024-04-11 NOTE — TELEPHONE ENCOUNTER
Prescription(s) sent electronically to specified pharmacy.    activity pattern changes/muscle tension

## 2025-03-31 NOTE — PLAN OF CARE
Problem: Patient Care Overview  Goal: Plan of Care/Patient Progress Review  OT: Not appropriate for therapies at this time per RN due to medical issues and level of alertness. Will reschedule OT evaluation for tomorrow.       yes

## (undated) RX ORDER — BUPIVACAINE HYDROCHLORIDE 2.5 MG/ML
INJECTION, SOLUTION EPIDURAL; INFILTRATION; INTRACAUDAL
Status: DISPENSED
Start: 2017-01-19

## (undated) RX ORDER — ONDANSETRON 2 MG/ML
INJECTION INTRAMUSCULAR; INTRAVENOUS
Status: DISPENSED
Start: 2017-01-19

## (undated) RX ORDER — HEPARIN SODIUM 1000 [USP'U]/ML
INJECTION, SOLUTION INTRAVENOUS; SUBCUTANEOUS
Status: DISPENSED
Start: 2017-01-19

## (undated) RX ORDER — FENTANYL CITRATE 50 UG/ML
INJECTION, SOLUTION INTRAMUSCULAR; INTRAVENOUS
Status: DISPENSED
Start: 2017-01-19